# Patient Record
Sex: FEMALE | Race: WHITE | NOT HISPANIC OR LATINO | Employment: UNEMPLOYED | URBAN - METROPOLITAN AREA
[De-identification: names, ages, dates, MRNs, and addresses within clinical notes are randomized per-mention and may not be internally consistent; named-entity substitution may affect disease eponyms.]

---

## 2020-01-30 ENCOUNTER — TELEPHONE (OUTPATIENT)
Dept: HEMATOLOGY/ONCOLOGY | Facility: CLINIC | Age: 35
End: 2020-01-30

## 2020-02-06 ENCOUNTER — TELEPHONE (OUTPATIENT)
Dept: HEMATOLOGY/ONCOLOGY | Facility: CLINIC | Age: 35
End: 2020-02-06

## 2020-02-06 NOTE — TELEPHONE ENCOUNTER
----- Message from Arthur Truong sent at 2/6/2020  3:44 PM CST -----  Contact: pt   Type:  Patient Returning Call    Who Called: pt   Who Left Message for Patient:nurs   Does the patient know what this is regarding?:to schedule an appt   Would the patient rather a call back or a response via [facility]? Phone   Best Call Back Number: [phone]  Additional Information: #

## 2020-07-08 ENCOUNTER — TELEPHONE (OUTPATIENT)
Dept: HEMATOLOGY/ONCOLOGY | Facility: CLINIC | Age: 35
End: 2020-07-08

## 2020-07-08 NOTE — TELEPHONE ENCOUNTER
----- Message from Lali Griffiths NP sent at 7/8/2020  8:26 AM CDT -----  Regarding: genetic testing  Argelia- please contact pt and ask her to check with - her insurance to see if they will pay for genetic testing.     Thanks

## 2020-07-08 NOTE — PROGRESS NOTES
Patient ID: Gina Garcia is a 34 y.o. female.    Chief Complaint: Genetic Evaluation (family history of breast cancer)      HPI: Patient presents for consideration of genetic testing. Dr. Lukas Wright referred pt.    Risk factors identified:     Menarche at 15 y/o  G 2 P 2  First pregnancy at 25 y/o  LMP:6/29/2020  Estrogen: fertility meds for 1 month  Radiation to the neck or chest wall-none  Prior breast biopsies or atypical hyperplasia- none    ETOH: rare     FH: mother breast cancer at 44 y/o. Maternal cousins X 2 breast cancer- ? Age. Paternal grandmother breast cancer in her 60's    Body mass index is 29.23 kg/m².    Review of Systems   Constitutional: Negative.    HENT: Negative.    Eyes: Negative.    Respiratory: Negative.    Cardiovascular: Negative.    Gastrointestinal: Negative.         No reflux   Endocrine: Negative.    Genitourinary: Negative.    Musculoskeletal: Negative.    Skin: Negative.    Allergic/Immunologic: Negative.    Neurological: Negative.    Hematological: Negative.  Negative for adenopathy.   Psychiatric/Behavioral: Negative.      Breast: Pt denies any breast pain, nipple discharge, or palpable mass. No prior trauma or bruising. No breast surgeries or abnormalities.    Current Outpatient Medications   Medication Sig Dispense Refill    SUMAtriptan-naproxen (TREXIMET)  mg Tab TAKE 1 TABLET BY MOUTH EVERY 2 HOURS AS NEEDED MIGRAINE *max 2/day* **MAY CAUSE DROWSINESS**      buPROPion (WELLBUTRIN XL) 150 MG TB24 tablet Take 150 mg by mouth every morning.  2    clonazePAM (KLONOPIN) 0.5 MG tablet   2    fluticasone (FLONASE) 50 mcg/actuation nasal spray 2 sprays by Each Nare route once daily. 1 Bottle 0    promethazine-dextromethorphan (PROMETHAZINE-DM) 6.25-15 mg/5 mL Syrp Take 5 mLs by mouth nightly as needed (Cough). 120 mL 0    TREXIMET  mg Tab   11     No current facility-administered medications for this visit.        Review of patient's allergies indicates:  No  Known Allergies    Past Medical History:   Diagnosis Date    Anxiety     Migraines        Past Surgical History:   Procedure Laterality Date    ACHILLES TENDON SURGERY      EXTERNAL EAR SURGERY      cosmetic       Family History   Problem Relation Age of Onset    Breast cancer Paternal Grandmother     Breast cancer Mother         45    Colon cancer Neg Hx     Ovarian cancer Neg Hx     Thrombophilia Neg Hx        Social History     Socioeconomic History    Marital status:      Spouse name: Not on file    Number of children: Not on file    Years of education: Not on file    Highest education level: Not on file   Occupational History    Not on file   Social Needs    Financial resource strain: Not on file    Food insecurity     Worry: Not on file     Inability: Not on file    Transportation needs     Medical: Not on file     Non-medical: Not on file   Tobacco Use    Smoking status: Former Smoker   Substance and Sexual Activity    Alcohol use: No     Alcohol/week: 0.0 standard drinks    Drug use: No    Sexual activity: Yes     Birth control/protection: None   Lifestyle    Physical activity     Days per week: Not on file     Minutes per session: Not on file    Stress: Not on file   Relationships    Social connections     Talks on phone: Not on file     Gets together: Not on file     Attends Zoroastrianism service: Not on file     Active member of club or organization: Not on file     Attends meetings of clubs or organizations: Not on file     Relationship status: Not on file   Other Topics Concern    Not on file   Social History Narrative    Not on file       There were no vitals filed for this visit.    Physical Exam  Constitutional:       Appearance: She is well-developed.   HENT:      Head: Normocephalic and atraumatic.      Right Ear: External ear normal.      Left Ear: External ear normal.      Mouth/Throat:      Pharynx: No oropharyngeal exudate.   Eyes:      General: No scleral icterus.         Right eye: No discharge.         Left eye: No discharge.      Conjunctiva/sclera: Conjunctivae normal.      Pupils: Pupils are equal, round, and reactive to light.   Neck:      Musculoskeletal: Normal range of motion and neck supple.      Thyroid: No thyromegaly.   Cardiovascular:      Rate and Rhythm: Normal rate and regular rhythm.      Heart sounds: Normal heart sounds.   Pulmonary:      Effort: Pulmonary effort is normal.      Breath sounds: Normal breath sounds.   Chest:      Breasts:         Right: No inverted nipple, mass, nipple discharge, skin change or tenderness.         Left: No inverted nipple, mass, nipple discharge, skin change or tenderness.   Abdominal:      General: Bowel sounds are normal.      Palpations: Abdomen is soft.   Musculoskeletal: Normal range of motion.      Right shoulder: She exhibits no crepitus and normal strength.   Lymphadenopathy:      Head:      Right side of head: No submental, submandibular, tonsillar, preauricular, posterior auricular or occipital adenopathy.      Left side of head: No submental, submandibular, tonsillar, preauricular, posterior auricular or occipital adenopathy.      Cervical: No cervical adenopathy.      Right cervical: No superficial or posterior cervical adenopathy.     Left cervical: No superficial or posterior cervical adenopathy.      Upper Body:      Right upper body: No supraclavicular adenopathy.      Left upper body: No supraclavicular adenopathy.   Skin:     General: Skin is warm and dry.      Coloration: Skin is not pale.      Findings: No erythema or rash.   Neurological:      Mental Status: She is alert and oriented to person, place, and time.      Deep Tendon Reflexes: Reflexes are normal and symmetric.   Psychiatric:         Behavior: Behavior normal.         Thought Content: Thought content normal.         Judgment: Judgment normal.       [facility] mammogram 4 years ago    Assessment & Plan:  Family history of breast  cancer    Counseling and coordination of care    Counseling on health promotion and disease prevention    Health education/counseling      1. Negative clinical findings on exam.  2. Negative imaging- 4 years ago per pt report  3. Recommend pt start screening mammos at age 40 unless otherwise indicated by an abnormal exam or change in family history or noted mutation on genetic testing. We will plan to see her on a yearly basis for examination and update of family history and risk factors  4. Discussed modifiable risk factors such as diet and exercise. Reviewed diet and counseled pt regarding eating more fruits and vegetables throughout the day.  5. Diet/Weight goals- 10# wt loss  6. Exercise:has stationary bike at home- not using  7. Encouraged 30 minutes most days of the week. Explained benefits of losing a few pounds to decrease estrogen exposure from fat cells.  8. Discussed risks vs benefits of genetic testing.  Indications for testing: due to her family history of breast cancer. Explained process of drawing blood- filing with insurance- if denied pt will be notified and given the option of paying out of pocket.   9. Explained 3 possible answers to genetic testing.   - Negative - if testing is negative would proceed with MRI and Mammogram screenings alternating every 6 months with clinical exams.  - Positive - If positive for a mutation- depending on the gene identified - would likely be recommended to see surgeon and plastic surgeon to discuss risk reducing prophylactic lynnette mastectomies with reconstruction with or without TAHBSO depending on test results.  Indication for testing of other relatives explained.  - Variant of undetermined significance - usually does not change follow-up recommendations or screenings unless reclassified in the future as a definitive mutation.   Consent for testing signed after risks vs benefits explained. Will call pt with results.   10. Discussed use of tamoxifen for the reduction  of breast cancer risk- Information regarding risk reducing medications given to pt verbally and in writing. Pt declines at this time due to the potential for hot flashes and blood clots.   11. BSE technique was demonstrated and explained. Related what to look and feel for on exam monthly. Pt verbalized understanding and return demonstrated proper technique and knowledge of what to look for on self-exam. Pt instructed to call if notes any changes. Contact information given.   One hour- 60 minutes was spent with this patient and 75% was spent identifying risk factors, reviewing records and educating pt regarding risk reduction strategies and planning future screenings.            Information about strategies to decrease breast cancer risk factors from Up to Date given to the pt.

## 2020-07-15 ENCOUNTER — OFFICE VISIT (OUTPATIENT)
Dept: HEMATOLOGY/ONCOLOGY | Facility: CLINIC | Age: 35
End: 2020-07-15
Payer: OTHER GOVERNMENT

## 2020-07-15 ENCOUNTER — TELEPHONE (OUTPATIENT)
Dept: HEMATOLOGY/ONCOLOGY | Facility: CLINIC | Age: 35
End: 2020-07-15

## 2020-07-15 VITALS — WEIGHT: 149.69 LBS | BODY MASS INDEX: 29.23 KG/M2

## 2020-07-15 DIAGNOSIS — Z71.89 COUNSELING ON HEALTH PROMOTION AND DISEASE PREVENTION: ICD-10-CM

## 2020-07-15 DIAGNOSIS — Z71.89 COUNSELING AND COORDINATION OF CARE: ICD-10-CM

## 2020-07-15 DIAGNOSIS — Z71.9 HEALTH EDUCATION/COUNSELING: ICD-10-CM

## 2020-07-15 DIAGNOSIS — Z80.3 FAMILY HISTORY OF BREAST CANCER: Primary | ICD-10-CM

## 2020-07-15 PROCEDURE — 99999 PR PBB SHADOW E&M-EST. PATIENT-LVL II: CPT | Mod: PBBFAC,,, | Performed by: NURSE PRACTITIONER

## 2020-07-15 PROCEDURE — 99999 PR PBB SHADOW E&M-EST. PATIENT-LVL II: ICD-10-PCS | Mod: PBBFAC,,, | Performed by: NURSE PRACTITIONER

## 2020-07-15 PROCEDURE — 99205 PR OFFICE/OUTPT VISIT, NEW, LEVL V, 60-74 MIN: ICD-10-PCS | Mod: S$PBB,,, | Performed by: NURSE PRACTITIONER

## 2020-07-15 PROCEDURE — 99212 OFFICE O/P EST SF 10 MIN: CPT | Mod: PBBFAC | Performed by: NURSE PRACTITIONER

## 2020-07-15 PROCEDURE — 99205 OFFICE O/P NEW HI 60 MIN: CPT | Mod: S$PBB,,, | Performed by: NURSE PRACTITIONER

## 2020-07-15 RX ORDER — SUMATRIPTAN AND NAPROXEN SODIUM 85; 500 MG/1; MG/1
TABLET, FILM COATED ORAL
COMMUNITY
Start: 2019-10-18 | End: 2022-03-03

## 2020-07-23 ENCOUNTER — TELEPHONE (OUTPATIENT)
Dept: HEMATOLOGY/ONCOLOGY | Facility: CLINIC | Age: 35
End: 2020-07-23

## 2020-08-10 ENCOUNTER — DOCUMENTATION ONLY (OUTPATIENT)
Dept: SURGERY | Facility: CLINIC | Age: 35
End: 2020-08-10

## 2020-08-10 DIAGNOSIS — Z15.89 BIALLELIC MUTATION OF CHEK2 GENE: Primary | ICD-10-CM

## 2020-08-10 DIAGNOSIS — Z15.09 BIALLELIC MUTATION OF CHEK2 GENE: Primary | ICD-10-CM

## 2020-08-10 DIAGNOSIS — Z15.01 BIALLELIC MUTATION OF CHEK2 GENE: Primary | ICD-10-CM

## 2020-08-10 NOTE — PROGRESS NOTES
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 Clinical Significance: High Cancer Risk This mutation is associated with increased cancer risk and should be regarded as clinically significant.     ADDITIONAL FINDINGS: VARIANT(S) OF UNCERTAIN SIGNIFICANCE (VUS) IDENTIFIED GENE INTERPRETATION VARIANT(S) OF UNCERTAIN SIGNIFICANCE UNCERTAIN CLINICAL SIGNIFICANCE There are currently insufficient data to determine if these variants cause increased cancer risk.     Variant noted in PMS2 c.1004A>G (p.Hja017Vyl) (aka N335S (1004A>G))  Details About Non-Clinically Significant Variants: All individuals     CHEK2-associated Cancer Risk:  This patient has been found to have a mutation in the CHEK2 gene. Women with CHEK2 mutations have a risk for breast cancer that is significantly increased over the 12.5% lifetime risk for women in the general population of the [place]. Men with CHEK2 mutations also have an increased risk for breast cancer.  Estimates of cancer risk for men and women with CHEK2 mutations vary widely and are strongly influenced by family history. In cases where there is no family history of one of these cancers, the risk for a patient with a CHEK2 mutation may be lower than in cases where that cancer has been diagnosed in one or more close relatives. Therefore, the family history of a patient should be considered when deciding on the most appropriate strategies to manage cancer risk, with more aggressive strategies targeted to patients with  significant family histories of related cancers.  Individuals with CHEK2 mutations may have an elevated risk for colorectal cancer, and the National Comprehensive Cancer Network (NCCN) has provided screening recommendations to address this possible risk.  Although many different CHEK2 mutations have been identified, estimated cancer risks for CHEK2 are currently based largely on studies of a single mutation (c.1100del) that is common in patients of  ancestry. These estimates may be modified as we learn more about other CHEK2 mutations in patients of varied ancestries.  Some studies have described a possible increased risk for a wide range of cancers in patients with CHEK2 mutations, including prostate, gastric, thyroid, hematological malignancies, testicular germ cell tumors, and other malignancies. However, these studies are not conclusive and there are currently no medical management guidelines to address these possible risks.  Although there are increased risks for cancer in men and women with mutations in CHEK2, there are interventions that may reduce these risks. Guidelines from the National Comprehensive Cancer Network (NCCN) that may apply are listed below. Since information about the cancer risks associated with CHEK2 mutations is relatively new, and there is still some uncertainty about the best ways to reduce these risks, it may be appropriate to interpret these results in consultation with cancer genetics experts in this emerging area of knowledge.       23%-48% risk up to age 80 for breast cancer    Possible increased risk for colon cancer.    Recommend to pt:    1. bilateral screening mammogram now  2. Appt with oncologist to discuss recommendations for follow-up. Explained to pt risk for breast cancer is not as high as if had BRCA muation. May recommend mammogram alternating with MRi of breast and clinical exams.   3. Recommend pt become established with GI to discuss colon cancer screening  initiation by 39 y/o or sooner if any one in her family is diagnosed below the age of 44 y/o  4. Recommend pt sister be tested for mutation and any other female relatives. Pt states she will discuss with her sister and has given us permission to copy her results and send with her sisters for testing reference purposes.

## 2020-08-10 NOTE — PROGRESS NOTES
NCCN Guidelines:    Annual screening mammogram (consider 3D mammography) and annual MRI with contrast beginning at age 40, or earlier based on family breast cancer history for women with mutations in following genes:  CLINTON  CHEK2  NBN  Breast Cancer Risk Management    Discussing the option of risk-reducing mastectomy based of family history of breast cancer for women with mutations in:  CLINTON  CHEK2  NBN  NF1  PALB2     Will also schedule pt appt with general surgery to discuss risk reducing mastectomies.

## 2020-08-13 ENCOUNTER — HOSPITAL ENCOUNTER (OUTPATIENT)
Dept: RADIOLOGY | Facility: HOSPITAL | Age: 35
Discharge: HOME | End: 2020-08-13
Attending: NURSE PRACTITIONER
Payer: OTHER GOVERNMENT

## 2020-08-13 ENCOUNTER — OFFICE VISIT (OUTPATIENT)
Dept: HEMATOLOGY/ONCOLOGY | Facility: CLINIC | Age: 35
End: 2020-08-13
Payer: OTHER GOVERNMENT

## 2020-08-13 VITALS
SYSTOLIC BLOOD PRESSURE: 131 MMHG | OXYGEN SATURATION: 99 % | HEIGHT: 57 IN | HEART RATE: 78 BPM | RESPIRATION RATE: 18 BRPM | TEMPERATURE: 99 F | BODY MASS INDEX: 33.54 KG/M2 | WEIGHT: 155.44 LBS | DIASTOLIC BLOOD PRESSURE: 78 MMHG

## 2020-08-13 VITALS — HEIGHT: 60 IN | WEIGHT: 149.69 LBS | BODY MASS INDEX: 29.39 KG/M2

## 2020-08-13 DIAGNOSIS — Z15.01 BIALLELIC MUTATION OF CHEK2 GENE WITHOUT DIAGNOSED MALIGNANCY: ICD-10-CM

## 2020-08-13 DIAGNOSIS — Z15.01 BIALLELIC MUTATION OF CHEK2 GENE: ICD-10-CM

## 2020-08-13 DIAGNOSIS — Z15.89 BIALLELIC MUTATION OF CHEK2 GENE WITHOUT DIAGNOSED MALIGNANCY: ICD-10-CM

## 2020-08-13 DIAGNOSIS — Z15.09 CHEK2-RELATED BREAST CANCER: ICD-10-CM

## 2020-08-13 DIAGNOSIS — C50.919 CHEK2-RELATED BREAST CANCER: ICD-10-CM

## 2020-08-13 DIAGNOSIS — Z15.09 BIALLELIC MUTATION OF CHEK2 GENE WITHOUT DIAGNOSED MALIGNANCY: ICD-10-CM

## 2020-08-13 DIAGNOSIS — Z15.89 CHEK2-RELATED BREAST CANCER: ICD-10-CM

## 2020-08-13 DIAGNOSIS — Z15.89 BIALLELIC MUTATION OF CHEK2 GENE: ICD-10-CM

## 2020-08-13 DIAGNOSIS — Z80.51 FAMILY HISTORY OF CANCER OF THE KIDNEY: Primary | ICD-10-CM

## 2020-08-13 DIAGNOSIS — Z15.02 CHEK2-RELATED BREAST CANCER: ICD-10-CM

## 2020-08-13 DIAGNOSIS — Z15.09 BIALLELIC MUTATION OF CHEK2 GENE: ICD-10-CM

## 2020-08-13 PROCEDURE — 99215 OFFICE O/P EST HI 40 MIN: CPT | Mod: S$PBB,,, | Performed by: INTERNAL MEDICINE

## 2020-08-13 PROCEDURE — 77067 MAMMO DIGITAL SCREENING BILAT WITH TOMOSYNTHESIS_CAD: ICD-10-PCS | Mod: 26,,, | Performed by: RADIOLOGY

## 2020-08-13 PROCEDURE — 77063 BREAST TOMOSYNTHESIS BI: CPT | Mod: 26,,, | Performed by: RADIOLOGY

## 2020-08-13 PROCEDURE — 99215 PR OFFICE/OUTPT VISIT, EST, LEVL V, 40-54 MIN: ICD-10-PCS | Mod: S$PBB,,, | Performed by: INTERNAL MEDICINE

## 2020-08-13 PROCEDURE — 99999 PR PBB SHADOW E&M-EST. PATIENT-LVL IV: CPT | Mod: PBBFAC,,, | Performed by: INTERNAL MEDICINE

## 2020-08-13 PROCEDURE — 77063 MAMMO DIGITAL SCREENING BILAT WITH TOMOSYNTHESIS_CAD: ICD-10-PCS | Mod: 26,,, | Performed by: RADIOLOGY

## 2020-08-13 PROCEDURE — 99999 PR PBB SHADOW E&M-EST. PATIENT-LVL IV: ICD-10-PCS | Mod: PBBFAC,,, | Performed by: INTERNAL MEDICINE

## 2020-08-13 PROCEDURE — 77067 SCR MAMMO BI INCL CAD: CPT | Mod: TC

## 2020-08-13 PROCEDURE — 99214 OFFICE O/P EST MOD 30 MIN: CPT | Mod: PBBFAC | Performed by: INTERNAL MEDICINE

## 2020-08-13 PROCEDURE — 77067 SCR MAMMO BI INCL CAD: CPT | Mod: 26,,, | Performed by: RADIOLOGY

## 2020-08-13 NOTE — PROGRESS NOTES
Subjective:       Patient ID: Gina Garcia is a 34 y.o. female.    Chief Complaint: Results    HPI 34-year-old female mother  of breast carcinoma age 45 patient underwent high risk genetic screening found to have CHEK2 positive mutation    Past Medical History:   Diagnosis Date    Anxiety     CHEK2-related breast cancer 2020    Migraines      Family History   Problem Relation Age of Onset    Breast cancer Paternal Grandmother     Cancer Paternal Grandmother         prostate    Breast cancer Mother 45        estrogen positive    Breast cancer Other     Breast cancer Other     Breast cancer Other     Breast cancer Other     Lung cancer Maternal Grandmother     Colon cancer Neg Hx         colon cancer    Ovarian cancer Neg Hx     Thrombophilia Neg Hx      Social History     Socioeconomic History    Marital status:      Spouse name: Not on file    Number of children: Not on file    Years of education: Not on file    Highest education level: Not on file   Occupational History    Not on file   Social Needs    Financial resource strain: Not on file    Food insecurity     Worry: Not on file     Inability: Not on file    Transportation needs     Medical: Not on file     Non-medical: Not on file   Tobacco Use    Smoking status: Former Smoker   Substance and Sexual Activity    Alcohol use: No     Alcohol/week: 0.0 standard drinks    Drug use: No    Sexual activity: Yes     Birth control/protection: None   Lifestyle    Physical activity     Days per week: Not on file     Minutes per session: Not on file    Stress: Not on file   Relationships    Social connections     Talks on phone: Not on file     Gets together: Not on file     Attends Druze service: Not on file     Active member of club or organization: Not on file     Attends meetings of clubs or organizations: Not on file     Relationship status: Not on file   Other Topics Concern    Not on file   Social History  Narrative    Not on file     Past Surgical History:   Procedure Laterality Date    ACHILLES TENDON SURGERY      EXTERNAL EAR SURGERY      cosmetic       Labs:  Lab Results   Component Value Date    WBC 11.05 (H) 11/30/2009    HGB 12.2 11/30/2009    HCT 36.1 (L) 11/30/2009    MCV 95.0 11/30/2009     11/30/2009     BMP  Lab Results   Component Value Date     08/26/2008    K 3.7 08/26/2008     08/26/2008    CO2 21 (L) 08/26/2008    BUN 8 08/26/2008    CREATININE 0.6 08/26/2008    CALCIUM 10.0 08/26/2008     Lab Results   Component Value Date    ALT 29 08/26/2008    AST 27 08/26/2008    ALKPHOS 64 08/26/2008    BILITOT 0.4 08/26/2008       No results found for: IRON, TIBC, FERRITIN, SATURATEDIRO  No results found for: [id number]  No results found for: FOLATE  Lab Results   Component Value Date    TSH 1.8 07/02/2008         Review of Systems   Constitutional: Negative for activity change, appetite change, chills, diaphoresis, fatigue, fever and unexpected weight change.   HENT: Negative for congestion, dental problem, drooling, ear discharge, ear pain, facial swelling, hearing loss, mouth sores, nosebleeds, postnasal drip, rhinorrhea, sinus pressure, sneezing, sore throat, tinnitus, trouble swallowing and voice change.    Eyes: Negative for photophobia, pain, discharge, redness, itching and visual disturbance.   Respiratory: Negative for cough, choking, chest tightness, shortness of breath, wheezing and stridor.    Cardiovascular: Negative for chest pain, palpitations and leg swelling.   Gastrointestinal: Negative for abdominal distention, abdominal pain, anal bleeding, blood in stool, constipation, diarrhea, nausea, rectal pain and vomiting.   Endocrine: Negative for cold intolerance, heat intolerance, polydipsia, polyphagia and polyuria.   Genitourinary: Negative for decreased urine volume, difficulty urinating, dyspareunia, dysuria, enuresis, flank pain, frequency, genital sores, hematuria,  menstrual problem, pelvic pain, urgency, vaginal bleeding, vaginal discharge and vaginal pain.   Musculoskeletal: Negative for arthralgias, back pain, gait problem, joint swelling, myalgias, neck pain and neck stiffness.   Skin: Negative for color change, pallor and rash.   Allergic/Immunologic: Negative for environmental allergies, food allergies and immunocompromised state.   Neurological: Negative for dizziness, tremors, seizures, syncope, facial asymmetry, speech difficulty, weakness, light-headedness, numbness and headaches.   Hematological: Negative for adenopathy. Does not bruise/bleed easily.   Psychiatric/Behavioral: Positive for dysphoric mood. Negative for agitation, behavioral problems, confusion, decreased concentration, hallucinations, self-injury, sleep disturbance and suicidal ideas. The patient is nervous/anxious. The patient is not hyperactive.        Objective:      Physical Exam  Vitals signs reviewed.   Constitutional:       General: She is not in acute distress.     Appearance: She is well-developed. She is not diaphoretic.   HENT:      Head: Normocephalic and atraumatic.      Right Ear: External ear normal.      Left Ear: External ear normal.      Nose: Nose normal.      Right Sinus: No maxillary sinus tenderness or frontal sinus tenderness.      Left Sinus: No maxillary sinus tenderness or frontal sinus tenderness.      Mouth/Throat:      Pharynx: No oropharyngeal exudate.   Eyes:      General: Lids are normal. No scleral icterus.        Right eye: No discharge.         Left eye: No discharge.      Conjunctiva/sclera: Conjunctivae normal.      Right eye: Right conjunctiva is not injected. No hemorrhage.     Left eye: Left conjunctiva is not injected. No hemorrhage.     Pupils: Pupils are equal, round, and reactive to light.   Neck:      Musculoskeletal: Normal range of motion and neck supple.      Thyroid: No thyromegaly.      Vascular: No JVD.      Trachea: No tracheal deviation.    Cardiovascular:      Rate and Rhythm: Normal rate.   Pulmonary:      Effort: Pulmonary effort is normal. No respiratory distress.      Breath sounds: No stridor.   Chest:      Chest wall: No tenderness.   Abdominal:      General: Bowel sounds are normal. There is no distension.      Palpations: Abdomen is soft. There is no hepatomegaly, splenomegaly or mass.      Tenderness: There is no abdominal tenderness. There is no rebound.   Musculoskeletal: Normal range of motion.         General: No tenderness.   Lymphadenopathy:      Cervical: No cervical adenopathy.      Upper Body:      Right upper body: No supraclavicular adenopathy.      Left upper body: No supraclavicular adenopathy.   Skin:     General: Skin is dry.      Findings: No erythema or rash.   Neurological:      Mental Status: She is alert and oriented to person, place, and time.      Cranial Nerves: No cranial nerve deficit.      Coordination: Coordination normal.   Psychiatric:         Behavior: Behavior normal.         Thought Content: Thought content normal.         Judgment: Judgment normal.             Assessment:      1. Family history of cancer of the kidney    2. CHEK2-related breast cancer    3. 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  Patient concerned over germ line mutation.  Is scheduled to see general surgery for possible prophylactic mastectomy.  In addition will have her seen by Medical Genetics further discussion of options.  She has had a concern over kidney cancer I have ordered ultrasound of her kidney.  I am not aware of this being associated with checked to but to relieve her anxiety of reasonable course of action.  At this time follow-up through surgery with plastic surgery consultation as well as Medical Genetics answered questions of family and copies of reports given to her 40 min face-to-face time coordination care greater 50% time face-to-face with  patient        Jak Queen Jr, MD FACP

## 2020-08-20 ENCOUNTER — OFFICE VISIT (OUTPATIENT)
Dept: SURGERY | Facility: CLINIC | Age: 35
End: 2020-08-20
Payer: OTHER GOVERNMENT

## 2020-08-20 VITALS — HEART RATE: 58 BPM | TEMPERATURE: 98 F | SYSTOLIC BLOOD PRESSURE: 125 MMHG | DIASTOLIC BLOOD PRESSURE: 81 MMHG

## 2020-08-20 DIAGNOSIS — Z15.09 BIALLELIC MUTATION OF CHEK2 GENE WITHOUT DIAGNOSED MALIGNANCY: Primary | ICD-10-CM

## 2020-08-20 DIAGNOSIS — Z15.89 BIALLELIC MUTATION OF CHEK2 GENE WITHOUT DIAGNOSED MALIGNANCY: Primary | ICD-10-CM

## 2020-08-20 DIAGNOSIS — Z15.01 BIALLELIC MUTATION OF CHEK2 GENE WITHOUT DIAGNOSED MALIGNANCY: Primary | ICD-10-CM

## 2020-08-20 PROCEDURE — 99999 PR PBB SHADOW E&M-EST. PATIENT-LVL III: ICD-10-PCS | Mod: PBBFAC,,, | Performed by: SURGERY

## 2020-08-20 PROCEDURE — 99204 PR OFFICE/OUTPT VISIT, NEW, LEVL IV, 45-59 MIN: ICD-10-PCS | Mod: S$PBB,,, | Performed by: SURGERY

## 2020-08-20 PROCEDURE — 99213 OFFICE O/P EST LOW 20 MIN: CPT | Mod: PBBFAC | Performed by: SURGERY

## 2020-08-20 PROCEDURE — 99999 PR PBB SHADOW E&M-EST. PATIENT-LVL III: CPT | Mod: PBBFAC,,, | Performed by: SURGERY

## 2020-08-20 PROCEDURE — 99204 OFFICE O/P NEW MOD 45 MIN: CPT | Mod: S$PBB,,, | Performed by: SURGERY

## 2020-08-20 NOTE — PROGRESS NOTES
[facility]  Breast Surgery  Department of General Surgery      Date of Visit:  2020    Referring [place]  [id number] [facility] [place] [address]    PCP:  Lukas Martinez MD    HIGH RISK    Gina Garcia is a 34 y.o. premenopausal female referred for evaluation of increased risk of breast cancer based on family history and + CHECK2 mutation.  Here today to discussed options of high risk screening and risk reduction.    Other breast cancer risk factors include family hx on mother's side, family hx on father's side, mom with breast CA, family hx of colon CA and family hx of Prostate CA.     Age of menarche was 15.    Age of menopause was NA.    Last menstrual period was 2020.    Patient denies hormonal therapy. IUD for 8 years - paraguard.     Patient is .    Age of first live birth was 23.    Patient did breast feed.       Family History is significant for the following:  Mother breast cancer at age 45, living, ER+  Paternal grandmother with breast cancer  4 maternal great aunts with breast cancer  Maternal and paternal grandfather with prostate cancer  Great uncle with kidney cancer  Also history of colon cancer, unknown family members    Social History:   Smoking:  never  Drinking:  Socially  Caffeine: daily     The following portions of the patient's history were reviewed and updated as appropriate: allergies, current medications, past family history, past medical history, past social history, past surgical history and problem list.    Review of Systems  Pertinent items are noted in HPI.      Objective:   /81   Pulse (!) 58   Temp 98 °F (36.7 °C) (Oral)     General: NAD  Chest: nonlabored breathing, no obvious deformity, no axillary LAD, no palpable masses, macromastia with ptosis   Heart: regular rate  Abdomen: soft, nondistended  Extremities: no edema noted      Imaging  Mammograms:  BIRADS 1 mammogram 2020       Assessment:     This is  a 34 y.o. female with an increased risk of breast cancer based on CHECK 2 mutation and family history.        Plan:   We discussed that she is at elevated risk for breast cancer based on family history and CHECK2 mutation.     We did discuss advanced screening with clinical breast exam every 6 months and MRI annually versus bilateral prophylactic mastectomy. There is lack of evidence supporting this as routinely recommended treatment; however, based on her significant family history, it is reasonable. The patient is requesting BPM with reconstruction for risk reduction.     We also discussed risk reduction options. Discussed that we recommend a healthy lifestyle.      Nutrition we recommend fresh fruits and vegetables and lean meats and avoidance of processed foods.    Exercise I recommend at least 30 minutes of cardiovascular exercise 4-5 times per week, even walking would have benefit.  Discussed that exercise can lower the relative risk of breast cancer by about 18-20%.  Also discussed that obesity is linked to higher risk of breast cancer, therefore exercise in important.    Discussed alcohol use and some studies suggest that increase alcohol intake may increase breast cancer risk.  Therefore, I do recommend limited alcohol intake.    Also discussed risk factors that are not modifiable, such as age at menarche, age at menopause, age at first pregnancy, and family history.     Will refer to plastic surgery and plan surgery accordingly.     Berkley Duckworth

## 2020-08-21 ENCOUNTER — TELEPHONE (OUTPATIENT)
Dept: RADIOLOGY | Facility: HOSPITAL | Age: 35
End: 2020-08-21

## 2020-08-28 ENCOUNTER — TELEPHONE (OUTPATIENT)
Dept: RADIOLOGY | Facility: HOSPITAL | Age: 35
End: 2020-08-28

## 2020-10-09 ENCOUNTER — TELEPHONE (OUTPATIENT)
Dept: GENETICS | Facility: CLINIC | Age: 35
End: 2020-10-09

## 2020-10-23 ENCOUNTER — TELEPHONE (OUTPATIENT)
Dept: RADIOLOGY | Facility: HOSPITAL | Age: 35
End: 2020-10-23

## 2020-10-26 ENCOUNTER — HOSPITAL ENCOUNTER (OUTPATIENT)
Dept: RADIOLOGY | Facility: HOSPITAL | Age: 35
Discharge: HOME | End: 2020-10-26
Attending: INTERNAL MEDICINE
Payer: OTHER GOVERNMENT

## 2020-10-26 DIAGNOSIS — Z80.51 FAMILY HISTORY OF CANCER OF THE KIDNEY: ICD-10-CM

## 2020-10-26 PROCEDURE — 76770 US RETROPERITONEAL COMPLETE: ICD-10-PCS | Mod: 26,,, | Performed by: RADIOLOGY

## 2020-10-26 PROCEDURE — 76770 US EXAM ABDO BACK WALL COMP: CPT | Mod: TC

## 2020-10-26 PROCEDURE — 76770 US EXAM ABDO BACK WALL COMP: CPT | Mod: 26,,, | Performed by: RADIOLOGY

## 2020-10-30 ENCOUNTER — TELEPHONE (OUTPATIENT)
Dept: SURGERY | Facility: CLINIC | Age: 35
End: 2020-10-30

## 2020-10-30 NOTE — TELEPHONE ENCOUNTER
"Spoke with Suleiman Garcia/Zulema in regards to upcoming sx is not approve at this time due to referral(Dr Almaraz was  not rec'd. Informed spouse contacted Dr Lukas Martinez-PCP office, staff member was not available at the time per Alix and a message was left with Alix/Central Scheduling a referral(Dr Almaraz) is needed "STAT" prior to upcoming sx on 11/16/2020 [facility]. Contact information was provided. Alix verbalized an understanding  "

## 2020-11-02 ENCOUNTER — TELEPHONE (OUTPATIENT)
Dept: SURGERY | Facility: CLINIC | Age: 35
End: 2020-11-02

## 2020-11-02 ENCOUNTER — PATIENT MESSAGE (OUTPATIENT)
Dept: SURGERY | Facility: CLINIC | Age: 35
End: 2020-11-02

## 2020-11-02 NOTE — TELEPHONE ENCOUNTER
"Spoke to pt and pt's  - Advised that I will send a message through her portal stating Dr Wright office needs to state in his referral for Plastic Surgery " Dr Almaraz is Dr Duckworth's preferred Plastic Surgeon" for Shannan Barr to give a Prior Auth  - Both pt and pt's  verbalized understanding     ----- Message from Melody Ingram sent at 11/2/2020  9:05 AM CST -----  Contact: Pt / Suleiman Bearden would like a call back at  [phone] in regards to referral information.  He would like a time frame for surgery.    Thanks    Melody Ingram      "

## 2021-03-13 ENCOUNTER — IMMUNIZATION (OUTPATIENT)
Dept: PHARMACY | Facility: CLINIC | Age: 36
End: 2021-03-13
Payer: OTHER GOVERNMENT

## 2021-03-13 DIAGNOSIS — Z23 NEED FOR VACCINATION: Primary | ICD-10-CM

## 2021-04-10 ENCOUNTER — IMMUNIZATION (OUTPATIENT)
Dept: PHARMACY | Facility: CLINIC | Age: 36
End: 2021-04-10
Payer: OTHER GOVERNMENT

## 2021-04-10 DIAGNOSIS — Z23 NEED FOR VACCINATION: Primary | ICD-10-CM

## 2021-08-25 ENCOUNTER — OFFICE VISIT (OUTPATIENT)
Dept: GASTROENTEROLOGY | Facility: CLINIC | Age: 36
End: 2021-08-25
Payer: OTHER GOVERNMENT

## 2021-08-25 DIAGNOSIS — R14.0 BLOATING: ICD-10-CM

## 2021-08-25 DIAGNOSIS — Z15.01 BIALLELIC MUTATION OF CHEK2 GENE: ICD-10-CM

## 2021-08-25 DIAGNOSIS — K59.00 CONSTIPATION, UNSPECIFIED CONSTIPATION TYPE: Primary | ICD-10-CM

## 2021-08-25 DIAGNOSIS — Z15.09 BIALLELIC MUTATION OF CHEK2 GENE: ICD-10-CM

## 2021-08-25 DIAGNOSIS — E66.9 OBESITY, UNSPECIFIED CLASSIFICATION, UNSPECIFIED OBESITY TYPE, UNSPECIFIED WHETHER SERIOUS COMORBIDITY PRESENT: ICD-10-CM

## 2021-08-25 DIAGNOSIS — Z15.89 BIALLELIC MUTATION OF CHEK2 GENE: ICD-10-CM

## 2021-08-25 PROCEDURE — 99204 OFFICE O/P NEW MOD 45 MIN: CPT | Mod: 95,,, | Performed by: INTERNAL MEDICINE

## 2021-08-25 PROCEDURE — 99204 PR OFFICE/OUTPT VISIT, NEW, LEVL IV, 45-59 MIN: ICD-10-PCS | Mod: 95,,, | Performed by: INTERNAL MEDICINE

## 2022-01-13 ENCOUNTER — PATIENT MESSAGE (OUTPATIENT)
Dept: ENDOSCOPY | Facility: HOSPITAL | Age: 37
End: 2022-01-13
Payer: OTHER GOVERNMENT

## 2022-01-18 ENCOUNTER — PATIENT MESSAGE (OUTPATIENT)
Dept: HEMATOLOGY/ONCOLOGY | Facility: CLINIC | Age: 37
End: 2022-01-18
Payer: OTHER GOVERNMENT

## 2022-01-19 NOTE — PROGRESS NOTES
Patient ID: Gina Garcia is a 36 y.o. female.    Chief Complaint: CHEK2 mutation    HPI: Patient presents for f/u regarding CHEK 2 genetic mutation. Dr. Lukas Wright referred pt.    Pt had genetic testing 7/15/2020 that revealed a CHEK2 mutation    Pt notified family members regarding need for testing.     Pt has intentionally lost wt over the past 2 years.    NCCN Guidelines:     Annual screening mammogram (consider 3D mammography) and annual MRI with contrast beginning at age 40, or earlier based on family breast cancer history for women with mutations in following genes:  CLINTON  CHEK2  NBN  Breast Cancer Risk Management     Discussing the option of risk-reducing mastectomy based of family history of breast cancer for women with mutations in:  CLINTON  CHEK2  NBN  NF1  PALB2     Pt had mammogram 8/13/2020- wnl risk score 23.79%- pt has not had imaging since    Pt met with oncology and general and plastic surgery to discuss chemoprevention and prophylactic mastectomies with reconstruction- pt has not proceeded with surgery due to insurance issues    Plans to check back in with plastics to see if can get surgery approved this year    Risk factors identified:     Menarche at 13 y/o  G 2 P 2  First pregnancy at 23 y/o  LMP: 1/17/2022  Estrogen: fertility meds for 1 month  Radiation to the neck or chest wall-none  Prior breast biopsies or atypical hyperplasia- none    ETOH: rare     FH: mother breast cancer at 44 y/o. Maternal cousins X 2 breast cancer- ? Age. Paternal grandmother breast cancer in her 60's, father- prostate cancer- 68 y/o    Body mass index is 25.6 kg/m².    Review of Systems   Constitutional: Negative.    HENT: Negative.    Eyes: Negative.    Respiratory: Negative.    Cardiovascular: Negative.    Gastrointestinal: Negative.         No reflux   Endocrine: Negative.    Genitourinary: Negative.    Musculoskeletal: Negative.    Skin: Negative.    Allergic/Immunologic: Negative.    Neurological:  Negative.    Hematological: Negative.  Negative for adenopathy.   Psychiatric/Behavioral: Negative.      Breast: Pt denies any breast pain, nipple discharge, or palpable mass. No prior trauma or bruising. No breast surgeries or abnormalities.    Current Outpatient Medications   Medication Sig Dispense Refill    buPROPion (WELLBUTRIN XL) 150 MG TB24 tablet Take 150 mg by mouth every morning.  2    clonazePAM (KLONOPIN) 0.5 MG tablet   2    fluticasone (FLONASE) 50 mcg/actuation nasal spray 2 sprays by Each Nare route once daily. 1 Bottle 0    promethazine-dextromethorphan (PROMETHAZINE-DM) 6.25-15 mg/5 mL Syrp Take 5 mLs by mouth nightly as needed (Cough). 120 mL 0    SUMAtriptan-naproxen (TREXIMET)  mg Tab TAKE 1 TABLET BY MOUTH EVERY 2 HOURS AS NEEDED MIGRAINE *max 2/day* **MAY CAUSE DROWSINESS**      TREXIMET  mg Tab   11     No current facility-administered medications for this visit.       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    Migraines        Past Surgical History:   Procedure Laterality Date    ACHILLES TENDON SURGERY      EXTERNAL EAR SURGERY      cosmetic       Family History   Problem Relation Age of Onset    Breast cancer Paternal Grandmother     Cancer Paternal Grandmother         prostate    Breast cancer Mother 45        estrogen positive    Breast cancer Other     Breast cancer Other     Breast cancer Other     Breast cancer Other     Lung cancer Maternal Grandmother     Colon cancer Neg Hx         colon cancer    Ovarian cancer Neg Hx     Thrombophilia Neg Hx        Social History     Socioeconomic History    Marital status:    Tobacco Use    Smoking status: Former Smoker   Substance and Sexual Activity    Alcohol use: No     Alcohol/week: 0.0 standard drinks    Drug use: No    Sexual activity: Yes     Birth control/protection: None        Vitals:    01/27/22 0820   BP: 125/80   Pulse: 71   Temp: 97.3 °F (36.3 °C)       Physical Exam  Constitutional:       Appearance: She is well-developed.   HENT:      Head: Normocephalic and atraumatic.      Right Ear: External ear normal.      Left Ear: External ear normal.      Mouth/Throat:      Pharynx: No oropharyngeal exudate.   Eyes:      General: No scleral icterus.        Right eye: No discharge.         Left eye: No discharge.      Conjunctiva/sclera: Conjunctivae normal.      Pupils: Pupils are equal, round, and reactive to light.   Neck:      Thyroid: No thyromegaly.   Cardiovascular:      Rate and Rhythm: Normal rate and regular rhythm.      Heart sounds: Normal heart sounds.   Pulmonary:      Effort: Pulmonary effort is normal.      Breath sounds: Normal breath sounds.   Chest:   Breasts:      Right: No inverted nipple, mass, nipple discharge, skin change, tenderness or supraclavicular adenopathy.      Left: No inverted nipple, mass, nipple discharge, skin change, tenderness or supraclavicular adenopathy.       Abdominal:      General: Bowel sounds are normal.      Palpations: Abdomen is soft.   Musculoskeletal:         General: Normal range of motion.      Right shoulder: No crepitus. Normal strength.      Cervical back: Normal range of motion and neck supple.   Lymphadenopathy:      Head:      Right side of head: No submental, submandibular, tonsillar, preauricular, posterior auricular or occipital adenopathy.      Left side of head: No submental, submandibular, tonsillar, preauricular, posterior auricular or occipital adenopathy.      Cervical: No cervical adenopathy.      Right cervical: No superficial or posterior cervical adenopathy.     Left cervical: No superficial or posterior cervical adenopathy.      Upper Body:      Right upper body: No supraclavicular adenopathy.      Left upper body: No supraclavicular adenopathy.   Skin:     General: Skin is warm and dry.      Coloration: Skin is  not pale.      Findings: No erythema or rash.   Neurological:      Mental Status: She is alert and oriented to person, place, and time.      Deep Tendon Reflexes: Reflexes are normal and symmetric.   Psychiatric:         Behavior: Behavior normal.         Thought Content: Thought content normal.         Judgment: Judgment normal.       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 Negative clinical findings on exam.  2. Negative imaging- 8/13/2020- due for mammo now. Would recommend mammos alternating with MRI of the breast due to CHEK2 mutation- MRI would be due July 2022 with exam if pt has not had mastectomies and reconstruction- referral to gen surg placed to est care again  3. Discussed modifiable risk factors such as diet and exercise. Reviewed diet and counseled pt regarding eating more fruits and vegetables throughout the day.  4. Diet/Weight goals- maintain wt loss  5. Exercise:has stationary bike at home- not using  6. Encouraged 30 minutes most days of the week. Explained benefits of losing a few pounds to decrease estrogen exposure from fat cells.  7. Discussed use of tamoxifen for the reduction of breast cancer risk- Information regarding risk reducing medications given to pt verbally and in writing. Pt declines at this time due to the potential for hot flashes and blood clots.   8. BSE technique was demonstrated and explained. Related what to look and feel for on exam monthly. Pt verbalized understanding and return demonstrated proper technique and knowledge of what to look for on self-exam. Pt instructed to call if notes any changes. Contact information given.   30 minutes was spent with this patient and 75% was spent identifying risk factors, reviewing records and educating pt regarding risk reduction strategies and planning future  screenings.

## 2022-01-27 ENCOUNTER — OFFICE VISIT (OUTPATIENT)
Dept: HEMATOLOGY/ONCOLOGY | Facility: CLINIC | Age: 37
End: 2022-01-27
Payer: OTHER GOVERNMENT

## 2022-01-27 VITALS
TEMPERATURE: 97 F | DIASTOLIC BLOOD PRESSURE: 80 MMHG | BODY MASS INDEX: 25.55 KG/M2 | WEIGHT: 126.75 LBS | HEIGHT: 59 IN | HEART RATE: 71 BPM | OXYGEN SATURATION: 99 % | SYSTOLIC BLOOD PRESSURE: 125 MMHG

## 2022-01-27 DIAGNOSIS — Z15.01 BIALLELIC MUTATION OF CHEK2 GENE WITHOUT DIAGNOSED MALIGNANCY: Primary | ICD-10-CM

## 2022-01-27 DIAGNOSIS — Z15.09 BIALLELIC MUTATION OF CHEK2 GENE WITHOUT DIAGNOSED MALIGNANCY: Primary | ICD-10-CM

## 2022-01-27 DIAGNOSIS — Z15.89 BIALLELIC MUTATION OF CHEK2 GENE WITHOUT DIAGNOSED MALIGNANCY: Primary | ICD-10-CM

## 2022-01-27 PROCEDURE — 99214 OFFICE O/P EST MOD 30 MIN: CPT | Mod: S$PBB,,, | Performed by: NURSE PRACTITIONER

## 2022-01-27 PROCEDURE — 99214 OFFICE O/P EST MOD 30 MIN: CPT | Mod: PBBFAC | Performed by: NURSE PRACTITIONER

## 2022-01-27 PROCEDURE — 99214 PR OFFICE/OUTPT VISIT, EST, LEVL IV, 30-39 MIN: ICD-10-PCS | Mod: S$PBB,,, | Performed by: NURSE PRACTITIONER

## 2022-01-27 PROCEDURE — 99999 PR PBB SHADOW E&M-EST. PATIENT-LVL IV: ICD-10-PCS | Mod: PBBFAC,,, | Performed by: NURSE PRACTITIONER

## 2022-01-27 PROCEDURE — 99999 PR PBB SHADOW E&M-EST. PATIENT-LVL IV: CPT | Mod: PBBFAC,,, | Performed by: NURSE PRACTITIONER

## 2022-01-28 ENCOUNTER — PATIENT MESSAGE (OUTPATIENT)
Dept: ENDOSCOPY | Facility: HOSPITAL | Age: 37
End: 2022-01-28
Payer: OTHER GOVERNMENT

## 2022-01-28 RX ORDER — SOD SULF/POT CHLORIDE/MAG SULF 1.479 G
12 TABLET ORAL DAILY
Qty: 24 TABLET | Refills: 0 | Status: ON HOLD | OUTPATIENT
Start: 2022-01-28 | End: 2022-03-28 | Stop reason: HOSPADM

## 2022-02-21 ENCOUNTER — HOSPITAL ENCOUNTER (OUTPATIENT)
Dept: RADIOLOGY | Facility: HOSPITAL | Age: 37
Discharge: HOME | End: 2022-02-21
Attending: NURSE PRACTITIONER
Payer: OTHER GOVERNMENT

## 2022-02-21 VITALS — WEIGHT: 126 LBS | BODY MASS INDEX: 25.4 KG/M2 | HEIGHT: 59 IN

## 2022-02-21 DIAGNOSIS — Z15.09 BIALLELIC MUTATION OF CHEK2 GENE WITHOUT DIAGNOSED MALIGNANCY: ICD-10-CM

## 2022-02-21 DIAGNOSIS — Z15.01 BIALLELIC MUTATION OF CHEK2 GENE WITHOUT DIAGNOSED MALIGNANCY: ICD-10-CM

## 2022-02-21 DIAGNOSIS — Z15.89 BIALLELIC MUTATION OF CHEK2 GENE WITHOUT DIAGNOSED MALIGNANCY: ICD-10-CM

## 2022-02-21 PROCEDURE — 77063 BREAST TOMOSYNTHESIS BI: CPT | Mod: 26,,, | Performed by: RADIOLOGY

## 2022-02-21 PROCEDURE — 77063 MAMMO DIGITAL SCREENING BILAT WITH TOMO: ICD-10-PCS | Mod: 26,,, | Performed by: RADIOLOGY

## 2022-02-21 PROCEDURE — 77067 MAMMO DIGITAL SCREENING BILAT WITH TOMO: ICD-10-PCS | Mod: 26,,, | Performed by: RADIOLOGY

## 2022-02-21 PROCEDURE — 77067 SCR MAMMO BI INCL CAD: CPT | Mod: TC

## 2022-02-21 PROCEDURE — 77067 SCR MAMMO BI INCL CAD: CPT | Mod: 26,,, | Performed by: RADIOLOGY

## 2022-02-22 ENCOUNTER — HOSPITAL ENCOUNTER (OUTPATIENT)
Dept: RADIOLOGY | Facility: HOSPITAL | Age: 37
Discharge: HOME | End: 2022-02-22
Attending: NURSE PRACTITIONER
Payer: OTHER GOVERNMENT

## 2022-02-22 ENCOUNTER — PATIENT MESSAGE (OUTPATIENT)
Dept: HEMATOLOGY/ONCOLOGY | Facility: CLINIC | Age: 37
End: 2022-02-22
Payer: OTHER GOVERNMENT

## 2022-02-22 DIAGNOSIS — R92.8 ABNORMAL MAMMOGRAM: ICD-10-CM

## 2022-02-22 DIAGNOSIS — R92.8 ABNORMAL MAMMOGRAM: Primary | ICD-10-CM

## 2022-02-22 DIAGNOSIS — R92.1 CALCIFICATION OF RIGHT BREAST ON MAMMOGRAPHY: Primary | ICD-10-CM

## 2022-02-22 PROCEDURE — 77062 MAMMO DIGITAL DIAGNOSTIC BILAT WITH TOMO: ICD-10-PCS | Mod: 26,,, | Performed by: RADIOLOGY

## 2022-02-22 PROCEDURE — 77062 BREAST TOMOSYNTHESIS BI: CPT | Mod: 26,,, | Performed by: RADIOLOGY

## 2022-02-22 PROCEDURE — 76642 ULTRASOUND BREAST LIMITED: CPT | Mod: TC,50

## 2022-02-22 PROCEDURE — 77066 DX MAMMO INCL CAD BI: CPT | Mod: 26,,, | Performed by: RADIOLOGY

## 2022-02-22 PROCEDURE — 77066 MAMMO DIGITAL DIAGNOSTIC BILAT WITH TOMO: ICD-10-PCS | Mod: 26,,, | Performed by: RADIOLOGY

## 2022-02-22 PROCEDURE — 76642 ULTRASOUND BREAST LIMITED: CPT | Mod: 26,50,, | Performed by: RADIOLOGY

## 2022-02-22 PROCEDURE — 76642 US BREAST BILATERAL LIMITED: ICD-10-PCS | Mod: 26,50,, | Performed by: RADIOLOGY

## 2022-02-22 PROCEDURE — 77066 DX MAMMO INCL CAD BI: CPT | Mod: TC

## 2022-02-23 ENCOUNTER — OFFICE VISIT (OUTPATIENT)
Dept: SURGERY | Facility: CLINIC | Age: 37
End: 2022-02-23
Payer: OTHER GOVERNMENT

## 2022-02-23 ENCOUNTER — PATIENT MESSAGE (OUTPATIENT)
Dept: HEMATOLOGY/ONCOLOGY | Facility: CLINIC | Age: 37
End: 2022-02-23
Payer: OTHER GOVERNMENT

## 2022-02-23 VITALS
SYSTOLIC BLOOD PRESSURE: 128 MMHG | DIASTOLIC BLOOD PRESSURE: 66 MMHG | WEIGHT: 121.94 LBS | BODY MASS INDEX: 24.62 KG/M2 | HEART RATE: 54 BPM

## 2022-02-23 DIAGNOSIS — Z15.01 BIALLELIC MUTATION OF CHEK2 GENE WITHOUT DIAGNOSED MALIGNANCY: Primary | ICD-10-CM

## 2022-02-23 DIAGNOSIS — Z15.09 BIALLELIC MUTATION OF CHEK2 GENE WITHOUT DIAGNOSED MALIGNANCY: Primary | ICD-10-CM

## 2022-02-23 DIAGNOSIS — Z15.89 BIALLELIC MUTATION OF CHEK2 GENE WITHOUT DIAGNOSED MALIGNANCY: Primary | ICD-10-CM

## 2022-02-23 DIAGNOSIS — Z80.51 FAMILY HISTORY OF CANCER OF THE KIDNEY: ICD-10-CM

## 2022-02-23 PROCEDURE — 99999 PR PBB SHADOW E&M-EST. PATIENT-LVL III: ICD-10-PCS | Mod: PBBFAC,,, | Performed by: SURGERY

## 2022-02-23 PROCEDURE — 99214 PR OFFICE/OUTPT VISIT, EST, LEVL IV, 30-39 MIN: ICD-10-PCS | Mod: S$PBB,,, | Performed by: SURGERY

## 2022-02-23 PROCEDURE — 99213 OFFICE O/P EST LOW 20 MIN: CPT | Mod: PBBFAC,PO | Performed by: SURGERY

## 2022-02-23 PROCEDURE — 99999 PR PBB SHADOW E&M-EST. PATIENT-LVL III: CPT | Mod: PBBFAC,,, | Performed by: SURGERY

## 2022-02-23 PROCEDURE — 99214 OFFICE O/P EST MOD 30 MIN: CPT | Mod: S$PBB,,, | Performed by: SURGERY

## 2022-03-04 ENCOUNTER — HOSPITAL ENCOUNTER (OUTPATIENT)
Dept: RADIOLOGY | Facility: HOSPITAL | Age: 37
Discharge: HOME | End: 2022-03-04
Attending: NURSE PRACTITIONER
Payer: OTHER GOVERNMENT

## 2022-03-04 DIAGNOSIS — R92.1 CALCIFICATION OF RIGHT BREAST ON MAMMOGRAPHY: ICD-10-CM

## 2022-03-04 DIAGNOSIS — R92.8 ABNORMAL MAMMOGRAM: ICD-10-CM

## 2022-03-04 PROCEDURE — 88305 TISSUE EXAM BY PATHOLOGIST: CPT | Performed by: PATHOLOGY

## 2022-03-04 PROCEDURE — 88305 TISSUE EXAM BY PATHOLOGIST: ICD-10-PCS | Mod: 26,,, | Performed by: PATHOLOGY

## 2022-03-04 PROCEDURE — 88305 TISSUE EXAM BY PATHOLOGIST: CPT | Mod: 26,,, | Performed by: PATHOLOGY

## 2022-03-04 PROCEDURE — 19081 BX BREAST 1ST LESION STRTCTC: CPT | Mod: RT

## 2022-03-04 PROCEDURE — 88360 TUMOR IMMUNOHISTOCHEM/MANUAL: CPT | Mod: 26,,, | Performed by: PATHOLOGY

## 2022-03-04 PROCEDURE — 88360 PR  TUMOR IMMUNOHISTOCHEM/MANUAL: ICD-10-PCS | Mod: 26,,, | Performed by: PATHOLOGY

## 2022-03-04 PROCEDURE — 19081 MAMMO BREAST STEREOTACTIC BREAST BIOPSY RIGHT: ICD-10-PCS | Mod: RT,,, | Performed by: RADIOLOGY

## 2022-03-04 PROCEDURE — 19081 BX BREAST 1ST LESION STRTCTC: CPT | Mod: RT,,, | Performed by: RADIOLOGY

## 2022-03-04 PROCEDURE — 88360 TUMOR IMMUNOHISTOCHEM/MANUAL: CPT | Performed by: PATHOLOGY

## 2022-03-04 NOTE — PROGRESS NOTES
[facility]  Breast Surgery  Department of General Surgery      Date of Visit:  2022    Referring [place]  [address] [facility]  [place] [address]    PCP:  Lukas Martinez MD    HIGH RISK    Gina Garcia is a 36 y.o. premenopausal female presents for abnormal mammogram of the right breast and high risk breast cancer check 2 mutation.  She recently underwent mammogram showing calcifications of the right breast and is currently awaiting stereotactic core biopsy.  She had previously undergone evaluation for her check 2 mutation with discussions about prophylactic bilateral mastectomy and reconstruction with Dr. Duckworth and Dr. Almaraz.  It was recommended that she undergo weight loss prior to surgery which she has done in the interim approximately 40-50 lbs.      Per Dr. Duckworth:  referred for evaluation of increased risk of breast cancer based on family history and + CHECK2 mutation.  Here today to discussed options of high risk screening and risk reduction.    Other breast cancer risk factors include family hx on mother's side, family hx on father's side, mom with breast CA, family hx of colon CA and family hx of Prostate CA.     Age of menarche was 15.    Age of menopause was NA.    Last menstrual period was 2020.    Patient denies hormonal therapy. IUD for 8 years - paraguard.     Patient is .    Age of first live birth was 23.    Patient did breast feed.       Family History is significant for the following:  Mother breast cancer at age 45, living, ER+  Paternal grandmother with breast cancer  4 maternal great aunts with breast cancer  Maternal and paternal grandfather with prostate cancer  Great uncle with kidney cancer  Also history of colon cancer, unknown family members    Social History:   Smoking:  never  Drinking:  Socially  Caffeine: daily   Past Medical History:   Diagnosis Date    Anxiety     CHEK2-related breast cancer 2020     Migraines      Past Surgical History:   Procedure Laterality Date    ACHILLES TENDON SURGERY      EXTERNAL EAR SURGERY      cosmetic     Current Outpatient Medications on File Prior to Visit   Medication Sig Dispense Refill    sod sulf-pot chloride-mag sulf (SUTAB) 1.479-0.188- 0.225 gram tablet Take 12 tablets by mouth once daily. Take according to package instructions with indicated amount of water. 24 tablet 0    [DISCONTINUED] buPROPion (WELLBUTRIN XL) 150 MG TB24 tablet Take 150 mg by mouth every morning.  2    [DISCONTINUED] clonazePAM (KLONOPIN) 0.5 MG tablet   2    [DISCONTINUED] fluticasone (FLONASE) 50 mcg/actuation nasal spray 2 sprays by Each Nare route once daily. 1 Bottle 0    [DISCONTINUED] promethazine-dextromethorphan (PROMETHAZINE-DM) 6.25-15 mg/5 mL Syrp Take 5 mLs by mouth nightly as needed (Cough). 120 mL 0    [DISCONTINUED] SUMAtriptan-naproxen (TREXIMET)  mg Tab TAKE 1 TABLET BY MOUTH EVERY 2 HOURS AS NEEDED MIGRAINE *max 2/day* **MAY CAUSE DROWSINESS**      [DISCONTINUED] TREXIMET  mg Tab   11     No current facility-administered medications on file prior to visit.         Review of patient's allergies indicates:  No Known Allergies      Review of Systems  Review of Systems   Constitutional: Negative for chills and fever.   HENT: Negative for congestion.    Eyes: Negative for blurred vision.   Respiratory: Negative for shortness of breath.    Cardiovascular: Negative for chest pain.   Gastrointestinal: Negative for abdominal pain, constipation, diarrhea, nausea and vomiting.   Genitourinary: Negative for dysuria.   Musculoskeletal: Negative for back pain.   Skin: Negative for itching and rash.   Neurological: Negative for dizziness.   Endo/Heme/Allergies: Does not bruise/bleed easily.   Psychiatric/Behavioral: The patient is not nervous/anxious.           Objective:   /66   Pulse (!) 54   Wt 55.3 kg (121 lb 14.6 oz)   LMP 02/20/2022   BMI 24.62 kg/m²      Physical Exam  Vitals reviewed.   Constitutional:       Appearance: Normal appearance. She is not ill-appearing.   HENT:      Head: Normocephalic and atraumatic.   Eyes:      Extraocular Movements: Extraocular movements intact.   Cardiovascular:      Rate and Rhythm: Normal rate and regular rhythm.      Pulses: Normal pulses.      Heart sounds: Normal heart sounds.   Pulmonary:      Effort: Pulmonary effort is normal.      Breath sounds: Normal breath sounds.   Chest:   Breasts:      Right: No swelling, bleeding, inverted nipple, mass, nipple discharge, skin change or tenderness.      Left: No swelling, bleeding, inverted nipple, mass, nipple discharge, skin change or tenderness.       Abdominal:      General: There is no distension.      Palpations: Abdomen is soft.   Musculoskeletal:         General: Normal range of motion.      Cervical back: Normal range of motion.   Skin:     General: Skin is warm and dry.   Neurological:      General: No focal deficit present.      Mental Status: She is alert and oriented to person, place, and time.   Psychiatric:         Mood and Affect: Mood normal.           Imaging  Result:   Mammo Digital Diagnostic Bilat with Tony  US Breast Bilateral Limited     History:  Patient is 36 y.o. and is seen for diagnostic imaging.     Films Compared:  Prior images (if available) were compared.     Findings:  This procedure was performed using tomosynthesis. Computer-aided detection was utilized in the interpretation of this examination.  The breasts have scattered areas of fibroglandular density.      Right  Mammo Digital Diagnostic Bilat with Tony  There are no corresponding areas of architectural distortion seen on this modality. Ultrasound of the lower-outer right breast demonstrates no concerning abnormality.         There are fine linear or fine-linear branching calcifications in a linear distribution seen in the retroareolar region of the right breast.      Left   Small mass within  the outer left breast is less conspicuous with somewhat lucent appearance. Ultrasound was performed and demonstrates a minimally complicated 9 mm cyst at the 03:00 o'clock location, 8 cm from the nipple.         Impression:  Right  Calcifications: Right breast calcifications at the retroareolar position. Assessment: 4 - Suspicious finding. Stereotactic Biopsy is recommended.      Left  There is no mammographic or sonographic evidence of malignancy in the left breast.     BI-RADS Category:   Overall: 4 - Suspicious     Recommendation:  Stereotactic biopsy recommended of the right breast calcifications.   Six month follow-up diagnostic left mammogram and ultrasound can be performed for the minimally complicated cyst.           Assessment:     This is a 36 y.o. female with abnormal mammogram right breast and an increased risk of breast cancer based on CHECK 2 mutation and family history.        Plan:     Imaging reviewed with patient.  Awaiting core biopsy right breast.  Will discuss with patient results once available.   Will need at a minimum 6 month follow-up left breast interval imaging    We discussed that she is at elevated risk for breast cancer based on family history and CHECK2 mutation.   Currently she is undergoing advanced screening with clinical breast exam every 6 months and MRI annually     There is lack of evidence supporting this as routinely recommended treatment; however, based on her significant family history, it is reasonable. The patient is requesting BPM with reconstruction for risk reduction.   She is doing well with risk reduction lifestyle of weight loss and healthy eating.  Discussed alcohol use and some studies suggest that increase alcohol intake may increase breast cancer risk.  Therefore, I do recommend limited alcohol intake.  Also discussed risk factors that are not modifiable, such as age at menarche, age at menopause, age at first pregnancy, and family history.     Referral to   Freel/plastic surgery to continue previous discussions for potential bilateral prophylactic mastectomy      Micheal Bartholomew

## 2022-03-04 NOTE — NURSING
Pressure held on right breast biopsy site for 10 mins, hemostasis was achieved, steri strips were applied, and wound was covered with 4x4 guaze and a tegaderm.  Dressing clean, dry and intact with no drainage noted.  Discharge instructions given verbally and in writing, patient voiced understandings.  Patient discharged and accompanied by family member.

## 2022-03-08 ENCOUNTER — TELEPHONE (OUTPATIENT)
Dept: HEMATOLOGY/ONCOLOGY | Facility: CLINIC | Age: 37
End: 2022-03-08
Payer: OTHER GOVERNMENT

## 2022-03-08 DIAGNOSIS — Z15.89 CHEK2-RELATED BREAST CANCER: ICD-10-CM

## 2022-03-08 DIAGNOSIS — D05.11 BREAST NEOPLASM, TIS (DCIS), RIGHT: Primary | ICD-10-CM

## 2022-03-08 DIAGNOSIS — Z15.02 CHEK2-RELATED BREAST CANCER: ICD-10-CM

## 2022-03-08 DIAGNOSIS — C50.919 CHEK2-RELATED BREAST CANCER: ICD-10-CM

## 2022-03-08 DIAGNOSIS — Z15.09 CHEK2-RELATED BREAST CANCER: ICD-10-CM

## 2022-03-08 DIAGNOSIS — D05.10 DUCTAL CARCINOMA IN SITU (DCIS) OF BREAST, UNSPECIFIED LATERALITY: Primary | ICD-10-CM

## 2022-03-08 LAB
FINAL PATHOLOGIC DIAGNOSIS: NORMAL
GROSS: NORMAL
Lab: NORMAL
SUPPLEMENTAL DIAGNOSIS: NORMAL

## 2022-03-08 NOTE — TELEPHONE ENCOUNTER
Received communication from Lali Griffiths NP to have pt set up for breast cancer multi D appts.    I have spoken to the pt over the phone this am and together we set her up for dr. Queen 3/16/22 at the [facility] center , then she will proceed to the [place] location for appt with Dr Bartholomew ( surgeon of her request) for 2Pm same day. Pt is good with going to two locations same day  She will then see dR witt 3/21/22 at the cancer center. Travel directions reviewed and pt stated understanding.  Pt also asked if we could enter another referral to plastic surgery as Dr. Almaraz does not accept her insurance at all. She was supposed to have bilateral mastectomies with reconstruction until abnormal mammo resulted.   Message sent to dr Bartholomew to enter another referral to plastic surgery per pt request so that this part could be working meanwhile.   Multi D approach to her care reviewed with pt and she stated understanding.   Zoom call set for 3/14/22 to review case per Lali, and all pertinent parties invited.   Pt knows to call with any further needs meanwhile.

## 2022-03-10 ENCOUNTER — PATIENT MESSAGE (OUTPATIENT)
Dept: HEMATOLOGY/ONCOLOGY | Facility: CLINIC | Age: 37
End: 2022-03-10
Payer: OTHER GOVERNMENT

## 2022-03-14 ENCOUNTER — TELEPHONE (OUTPATIENT)
Dept: HEMATOLOGY/ONCOLOGY | Facility: CLINIC | Age: 37
End: 2022-03-14
Payer: OTHER GOVERNMENT

## 2022-03-14 ENCOUNTER — TUMOR BOARD CONFERENCE (OUTPATIENT)
Dept: HEMATOLOGY/ONCOLOGY | Facility: CLINIC | Age: 37
End: 2022-03-14
Payer: OTHER GOVERNMENT

## 2022-03-14 NOTE — TELEPHONE ENCOUNTER
See breast mult d note  Spoke with pt and cancelled radiation consult as it is felt this appt is no longer needed by conference recommendations. Pt in agreement with plan

## 2022-03-14 NOTE — PROGRESS NOTES
Interdisciplinary Breast Cancer Conference    Gina Garcia    Female    Date Presented to Tumor Board: (P) 03/14/22    Presenting Hospital / Clinic: (P) [facility]              Presenter: (P) Lali Griffiths NP    Reason for Consultation: (P) Initial Presentation    Specialties Present: (P) Medical Oncology;Hematology;Radiation Oncology;Surgical Oncology;Navigation;Genetics    Patient Status: (P) a new patient    Treatment to Date: (P) None    Clinical Trial Eligibility: (P) Not discussed    ER: (P) Positive    IL: (P) Positive         Cancer Staging  No matching staging information was found for the patient.    Recommended Plan: (P) Surgery    Bilateral Mastectomies with Reconstruction with SLN biopsy on right side/ cancel radiation consult until later date if needed       Presentation at Cancer Conference: (P) Prospective

## 2022-03-16 ENCOUNTER — OFFICE VISIT (OUTPATIENT)
Dept: SURGERY | Facility: CLINIC | Age: 37
End: 2022-03-16
Payer: OTHER GOVERNMENT

## 2022-03-16 ENCOUNTER — OFFICE VISIT (OUTPATIENT)
Dept: HEMATOLOGY/ONCOLOGY | Facility: CLINIC | Age: 37
End: 2022-03-16
Payer: OTHER GOVERNMENT

## 2022-03-16 ENCOUNTER — LAB VISIT (OUTPATIENT)
Dept: LAB | Facility: HOSPITAL | Age: 37
End: 2022-03-16
Attending: INTERNAL MEDICINE
Payer: OTHER GOVERNMENT

## 2022-03-16 VITALS
HEIGHT: 59 IN | OXYGEN SATURATION: 100 % | SYSTOLIC BLOOD PRESSURE: 128 MMHG | BODY MASS INDEX: 23.74 KG/M2 | DIASTOLIC BLOOD PRESSURE: 87 MMHG | HEART RATE: 78 BPM | TEMPERATURE: 98 F | WEIGHT: 117.75 LBS

## 2022-03-16 VITALS
DIASTOLIC BLOOD PRESSURE: 87 MMHG | SYSTOLIC BLOOD PRESSURE: 128 MMHG | HEART RATE: 78 BPM | BODY MASS INDEX: 23.78 KG/M2 | WEIGHT: 117.75 LBS | TEMPERATURE: 98 F

## 2022-03-16 DIAGNOSIS — Z15.09 CHEK2-RELATED BREAST CANCER: ICD-10-CM

## 2022-03-16 DIAGNOSIS — Z15.02 CHEK2-RELATED BREAST CANCER: ICD-10-CM

## 2022-03-16 DIAGNOSIS — Z15.09 BIALLELIC MUTATION OF CHEK2 GENE WITHOUT DIAGNOSED MALIGNANCY: Primary | ICD-10-CM

## 2022-03-16 DIAGNOSIS — Z15.89 CHEK2-RELATED BREAST CANCER: ICD-10-CM

## 2022-03-16 DIAGNOSIS — C50.919 CHEK2-RELATED BREAST CANCER: ICD-10-CM

## 2022-03-16 DIAGNOSIS — D05.11 DUCTAL CARCINOMA IN SITU (DCIS) OF RIGHT BREAST: ICD-10-CM

## 2022-03-16 DIAGNOSIS — E66.9 OBESITY, UNSPECIFIED CLASSIFICATION, UNSPECIFIED OBESITY TYPE, UNSPECIFIED WHETHER SERIOUS COMORBIDITY PRESENT: ICD-10-CM

## 2022-03-16 DIAGNOSIS — Z15.89 BIALLELIC MUTATION OF CHEK2 GENE WITHOUT DIAGNOSED MALIGNANCY: Primary | ICD-10-CM

## 2022-03-16 DIAGNOSIS — D05.11 BREAST NEOPLASM, TIS (DCIS), RIGHT: ICD-10-CM

## 2022-03-16 DIAGNOSIS — Z15.01 BIALLELIC MUTATION OF CHEK2 GENE WITHOUT DIAGNOSED MALIGNANCY: Primary | ICD-10-CM

## 2022-03-16 DIAGNOSIS — K59.00 CONSTIPATION, UNSPECIFIED CONSTIPATION TYPE: ICD-10-CM

## 2022-03-16 LAB
ALBUMIN SERPL BCP-MCNC: 4.2 G/DL (ref 3.5–5.2)
ALP SERPL-CCNC: 65 U/L (ref 55–135)
ALT SERPL W/O P-5'-P-CCNC: 16 U/L (ref 10–44)
ANION GAP SERPL CALC-SCNC: 11 MMOL/L (ref 8–16)
AST SERPL-CCNC: 17 U/L (ref 10–40)
BASOPHILS # BLD AUTO: 0.07 K/UL (ref 0–0.2)
BASOPHILS NFR BLD: 0.9 % (ref 0–1.9)
BILIRUB SERPL-MCNC: 0.4 MG/DL (ref 0.1–1)
BUN SERPL-MCNC: 7 MG/DL (ref 6–20)
CALCIUM SERPL-MCNC: 9.9 MG/DL (ref 8.7–10.5)
CHLORIDE SERPL-SCNC: 106 MMOL/L (ref 95–110)
CHOLEST SERPL-MCNC: 281 MG/DL (ref 120–199)
CHOLEST/HDLC SERPL: 5.3 {RATIO} (ref 2–5)
CO2 SERPL-SCNC: 23 MMOL/L (ref 23–29)
CREAT SERPL-MCNC: 0.7 MG/DL (ref 0.5–1.4)
DIFFERENTIAL METHOD: ABNORMAL
EOSINOPHIL # BLD AUTO: 0 K/UL (ref 0–0.5)
EOSINOPHIL NFR BLD: 0.4 % (ref 0–8)
ERYTHROCYTE [DISTWIDTH] IN BLOOD BY AUTOMATED COUNT: 11.2 % (ref 11.5–14.5)
EST. GFR  (AFRICAN AMERICAN): >60 ML/MIN/1.73 M^2
EST. GFR  (NON AFRICAN AMERICAN): >60 ML/MIN/1.73 M^2
GLUCOSE SERPL-MCNC: 82 MG/DL (ref 70–110)
HCT VFR BLD AUTO: 43.1 % (ref 37–48.5)
HDLC SERPL-MCNC: 53 MG/DL (ref 40–75)
HDLC SERPL: 18.9 % (ref 20–50)
HGB BLD-MCNC: 14 G/DL (ref 12–16)
IMM GRANULOCYTES # BLD AUTO: 0.01 K/UL (ref 0–0.04)
IMM GRANULOCYTES NFR BLD AUTO: 0.1 % (ref 0–0.5)
LDLC SERPL CALC-MCNC: 208.8 MG/DL (ref 63–159)
LYMPHOCYTES # BLD AUTO: 2.5 K/UL (ref 1–4.8)
LYMPHOCYTES NFR BLD: 31.3 % (ref 18–48)
MCH RBC QN AUTO: 32.5 PG (ref 27–31)
MCHC RBC AUTO-ENTMCNC: 32.5 G/DL (ref 32–36)
MCV RBC AUTO: 100 FL (ref 82–98)
MONOCYTES # BLD AUTO: 0.6 K/UL (ref 0.3–1)
MONOCYTES NFR BLD: 6.9 % (ref 4–15)
NEUTROPHILS # BLD AUTO: 4.9 K/UL (ref 1.8–7.7)
NEUTROPHILS NFR BLD: 60.4 % (ref 38–73)
NONHDLC SERPL-MCNC: 228 MG/DL
NRBC BLD-RTO: 0 /100 WBC
PLATELET # BLD AUTO: 367 K/UL (ref 150–450)
PMV BLD AUTO: 11.2 FL (ref 9.2–12.9)
POTASSIUM SERPL-SCNC: 5 MMOL/L (ref 3.5–5.1)
PROT SERPL-MCNC: 7.3 G/DL (ref 6–8.4)
RBC # BLD AUTO: 4.31 M/UL (ref 4–5.4)
SODIUM SERPL-SCNC: 140 MMOL/L (ref 136–145)
TRIGL SERPL-MCNC: 96 MG/DL (ref 30–150)
TSH SERPL DL<=0.005 MIU/L-ACNC: 2.04 UIU/ML (ref 0.4–4)
WBC # BLD AUTO: 8.02 K/UL (ref 3.9–12.7)

## 2022-03-16 PROCEDURE — 99999 PR PBB SHADOW E&M-EST. PATIENT-LVL IV: CPT | Mod: PBBFAC,,, | Performed by: INTERNAL MEDICINE

## 2022-03-16 PROCEDURE — 36415 COLL VENOUS BLD VENIPUNCTURE: CPT | Performed by: INTERNAL MEDICINE

## 2022-03-16 PROCEDURE — 99999 PR PBB SHADOW E&M-EST. PATIENT-LVL III: ICD-10-PCS | Mod: PBBFAC,,, | Performed by: SURGERY

## 2022-03-16 PROCEDURE — 99214 OFFICE O/P EST MOD 30 MIN: CPT | Mod: PBBFAC | Performed by: INTERNAL MEDICINE

## 2022-03-16 PROCEDURE — 80061 LIPID PANEL: CPT | Performed by: INTERNAL MEDICINE

## 2022-03-16 PROCEDURE — 99213 OFFICE O/P EST LOW 20 MIN: CPT | Mod: PBBFAC,27 | Performed by: SURGERY

## 2022-03-16 PROCEDURE — 99999 PR PBB SHADOW E&M-EST. PATIENT-LVL IV: ICD-10-PCS | Mod: PBBFAC,,, | Performed by: INTERNAL MEDICINE

## 2022-03-16 PROCEDURE — 99215 OFFICE O/P EST HI 40 MIN: CPT | Mod: S$PBB,,, | Performed by: INTERNAL MEDICINE

## 2022-03-16 PROCEDURE — 85025 COMPLETE CBC W/AUTO DIFF WBC: CPT | Performed by: INTERNAL MEDICINE

## 2022-03-16 PROCEDURE — 84443 ASSAY THYROID STIM HORMONE: CPT | Performed by: INTERNAL MEDICINE

## 2022-03-16 PROCEDURE — 80053 COMPREHEN METABOLIC PANEL: CPT | Performed by: INTERNAL MEDICINE

## 2022-03-16 PROCEDURE — 99215 PR OFFICE/OUTPT VISIT, EST, LEVL V, 40-54 MIN: ICD-10-PCS | Mod: S$PBB,,, | Performed by: INTERNAL MEDICINE

## 2022-03-16 PROCEDURE — 99999 PR PBB SHADOW E&M-EST. PATIENT-LVL III: CPT | Mod: PBBFAC,,, | Performed by: SURGERY

## 2022-03-16 PROCEDURE — 99215 PR OFFICE/OUTPT VISIT, EST, LEVL V, 40-54 MIN: ICD-10-PCS | Mod: S$PBB,,, | Performed by: SURGERY

## 2022-03-16 PROCEDURE — 99215 OFFICE O/P EST HI 40 MIN: CPT | Mod: S$PBB,,, | Performed by: SURGERY

## 2022-03-16 NOTE — PROGRESS NOTES
Subjective:       Patient ID: Gina Garcia is a 36 y.o. female.    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  Intra ductal breast carcinoma.  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    Migraines      Family History   Problem Relation Age of Onset    Breast cancer Paternal Grandmother     Cancer Paternal Grandmother         prostate    Breast cancer Mother 45        estrogen positive    Breast cancer Other     Breast cancer Other     Breast cancer Other     Breast cancer Other     Lung cancer Maternal Grandmother     Colon cancer Neg Hx         colon cancer    Ovarian cancer Neg Hx     Thrombophilia Neg Hx      Social History     Socioeconomic History    Marital status:    Tobacco Use    Smoking status: Never Smoker    Smokeless tobacco: Never Used   Substance and Sexual Activity    Alcohol use: No     Alcohol/week: 0.0 standard drinks    Drug use: No    Sexual activity: Yes     Birth control/protection: None     Past Surgical History:   Procedure Laterality Date    ACHILLES TENDON SURGERY      EXTERNAL EAR SURGERY      cosmetic       Labs:  Lab Results   Component Value Date    WBC 11.05 (H) 11/30/2009    HGB 12.2 11/30/2009    HCT 36.1 (L) 11/30/2009    MCV 95.0 11/30/2009     11/30/2009     BMP  Lab Results   Component Value Date     08/26/2008    K 3.7 08/26/2008     08/26/2008    CO2 21 (L) 08/26/2008    BUN 8 08/26/2008    CREATININE 0.6 08/26/2008    CALCIUM 10.0 08/26/2008     Lab Results   Component Value Date    ALT 29 08/26/2008    AST 27 08/26/2008    ALKPHOS 64 08/26/2008    BILITOT 0.4 08/26/2008       No results found for: IRON, TIBC, FERRITIN, SATURATEDIRO  No results found for:  [id number]  No results found for: FOLATE  Lab Results   Component Value Date    TSH 1.8 07/02/2008         Review of Systems   Constitutional: Negative for activity change, appetite change, chills, diaphoresis, fatigue, fever and unexpected weight change.   HENT: Negative for congestion, dental problem, drooling, ear discharge, ear pain, facial swelling, hearing loss, mouth sores, nosebleeds, postnasal drip, rhinorrhea, sinus pressure, sneezing, sore throat, tinnitus, trouble swallowing and voice change.    Eyes: Negative for photophobia, pain, discharge, redness, itching and visual disturbance.   Respiratory: Negative for cough, choking, chest tightness, shortness of breath, wheezing and stridor.    Cardiovascular: Negative for chest pain, palpitations and leg swelling.   Gastrointestinal: Negative for abdominal distention, abdominal pain, anal bleeding, blood in stool, constipation, diarrhea, nausea, rectal pain and vomiting.   Endocrine: Negative for cold intolerance, heat intolerance, polydipsia, polyphagia and polyuria.   Genitourinary: Negative for decreased urine volume, difficulty urinating, dyspareunia, dysuria, enuresis, flank pain, frequency, genital sores, hematuria, menstrual problem, pelvic pain, urgency, vaginal bleeding, vaginal discharge and vaginal pain.   Musculoskeletal: Negative for arthralgias, back pain, gait problem, joint swelling, myalgias, neck pain and neck stiffness.   Skin: Negative for color change, pallor and rash.   Allergic/Immunologic: Negative for environmental allergies, food allergies and immunocompromised state.   Neurological: Negative for dizziness, tremors, seizures, syncope, facial asymmetry, speech difficulty, weakness, light-headedness, numbness and headaches.   Hematological: Negative for adenopathy. Does not bruise/bleed easily.   Psychiatric/Behavioral: Positive for dysphoric mood. Negative for agitation, behavioral problems, confusion, decreased concentration,  hallucinations, self-injury, sleep disturbance and suicidal ideas. The patient is nervous/anxious. The patient is not hyperactive.        Objective:      Physical Exam  Vitals reviewed.   Constitutional:       General: She is not in acute distress.     Appearance: She is well-developed. She is not diaphoretic.   HENT:      Head: Normocephalic and atraumatic.      Right Ear: External ear normal.      Left Ear: External ear normal.      Nose: Nose normal.      Right Sinus: No maxillary sinus tenderness or frontal sinus tenderness.      Left Sinus: No maxillary sinus tenderness or frontal sinus tenderness.      Mouth/Throat:      Pharynx: No oropharyngeal exudate.   Eyes:      General: Lids are normal. No scleral icterus.        Right eye: No discharge.         Left eye: No discharge.      Conjunctiva/sclera: Conjunctivae normal.      Right eye: Right conjunctiva is not injected. No hemorrhage.     Left eye: Left conjunctiva is not injected. No hemorrhage.     Pupils: Pupils are equal, round, and reactive to light.   Neck:      Thyroid: No thyromegaly.      Vascular: No JVD.      Trachea: No tracheal deviation.   Cardiovascular:      Rate and Rhythm: Normal rate.   Pulmonary:      Effort: Pulmonary effort is normal. No respiratory distress.      Breath sounds: No stridor.   Chest:      Chest wall: No tenderness.   Breasts:      Right: No supraclavicular adenopathy.      Left: No supraclavicular adenopathy.       Abdominal:      General: Bowel sounds are normal. There is no distension.      Palpations: Abdomen is soft. There is no hepatomegaly, splenomegaly or mass.      Tenderness: There is no abdominal tenderness. There is no rebound.   Musculoskeletal:         General: No tenderness. Normal range of motion.      Cervical back: Normal range of motion and neck supple.   Lymphadenopathy:      Cervical: No cervical adenopathy.      Upper Body:      Right upper body: No supraclavicular adenopathy.      Left upper body: No  supraclavicular adenopathy.   Skin:     General: Skin is dry.      Findings: No erythema or rash.   Neurological:      Mental Status: She is alert and oriented to person, place, and time.      Cranial Nerves: No cranial nerve deficit.      Coordination: Coordination normal.   Psychiatric:         Mood and Affect: Mood is anxious and depressed.         Behavior: Behavior normal.         Thought Content: Thought content normal.         Judgment: Judgment normal.             Assessment:      1. Breast neoplasm, Tis (DCIS), right    2. CHEK2-related breast cancer           Plan:     Intraductal breast carcinoma CHEK2 mutation patient presented at multidisciplinary tumor conference overwhelming consensus is that patient proceed with bilateral mastectomy and reconstruction.  I have discussed with her various types of mastectomy article from up-to-date followed to her for review in discussed with her possible follow-up scenario recommend Medical Genetics consultation to discuss testing of children who are currently age 12. And also other related malignancies which I do not believe or associated with CHEK2 mutation only breast.  Discussed above with her will defer to Medical Genetics for other recommendations.  Follow-up afterwards for review total time 40 minutes          Jak Queen Jr, MD FACP

## 2022-03-17 ENCOUNTER — TELEPHONE (OUTPATIENT)
Dept: GASTROENTEROLOGY | Facility: CLINIC | Age: 37
End: 2022-03-17
Payer: OTHER GOVERNMENT

## 2022-03-17 ENCOUNTER — PATIENT MESSAGE (OUTPATIENT)
Dept: GASTROENTEROLOGY | Facility: CLINIC | Age: 37
End: 2022-03-17
Payer: OTHER GOVERNMENT

## 2022-03-17 DIAGNOSIS — R10.9 ABDOMINAL PAIN, UNSPECIFIED ABDOMINAL LOCATION: Primary | ICD-10-CM

## 2022-03-17 NOTE — TELEPHONE ENCOUNTER
----- Message from Rosa Elena Carrero sent at 3/17/2022  7:47 AM CDT -----  Regarding: reorder stool study  H. Pylori stool study was linked to lab appt yesterday, patient did not have the specimen with them at that time and wanted to return another time with specimen. Please reorder lab for a future encounter and home collection.

## 2022-03-22 ENCOUNTER — PATIENT MESSAGE (OUTPATIENT)
Dept: SURGERY | Facility: CLINIC | Age: 37
End: 2022-03-22
Payer: OTHER GOVERNMENT

## 2022-03-28 ENCOUNTER — ANESTHESIA (OUTPATIENT)
Dept: ENDOSCOPY | Facility: HOSPITAL | Age: 37
End: 2022-03-28
Payer: OTHER GOVERNMENT

## 2022-03-28 ENCOUNTER — HOSPITAL ENCOUNTER (OUTPATIENT)
Facility: HOSPITAL | Age: 37
Discharge: HOME | End: 2022-03-28
Attending: INTERNAL MEDICINE | Admitting: INTERNAL MEDICINE
Payer: OTHER GOVERNMENT

## 2022-03-28 ENCOUNTER — ANESTHESIA EVENT (OUTPATIENT)
Dept: ENDOSCOPY | Facility: HOSPITAL | Age: 37
End: 2022-03-28
Payer: OTHER GOVERNMENT

## 2022-03-28 LAB
B-HCG UR QL: NEGATIVE
CTP QC/QA: YES

## 2022-03-28 PROCEDURE — 45380 PR COLONOSCOPY,BIOPSY: ICD-10-PCS | Mod: ,,, | Performed by: INTERNAL MEDICINE

## 2022-03-28 PROCEDURE — 25000003 PHARM REV CODE 250: Performed by: NURSE ANESTHETIST, CERTIFIED REGISTERED

## 2022-03-28 PROCEDURE — 00811 ANES LWR INTST NDSC NOS: CPT | Performed by: INTERNAL MEDICINE

## 2022-03-28 PROCEDURE — 88305 TISSUE EXAM BY PATHOLOGIST: CPT | Mod: 26,,, | Performed by: PATHOLOGY

## 2022-03-28 PROCEDURE — 37000008 HC ANESTHESIA 1ST 15 MINUTES: Performed by: INTERNAL MEDICINE

## 2022-03-28 PROCEDURE — 45380 COLONOSCOPY AND BIOPSY: CPT | Mod: ,,, | Performed by: INTERNAL MEDICINE

## 2022-03-28 PROCEDURE — 81025 URINE PREGNANCY TEST: CPT | Performed by: INTERNAL MEDICINE

## 2022-03-28 PROCEDURE — 63600175 PHARM REV CODE 636 W HCPCS: Performed by: NURSE ANESTHETIST, CERTIFIED REGISTERED

## 2022-03-28 PROCEDURE — 88305 TISSUE EXAM BY PATHOLOGIST: ICD-10-PCS | Mod: 26,,, | Performed by: PATHOLOGY

## 2022-03-28 PROCEDURE — 88305 TISSUE EXAM BY PATHOLOGIST: CPT | Performed by: PATHOLOGY

## 2022-03-28 PROCEDURE — 37000009 HC ANESTHESIA EA ADD 15 MINS: Performed by: INTERNAL MEDICINE

## 2022-03-28 PROCEDURE — 27201012 HC FORCEPS, HOT/COLD, DISP: Performed by: INTERNAL MEDICINE

## 2022-03-28 PROCEDURE — 45380 COLONOSCOPY AND BIOPSY: CPT | Performed by: INTERNAL MEDICINE

## 2022-03-28 RX ORDER — LIDOCAINE HYDROCHLORIDE 10 MG/ML
INJECTION, SOLUTION EPIDURAL; INFILTRATION; INTRACAUDAL; PERINEURAL
Status: DISCONTINUED | OUTPATIENT
Start: 2022-03-28 | End: 2022-03-28

## 2022-03-28 RX ORDER — SODIUM CHLORIDE, SODIUM LACTATE, POTASSIUM CHLORIDE, CALCIUM CHLORIDE 600; 310; 30; 20 MG/100ML; MG/100ML; MG/100ML; MG/100ML
INJECTION, SOLUTION INTRAVENOUS CONTINUOUS
Status: DISCONTINUED | OUTPATIENT
Start: 2022-03-28 | End: 2022-03-28 | Stop reason: HOSPADM

## 2022-03-28 RX ORDER — PROPOFOL 10 MG/ML
VIAL (ML) INTRAVENOUS
Status: DISCONTINUED | OUTPATIENT
Start: 2022-03-28 | End: 2022-03-28

## 2022-03-28 RX ORDER — ADHESIVE BANDAGE
30 BANDAGE TOPICAL DAILY PRN
COMMUNITY
End: 2022-06-29

## 2022-03-28 RX ORDER — SODIUM CHLORIDE, SODIUM LACTATE, POTASSIUM CHLORIDE, CALCIUM CHLORIDE 600; 310; 30; 20 MG/100ML; MG/100ML; MG/100ML; MG/100ML
INJECTION, SOLUTION INTRAVENOUS CONTINUOUS PRN
Status: DISCONTINUED | OUTPATIENT
Start: 2022-03-28 | End: 2022-03-28

## 2022-03-28 RX ADMIN — PROPOFOL 50 MG: 10 INJECTION, EMULSION INTRAVENOUS at 10:03

## 2022-03-28 RX ADMIN — PROPOFOL 80 MG: 10 INJECTION, EMULSION INTRAVENOUS at 10:03

## 2022-03-28 RX ADMIN — LIDOCAINE HYDROCHLORIDE 50 MG: 10 INJECTION, SOLUTION EPIDURAL; INFILTRATION; INTRACAUDAL; PERINEURAL at 10:03

## 2022-03-28 RX ADMIN — SODIUM CHLORIDE, SODIUM LACTATE, POTASSIUM CHLORIDE, AND CALCIUM CHLORIDE: .6; .31; .03; .02 INJECTION, SOLUTION INTRAVENOUS at 10:03

## 2022-03-28 RX ADMIN — PROPOFOL 50 MG: 10 INJECTION, EMULSION INTRAVENOUS at 11:03

## 2022-03-28 RX ADMIN — PROPOFOL 30 MG: 10 INJECTION, EMULSION INTRAVENOUS at 11:03

## 2022-03-28 NOTE — H&P
PRE PROCEDURE H&P    Patient Name: Gina Garcia  MRN: [id number]  : 1985  Date of Procedure:  3/28/2022  Referring Physician: Winnie Polanco MD  Primary Physician: Kenneth Barraza MD  Procedure Physician: Winnie Polanco MD       Planned Procedure: Colonoscopy  Diagnosis: constipation  Chief Complaint: Same as above    HPI: Patient is an 36 y.o. female is here for the above.       Past Medical History:   Past Medical History:   Diagnosis Date    Anxiety     CHEK2-related breast cancer 2020    Migraines         Past Surgical History:  Past Surgical History:   Procedure Laterality Date    ACHILLES TENDON SURGERY      EXTERNAL EAR SURGERY      cosmetic        Home Medications:  Prior to Admission medications    Medication Sig Start Date End Date Taking? Authorizing Provider   magnesium hydroxide 400 mg/5 ml (MILK OF MAGNESIA) 400 mg/5 mL Susp Take 30 mLs by mouth daily as needed.   Yes Historical Provider   sod sulf-pot chloride-mag sulf (SUTAB) 1.479-0.188- 0.225 gram tablet Take 12 tablets by mouth once daily. Take according to package instructions with indicated amount of water. 22   Dhruv Barr PA-C        Allergies:  Review of patient's allergies indicates:  No Known Allergies     Social History:   Social History     Socioeconomic History    Marital status:    Tobacco Use    Smoking status: Never Smoker    Smokeless tobacco: Never Used   Substance and Sexual Activity    Alcohol use: No     Alcohol/week: 0.0 standard drinks    Drug use: No    Sexual activity: Yes     Birth control/protection: None       Family History:  Family History   Problem Relation Age of Onset    Breast cancer Paternal Grandmother     Cancer Paternal Grandmother         prostate    Breast cancer Mother 45        estrogen positive    Breast cancer Other     Breast cancer Other     Breast cancer Other     Breast cancer Other     Lung cancer Maternal Grandmother     Colon cancer Neg Hx   "       colon cancer    Ovarian cancer Neg Hx     Thrombophilia Neg Hx        ROS: No acute cardiac events, no acute respiratory complaints.     Physical Exam (all patients):    /81 (BP Location: Left arm, Patient Position: Sitting)   Pulse 65   Temp 98.4 °F (36.9 °C)   Resp 19   Ht 4' 11" (1.499 m)   Wt 53.5 kg (118 lb)   LMP 03/21/2022   SpO2 100%   Breastfeeding No   BMI 23.83 kg/m²   Lungs: Clear to auscultation bilaterally, respirations unlabored  Heart: Regular rate and rhythm, S1 and S2 normal, no obvious murmurs  Abdomen:         Soft, non-tender, bowel sounds normal, no masses, no organomegaly    Lab Results   Component Value Date    WBC 8.02 03/16/2022     (H) 03/16/2022    RDW 11.2 (L) 03/16/2022     03/16/2022    GLU 82 03/16/2022    BUN 7 03/16/2022     03/16/2022    K 5.0 03/16/2022     03/16/2022        SEDATION PLAN: per anesthesia      History reviewed, vital signs satisfactory, cardiopulmonary status satisfactory, sedation options, risks and plans have been discussed with the patient  All their questions were answered and the patient agrees to the sedation procedures as planned and the patient is deemed an appropriate candidate for the sedation as planned.    Procedure explained to patient, informed consent obtained and placed in chart.    Winnie Polanco  3/28/2022  10:49 AM   "

## 2022-03-28 NOTE — PROVATION PATIENT INSTRUCTIONS
Discharge Summary/Instructions after an Endoscopic Procedure  Patient Name: Gina Garcia  Patient MRN: [id number]  Patient YOB: 1985 Monday, March 28, 2022 Winnie Polanco MD  Dear patient,  As a result of recent federal legislation (The Federal Cures Act), you may   receive lab or pathology results from your procedure in your [facility]   account before your physician is able to contact you. Your physician or   their representative will relay the results to you with their   recommendations at their soonest availability.  Thank you,  RESTRICTIONS:  During your procedure today, you received medications for sedation.  These   medications may affect your judgment, balance and coordination.  Therefore,   for 24 hours, you have the following restrictions:   - DO NOT drive a car, operate machinery, make legal/financial decisions,   sign important papers or drink alcohol.    ACTIVITY:  Today: no heavy lifting, straining or running due to procedural   sedation/anesthesia.  The following day: return to full activity including work.  DIET:  Eat and drink normally unless instructed otherwise.     TREATMENT FOR COMMON SIDE EFFECTS:  - Mild abdominal pain, nausea, belching, bloating or excessive gas:  rest,   eat lightly and use a heating pad.  - Sore Throat: treat with throat lozenges and/or gargle with warm salt   water.  - Because air was used during the procedure, expelling large amounts of air   from your rectum or belching is normal.  - If a bowel prep was taken, you may not have a bowel movement for 1-3 days.    This is normal.  SYMPTOMS TO WATCH FOR AND REPORT TO YOUR PHYSICIAN:  1. Abdominal pain or bloating, other than gas cramps.  2. Chest pain.  3. Back pain.  4. Signs of infection such as: chills or fever occurring within 24 hours   after the procedure.  5. Rectal bleeding, which would show as bright red, maroon, or black stools.   (A tablespoon of blood from the rectum is not serious, especially if    hemorrhoids are present.)  6. Vomiting.  7. Weakness or dizziness.  GO DIRECTLY TO THE NEAREST EMERGENCY ROOM IF YOU HAVE ANY OF THE FOLLOWING:      Difficulty breathing              Chills and/or fever over 101 F   Persistent vomiting and/or vomiting blood   Severe abdominal pain   Severe chest pain   Black, tarry stools   Bleeding- more than one tablespoon   Any other symptom or condition that you feel may need urgent attention  Your doctor recommends these additional instructions:  If any biopsies were taken, your doctors clinic will contact you in 1 to 2   weeks with any results.  - Patient has a contact number available for emergencies.  The signs and   symptoms of potential delayed complications were discussed with the   patient.  Return to normal activities tomorrow.  Written discharge   instructions were provided to the patient.   - Discharge patient to home (via wheelchair).   - Resume previous diet today.   - Continue present medications.   - Await pathology results.   - Repeat colonoscopy in 5 years for surveillance (pt due for screening at   age 40 for genetic mutation).  For questions, problems or results please call your physician Winnie Polanco MD at Work:  [phone]  If you have any questions about the above instructions, call the GI   department at [phone] or call the endoscopy unit at [phone]   from 7am until 3 pm.  [facility] [place], EMERGENCY ROOM PHONE NUMBER:   [phone]  IF A COMPLICATION OR EMERGENCY SITUATION ARISES AND YOU ARE UNABLE TO REACH   YOUR PHYSICIAN - GO DIRECTLY TO THE EMERGENCY ROOM.  I have read or have had read to me these discharge instructions for my   procedure and have received a written copy.  I understand these   instructions and will follow-up with my physician if I have any questions.     __________________________________       _____________________________________  Nurse Signature                                           Patient/Designated   Responsible Party Signature  MD Winnie Lechuga MD  3/28/2022 11:14:23 AM  This report has been verified and signed electronically.  Dear patient,  As a result of recent federal legislation (The Federal Cures Act), you may   receive lab or pathology results from your procedure in your [facility]   account before your physician is able to contact you. Your physician or   their representative will relay the results to you with their   recommendations at their soonest availability.  Thank you,  PROVATION

## 2022-03-28 NOTE — TRANSFER OF CARE
"Anesthesia Transfer of Care Note    Patient: Gina Garcia    Procedure(s) Performed: Procedure(s) (LRB):  COLONOSCOPY (N/A)    Patient location: GI    Anesthesia Type: MAC    Transport from OR: Transported from OR on room air with adequate spontaneous ventilation    Post pain: adequate analgesia    Post assessment: no apparent anesthetic complications and tolerated procedure well    Post vital signs: stable    Level of consciousness: awake    Nausea/Vomiting: no nausea/vomiting    Complications: none    Transfer of care protocol was followed      Last vitals:   Visit Vitals  /81 (BP Location: Left arm, Patient Position: Sitting)   Pulse 65   Temp 36.9 °C (98.4 °F)   Resp 19   Ht 4' 11" (1.499 m)   Wt 53.5 kg (118 lb)   LMP 03/21/2022   SpO2 100%   Breastfeeding No   BMI 23.83 kg/m²     "

## 2022-03-28 NOTE — ANESTHESIA PREPROCEDURE EVALUATION
03/28/2022  Gina Garcia is a 36 y.o., female.      Pre-op Assessment    I have reviewed the Patient Summary Reports.     I have reviewed the Nursing Notes. I have reviewed the NPO Status.   I have reviewed the Medications.     Review of Systems  Anesthesia Hx:  No problems with previous Anesthesia  Denies Family Hx of Anesthesia complications.   Denies Personal Hx of Anesthesia complications.   Social:  Non-Smoker    Hematology/Oncology:  Hematology Normal   Oncology Normal     EENT/Dental:EENT/Dental Normal   Cardiovascular:   Exercise tolerance: good ECG has been reviewed.    Pulmonary:  Pulmonary Normal    Renal/:  Renal/ Normal     Hepatic/GI:  Hepatic/GI Normal    Musculoskeletal:  Musculoskeletal Normal    Neurological:   Headaches    Endocrine:  Endocrine Normal    Dermatological:  Skin Normal    Psych:   anxiety          Physical Exam  General: Well nourished, Cooperative, Alert and Oriented    Airway:  Mallampati: II   Mouth Opening: Normal  TM Distance: Normal  Tongue: Normal  Neck ROM: Normal ROM    Dental:  Intact  Pt denies loose or removable dentition  Heart:  Rate: Normal  Rhythm: Regular Rhythm        Anesthesia Plan  Type of Anesthesia, risks & benefits discussed:    Anesthesia Type: MAC  Intra-op Monitoring Plan: Standard ASA Monitors  Post Op Pain Control Plan: multimodal analgesia  Induction:  IV  Informed Consent: Informed consent signed with the Patient and all parties understand the risks and agree with anesthesia plan.  All questions answered.   ASA Score: 2  Day of Surgery Review of History & Physical: H&P Update referred to the surgeon/provider.    Ready For Surgery From Anesthesia Perspective.     .

## 2022-03-28 NOTE — ANESTHESIA POSTPROCEDURE EVALUATION
Anesthesia Post Evaluation    Patient: Gina Garcia    Procedure(s) Performed: Procedure(s) (LRB):  COLONOSCOPY (N/A)    Final Anesthesia Type: MAC      Patient location during evaluation: GI PACU  Patient participation: Yes- Able to Participate  Level of consciousness: awake and alert  Post-procedure vital signs: reviewed and stable  Pain management: adequate  Airway patency: patent    PONV status at discharge: No PONV  Anesthetic complications: no      Cardiovascular status: blood pressure returned to baseline and hemodynamically stable  Respiratory status: unassisted, spontaneous ventilation and room air  Hydration status: euvolemic  Follow-up not needed.          Vitals Value Taken Time   BP 93/50 03/28/22 1118   Temp 36.4 °C (97.6 °F) 03/28/22 1118   Pulse 86 03/28/22 1118   Resp 16 03/28/22 1118   SpO2 99 % 03/28/22 1118         No case tracking events are documented in the log.      Pain/Jimy Score: No data recorded

## 2022-03-29 VITALS
SYSTOLIC BLOOD PRESSURE: 112 MMHG | BODY MASS INDEX: 23.79 KG/M2 | HEART RATE: 76 BPM | HEIGHT: 59 IN | TEMPERATURE: 98 F | OXYGEN SATURATION: 99 % | RESPIRATION RATE: 16 BRPM | DIASTOLIC BLOOD PRESSURE: 70 MMHG | WEIGHT: 118 LBS

## 2022-03-30 NOTE — PROGRESS NOTES
[facility]  Breast Surgery  Department of General Surgery      Date of Visit:  2022    Referring provider:  [place]  [id number] [facility]  [place] [address]    PCP:  Kenneth Barraza MD    HIGH RISK    Gina Garcia is a 36 y.o. premenopausal female presents to discuss right breast biopsy. Her biopsy showed right breast DCIS ER+/NE+.    presents for abnormal mammogram of the right breast and high risk breast cancer check 2 mutation.  She recently underwent mammogram showing calcifications of the right breast and is currently awaiting stereotactic core biopsy.  She had previously undergone evaluation for her check 2 mutation with discussions about prophylactic bilateral mastectomy and reconstruction with Dr. Duckworth and Dr. Almaraz.  It was recommended that she undergo weight loss prior to surgery which she has done in the interim approximately 40-50 lbs.      Per Dr. Duckworth:  referred for evaluation of increased risk of breast cancer based on family history and + CHECK2 mutation.  Here today to discussed options of high risk screening and risk reduction.    Other breast cancer risk factors include family hx on mother's side, family hx on father's side, mom with breast CA, family hx of colon CA and family hx of Prostate CA.     Age of menarche was 15.    Age of menopause was NA.    Last menstrual period was 2020.    Patient denies hormonal therapy. IUD for 8 years - paraguard.     Patient is .    Age of first live birth was 23.    Patient did breast feed.       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       No current facility-administered medications on file prior to visit.         Review of patient's allergies indicates:  No Known Allergies      Review of Systems  Review of Systems   Constitutional: Negative for chills and fever.   HENT: Negative for congestion.    Eyes: Negative for blurred vision.   Respiratory: Negative for shortness of breath.    Cardiovascular: Negative for chest pain.   Gastrointestinal: Negative for abdominal pain, constipation, diarrhea, nausea and vomiting.   Genitourinary: Negative for dysuria.   Musculoskeletal: Negative for back pain.   Skin: Negative for itching and rash.   Neurological: Negative for dizziness.   Endo/Heme/Allergies: Does not bruise/bleed easily.   Psychiatric/Behavioral: The patient is not nervous/anxious.           Objective:   /87   Pulse 78   Temp 97.8 °F (36.6 °C) (Temporal)   Wt 53.4 kg (117 lb 11.6 oz)   LMP 02/20/2022   BMI 23.78 kg/m²     Physical Exam  Vitals reviewed.   Constitutional:       Appearance: Normal appearance. She is not ill-appearing.   HENT:      Head: Normocephalic and atraumatic.   Eyes:      Extraocular Movements: Extraocular movements intact.   Cardiovascular:      Rate and Rhythm: Normal rate and regular rhythm.      Pulses: Normal pulses.      Heart sounds: Normal heart sounds.   Pulmonary:      Effort: Pulmonary effort is normal.      Breath sounds: Normal breath  sounds.   Chest:   Breasts:      Right: No swelling, bleeding, inverted nipple, mass, nipple discharge, skin change or tenderness.      Left: No swelling, bleeding, inverted nipple, mass, nipple discharge, skin change or tenderness.       Abdominal:      General: There is no distension.      Palpations: Abdomen is soft.   Musculoskeletal:         General: Normal range of motion.      Cervical back: Normal range of motion.   Skin:     General: Skin is warm and dry.   Neurological:      General: No focal deficit present.      Mental Status: She is alert and oriented to person, place, and time.   Psychiatric:         Mood and Affect: Mood normal.           Imaging  Result:   Mammo Digital Diagnostic Bilat with Tony  US Breast Bilateral Limited     History:  Patient is 36 y.o. and is seen for diagnostic imaging.     Films Compared:  Prior images (if available) were compared.     Findings:  This procedure was performed using tomosynthesis. Computer-aided detection was utilized in the interpretation of this examination.  The breasts have scattered areas of fibroglandular density.      Right  Mammo Digital Diagnostic Bilat with Tony  There are no corresponding areas of architectural distortion seen on this modality. Ultrasound of the lower-outer right breast demonstrates no concerning abnormality.         There are fine linear or fine-linear branching calcifications in a linear distribution seen in the retroareolar region of the right breast.      Left   Small mass within the outer left breast is less conspicuous with somewhat lucent appearance. Ultrasound was performed and demonstrates a minimally complicated 9 mm cyst at the 03:00 o'clock location, 8 cm from the nipple.         Impression:  Right  Calcifications: Right breast calcifications at the retroareolar position. Assessment: 4 - Suspicious finding. Stereotactic Biopsy is recommended.      Left  There is no mammographic or sonographic evidence of malignancy in the  left breast.     BI-RADS Category:   Overall: 4 - Suspicious     Recommendation:  Stereotactic biopsy recommended of the right breast calcifications.   Six month follow-up diagnostic left mammogram and ultrasound can be performed for the minimally complicated cyst.      Final Pathologic Diagnosis RIGHT BREAST, CALCIFICATIONS, BIOPSY:   Ductal carcinoma in situ (DCIS), high nuclear grade, cribriform, solid and   micropapillary types.   Microcalcifications are identified in association with DCIS.   No invasive carcinoma identified.   Comment:  The largest single linear focus of DCIS present measures 4 mm.   Biomarkers are ordered and will be issued in a supplemental report.  Dr. Chen Mcdonald also reviewed this case and agrees with the diagnosis.  VC      Comment: Interp By KRAIG Willson M.D., Signed on 03/08/2022 at 17:29   Supplemental Diagnosis BREAST BIOMARKER RESULTS   Estrogen receptor (ER):  Positive (100%, strong)   Progesterone receptor (VT):  Positive (5%, strong)   All immunostain controls react appropriately.              Assessment:     This is a 36 y.o. female with abnormal mammogram right breast DCIS and an increased risk of breast cancer based on CHECK 2 mutation and family history.        Plan:   Pathology reviewed and discussed  Bilateral mastectomy with  reconstruction with right axillary SLNbx possible ALND; discussed alternative surgical options including lumpectomy + XRT; potential need for further treatment including hormone therapy  Preop: CBC, CMP, EKG, CXR  Risks and benefits discussed with patient including but not limited to: pain, bleeding, infection, injury to underlying nerves or vessels, lymphedema,parathesias, need for further surgery  Oncology follow up after surgery  Referral plastic surgery Dr. Carpio schedule once eval complete  45 minutes of total time spent on the encounter, which includes face to face time and non-face to face time preparing to see the patient (eg, review  of tests), Obtaining and/or reviewing separately obtained history, Documenting clinical information in the electronic or other health record, Independently interpreting results (not separately reported) and communicating results to the patient/family/caregiver, or Care coordination (not separately reported).       Micheal Bartholomew

## 2022-03-31 ENCOUNTER — PATIENT MESSAGE (OUTPATIENT)
Dept: SURGERY | Facility: CLINIC | Age: 37
End: 2022-03-31
Payer: OTHER GOVERNMENT

## 2022-04-04 ENCOUNTER — TELEPHONE (OUTPATIENT)
Dept: HEMATOLOGY/ONCOLOGY | Facility: CLINIC | Age: 37
End: 2022-04-04
Payer: OTHER GOVERNMENT

## 2022-04-04 NOTE — TELEPHONE ENCOUNTER
Left  Left  to schedule genetics appt. Provided call back number.to schedule genetics appt. Provided call back number.

## 2022-04-05 LAB
FINAL PATHOLOGIC DIAGNOSIS: NORMAL
GROSS: NORMAL
Lab: NORMAL

## 2022-04-11 DIAGNOSIS — Z15.09 BIALLELIC MUTATION OF CHEK2 GENE: ICD-10-CM

## 2022-04-11 DIAGNOSIS — Z15.89 BIALLELIC MUTATION OF CHEK2 GENE: ICD-10-CM

## 2022-04-11 DIAGNOSIS — D05.11 DUCTAL CARCINOMA IN SITU (DCIS) OF RIGHT BREAST: Primary | ICD-10-CM

## 2022-04-11 DIAGNOSIS — D05.11 BREAST NEOPLASM, TIS (DCIS), RIGHT: ICD-10-CM

## 2022-04-11 DIAGNOSIS — Z15.01 BIALLELIC MUTATION OF CHEK2 GENE: ICD-10-CM

## 2022-04-11 RX ORDER — SODIUM CHLORIDE 9 MG/ML
INJECTION, SOLUTION INTRAVENOUS CONTINUOUS
Status: CANCELLED | OUTPATIENT
Start: 2022-04-11

## 2022-04-11 RX ORDER — LIDOCAINE HYDROCHLORIDE 10 MG/ML
1 INJECTION, SOLUTION EPIDURAL; INFILTRATION; INTRACAUDAL; PERINEURAL ONCE
Status: CANCELLED | OUTPATIENT
Start: 2022-04-11 | End: 2022-04-11

## 2022-04-18 ENCOUNTER — TELEPHONE (OUTPATIENT)
Dept: INTERNAL MEDICINE | Facility: CLINIC | Age: 37
End: 2022-04-18
Payer: OTHER GOVERNMENT

## 2022-04-18 ENCOUNTER — PATIENT MESSAGE (OUTPATIENT)
Dept: INTERNAL MEDICINE | Facility: CLINIC | Age: 37
End: 2022-04-18
Payer: OTHER GOVERNMENT

## 2022-04-18 NOTE — TELEPHONE ENCOUNTER
Spoke with dr cornell and she say she need a referral to do her Bilateral mastectomy reconstruction  surgery. She has breast cancer and has to have a procedure done. I do see a  Referral in here from a different doctor but they need one from you since your her pcp and not dr cavazos

## 2022-04-18 NOTE — TELEPHONE ENCOUNTER
----- Message from Arthur Truong sent at 4/18/2022 11:09 AM CDT -----  Contact: pt  Pt is calling rg having double masectomy on Monday of next week and is needing to have a referral placed for the reconstruction part of the surgery and sent to [facility] and also do Dr Romero's fax [phone] phone # [phone]// pt can be reached asap rg this matter at [phone]//thanks/noah     Needs to have it done asap today for the surgery on Monday

## 2022-04-18 NOTE — TELEPHONE ENCOUNTER
Called dr. steinberg's office to let them know that dr. Barraza has never seen this pt before and that pt will need to be seen in order for her to do the referral.

## 2022-04-18 NOTE — TELEPHONE ENCOUNTER
pls schedule pt for Wed AM//go ahead and double book my 8AM slot for her to establish care and so I can place a referral.

## 2022-04-18 NOTE — TELEPHONE ENCOUNTER
I have never seen this patient.  In order to place a referral, she will need a visit with me.  Not sure how I can be listed as PCP as I have never seen her.

## 2022-04-18 NOTE — TELEPHONE ENCOUNTER
Spoke with pt and got her booked but she is asking can the referral still be put in so  can accept it and she still see you on Wednesday. I did explain to her that you are out of the office on today and tomorrow and that she needs to be seen because she hasn't seen you before. Pt is still begging for referral

## 2022-04-18 NOTE — TELEPHONE ENCOUNTER
----- Message from Ye Wilson sent at 4/18/2022 11:09 AM CDT -----  Contact: Dr. cornell office  Calling to get a referral for the PT. Schedule for surgery next week and insurance is requiring a referral. Call back at [phone] or [phone]. fax at [phone].

## 2022-04-20 ENCOUNTER — OFFICE VISIT (OUTPATIENT)
Dept: INTERNAL MEDICINE | Facility: CLINIC | Age: 37
End: 2022-04-20
Payer: OTHER GOVERNMENT

## 2022-04-20 ENCOUNTER — DOCUMENTATION ONLY (OUTPATIENT)
Dept: PREADMISSION TESTING | Facility: HOSPITAL | Age: 37
End: 2022-04-20
Payer: OTHER GOVERNMENT

## 2022-04-20 VITALS
HEART RATE: 65 BPM | WEIGHT: 113.75 LBS | BODY MASS INDEX: 22.98 KG/M2 | TEMPERATURE: 98 F | DIASTOLIC BLOOD PRESSURE: 60 MMHG | SYSTOLIC BLOOD PRESSURE: 110 MMHG | OXYGEN SATURATION: 99 %

## 2022-04-20 DIAGNOSIS — E78.49 FAMILIAL HYPERLIPIDEMIA: ICD-10-CM

## 2022-04-20 DIAGNOSIS — Z15.01 BIALLELIC MUTATION OF CHEK2 GENE WITHOUT DIAGNOSED MALIGNANCY: Primary | ICD-10-CM

## 2022-04-20 DIAGNOSIS — Z15.09 BIALLELIC MUTATION OF CHEK2 GENE WITHOUT DIAGNOSED MALIGNANCY: Primary | ICD-10-CM

## 2022-04-20 DIAGNOSIS — Z15.89 BIALLELIC MUTATION OF CHEK2 GENE WITHOUT DIAGNOSED MALIGNANCY: Primary | ICD-10-CM

## 2022-04-20 DIAGNOSIS — D05.11 NEOPLASM OF RIGHT BREAST, PRIMARY TUMOR STAGING CATEGORY TIS: DUCTAL CARCINOMA IN SITU (DCIS): ICD-10-CM

## 2022-04-20 PROCEDURE — 99999 PR PBB SHADOW E&M-EST. PATIENT-LVL IV: CPT | Mod: PBBFAC,,, | Performed by: FAMILY MEDICINE

## 2022-04-20 PROCEDURE — 99204 PR OFFICE/OUTPT VISIT, NEW, LEVL IV, 45-59 MIN: ICD-10-PCS | Mod: S$PBB,,, | Performed by: FAMILY MEDICINE

## 2022-04-20 PROCEDURE — 99214 OFFICE O/P EST MOD 30 MIN: CPT | Mod: PBBFAC,PO | Performed by: FAMILY MEDICINE

## 2022-04-20 PROCEDURE — 99204 OFFICE O/P NEW MOD 45 MIN: CPT | Mod: S$PBB,,, | Performed by: FAMILY MEDICINE

## 2022-04-20 PROCEDURE — 99999 PR PBB SHADOW E&M-EST. PATIENT-LVL IV: ICD-10-PCS | Mod: PBBFAC,,, | Performed by: FAMILY MEDICINE

## 2022-04-20 RX ORDER — ASCORBIC ACID 250 MG
250 TABLET ORAL DAILY
COMMUNITY

## 2022-04-20 RX ORDER — ROSUVASTATIN CALCIUM 20 MG/1
20 TABLET, COATED ORAL DAILY
Qty: 90 TABLET | Refills: 3 | Status: SHIPPED | OUTPATIENT
Start: 2022-04-20 | End: 2023-08-08

## 2022-04-20 NOTE — PROGRESS NOTES
Pre op instructions reviewed with patient per phone.    Spoke about pre op process and surgery instructions, verbalized understanding.    To confirm, Your surgeon has instructed you:  Surgery is scheduled on April 25, 2022 at [facility].      Please report to [facility], 1st floor.    The address is [address] [facility].  [place]  [id number]       The Pre Admissions will call you the day prior to surgery with your arrival time.        INSTRUCTIONS IMPORTANT!!!  Do Not Eat, Drink, or Smoke after 12 midnight! NO WATER after midnight! OK to brush teeth, no gum, candy or mints!        *Take only these medicines with a small swallow of water-morning of surgery.    NONE        ____  Do Not wear makeup, mascara nail polish or artificial nails  ____  NO powder, lotions, deodorants or creams to surgical area.  ____  Please remove all jewelry, including piercings and leave at home.  SURGERY WILL BE CANCELLED IF PIERCINGS ARE PRESENT!!!  ____  Dentures, Hearing Aids and Contact Lens will need to be removed prior to the start of surgery.  ____  Please bring photo ID and insurance information to hospital (Leave Valuables at Home)  ____  If going home the same day, arrange for a ride home. You will not be able to            drive if Anesthesia was used.    ____  Wear clean, loose fitting clothing. Allow for dressings, bandages.  ____  Stop Aspirin, Ibuprofen, Motrin and Aleve at least 5-7 days before surgery, unless otherwise instructed by your doctor, or the nurse. You MAY use Tylenol/acetaminophen until day of               surgery.  ____  If you take diabetic medication, do NOT take morning of surgery unless instructed by            Doctor. Metformin to be stopped 24 hrs prior to surgery time.   ____ Stop taking any Fish Oil supplements or Vitamins at least 5 days prior to surgery, unless instructed otherwise by your Doctor.         Bathing Instructions: The night before surgery and the  morning prior to coming to the hospital:              -Do not shave your face.  -Do not shave pubic hair 7 days prior to surgery (gyn pt's).  -Do not shave legs/underarms 3 days prior to surgery.              -Shower & Rinse your body as usual with anti-bacterial Soap (Dial, Lever 2000, or Hibiclens)              -Do not use hibiclens on your head, face, or genitals.              -Do not wash with anti-bacterial soap after you use the hibiclens.              -Rinse your body thoroughly.       Pediatric patients do not need to use anti-bacterial soap or Hibiclens.            [facility] Visitor/Ride Policy:  Only 1 adult allowed (over the age of 18) to accompany you into Pre-op/Recovery Surgery Dept and must stay through the entire length of admission.    Must have a ride home from a responsible adult that you know and trust.   Pediatric Patients are allowed 2 adult visitors.    Medical Transport, Uber or Lyft can only be used if patient has a responsible adult to accompany them during ride home.     Post-Op Instructions: You will receive surgery post-op instructions by your Discharge Nurse prior to going home.     Surgical Site Infection:     Prevention of surgical site infections:                 -Keep incisions clean and dry.              -Do not soak/submerge incisions in water until completely healed.              -Do not apply lotions, powders, creams, or deodorants to site.              -Always make sure hands are cleaned with antibacterial soap/ alcohol-based   prior to touching the surgical site.       Signs and symptoms:              -Redness and pain around the area where you had surgery              -Drainage of cloudy fluid from your surgical wound              -Fever over 100.4 or chills  >>>Call Surgeon office/on-call Surgeon if you experience any of these signs & symptoms post-surgery.        *Please Call [facility] Pre-[facility] Department with surgery instruction questions at [phone].      *Insurance Questions, please call [phone].    Pre admit office to call afternoon prior to surgery with final arrival time.

## 2022-04-20 NOTE — PROGRESS NOTES
Subjective:      Patient ID: Gina Garcia is a 36 y.o. female.    Chief Complaint: establish care    HPI 36 y.o.   female patient with a PMHx of anxiety, migraines, and CHEK2-related breast cancer presents to clinic to establish care. This is the patient's first encounter with Dr. PRATT.    Today she reports that she is doing well. She states that she is scheduled for mastectomy  With reconstruction 4/25/22.  Seeing Dr. Bartholomew and Dr. Romero will be doing surgery.  She is in need of referral from PCP.  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  Her Maternal side with Breast CA as well as Paternal side.  Also, has familial hyperlipidemia.      Patient denies chest pain, SOB and bowel changes.         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    Migraines      Family History   Problem Relation Age of Onset    Breast cancer Mother 45        estrogen positive    Prostate cancer Father     Lung cancer Maternal Grandmother     Cancer Paternal Grandmother         prostate    Breast cancer Paternal Grandmother     Cancer Paternal Grandfather     Breast cancer Other     Breast cancer Other     Breast cancer Other     Breast cancer Other     Colon cancer Neg Hx         colon cancer    Ovarian cancer Neg Hx     Thrombophilia Neg Hx      Past Surgical History:   Procedure Laterality Date    ACHILLES TENDON SURGERY      COLONOSCOPY N/A 3/28/2022    Procedure: COLONOSCOPY;  Surgeon: Winnie Polanco MD;  Location: [facility];  Service: Endoscopy;  Laterality: N/A;    EXTERNAL EAR SURGERY      cosmetic     Social History     Tobacco Use    Smoking status: Never Smoker    Smokeless tobacco: Never Used   Substance Use Topics    Alcohol use: No     Alcohol/week: 0.0 standard drinks    Drug use: Never       /60   Pulse 65   Temp 97.7 °F (36.5 °C)   Wt 51.6 kg (113 lb 12.1 oz)   LMP 03/21/2022   SpO2 99%   BMI  22.98 kg/m²     Review of Systems   Constitutional: Negative for activity change, appetite change, chills, diaphoresis, fatigue, fever and unexpected weight change.   HENT: Negative for hearing loss, rhinorrhea, tinnitus and trouble swallowing.    Eyes: Negative for discharge and visual disturbance.   Respiratory: Negative for cough, chest tightness, shortness of breath and wheezing.    Cardiovascular: Negative for chest pain, palpitations and leg swelling.   Gastrointestinal: Negative for abdominal distention, abdominal pain, blood in stool, constipation, diarrhea and vomiting.   Endocrine: Negative for polydipsia and polyuria.   Genitourinary: Negative for difficulty urinating, dysuria, frequency, hematuria, menstrual problem and urgency.   Musculoskeletal: Negative for arthralgias, back pain, joint swelling and neck pain.   Skin: Negative for color change and rash.   Neurological: Negative for weakness and headaches.   Hematological: Negative for adenopathy.   Psychiatric/Behavioral: Negative for confusion, decreased concentration and dysphoric mood.       Objective:     Physical Exam  Vitals and nursing note reviewed.   Constitutional:       General: She is not in acute distress.  HENT:      Right Ear: External ear normal.      Left Ear: External ear normal.      Nose: Nose normal.   Eyes:      Conjunctiva/sclera: Conjunctivae normal.      Pupils: Pupils are equal, round, and reactive to light.   Neck:      Vascular: No carotid bruit.   Cardiovascular:      Rate and Rhythm: Normal rate and regular rhythm.      Heart sounds: Normal heart sounds.   Pulmonary:      Effort: Pulmonary effort is normal. No respiratory distress.      Breath sounds: Normal breath sounds. No wheezing or rales.   Abdominal:      General: Bowel sounds are normal. There is no distension.      Palpations: Abdomen is soft.      Tenderness: There is no abdominal tenderness. There is no guarding.   Musculoskeletal:      Cervical back: Normal  range of motion and neck supple.      Right lower leg: No edema.      Left lower leg: No edema.   Skin:     General: Skin is warm and dry.      Findings: No rash.   Neurological:      Mental Status: She is alert and oriented to person, place, and time.   Psychiatric:         Behavior: Behavior normal.         Thought Content: Thought content normal.         Judgment: Judgment normal.         Lab Results   Component Value Date    WBC 8.02 03/16/2022    HGB 14.0 03/16/2022    HCT 43.1 03/16/2022     03/16/2022    CHOL 281 (H) 03/16/2022    TRIG 96 03/16/2022    HDL 53 03/16/2022    ALT 16 03/16/2022    AST 17 03/16/2022     03/16/2022    K 5.0 03/16/2022     03/16/2022    CREATININE 0.7 03/16/2022    BUN 7 03/16/2022    CO2 23 03/16/2022    TSH 2.041 03/16/2022    GLUF 89 07/02/2008       Assessment:     1. 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 Neoplasm of right breast, primary tumor staging category Tis: ductal carcinoma in situ (DCIS)    3. 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  Dispense: 90 tablet; Refill: 3        Vitals reviewed and stable. BP within normal range at 110/60    Referral placed for pt.  Patient is scheduled for mastectomy on 4/25/2022.     Start crestor 20mg daily//recheck labs in 4 mos  Update Pap//pt plans to look at getting total hysterectomy as  well    Questions and concerns addressed.          Documentation entered by Nuha Malone, acting as scribe for Dr. Kenneth Dewitt. 04/20/2022 8:01 AM.

## 2022-04-20 NOTE — H&P (VIEW-ONLY)
Subjective:      Patient ID: Gina Garcia is a 36 y.o. female.    Chief Complaint: establish care    HPI 36 y.o.   female patient with a PMHx of anxiety, migraines, and CHEK2-related breast cancer presents to clinic to establish care. This is the patient's first encounter with Dr. PRATT.    Today she reports that she is doing well. She states that she is scheduled for mastectomy  With reconstruction 4/25/22.  Seeing Dr. Bartholomew and Dr. Romero will be doing surgery.  She is in need of referral from PCP.  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  Her Maternal side with Breast CA as well as Paternal side.  Also, has familial hyperlipidemia.      Patient denies chest pain, SOB and bowel changes.         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    Migraines      Family History   Problem Relation Age of Onset    Breast cancer Mother 45        estrogen positive    Prostate cancer Father     Lung cancer Maternal Grandmother     Cancer Paternal Grandmother         prostate    Breast cancer Paternal Grandmother     Cancer Paternal Grandfather     Breast cancer Other     Breast cancer Other     Breast cancer Other     Breast cancer Other     Colon cancer Neg Hx         colon cancer    Ovarian cancer Neg Hx     Thrombophilia Neg Hx      Past Surgical History:   Procedure Laterality Date    ACHILLES TENDON SURGERY      COLONOSCOPY N/A 3/28/2022    Procedure: COLONOSCOPY;  Surgeon: Winnie Polanco MD;  Location: [facility];  Service: Endoscopy;  Laterality: N/A;    EXTERNAL EAR SURGERY      cosmetic     Social History     Tobacco Use    Smoking status: Never Smoker    Smokeless tobacco: Never Used   Substance Use Topics    Alcohol use: No     Alcohol/week: 0.0 standard drinks    Drug use: Never       /60   Pulse 65   Temp 97.7 °F (36.5 °C)   Wt 51.6 kg (113 lb 12.1 oz)   LMP 03/21/2022   SpO2 99%   BMI  22.98 kg/m²     Review of Systems   Constitutional: Negative for activity change, appetite change, chills, diaphoresis, fatigue, fever and unexpected weight change.   HENT: Negative for hearing loss, rhinorrhea, tinnitus and trouble swallowing.    Eyes: Negative for discharge and visual disturbance.   Respiratory: Negative for cough, chest tightness, shortness of breath and wheezing.    Cardiovascular: Negative for chest pain, palpitations and leg swelling.   Gastrointestinal: Negative for abdominal distention, abdominal pain, blood in stool, constipation, diarrhea and vomiting.   Endocrine: Negative for polydipsia and polyuria.   Genitourinary: Negative for difficulty urinating, dysuria, frequency, hematuria, menstrual problem and urgency.   Musculoskeletal: Negative for arthralgias, back pain, joint swelling and neck pain.   Skin: Negative for color change and rash.   Neurological: Negative for weakness and headaches.   Hematological: Negative for adenopathy.   Psychiatric/Behavioral: Negative for confusion, decreased concentration and dysphoric mood.       Objective:     Physical Exam  Vitals and nursing note reviewed.   Constitutional:       General: She is not in acute distress.  HENT:      Right Ear: External ear normal.      Left Ear: External ear normal.      Nose: Nose normal.   Eyes:      Conjunctiva/sclera: Conjunctivae normal.      Pupils: Pupils are equal, round, and reactive to light.   Neck:      Vascular: No carotid bruit.   Cardiovascular:      Rate and Rhythm: Normal rate and regular rhythm.      Heart sounds: Normal heart sounds.   Pulmonary:      Effort: Pulmonary effort is normal. No respiratory distress.      Breath sounds: Normal breath sounds. No wheezing or rales.   Abdominal:      General: Bowel sounds are normal. There is no distension.      Palpations: Abdomen is soft.      Tenderness: There is no abdominal tenderness. There is no guarding.   Musculoskeletal:      Cervical back: Normal  range of motion and neck supple.      Right lower leg: No edema.      Left lower leg: No edema.   Skin:     General: Skin is warm and dry.      Findings: No rash.   Neurological:      Mental Status: She is alert and oriented to person, place, and time.   Psychiatric:         Behavior: Behavior normal.         Thought Content: Thought content normal.         Judgment: Judgment normal.         Lab Results   Component Value Date    WBC 8.02 03/16/2022    HGB 14.0 03/16/2022    HCT 43.1 03/16/2022     03/16/2022    CHOL 281 (H) 03/16/2022    TRIG 96 03/16/2022    HDL 53 03/16/2022    ALT 16 03/16/2022    AST 17 03/16/2022     03/16/2022    K 5.0 03/16/2022     03/16/2022    CREATININE 0.7 03/16/2022    BUN 7 03/16/2022    CO2 23 03/16/2022    TSH 2.041 03/16/2022    GLUF 89 07/02/2008       Assessment:     1. 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 Neoplasm of right breast, primary tumor staging category Tis: ductal carcinoma in situ (DCIS)    3. 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  Dispense: 90 tablet; Refill: 3        Vitals reviewed and stable. BP within normal range at 110/60    Referral placed for pt.  Patient is scheduled for mastectomy on 4/25/2022.     Start crestor 20mg daily//recheck labs in 4 mos  Update Pap//pt plans to look at getting total hysterectomy as  well    Questions and concerns addressed.          Documentation entered by Nuha Malone, acting as scribe for Dr. Kenneth Dewitt. 04/20/2022 8:01 AM.

## 2022-04-21 ENCOUNTER — TELEPHONE (OUTPATIENT)
Dept: INTERNAL MEDICINE | Facility: CLINIC | Age: 37
End: 2022-04-21
Payer: OTHER GOVERNMENT

## 2022-04-21 ENCOUNTER — TELEPHONE (OUTPATIENT)
Dept: PRIMARY CARE CLINIC | Facility: CLINIC | Age: 37
End: 2022-04-21
Payer: OTHER GOVERNMENT

## 2022-04-21 ENCOUNTER — TELEPHONE (OUTPATIENT)
Dept: GENETICS | Facility: CLINIC | Age: 37
End: 2022-04-21
Payer: OTHER GOVERNMENT

## 2022-04-21 ENCOUNTER — PATIENT MESSAGE (OUTPATIENT)
Dept: INTERNAL MEDICINE | Facility: CLINIC | Age: 37
End: 2022-04-21
Payer: OTHER GOVERNMENT

## 2022-04-21 NOTE — TELEPHONE ENCOUNTER
Nurse called pt to set up visit with genetic from a referral, no answer voice message left to call office for Jay Novak, Genetic

## 2022-04-21 NOTE — TELEPHONE ENCOUNTER
Pt needs help getting approved to have her breast surgery on Monday. Shannan is saying the doctor, dr. pascal is out of their network but pt has been trying to get this surgery. A year ago pt was trying and they denied her and she ended up getting breast cancer. Now that she is trying to get it removed they are still denying it. Pt has had pre-op visit and everything she needs to have surgery.

## 2022-04-21 NOTE — TELEPHONE ENCOUNTER
Pt states she needs a copy of Letter of Medical Necessity. It needs to state that no in network provider are offiliated with [facility], and that the out of network provider is needed. This form needs to be sent to [facility] [place]. This needs to be sent ASAP.           Do they have a form or you will be able to type it up

## 2022-04-21 NOTE — TELEPHONE ENCOUNTER
----- Message from Arpita Joe sent at 4/21/2022  3:28 PM CDT -----  Deann from [facility] would like a call back in regards to the  plastic surgery referral. She stated the doctor is not in network and would like to know if there is a different doctor they can approve it for or a reason for the non net work doctor. Please call her at [phone], she stated it is okay to leave a voicemail with the information.

## 2022-04-21 NOTE — TELEPHONE ENCOUNTER
Spoke with ms montoya about pts referral and she says as long as the referral is in the portal they can still go forward with surgery. Will call  to make sure they have it

## 2022-04-21 NOTE — TELEPHONE ENCOUNTER
----- Message from Ericka Novak sent at 4/21/2022  4:32 PM CDT -----  Pt is needing to speak with the nurse about her upcoming appt. Pt states she needs a copy of Letter of Medical Necessity. It needs to state that no in network provider are offiliated with [facility], and that the out of network provider is needed. This form needs to be sent to [facility]. This needs to be sent ASAP. Please call back at [phone]

## 2022-04-21 NOTE — TELEPHONE ENCOUNTER
----- Message from Bing Mendoza sent at 4/21/2022  9:19 AM CDT -----  Michelle, with Dr. Romero, would like return call; states pt is needing to have urgent referral put in  system for upcoming surgery.  Please call back at [phone].   Priya Christie

## 2022-04-21 NOTE — TELEPHONE ENCOUNTER
----- Message from Aida Jackson sent at 4/21/2022  4:10 PM CDT -----  Contact: Michelle, with Dr. Romero  Type: Patient Call Back         Who called: Michelle, with Dr. Romero         What is the request in detail: returning a missed call regarding patient's referral auth; please advise          Best call back number: [phone] - Michelle          Additional Information: sched for surgery on 4/25           Thank You

## 2022-04-22 ENCOUNTER — TELEPHONE (OUTPATIENT)
Dept: INTERNAL MEDICINE | Facility: CLINIC | Age: 37
End: 2022-04-22
Payer: OTHER GOVERNMENT

## 2022-04-22 ENCOUNTER — TELEPHONE (OUTPATIENT)
Dept: PREADMISSION TESTING | Facility: HOSPITAL | Age: 37
End: 2022-04-22
Payer: OTHER GOVERNMENT

## 2022-04-22 ENCOUNTER — ANESTHESIA EVENT (OUTPATIENT)
Dept: SURGERY | Facility: HOSPITAL | Age: 37
End: 2022-04-22
Payer: OTHER GOVERNMENT

## 2022-04-22 NOTE — TELEPHONE ENCOUNTER
I have spoken to Jai at [facility] and provided the information that she states they need to get Dr. Carpio approved.  jai reports she will contact us back if anything else is needed.  Please let pt know this.

## 2022-04-22 NOTE — TELEPHONE ENCOUNTER
Called and spoke with the patient about the following:    Your Surgery arrival time is at 8:30 AM on 4/22/2022 at [facility] location.    The address is [address] [place]  [address].     Only one adult (over 18) is to accompany you to surgery, unless it is a Pediatric patient, then 2 adults are encouraged to accompany them to the surgery center.    Your ride MUST STAY the entire time until you are discharged.      Please come in the main lobby (located on the 1st floor).     Be prepared to show your photo ID and insurance card.     Masks should be worn by ALL persons entering the building.     Nothing to eat or drink after after midnight, unless you were instructed to take specific medications discussed with the Pre-admit Nurse.     Please call [phone] with any questions or concerns.     Thanks.

## 2022-04-22 NOTE — TELEPHONE ENCOUNTER
----- Message from Walt Fritz sent at 4/21/2022  5:05 PM CDT -----  'Pt needs help getting approved to have her breast surgery on Monday. Shannan is saying the doctor, dr. pascal is out of their network but pt has been trying to get this surgery. A year ago pt was trying and they denied her and she ended up getting breast cancer. Now that she is trying to get it removed they are still denying it. Pt has had pre-op visit and everything she needs to have surgery.'        The team has already approved the referral input by the provider.    Thanks!

## 2022-04-22 NOTE — TELEPHONE ENCOUNTER
----- Message from Carmen Harrison sent at 4/22/2022  6:30 AM CDT -----  Regarding: return call  Contact: Deann eric/ Shannan/ Yessenia eric/ Shannan/ Yessenia returning your call    Please call    Phone [phone]

## 2022-04-22 NOTE — ANESTHESIA PREPROCEDURE EVALUATION
04/22/2022   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    Migraines        Gina Garcia is a 36 y.o., female.  Past Surgical History:   Procedure Laterality Date    ACHILLES TENDON SURGERY      COLONOSCOPY N/A 3/28/2022    Procedure: COLONOSCOPY;  Surgeon: Winnie Polanco MD;  Location: [facility];  Service: Endoscopy;  Laterality: N/A;    EXTERNAL EAR SURGERY      cosmetic           Pre-op Assessment    I have reviewed the Patient Summary Reports.     I have reviewed the Nursing Notes. I have reviewed the NPO Status.   I have reviewed the Medications.     Review of Systems  Anesthesia Hx:  No problems with previous Anesthesia  Denies Family Hx of Anesthesia complications.   Denies Personal Hx of Anesthesia complications.   Social:  Non-Smoker, No Alcohol Use    Hematology/Oncology:  Hematology Normal      Current/Recent Cancer. Breast   Cardiovascular:  Cardiovascular Normal  hyperlipidemia    Pulmonary:  Pulmonary Normal    Renal/:  Renal/ Normal     Hepatic/GI:  Hepatic/GI Normal    Neurological:   Headaches    Endocrine:  Endocrine Normal    Psych:  Psychiatric Normal           Physical Exam  General: Well nourished, Cooperative, Alert and Oriented    Airway:  Mallampati: III   Mouth Opening: Small, but > 3cm  TM Distance: Normal  Tongue: Normal  Neck ROM: Normal ROM    Dental:  Intact    Chest/Lungs:  Clear to auscultation, Normal Respiratory Rate    Heart:  Rate: Normal  Rhythm: Regular Rhythm      Wt Readings from Last 3 Encounters:   04/20/22 51.6 kg (113 lb 12.1 oz)   03/28/22 53.5 kg (118 lb)   03/16/22 53.4 kg (117 lb 11.6 oz)     Temp Readings from Last 3 Encounters:   04/20/22 36.5 °C (97.7 °F)   03/28/22 36.4 °C (97.6 °F) (Temporal)   03/16/22 36.6 °C (97.8 °F) (Temporal)     BP Readings from Last 3 Encounters:   04/20/22 110/60    03/28/22 112/70   03/16/22 128/87     Pulse Readings from Last 3 Encounters:   04/20/22 65   03/28/22 76   03/16/22 78     Lab Results   Component Value Date    WBC 8.02 03/16/2022    HGB 14.0 03/16/2022    HCT 43.1 03/16/2022     (H) 03/16/2022     03/16/2022       CMP  Sodium   Date Value Ref Range Status   03/16/2022 140 136 - 145 mmol/L Final     Potassium   Date Value Ref Range Status   03/16/2022 5.0 3.5 - 5.1 mmol/L Final     Chloride   Date Value Ref Range Status   03/16/2022 106 95 - 110 mmol/L Final     CO2   Date Value Ref Range Status   03/16/2022 23 23 - 29 mmol/L Final     Glucose   Date Value Ref Range Status   03/16/2022 82 70 - 110 mg/dL Final     BUN   Date Value Ref Range Status   03/16/2022 7 6 - 20 mg/dL Final     Creatinine   Date Value Ref Range Status   03/16/2022 0.7 0.5 - 1.4 mg/dL Final     Calcium   Date Value Ref Range Status   03/16/2022 9.9 8.7 - 10.5 mg/dL Final     Total Protein   Date Value Ref Range Status   03/16/2022 7.3 6.0 - 8.4 g/dL Final     Albumin   Date Value Ref Range Status   03/16/2022 4.2 3.5 - 5.2 g/dL Final     Total Bilirubin   Date Value Ref Range Status   03/16/2022 0.4 0.1 - 1.0 mg/dL Final     Comment:     For infants and newborns, interpretation of results should be based  on gestational age, weight and in agreement with clinical  observations.    Premature Infant recommended reference ranges:  Up to 24 hours.............<8.0 mg/dL  Up to 48 hours............<12.0 mg/dL  3-5 days..................<15.0 mg/dL  6-29 days.................<15.0 mg/dL       Alkaline Phosphatase   Date Value Ref Range Status   03/16/2022 65 55 - 135 U/L Final     AST   Date Value Ref Range Status   03/16/2022 17 10 - 40 U/L Final     ALT   Date Value Ref Range Status   03/16/2022 16 10 - 44 U/L Final     Anion Gap   Date Value Ref Range Status   03/16/2022 11 8 - 16 mmol/L Final     eGFR if    Date Value Ref Range Status   03/16/2022 >60.0 >60  mL/min/1.73 m^2 Final     eGFR if non    Date Value Ref Range Status   03/16/2022 >60.0 >60 mL/min/1.73 m^2 Final     Comment:     Calculation used to obtain the estimated glomerular filtration  rate (eGFR) is the CKD-EPI equation.            Anesthesia Plan  Type of Anesthesia, risks & benefits discussed:    Anesthesia Type: Gen ETT  Intra-op Monitoring Plan: Standard ASA Monitors  Post Op Pain Control Plan: multimodal analgesia and IV/PO Opioids PRN  Induction:  IV  ASA Score: 2  Day of Surgery Review of History & Physical: H&P Update referred to the surgeon/provider.    Ready For Surgery From Anesthesia Perspective.     .

## 2022-04-25 ENCOUNTER — HOSPITAL ENCOUNTER (OUTPATIENT)
Facility: HOSPITAL | Age: 37
Discharge: HOME | End: 2022-04-26
Attending: SURGERY | Admitting: STUDENT IN AN ORGANIZED HEALTH CARE EDUCATION/TRAINING PROGRAM
Payer: OTHER GOVERNMENT

## 2022-04-25 ENCOUNTER — HOSPITAL ENCOUNTER (OUTPATIENT)
Dept: RADIOLOGY | Facility: HOSPITAL | Age: 37
Discharge: HOME | End: 2022-04-25
Attending: SURGERY
Payer: OTHER GOVERNMENT

## 2022-04-25 ENCOUNTER — ANESTHESIA (OUTPATIENT)
Dept: SURGERY | Facility: HOSPITAL | Age: 37
End: 2022-04-25
Payer: OTHER GOVERNMENT

## 2022-04-25 DIAGNOSIS — Z15.89 BIALLELIC MUTATION OF CHEK2 GENE: ICD-10-CM

## 2022-04-25 DIAGNOSIS — D05.11 BREAST NEOPLASM, TIS (DCIS), RIGHT: ICD-10-CM

## 2022-04-25 DIAGNOSIS — D05.11 DUCTAL CARCINOMA IN SITU (DCIS) OF RIGHT BREAST: ICD-10-CM

## 2022-04-25 DIAGNOSIS — Z15.09 BIALLELIC MUTATION OF CHEK2 GENE: ICD-10-CM

## 2022-04-25 DIAGNOSIS — Z15.89 BIALLELIC MUTATION OF CHEK2 GENE WITHOUT DIAGNOSED MALIGNANCY: Primary | ICD-10-CM

## 2022-04-25 DIAGNOSIS — Z15.01 BIALLELIC MUTATION OF CHEK2 GENE WITHOUT DIAGNOSED MALIGNANCY: Primary | ICD-10-CM

## 2022-04-25 DIAGNOSIS — Z15.01 BIALLELIC MUTATION OF CHEK2 GENE: ICD-10-CM

## 2022-04-25 DIAGNOSIS — Z15.09 BIALLELIC MUTATION OF CHEK2 GENE WITHOUT DIAGNOSED MALIGNANCY: Primary | ICD-10-CM

## 2022-04-25 PROCEDURE — 63600175 PHARM REV CODE 636 W HCPCS: Performed by: NURSE ANESTHETIST, CERTIFIED REGISTERED

## 2022-04-25 PROCEDURE — 88331 PATH CONSLTJ SURG 1 BLK 1SPC: CPT | Mod: 26,,, | Performed by: PATHOLOGY

## 2022-04-25 PROCEDURE — C1729 CATH, DRAINAGE: HCPCS | Performed by: SURGERY

## 2022-04-25 PROCEDURE — 38525 PR BIOPSY/REM LYMPH NODES, AXILLARY: ICD-10-PCS | Mod: 51,RT,, | Performed by: SURGERY

## 2022-04-25 PROCEDURE — 88309 TISSUE EXAM BY PATHOLOGIST: CPT | Mod: 26,,, | Performed by: PATHOLOGY

## 2022-04-25 PROCEDURE — A9520 TC99 TILMANOCEPT DIAG 0.5MCI: HCPCS

## 2022-04-25 PROCEDURE — 88342 IMHCHEM/IMCYTCHM 1ST ANTB: CPT | Mod: 26,,, | Performed by: PATHOLOGY

## 2022-04-25 PROCEDURE — 25000003 PHARM REV CODE 250: Performed by: STUDENT IN AN ORGANIZED HEALTH CARE EDUCATION/TRAINING PROGRAM

## 2022-04-25 PROCEDURE — 88331 PR  PATH CONSULT IN SURG,W FRZ SEC: ICD-10-PCS | Mod: 26,,, | Performed by: PATHOLOGY

## 2022-04-25 PROCEDURE — D9220A PRA ANESTHESIA: Mod: CRNA,,, | Performed by: NURSE ANESTHETIST, CERTIFIED REGISTERED

## 2022-04-25 PROCEDURE — 88307 TISSUE EXAM BY PATHOLOGIST: CPT | Mod: 26,,, | Performed by: PATHOLOGY

## 2022-04-25 PROCEDURE — 37000008 HC ANESTHESIA 1ST 15 MINUTES: Performed by: SURGERY

## 2022-04-25 PROCEDURE — 19303 PR MASTECTOMY, SIMPLE, COMPLETE: ICD-10-PCS | Mod: LT,,, | Performed by: SURGERY

## 2022-04-25 PROCEDURE — 38900 IO MAP OF SENT LYMPH NODE: CPT | Mod: RT,,, | Performed by: SURGERY

## 2022-04-25 PROCEDURE — 63600175 PHARM REV CODE 636 W HCPCS: Performed by: STUDENT IN AN ORGANIZED HEALTH CARE EDUCATION/TRAINING PROGRAM

## 2022-04-25 PROCEDURE — 88307 PR  SURG PATH,LEVEL V: ICD-10-PCS | Mod: 26,,, | Performed by: PATHOLOGY

## 2022-04-25 PROCEDURE — D9220A PRA ANESTHESIA: ICD-10-PCS | Mod: ANES,,, | Performed by: ANESTHESIOLOGY

## 2022-04-25 PROCEDURE — 36000707: Performed by: SURGERY

## 2022-04-25 PROCEDURE — 78195 NM LYMPHATICS AND LYMPH NODE IMAGING: ICD-10-PCS | Mod: 26,,, | Performed by: RADIOLOGY

## 2022-04-25 PROCEDURE — 63600175 PHARM REV CODE 636 W HCPCS: Performed by: SURGERY

## 2022-04-25 PROCEDURE — 27201423 OPTIME MED/SURG SUP & DEVICES STERILE SUPPLY: Performed by: SURGERY

## 2022-04-25 PROCEDURE — 38900 PR INTRAOPERATIVE SENTINEL LYMPH NODE ID W DYE INJECTION: ICD-10-PCS | Mod: RT,,, | Performed by: SURGERY

## 2022-04-25 PROCEDURE — 25000003 PHARM REV CODE 250: Performed by: NURSE ANESTHETIST, CERTIFIED REGISTERED

## 2022-04-25 PROCEDURE — 88331 PATH CONSLTJ SURG 1 BLK 1SPC: CPT | Performed by: PATHOLOGY

## 2022-04-25 PROCEDURE — 36000706: Performed by: SURGERY

## 2022-04-25 PROCEDURE — C1789 PROSTHESIS, BREAST, IMP: HCPCS | Performed by: SURGERY

## 2022-04-25 PROCEDURE — 00404 ANES INTEG SYS RAD/MODF BRST: CPT | Performed by: SURGERY

## 2022-04-25 PROCEDURE — 88341 IMHCHEM/IMCYTCHM EA ADD ANTB: CPT | Mod: 26,,, | Performed by: PATHOLOGY

## 2022-04-25 PROCEDURE — 71000033 HC RECOVERY, INTIAL HOUR: Performed by: SURGERY

## 2022-04-25 PROCEDURE — 38525 BIOPSY/REMOVAL LYMPH NODES: CPT | Mod: 51,RT,, | Performed by: SURGERY

## 2022-04-25 PROCEDURE — 88307 TISSUE EXAM BY PATHOLOGIST: CPT | Performed by: PATHOLOGY

## 2022-04-25 PROCEDURE — 88341 IMHCHEM/IMCYTCHM EA ADD ANTB: CPT | Mod: 59 | Performed by: PATHOLOGY

## 2022-04-25 PROCEDURE — 88342 CHG IMMUNOCYTOCHEMISTRY: ICD-10-PCS | Mod: 26,,, | Performed by: PATHOLOGY

## 2022-04-25 PROCEDURE — 63600175 PHARM REV CODE 636 W HCPCS: Performed by: ANESTHESIOLOGY

## 2022-04-25 PROCEDURE — D9220A PRA ANESTHESIA: Mod: ANES,,, | Performed by: ANESTHESIOLOGY

## 2022-04-25 PROCEDURE — 88309 PR  SURG PATH,LEVEL VI: ICD-10-PCS | Mod: 26,,, | Performed by: PATHOLOGY

## 2022-04-25 PROCEDURE — 19303 MAST SIMPLE COMPLETE: CPT | Mod: LT,,, | Performed by: SURGERY

## 2022-04-25 PROCEDURE — 78195 LYMPH SYSTEM IMAGING: CPT | Mod: 26,,, | Performed by: RADIOLOGY

## 2022-04-25 PROCEDURE — D9220A PRA ANESTHESIA: ICD-10-PCS | Mod: CRNA,,, | Performed by: NURSE ANESTHETIST, CERTIFIED REGISTERED

## 2022-04-25 PROCEDURE — 94799 UNLISTED PULMONARY SVC/PX: CPT

## 2022-04-25 PROCEDURE — 37000009 HC ANESTHESIA EA ADD 15 MINS: Performed by: SURGERY

## 2022-04-25 PROCEDURE — 88341 PR IHC OR ICC EACH ADD'L SINGLE ANTIBODY  STAINPR: ICD-10-PCS | Mod: 26,,, | Performed by: PATHOLOGY

## 2022-04-25 PROCEDURE — 88342 IMHCHEM/IMCYTCHM 1ST ANTB: CPT | Mod: 59 | Performed by: PATHOLOGY

## 2022-04-25 PROCEDURE — 25000003 PHARM REV CODE 250: Performed by: ANESTHESIOLOGY

## 2022-04-25 PROCEDURE — 25000003 PHARM REV CODE 250: Performed by: SURGERY

## 2022-04-25 DEVICE — IMPLANTABLE DEVICE: Type: IMPLANTABLE DEVICE | Site: BREAST | Status: FUNCTIONAL

## 2022-04-25 RX ORDER — ROCURONIUM BROMIDE 10 MG/ML
INJECTION, SOLUTION INTRAVENOUS
Status: DISCONTINUED | OUTPATIENT
Start: 2022-04-25 | End: 2022-04-25

## 2022-04-25 RX ORDER — MIDAZOLAM HYDROCHLORIDE 1 MG/ML
INJECTION INTRAMUSCULAR; INTRAVENOUS
Status: DISCONTINUED | OUTPATIENT
Start: 2022-04-25 | End: 2022-04-25

## 2022-04-25 RX ORDER — ALBUTEROL SULFATE 0.83 MG/ML
2.5 SOLUTION RESPIRATORY (INHALATION) EVERY 4 HOURS PRN
Status: DISCONTINUED | OUTPATIENT
Start: 2022-04-25 | End: 2022-04-26 | Stop reason: HOSPADM

## 2022-04-25 RX ORDER — CEFAZOLIN SODIUM 1 G/3ML
INJECTION, POWDER, FOR SOLUTION INTRAMUSCULAR; INTRAVENOUS
Status: DISCONTINUED | OUTPATIENT
Start: 2022-04-25 | End: 2022-04-25 | Stop reason: HOSPADM

## 2022-04-25 RX ORDER — ISOSULFAN BLUE 50 MG/5ML
INJECTION, SOLUTION SUBCUTANEOUS
Status: DISPENSED
Start: 2022-04-25 | End: 2022-04-26

## 2022-04-25 RX ORDER — ACETAMINOPHEN 10 MG/ML
1000 INJECTION, SOLUTION INTRAVENOUS ONCE
Status: COMPLETED | OUTPATIENT
Start: 2022-04-25 | End: 2022-04-25

## 2022-04-25 RX ORDER — NEOSTIGMINE METHYLSULFATE 1 MG/ML
INJECTION, SOLUTION INTRAVENOUS
Status: DISCONTINUED | OUTPATIENT
Start: 2022-04-25 | End: 2022-04-25

## 2022-04-25 RX ORDER — DOCUSATE SODIUM 100 MG/1
100 CAPSULE, LIQUID FILLED ORAL 2 TIMES DAILY
Status: DISCONTINUED | OUTPATIENT
Start: 2022-04-25 | End: 2022-04-26 | Stop reason: HOSPADM

## 2022-04-25 RX ORDER — SUCCINYLCHOLINE CHLORIDE 20 MG/ML
INJECTION INTRAMUSCULAR; INTRAVENOUS
Status: DISCONTINUED | OUTPATIENT
Start: 2022-04-25 | End: 2022-04-25

## 2022-04-25 RX ORDER — HYDROCODONE BITARTRATE AND ACETAMINOPHEN 5; 325 MG/1; MG/1
1 TABLET ORAL
Status: DISCONTINUED | OUTPATIENT
Start: 2022-04-25 | End: 2022-04-26

## 2022-04-25 RX ORDER — HYDROMORPHONE HYDROCHLORIDE 2 MG/ML
INJECTION, SOLUTION INTRAMUSCULAR; INTRAVENOUS; SUBCUTANEOUS
Status: DISCONTINUED | OUTPATIENT
Start: 2022-04-25 | End: 2022-04-25

## 2022-04-25 RX ORDER — ACETAMINOPHEN 325 MG/1
650 TABLET ORAL EVERY 6 HOURS PRN
Status: DISCONTINUED | OUTPATIENT
Start: 2022-04-25 | End: 2022-04-26 | Stop reason: HOSPADM

## 2022-04-25 RX ORDER — LIDOCAINE HYDROCHLORIDE 10 MG/ML
INJECTION, SOLUTION EPIDURAL; INFILTRATION; INTRACAUDAL; PERINEURAL
Status: DISCONTINUED
Start: 2022-04-25 | End: 2022-04-25 | Stop reason: WASHOUT

## 2022-04-25 RX ORDER — HYDROMORPHONE HYDROCHLORIDE 2 MG/ML
0.5 INJECTION, SOLUTION INTRAMUSCULAR; INTRAVENOUS; SUBCUTANEOUS EVERY 6 HOURS PRN
Status: DISCONTINUED | OUTPATIENT
Start: 2022-04-25 | End: 2022-04-26 | Stop reason: HOSPADM

## 2022-04-25 RX ORDER — KETAMINE HYDROCHLORIDE 50 MG/ML
INJECTION, SOLUTION INTRAMUSCULAR; INTRAVENOUS
Status: DISCONTINUED | OUTPATIENT
Start: 2022-04-25 | End: 2022-04-25

## 2022-04-25 RX ORDER — HYDROCODONE BITARTRATE AND ACETAMINOPHEN 5; 325 MG/1; MG/1
1 TABLET ORAL EVERY 4 HOURS PRN
Status: DISCONTINUED | OUTPATIENT
Start: 2022-04-25 | End: 2022-04-26 | Stop reason: HOSPADM

## 2022-04-25 RX ORDER — DEXAMETHASONE SODIUM PHOSPHATE 4 MG/ML
INJECTION, SOLUTION INTRA-ARTICULAR; INTRALESIONAL; INTRAMUSCULAR; INTRAVENOUS; SOFT TISSUE
Status: DISCONTINUED | OUTPATIENT
Start: 2022-04-25 | End: 2022-04-25

## 2022-04-25 RX ORDER — FENTANYL CITRATE 50 UG/ML
25 INJECTION, SOLUTION INTRAMUSCULAR; INTRAVENOUS EVERY 5 MIN PRN
Status: DISCONTINUED | OUTPATIENT
Start: 2022-04-25 | End: 2022-04-26

## 2022-04-25 RX ORDER — ONDANSETRON 2 MG/ML
4 INJECTION INTRAMUSCULAR; INTRAVENOUS EVERY 12 HOURS PRN
Status: DISCONTINUED | OUTPATIENT
Start: 2022-04-25 | End: 2022-04-26 | Stop reason: HOSPADM

## 2022-04-25 RX ORDER — BUPIVACAINE HYDROCHLORIDE 2.5 MG/ML
INJECTION, SOLUTION EPIDURAL; INFILTRATION; INTRACAUDAL
Status: DISCONTINUED
Start: 2022-04-25 | End: 2022-04-25 | Stop reason: WASHOUT

## 2022-04-25 RX ORDER — CEFAZOLIN SODIUM 2 G/50ML
2 SOLUTION INTRAVENOUS
Status: COMPLETED | OUTPATIENT
Start: 2022-04-25 | End: 2022-04-25

## 2022-04-25 RX ORDER — CYCLOBENZAPRINE HCL 5 MG
10 TABLET ORAL 3 TIMES DAILY
Status: DISCONTINUED | OUTPATIENT
Start: 2022-04-25 | End: 2022-04-26 | Stop reason: HOSPADM

## 2022-04-25 RX ORDER — HYDROCODONE BITARTRATE AND ACETAMINOPHEN 10; 325 MG/1; MG/1
1 TABLET ORAL EVERY 4 HOURS PRN
Status: DISCONTINUED | OUTPATIENT
Start: 2022-04-25 | End: 2022-04-26 | Stop reason: HOSPADM

## 2022-04-25 RX ORDER — METHYLENE BLUE 5 MG/ML
INJECTION INTRAVENOUS
Status: DISCONTINUED
Start: 2022-04-25 | End: 2022-04-25 | Stop reason: WASHOUT

## 2022-04-25 RX ORDER — LIDOCAINE HYDROCHLORIDE 10 MG/ML
1 INJECTION, SOLUTION EPIDURAL; INFILTRATION; INTRACAUDAL; PERINEURAL ONCE
Status: DISCONTINUED | OUTPATIENT
Start: 2022-04-25 | End: 2022-04-26

## 2022-04-25 RX ORDER — MEPERIDINE HYDROCHLORIDE 25 MG/ML
12.5 INJECTION INTRAMUSCULAR; INTRAVENOUS; SUBCUTANEOUS ONCE
Status: DISCONTINUED | OUTPATIENT
Start: 2022-04-25 | End: 2022-04-26

## 2022-04-25 RX ORDER — LIDOCAINE HYDROCHLORIDE 20 MG/ML
INJECTION, SOLUTION EPIDURAL; INFILTRATION; INTRACAUDAL; PERINEURAL
Status: DISCONTINUED | OUTPATIENT
Start: 2022-04-25 | End: 2022-04-25

## 2022-04-25 RX ORDER — PROPOFOL 10 MG/ML
VIAL (ML) INTRAVENOUS
Status: DISCONTINUED | OUTPATIENT
Start: 2022-04-25 | End: 2022-04-25

## 2022-04-25 RX ORDER — SODIUM CHLORIDE 9 MG/ML
INJECTION, SOLUTION INTRAVENOUS CONTINUOUS
Status: DISCONTINUED | OUTPATIENT
Start: 2022-04-25 | End: 2022-04-26

## 2022-04-25 RX ORDER — FENTANYL CITRATE 50 UG/ML
INJECTION, SOLUTION INTRAMUSCULAR; INTRAVENOUS
Status: DISCONTINUED | OUTPATIENT
Start: 2022-04-25 | End: 2022-04-25

## 2022-04-25 RX ORDER — NEOMYCIN AND POLYMYXIN B SULFATES 40; 200000 MG/ML; [USP'U]/ML
SOLUTION IRRIGATION
Status: DISCONTINUED | OUTPATIENT
Start: 2022-04-25 | End: 2022-04-25 | Stop reason: HOSPADM

## 2022-04-25 RX ORDER — PROMETHAZINE HYDROCHLORIDE 25 MG/1
25 TABLET ORAL EVERY 6 HOURS PRN
Status: DISCONTINUED | OUTPATIENT
Start: 2022-04-25 | End: 2022-04-26 | Stop reason: HOSPADM

## 2022-04-25 RX ORDER — ISOSULFAN BLUE 50 MG/5ML
INJECTION, SOLUTION SUBCUTANEOUS
Status: DISPENSED
Start: 2022-04-25 | End: 2022-04-25

## 2022-04-25 RX ORDER — EPHEDRINE SULFATE 50 MG/ML
INJECTION, SOLUTION INTRAVENOUS
Status: DISCONTINUED | OUTPATIENT
Start: 2022-04-25 | End: 2022-04-25

## 2022-04-25 RX ORDER — PHENYLEPHRINE HYDROCHLORIDE 10 MG/ML
INJECTION INTRAVENOUS
Status: DISCONTINUED | OUTPATIENT
Start: 2022-04-25 | End: 2022-04-25

## 2022-04-25 RX ORDER — ONDANSETRON HYDROCHLORIDE 2 MG/ML
INJECTION, SOLUTION INTRAMUSCULAR; INTRAVENOUS
Status: DISCONTINUED | OUTPATIENT
Start: 2022-04-25 | End: 2022-04-25

## 2022-04-25 RX ORDER — CEFAZOLIN SODIUM 1 G/3ML
INJECTION, POWDER, FOR SOLUTION INTRAMUSCULAR; INTRAVENOUS
Status: DISPENSED
Start: 2022-04-25 | End: 2022-04-25

## 2022-04-25 RX ORDER — CEFAZOLIN SODIUM 1 G/50ML
1 SOLUTION INTRAVENOUS
Status: DISCONTINUED | OUTPATIENT
Start: 2022-04-26 | End: 2022-04-26 | Stop reason: HOSPADM

## 2022-04-25 RX ORDER — NEOMYCIN AND POLYMYXIN B SULFATES 40; 200000 MG/ML; [USP'U]/ML
SOLUTION IRRIGATION
Status: DISPENSED
Start: 2022-04-25 | End: 2022-04-26

## 2022-04-25 RX ORDER — ACETAMINOPHEN 10 MG/ML
INJECTION, SOLUTION INTRAVENOUS
Status: DISCONTINUED | OUTPATIENT
Start: 2022-04-25 | End: 2022-04-25

## 2022-04-25 RX ORDER — NEOMYCIN AND POLYMYXIN B SULFATES 40; 200000 MG/ML; [USP'U]/ML
SOLUTION IRRIGATION
Status: DISPENSED
Start: 2022-04-25 | End: 2022-04-25

## 2022-04-25 RX ORDER — IBUPROFEN 200 MG
600 TABLET ORAL EVERY 6 HOURS PRN
Status: DISCONTINUED | OUTPATIENT
Start: 2022-04-25 | End: 2022-04-26 | Stop reason: HOSPADM

## 2022-04-25 RX ORDER — DIPHENHYDRAMINE HYDROCHLORIDE 50 MG/ML
25 INJECTION INTRAMUSCULAR; INTRAVENOUS EVERY 6 HOURS PRN
Status: DISCONTINUED | OUTPATIENT
Start: 2022-04-25 | End: 2022-04-26

## 2022-04-25 RX ORDER — ISOSULFAN BLUE 50 MG/5ML
INJECTION, SOLUTION SUBCUTANEOUS
Status: DISCONTINUED | OUTPATIENT
Start: 2022-04-25 | End: 2022-04-25 | Stop reason: HOSPADM

## 2022-04-25 RX ORDER — ONDANSETRON 2 MG/ML
4 INJECTION INTRAMUSCULAR; INTRAVENOUS ONCE AS NEEDED
Status: COMPLETED | OUTPATIENT
Start: 2022-04-25 | End: 2022-04-25

## 2022-04-25 RX ADMIN — EPHEDRINE SULFATE 20 MG: 50 INJECTION INTRAVENOUS at 06:04

## 2022-04-25 RX ADMIN — LIDOCAINE HYDROCHLORIDE 40 MG: 20 INJECTION, SOLUTION EPIDURAL; INFILTRATION; INTRACAUDAL; PERINEURAL at 01:04

## 2022-04-25 RX ADMIN — ONDANSETRON HYDROCHLORIDE 4 MG: 2 INJECTION, SOLUTION INTRAMUSCULAR; INTRAVENOUS at 06:04

## 2022-04-25 RX ADMIN — PHENYLEPHRINE HYDROCHLORIDE 100 MCG: 10 INJECTION INTRAVENOUS at 03:04

## 2022-04-25 RX ADMIN — ROCURONIUM BROMIDE 10 MG: 10 INJECTION, SOLUTION INTRAVENOUS at 05:04

## 2022-04-25 RX ADMIN — DOCUSATE SODIUM 100 MG: 100 CAPSULE, LIQUID FILLED ORAL at 09:04

## 2022-04-25 RX ADMIN — GLYCOPYRROLATE 0.4 MG: 0.2 INJECTION, SOLUTION INTRAMUSCULAR; INTRAVITREAL at 06:04

## 2022-04-25 RX ADMIN — KETAMINE HYDROCHLORIDE 50 MG: 50 INJECTION, SOLUTION INTRAMUSCULAR; INTRAVENOUS at 01:04

## 2022-04-25 RX ADMIN — DEXAMETHASONE SODIUM PHOSPHATE 8 MG: 4 INJECTION, SOLUTION INTRA-ARTICULAR; INTRALESIONAL; INTRAMUSCULAR; INTRAVENOUS; SOFT TISSUE at 01:04

## 2022-04-25 RX ADMIN — HYDROMORPHONE HYDROCHLORIDE 0.4 MG: 2 INJECTION, SOLUTION INTRAMUSCULAR; INTRAVENOUS; SUBCUTANEOUS at 04:04

## 2022-04-25 RX ADMIN — PHENYLEPHRINE HYDROCHLORIDE 100 MCG: 10 INJECTION INTRAVENOUS at 04:04

## 2022-04-25 RX ADMIN — ONDANSETRON HYDROCHLORIDE 4 MG: 2 SOLUTION INTRAMUSCULAR; INTRAVENOUS at 07:04

## 2022-04-25 RX ADMIN — EPHEDRINE SULFATE 10 MG: 50 INJECTION INTRAVENOUS at 05:04

## 2022-04-25 RX ADMIN — FENTANYL CITRATE 25 MCG: 50 INJECTION INTRAMUSCULAR; INTRAVENOUS at 07:04

## 2022-04-25 RX ADMIN — PROPOFOL 200 MG: 10 INJECTION, EMULSION INTRAVENOUS at 01:04

## 2022-04-25 RX ADMIN — ROCURONIUM BROMIDE 5 MG: 10 INJECTION, SOLUTION INTRAVENOUS at 01:04

## 2022-04-25 RX ADMIN — ACETAMINOPHEN 1000 MG: 10 INJECTION, SOLUTION INTRAVENOUS at 04:04

## 2022-04-25 RX ADMIN — GLYCOPYRROLATE 0.2 MG: 0.2 INJECTION, SOLUTION INTRAMUSCULAR; INTRAVITREAL at 02:04

## 2022-04-25 RX ADMIN — ONDANSETRON HYDROCHLORIDE 4 MG: 2 SOLUTION INTRAMUSCULAR; INTRAVENOUS at 11:04

## 2022-04-25 RX ADMIN — DEXTROSE 2 G: 50 INJECTION, SOLUTION INTRAVENOUS at 01:04

## 2022-04-25 RX ADMIN — SODIUM CHLORIDE: 0.9 INJECTION, SOLUTION INTRAVENOUS at 09:04

## 2022-04-25 RX ADMIN — HYDROCODONE BITARTRATE AND ACETAMINOPHEN 1 TABLET: 5; 325 TABLET ORAL at 09:04

## 2022-04-25 RX ADMIN — CEFAZOLIN SODIUM 2 G: 2 SOLUTION INTRAVENOUS at 07:04

## 2022-04-25 RX ADMIN — HYDROMORPHONE HYDROCHLORIDE 0.5 MG: 2 INJECTION INTRAMUSCULAR; INTRAVENOUS; SUBCUTANEOUS at 09:04

## 2022-04-25 RX ADMIN — FENTANYL CITRATE 100 MCG: 50 INJECTION, SOLUTION INTRAMUSCULAR; INTRAVENOUS at 01:04

## 2022-04-25 RX ADMIN — PROPOFOL 20 MG: 10 INJECTION, EMULSION INTRAVENOUS at 07:04

## 2022-04-25 RX ADMIN — ACETAMINOPHEN 1000 MG: 10 INJECTION, SOLUTION INTRAVENOUS at 09:04

## 2022-04-25 RX ADMIN — SUCCINYLCHOLINE CHLORIDE 140 MG: 20 INJECTION, SOLUTION INTRAMUSCULAR; INTRAVENOUS at 01:04

## 2022-04-25 RX ADMIN — PROPOFOL 20 MG: 10 INJECTION, EMULSION INTRAVENOUS at 06:04

## 2022-04-25 RX ADMIN — ROCURONIUM BROMIDE 10 MG: 10 INJECTION, SOLUTION INTRAVENOUS at 04:04

## 2022-04-25 RX ADMIN — HYDROMORPHONE HYDROCHLORIDE 0.6 MG: 2 INJECTION, SOLUTION INTRAMUSCULAR; INTRAVENOUS; SUBCUTANEOUS at 01:04

## 2022-04-25 RX ADMIN — NEOSTIGMINE METHYLSULFATE 3 MG: 1 INJECTION INTRAVENOUS at 06:04

## 2022-04-25 RX ADMIN — HYDROMORPHONE HYDROCHLORIDE 0.8 MG: 2 INJECTION, SOLUTION INTRAMUSCULAR; INTRAVENOUS; SUBCUTANEOUS at 06:04

## 2022-04-25 RX ADMIN — ROCURONIUM BROMIDE 10 MG: 10 INJECTION, SOLUTION INTRAVENOUS at 02:04

## 2022-04-25 RX ADMIN — EPHEDRINE SULFATE 10 MG: 50 INJECTION INTRAVENOUS at 06:04

## 2022-04-25 RX ADMIN — CYCLOBENZAPRINE HYDROCHLORIDE 10 MG: 5 TABLET, FILM COATED ORAL at 09:04

## 2022-04-25 RX ADMIN — HYDROMORPHONE HYDROCHLORIDE 0.2 MG: 2 INJECTION, SOLUTION INTRAMUSCULAR; INTRAVENOUS; SUBCUTANEOUS at 02:04

## 2022-04-25 RX ADMIN — MIDAZOLAM HYDROCHLORIDE 2 MG: 1 INJECTION INTRAMUSCULAR; INTRAVENOUS at 01:04

## 2022-04-25 NOTE — ANESTHESIA PROCEDURE NOTES
Intubation    Date/Time: 4/25/2022 1:06 PM  Performed by: Phuong Singh CRNA  Authorized by: Jessica Delvalle MD     Intubation:     Induction:  Intravenous    Intubated:  Postinduction    Mask Ventilation:  Easy mask    Attempts:  2    Attempted By:  CRNA    Method of Intubation:  Direct    Blade:  Mehta 2    Laryngeal View Grade: Grade IIb - only the arytenoids and epiglottis seen      Attempted By (2nd Attempt):  CRNA    Method of Intubation (2nd Attempt):  Video laryngoscopy    Blade (2nd Attempt):  Purdy 3    Laryngeal View Grade (2nd Attempt): Grade I - full view of cords      Difficult Airway Encountered?: No      Complications:  None    Airway Device:  Oral endotracheal tube    Airway Device Size:  7.0    Style/Cuff Inflation:  Cuffed    Inflation Amount (mL):  4    Tube secured:  21    Secured at:  The lips    Placement Verified By:  Capnometry and Revisualization with laryngoscopy (Bilateral Breath Sounds)    Complicating Factors:  None    Findings Post-Intubation:  BS equal bilateral and atraumatic/condition of teeth unchanged

## 2022-04-25 NOTE — H&P
[facility]  Breast Surgery  Department of General Surgery        Date of Visit:  2022     Referring provider:  [place]  [id number] [facility]  [place] [address]     PCP:  Kenneth Barraza MD     HIGH RISK     Gina Garcia is a 36 y.o. premenopausal female presents to discuss right breast biopsy. Her biopsy showed right breast DCIS ER+/NV+.     presents for abnormal mammogram of the right breast and high risk breast cancer check 2 mutation.  She recently underwent mammogram showing calcifications of the right breast and is currently awaiting stereotactic core biopsy.  She had previously undergone evaluation for her check 2 mutation with discussions about prophylactic bilateral mastectomy and reconstruction with Dr. Duckworth and Dr. Almaraz.  It was recommended that she undergo weight loss prior to surgery which she has done in the interim approximately 40-50 lbs.        Per Dr. Duckworth:  referred for evaluation of increased risk of breast cancer based on family history and + CHECK2 mutation.  Here today to discussed options of high risk screening and risk reduction.     Other breast cancer risk factors include family hx on mother's side, family hx on father's side, mom with breast CA, family hx of colon CA and family hx of Prostate CA.      Age of menarche was 15.    Age of menopause was NA.    Last menstrual period was 2020.    Patient denies hormonal therapy. IUD for 8 years - paraguard.      Patient is .    Age of first live birth was 23.    Patient did breast feed.         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          No current facility-administered medications on file prior to visit.            Review of patient's allergies indicates:  No Known Allergies        Review of Systems  Review of Systems   Constitutional: Negative for chills and fever.   HENT: Negative for congestion.    Eyes: Negative for blurred vision.   Respiratory: Negative for shortness of breath.    Cardiovascular: Negative for chest pain.   Gastrointestinal: Negative for abdominal pain, constipation, diarrhea, nausea and vomiting.   Genitourinary: Negative for dysuria.   Musculoskeletal: Negative for back pain.   Skin: Negative for itching and rash.   Neurological: Negative for dizziness.   Endo/Heme/Allergies: Does not bruise/bleed easily.   Psychiatric/Behavioral: The patient is not nervous/anxious.             Objective:   /87   Pulse 78   Temp 97.8 °F (36.6 °C) (Temporal)   Wt 53.4 kg (117 lb 11.6 oz)   LMP 02/20/2022   BMI 23.78 kg/m²      Physical Exam  Vitals reviewed.   Constitutional:       Appearance: Normal appearance. She is not ill-appearing.   HENT:      Head: Normocephalic and atraumatic.   Eyes:      Extraocular Movements: Extraocular movements intact.   Cardiovascular:      Rate and Rhythm: Normal rate and regular rhythm.      Pulses: Normal pulses.      Heart sounds: Normal heart sounds.   Pulmonary:      Effort: Pulmonary  effort is normal.      Breath sounds: Normal breath sounds.   Chest:   Breasts:      Right: No swelling, bleeding, inverted nipple, mass, nipple discharge, skin change or tenderness.      Left: No swelling, bleeding, inverted nipple, mass, nipple discharge, skin change or tenderness.         Abdominal:      General: There is no distension.      Palpations: Abdomen is soft.   Musculoskeletal:         General: Normal range of motion.      Cervical back: Normal range of motion.   Skin:     General: Skin is warm and dry.   Neurological:      General: No focal deficit present.      Mental Status: She is alert and oriented to person, place, and time.   Psychiatric:         Mood and Affect: Mood normal.               Imaging  Result:   Mammo Digital Diagnostic Bilat with Tony  US Breast Bilateral Limited     History:  Patient is 36 y.o. and is seen for diagnostic imaging.     Films Compared:  Prior images (if available) were compared.     Findings:  This procedure was performed using tomosynthesis. Computer-aided detection was utilized in the interpretation of this examination.  The breasts have scattered areas of fibroglandular density.      Right  Mammo Digital Diagnostic Bilat with Tony  There are no corresponding areas of architectural distortion seen on this modality. Ultrasound of the lower-outer right breast demonstrates no concerning abnormality.         There are fine linear or fine-linear branching calcifications in a linear distribution seen in the retroareolar region of the right breast.      Left   Small mass within the outer left breast is less conspicuous with somewhat lucent appearance. Ultrasound was performed and demonstrates a minimally complicated 9 mm cyst at the 03:00 o'clock location, 8 cm from the nipple.         Impression:  Right  Calcifications: Right breast calcifications at the retroareolar position. Assessment: 4 - Suspicious finding. Stereotactic Biopsy is recommended.      Left  There is no  mammographic or sonographic evidence of malignancy in the left breast.     BI-RADS Category:   Overall: 4 - Suspicious     Recommendation:  Stereotactic biopsy recommended of the right breast calcifications.   Six month follow-up diagnostic left mammogram and ultrasound can be performed for the minimally complicated cyst.      Final Pathologic Diagnosis RIGHT BREAST, CALCIFICATIONS, BIOPSY:   Ductal carcinoma in situ (DCIS), high nuclear grade, cribriform, solid and   micropapillary types.   Microcalcifications are identified in association with DCIS.   No invasive carcinoma identified.   Comment:  The largest single linear focus of DCIS present measures 4 mm.   Biomarkers are ordered and will be issued in a supplemental report.  Dr. Chen Mcdonald also reviewed this case and agrees with the diagnosis.  VC      Comment: Interp By KRAIG Willson M.D., Signed on 03/08/2022 at 17:29   Supplemental Diagnosis BREAST BIOMARKER RESULTS   Estrogen receptor (ER):  Positive (100%, strong)   Progesterone receptor (FL):  Positive (5%, strong)   All immunostain controls react appropriately.                Assessment:      This is a 36 y.o. female with abnormal mammogram right breast DCIS and an increased risk of breast cancer based on CHECK 2 mutation and family history.          Plan:   Pathology reviewed and discussed  Bilateral mastectomy with  reconstruction with right axillary SLNbx possible ALND; discussed alternative surgical options including lumpectomy + XRT; potential need for further treatment including hormone therapy  Preop: CBC, CMP, EKG, CXR  Risks and benefits discussed with patient including but not limited to: pain, bleeding, infection, injury to underlying nerves or vessels, lymphedema,parathesias, need for further surgery  Oncology follow up after surgery  Referral plastic surgery Dr. Carpio schedule once eval complete  45 minutes of total time spent on the encounter, which includes face to face time and  non-face to face time preparing to see the patient (eg, review of tests), Obtaining and/or reviewing separately obtained history, Documenting clinical information in the electronic or other health record, Independently interpreting results (not separately reported) and communicating results to the patient/family/caregiver, or Care coordination (not separately reported).         Micheal Bartholomew

## 2022-04-25 NOTE — OP NOTE
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 Item Serial No.  Lot No. LRB No. 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  Right Breast [place] Node #1 - FROZEN/PERM2.  Right Breast [place] Node #2 - FROZEN/PERM3.  Right  Breast [place] Node #3 - FROZEN/PERM4.  Right Axillary Content - PERM5.  Right Breast [place] Node #4 - FROZEN/PERM6.  Right Breast Ti Tissue short superior - long lateral - PERM7.  Left Breast Tissue short superior - long lateral - PERM     References:    Click here for ordering Quick Tip   Question Answer Comment   Procedure Type: General Surgery    Specimen Class: Routine/Screening    Release to patient Immediate        04/25/22 7309                        Condition: Good    Disposition: PACU - hemodynamically stable.    Procedure in detail:  Prior to arriving in the operating room, the patient's right breast was injected with technetium to facilitate sentinel lymph node identification. The patient was then brought to the operating room and placed in the supine position with both upper extremities extended.  general anesthesia was administered. Perioperative antibiotics were administered and a time out was performed confirming the patient, site, and procedure. The bilateral chest and axilla was then prepped and draped in the usual sterile fashion.     We then turned our attention to the right breast where an elliptical incision was fashioned to incorporate the nipple areolar complex.  The incision was made with a 10-blade and extended through the subcutaneous tissues with Bovie electrocautery.  Skin flaps were raised to the clavicle superiorly.  We then  turned our attention to the right axilla.  Blue dye was injected prior to the incision in the usual subareolar fashion. The gamma probe was used to identify an area of increased radioactivity within the lower axilla. The clavipectoral sheath was sharply incised to reveal the level I axillary lymph nodes. The probe was used to identify a single node with increased radioactivity.  This node was brought into the operative field and carefully dissected free of the surrounding lymphovascular structures.  The highest ex vivo count of the node was 770.  The node was  then sent to pathology for frozen section evaluation, labeled as sentinel node #1.  A total of 4 axillary sentinel nodes and 0 axillary non-sentinel nodes were identified, excised and submitted to pathology.  Bed counts were obtained to confirm that the 10% rule had not been violated.   The wound was irrigated with normal saline, and all bleeding points were secured with Bovie electrocautery.      We then proceeded to raise the remainder of the flaps to the lateral border of the sternum medially, to the inframammary fold inferiorly, and to the anterior border of the latissimus dorsi muscle laterally. The breast tissue was sharply excised off the chest wall taking care to incorporate the pectoralis fascia while leaving the serratus fascia behind.  The resulting mastectomy specimen was marked using a short stitch superiorly and long stitch laterally.  The breast was sent to pathology for permanent evaluation.       Frozen section rojelio evaluation revealed positive  metastatic disease.  Based on thee results an axillary dissection was then performed with removal of the associated lymph nodes and surrounding adipose tissue. This included levels I and II. This was accomplished by exposing the axillary vein superiorly. Small venous tributaries, lymphatics, and vessels were clipped and ligated or cauterized and divided. The subscapularis muscle was skeletonized. The long thoracic and thoracodorsal neurovascular bundles were identified and preserved. The tissue between the long thoracic and thoracodorsal bundle was removed.  Of note, at the end of the dissection, level 3 nodes were palpated for any abnormality and none were noted.      We then turned our attention to the left breast where an elliptical incision was fashioned to incorporate the nipple areolar complex.  The incision was made with a 10-blade and extended through the subcutaneous tissues with Bovie electrocautery.  Skin flaps were raised to the clavicle  superiorly. We then proceeded to raise the remainder of the flaps to the lateral border of the sternum medially, to the inframammary fold inferiorly, and to the anterior border of the latissimus dorsi muscle laterally. The breast tissue was sharply excised off the chest wall taking care to incorporate the pectoralis fascia while leaving the serratus fascia behind.  The resulting mastectomy specimen was marked using a short stitch superiorly and long stitch laterally.  The breast was sent to pathology for permanent evaluation.       At this time Dr. Carpio proceeded with reconstruction.  Please refer to her note for remaining portions of the surgery.

## 2022-04-26 VITALS
RESPIRATION RATE: 16 BRPM | DIASTOLIC BLOOD PRESSURE: 55 MMHG | WEIGHT: 110.44 LBS | SYSTOLIC BLOOD PRESSURE: 116 MMHG | HEIGHT: 59 IN | TEMPERATURE: 100 F | OXYGEN SATURATION: 97 % | BODY MASS INDEX: 22.26 KG/M2 | HEART RATE: 68 BPM

## 2022-04-26 PROBLEM — D05.10 DCIS (DUCTAL CARCINOMA IN SITU): Status: ACTIVE | Noted: 2022-04-26

## 2022-04-26 LAB
ALBUMIN SERPL BCP-MCNC: 3.5 G/DL (ref 3.5–5.2)
ALP SERPL-CCNC: 66 U/L (ref 55–135)
ALT SERPL W/O P-5'-P-CCNC: 15 U/L (ref 10–44)
ANION GAP SERPL CALC-SCNC: 11 MMOL/L (ref 8–16)
AST SERPL-CCNC: 21 U/L (ref 10–40)
BASOPHILS # BLD AUTO: 0.03 K/UL (ref 0–0.2)
BASOPHILS NFR BLD: 0.2 % (ref 0–1.9)
BILIRUB SERPL-MCNC: 0.8 MG/DL (ref 0.1–1)
BUN SERPL-MCNC: 6 MG/DL (ref 6–20)
CALCIUM SERPL-MCNC: 8.6 MG/DL (ref 8.7–10.5)
CHLORIDE SERPL-SCNC: 102 MMOL/L (ref 95–110)
CO2 SERPL-SCNC: 21 MMOL/L (ref 23–29)
CREAT SERPL-MCNC: 0.7 MG/DL (ref 0.5–1.4)
DIFFERENTIAL METHOD: ABNORMAL
EOSINOPHIL # BLD AUTO: 0 K/UL (ref 0–0.5)
EOSINOPHIL NFR BLD: 0 % (ref 0–8)
ERYTHROCYTE [DISTWIDTH] IN BLOOD BY AUTOMATED COUNT: 11.6 % (ref 11.5–14.5)
EST. GFR  (AFRICAN AMERICAN): >60 ML/MIN/1.73 M^2
EST. GFR  (NON AFRICAN AMERICAN): >60 ML/MIN/1.73 M^2
GLUCOSE SERPL-MCNC: 108 MG/DL (ref 70–110)
HCT VFR BLD AUTO: 35.3 % (ref 37–48.5)
HGB BLD-MCNC: 12 G/DL (ref 12–16)
IMM GRANULOCYTES # BLD AUTO: 0.05 K/UL (ref 0–0.04)
IMM GRANULOCYTES NFR BLD AUTO: 0.3 % (ref 0–0.5)
LYMPHOCYTES # BLD AUTO: 2.7 K/UL (ref 1–4.8)
LYMPHOCYTES NFR BLD: 16.8 % (ref 18–48)
MCH RBC QN AUTO: 33.3 PG (ref 27–31)
MCHC RBC AUTO-ENTMCNC: 34 G/DL (ref 32–36)
MCV RBC AUTO: 98 FL (ref 82–98)
MONOCYTES # BLD AUTO: 1.2 K/UL (ref 0.3–1)
MONOCYTES NFR BLD: 7.3 % (ref 4–15)
NEUTROPHILS # BLD AUTO: 12.1 K/UL (ref 1.8–7.7)
NEUTROPHILS NFR BLD: 75.4 % (ref 38–73)
NRBC BLD-RTO: 0 /100 WBC
PLATELET # BLD AUTO: 381 K/UL (ref 150–450)
PMV BLD AUTO: 10.9 FL (ref 9.2–12.9)
POTASSIUM SERPL-SCNC: 3.9 MMOL/L (ref 3.5–5.1)
PROT SERPL-MCNC: 6.2 G/DL (ref 6–8.4)
RBC # BLD AUTO: 3.6 M/UL (ref 4–5.4)
SODIUM SERPL-SCNC: 134 MMOL/L (ref 136–145)
WBC # BLD AUTO: 16 K/UL (ref 3.9–12.7)

## 2022-04-26 PROCEDURE — 63600175 PHARM REV CODE 636 W HCPCS: Performed by: STUDENT IN AN ORGANIZED HEALTH CARE EDUCATION/TRAINING PROGRAM

## 2022-04-26 PROCEDURE — 85025 COMPLETE CBC W/AUTO DIFF WBC: CPT | Performed by: SURGERY

## 2022-04-26 PROCEDURE — 25000003 PHARM REV CODE 250: Performed by: SURGERY

## 2022-04-26 PROCEDURE — 25000003 PHARM REV CODE 250: Performed by: STUDENT IN AN ORGANIZED HEALTH CARE EDUCATION/TRAINING PROGRAM

## 2022-04-26 PROCEDURE — 80053 COMPREHEN METABOLIC PANEL: CPT | Performed by: SURGERY

## 2022-04-26 RX ORDER — KETOROLAC TROMETHAMINE 30 MG/ML
30 INJECTION, SOLUTION INTRAMUSCULAR; INTRAVENOUS EVERY 8 HOURS
Status: DISCONTINUED | OUTPATIENT
Start: 2022-04-26 | End: 2022-04-26 | Stop reason: HOSPADM

## 2022-04-26 RX ORDER — OXYCODONE HYDROCHLORIDE 5 MG/1
5 TABLET ORAL ONCE
Status: COMPLETED | OUTPATIENT
Start: 2022-04-26 | End: 2022-04-26

## 2022-04-26 RX ORDER — ONDANSETRON 4 MG/1
4 TABLET, FILM COATED ORAL EVERY 8 HOURS PRN
Qty: 10 TABLET | Refills: 1 | Status: SHIPPED | OUTPATIENT
Start: 2022-04-26 | End: 2022-04-30

## 2022-04-26 RX ORDER — CEPHALEXIN 500 MG/1
500 CAPSULE ORAL EVERY 6 HOURS
Qty: 56 CAPSULE | Refills: 0 | Status: SHIPPED | OUTPATIENT
Start: 2022-04-26 | End: 2022-05-10

## 2022-04-26 RX ORDER — CYCLOBENZAPRINE HCL 10 MG
10 TABLET ORAL 3 TIMES DAILY PRN
Qty: 30 TABLET | Refills: 1 | Status: SHIPPED | OUTPATIENT
Start: 2022-04-26 | End: 2022-05-06

## 2022-04-26 RX ORDER — OXYCODONE HYDROCHLORIDE 5 MG/1
5 TABLET ORAL EVERY 4 HOURS PRN
Qty: 40 TABLET | Refills: 0 | Status: SHIPPED | OUTPATIENT
Start: 2022-04-26 | End: 2022-05-03

## 2022-04-26 RX ADMIN — CEFAZOLIN SODIUM 1 G: 1 SOLUTION INTRAVENOUS at 12:04

## 2022-04-26 RX ADMIN — DOCUSATE SODIUM 100 MG: 100 CAPSULE, LIQUID FILLED ORAL at 08:04

## 2022-04-26 RX ADMIN — CEFAZOLIN SODIUM 1 G: 1 SOLUTION INTRAVENOUS at 03:04

## 2022-04-26 RX ADMIN — CYCLOBENZAPRINE HYDROCHLORIDE 10 MG: 5 TABLET, FILM COATED ORAL at 08:04

## 2022-04-26 RX ADMIN — KETOROLAC TROMETHAMINE 30 MG: 30 INJECTION, SOLUTION INTRAMUSCULAR at 08:04

## 2022-04-26 RX ADMIN — HYDROCODONE BITARTRATE AND ACETAMINOPHEN 1 TABLET: 10; 325 TABLET ORAL at 09:04

## 2022-04-26 RX ADMIN — IBUPROFEN 600 MG: 200 TABLET, FILM COATED ORAL at 12:04

## 2022-04-26 RX ADMIN — CYCLOBENZAPRINE HYDROCHLORIDE 10 MG: 5 TABLET, FILM COATED ORAL at 02:04

## 2022-04-26 RX ADMIN — HYDROMORPHONE HYDROCHLORIDE 0.5 MG: 2 INJECTION INTRAMUSCULAR; INTRAVENOUS; SUBCUTANEOUS at 05:04

## 2022-04-26 RX ADMIN — HYDROCODONE BITARTRATE AND ACETAMINOPHEN 1 TABLET: 10; 325 TABLET ORAL at 02:04

## 2022-04-26 RX ADMIN — OXYCODONE HYDROCHLORIDE 5 MG: 5 TABLET ORAL at 02:04

## 2022-04-26 RX ADMIN — HYDROMORPHONE HYDROCHLORIDE 0.5 MG: 2 INJECTION INTRAMUSCULAR; INTRAVENOUS; SUBCUTANEOUS at 01:04

## 2022-04-26 NOTE — CARE UPDATE
Patient assessed. Sitting and resting with a pain level of 7. Given meds and labs taken. Refused food. Pt taking fluids PO. Family at bedside. HOB elevated and call light within reach.

## 2022-04-26 NOTE — NURSING
Pt ambulated to the bathroom (assist x1).  Collected 275 ml of GREEN urine in hat. Sample retained.

## 2022-04-26 NOTE — BRIEF OP NOTE
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  Right Breast [place] Node #1 - FROZEN/PERM2.  Right Breast [place] Node #2 - FROZEN/PERM3.  Right Breast [place] Node #3 - FROZEN/PERM4.  Right Axillary Content - PERM5.  Right Breast [place] Node #4 - FROZEN/PERM6.  Right Breast Ti Tissue short superior - long lateral - PERM7.  Left Breast Tissue short superior - long lateral - PERM     References:    Click here for ordering Quick Tip   Question Answer  Comment   Procedure Type: General Surgery    Specimen Class: Routine/Screening    Release to patient Immediate        04/25/22 1740                [id number]

## 2022-04-26 NOTE — CARE UPDATE
Pain meds given. Pt requested right arm to be propped on a pillow due to pain. Ice pack removed from left, lateral breast. HOB elevated, alert and oriented. Family member at bedside. Call light within reach.

## 2022-04-26 NOTE — CARE UPDATE
Patient and mom stated they were ready for discharge. Patient still having some discomfort on her left side, MD assessed it when she rounded this morning and told patient it looked good. Explained it could just be from the drains and the tissue expander. Discussed discharge instructions in depth with both the patient and her mom. Stressed the importance of getting OOB and walking around. Went over pain medication schedule including the NSAIDS. Patient and her mom both stated they understood all teaching. Completed teaching on simon drains when emptied earlier. Printed out a log for her to record her drainage from each drain per doctors orders and to bring to first follow up appointment which patient knows the date and time. IV removed. Pharmacy delivered medications to patient. Patient left with all belongings.

## 2022-04-26 NOTE — TRANSFER OF CARE
"Anesthesia Transfer of Care Note    Patient: Gina Garcia    Procedure(s) Performed: Procedure(s) (LRB):  MASTECTOMY, BILATERAL (Bilateral)  BIOPSY, LYMPH NODE, SENTINEL (Right)  INSERTION, TISSUE EXPANDER, BREAST (Bilateral)  LYMPHADENECTOMY, AXILLARY (Right)    Patient location: PACU    Anesthesia Type: general    Transport from OR: Transported from OR on room air with adequate spontaneous ventilation    Post pain: adequate analgesia    Post assessment: no apparent anesthetic complications    Post vital signs: stable    Level of consciousness: sedated    Nausea/Vomiting: no nausea/vomiting    Complications: none    Transfer of care protocol was followed      Last vitals:   Visit Vitals  /74 (BP Location: Right arm, Patient Position: Sitting)   Pulse 64   Temp 36.4 °C (97.6 °F) (Temporal)   Resp 16   Ht 4' 11" (1.499 m)   Wt 50.1 kg (110 lb 7.2 oz)   SpO2 96%   Breastfeeding No   BMI 22.31 kg/m²     "

## 2022-04-26 NOTE — DISCHARGE SUMMARY
[facility] - Med/Surg  Discharge Note  Short Stay    Procedure(s) (LRB):  MASTECTOMY, BILATERAL (Bilateral)  BIOPSY, LYMPH NODE, SENTINEL (Right)  INSERTION, TISSUE EXPANDER, BREAST (Bilateral)  LYMPHADENECTOMY, AXILLARY (Right)    OUTCOME: Patient tolerated treatment/procedure well without complication and is now ready for discharge.    DISPOSITION: Home or Self Care    FINAL DIAGNOSIS:  DCIS (ductal carcinoma in situ)    FOLLOWUP: In clinic    DISCHARGE INSTRUCTIONS:    Discharge Procedure Orders   Diet Adult Regular     Ice to affected area     Lifting restrictions     Notify your health care provider if you experience any of the following:  temperature >100.4     Notify your health care provider if you experience any of the following:  persistent nausea and vomiting or diarrhea     Notify your health care provider if you experience any of the following:  severe uncontrolled pain     Notify your health care provider if you experience any of the following:  redness, tenderness, or signs of infection (pain, swelling, redness, odor or green/yellow discharge around incision site)     Notify your health care provider if you experience any of the following:  difficulty breathing or increased cough     Notify your health care provider if you experience any of the following:  worsening rash     Leave dressing on - Keep it clean, dry, and intact until clinic visit     Shower on day dressing removed (No bath)         Clinical Reference Documents Added to Patient Instructions       Document    BREAST RECONSTRUCTION DISCHARGE INSTRUCTIONS (ENGLISH)    BREAST SURGERY EXERCISES (ENGLISH)    MASTECTOMY DISCHARGE INSTRUCTIONS (ENGLISH)          TIME SPENT ON DISCHARGE: 20 minutes

## 2022-04-26 NOTE — PROGRESS NOTES
Patient seen and examined.  Did ok overnight but hurting this morning     AFVSS, I/O reviewed   Bilat mastectomy flaps are hyperemic, R >> L, but they mike and appear viable.  Incisions C/D/I  No hematoma or seroma.  R axillary swelling/induration more than L but not unexpected amount  Hussain drain x 4 serosang and functioning, normal output     No labs     POD 1 B SSM, R ALND, immediate recon w subpectoral TE and autoderm/skin reduction     -Adding toradol now, give next dose of muscle relaxant now  -IV abx then PO until drains are discontinued   -OOBTC, ambulate today   -Activity reviewed   -OK to DC home later today

## 2022-04-26 NOTE — NURSING
Completed assessment, VS, etc. Pt reports chest pain of ten (10) bilaterally.  Reports that this is the pain she has experienced since awakening in PACU.  Will continue to monitor.

## 2022-04-26 NOTE — NURSING
Pt resting quietly after pain med administration.  Pt did not arrive with a Kohler. Per pt request, removed SCD's.Will continue to monitor.

## 2022-04-26 NOTE — NURSING
Received pt from PACU at 2020. Pt AAO, mother at bedside.  Pain confined to bilateral anterior chest. Last pain medication at 1940.  Will continue to monitor.

## 2022-04-26 NOTE — NURSING
Pt taking adequate fluids PO. Stopped IV fluids per order.Pt pain now 5.  Mother at bedside. Will continue to monitor.

## 2022-04-26 NOTE — PLAN OF CARE
Reviewed the POC, no changes required.  Pt received last pain med at 0525, and is resting quietly. VS WNL with mother at bedside. Will continue to monitor.

## 2022-04-26 NOTE — HOSPITAL COURSE
S/p bilateral mastectomy with right axillary dissection and reconstruction    POD 1 pain moderately controlled, nausea improving, ambulating

## 2022-04-26 NOTE — CARE UPDATE
Patient is alert and oriented. Instructed to cough, deep breathe, and use IS. Sitting and eating lunch, PO fluids. Meds given. Patient is accompanied by family. Call light and personal items within reach.

## 2022-04-26 NOTE — CARE UPDATE
Patient awake and alert. Patient refused food but has a drink bedside. Mother is at bedside. HOB elevated and will continue to monitor.

## 2022-04-26 NOTE — ASSESSMENT & PLAN NOTE
Status post bilateral mastectomy with right axillary dissection (positive node on frozen section)    Pain control  Regular diet  Ambulate  Discharge planning

## 2022-04-26 NOTE — PROGRESS NOTES
[facility] - Med/Surg  General Surgery  Progress Note    Subjective:     History of Present Illness:  No notes on file    Post-Op Info:  Procedure(s) (LRB):  MASTECTOMY, BILATERAL (Bilateral)  BIOPSY, LYMPH NODE, SENTINEL (Right)  INSERTION, TISSUE EXPANDER, BREAST (Bilateral)  LYMPHADENECTOMY, AXILLARY (Right)   1 Day Post-Op     Interval History: pain moderately controlled, nausea improving, ambulating    Medications:  Continuous Infusions:   sodium chloride 0.9% Stopped (04/25/22 2055)    sodium chloride 0.9% Stopped (04/25/22 2314)     Scheduled Meds:   ceFAZolin (ANCEF) IVPB  1 g Intravenous Q8H    cyclobenzaprine  10 mg Oral TID    docusate sodium  100 mg Oral BID    LIDOcaine (PF) 10 mg/ml (1%)  1 mL Intradermal Once    meperidine  12.5 mg Intravenous Once     PRN Meds:acetaminophen, albuterol sulfate, diphenhydrAMINE, fentaNYL, HYDROcodone-acetaminophen, HYDROcodone-acetaminophen, HYDROcodone-acetaminophen, HYDROmorphone, ibuprofen, lorazepam, ondansetron, promethazine     Review of patient's allergies indicates:  No Known Allergies  Objective:     Vital Signs (Most Recent):  Temp: 98 °F (36.7 °C) (04/26/22 0349)  Pulse: 79 (04/26/22 0349)  Resp: 16 (04/26/22 0525)  BP: 116/74 (04/26/22 0349)  SpO2: 98 % (04/26/22 0520) Vital Signs (24h Range):  Temp:  [97.6 °F (36.4 °C)-98.3 °F (36.8 °C)] 98 °F (36.7 °C)  Pulse:  [] 79  Resp:  [11-24] 16  SpO2:  [96 %-100 %] 98 %  BP: (116-137)/(56-87) 116/74     Weight: 50.1 kg (110 lb 7.2 oz)  Body mass index is 22.31 kg/m².    Intake/Output - Last 3 Shifts         04/24 0700 04/25 0659 04/25 0700 04/26 0659    P.O.  300    I.V. (mL/kg)  280 (5.6)    Other  100    IV Piggyback  2050    Total Intake(mL/kg)  2730 (54.5)    Urine (mL/kg/hr)  475    Drains  195    Blood  150    Total Output  820    Net  +1910                  Physical Exam  Vitals reviewed.   Constitutional:       Appearance: She is well-developed.   HENT:      Head: Normocephalic and  atraumatic.   Cardiovascular:      Rate and Rhythm: Normal rate and regular rhythm.   Pulmonary:      Effort: Pulmonary effort is normal.      Breath sounds: Normal breath sounds.   Chest:      Comments: Bilateral incisions clean dry intact  KARLOS drains serosanguineous  Abdominal:      General: Bowel sounds are normal. There is no distension.      Palpations: Abdomen is soft.      Tenderness: There is no abdominal tenderness.   Musculoskeletal:      Cervical back: Neck supple.   Skin:     General: Skin is warm and dry.   Neurological:      Mental Status: She is alert and oriented to person, place, and time.         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

## 2022-04-26 NOTE — SUBJECTIVE & OBJECTIVE
Interval History: pain moderately controlled, nausea improving, ambulating    Medications:  Continuous Infusions:   sodium chloride 0.9% Stopped (04/25/22 2055)    sodium chloride 0.9% Stopped (04/25/22 2314)     Scheduled Meds:   ceFAZolin (ANCEF) IVPB  1 g Intravenous Q8H    cyclobenzaprine  10 mg Oral TID    docusate sodium  100 mg Oral BID    LIDOcaine (PF) 10 mg/ml (1%)  1 mL Intradermal Once    meperidine  12.5 mg Intravenous Once     PRN Meds:acetaminophen, albuterol sulfate, diphenhydrAMINE, fentaNYL, HYDROcodone-acetaminophen, HYDROcodone-acetaminophen, HYDROcodone-acetaminophen, HYDROmorphone, ibuprofen, lorazepam, ondansetron, promethazine     Review of patient's allergies indicates:  No Known Allergies  Objective:     Vital Signs (Most Recent):  Temp: 98 °F (36.7 °C) (04/26/22 0349)  Pulse: 79 (04/26/22 0349)  Resp: 16 (04/26/22 0525)  BP: 116/74 (04/26/22 0349)  SpO2: 98 % (04/26/22 0520) Vital Signs (24h Range):  Temp:  [97.6 °F (36.4 °C)-98.3 °F (36.8 °C)] 98 °F (36.7 °C)  Pulse:  [] 79  Resp:  [11-24] 16  SpO2:  [96 %-100 %] 98 %  BP: (116-137)/(56-87) 116/74     Weight: 50.1 kg (110 lb 7.2 oz)  Body mass index is 22.31 kg/m².    Intake/Output - Last 3 Shifts         04/24 0700 04/25 0659 04/25 0700 04/26 0659    P.O.  300    I.V. (mL/kg)  280 (5.6)    Other  100    IV Piggyback  2050    Total Intake(mL/kg)  2730 (54.5)    Urine (mL/kg/hr)  475    Drains  195    Blood  150    Total Output  820    Net  +1910                  Physical Exam  Vitals reviewed.   Constitutional:       Appearance: She is well-developed.   HENT:      Head: Normocephalic and atraumatic.   Cardiovascular:      Rate and Rhythm: Normal rate and regular rhythm.   Pulmonary:      Effort: Pulmonary effort is normal.      Breath sounds: Normal breath sounds.   Chest:      Comments: Bilateral incisions clean dry intact  KARLOS drains serosanguineous  Abdominal:      General: Bowel sounds are normal. There is no distension.       Palpations: Abdomen is soft.      Tenderness: There is no abdominal tenderness.   Musculoskeletal:      Cervical back: Neck supple.   Skin:     General: Skin is warm and dry.   Neurological:      Mental Status: She is alert and oriented to person, place, and time.

## 2022-04-26 NOTE — PATIENT INSTRUCTIONS
NO LIFTING > 5 POUNDS UNTIL CLEARED BY DR NAPIER   START SHOWERS POD 1.  LET SOAPY WATER RINSE OVER PLASTIC DRESSING. PAT DRY AFTERWARDS  SECURE DRAINS AROUND NECK WHEN IN SHOWER   LEAVE PLASTIC DRESSING ON UNTIL YOUR FOLLOW UP WITH DR NAPIER.  IF YOU EXPERIENCE ANY BLISTERING OR REACTION TO IT, PLEASE NOTIFY DR NAPIER     TAKE YOUR ANTIBIOTICS UNTIL THE DRAINS ARE OUT   RECORD DRAIN OUTPUT 1-2 TIMES PER DAY  BRING DRAIN RECORD WITH YOU TO CLINIC   FOR PAIN, I RECOMMEND THE FOLLOWING:    Advil/Alleve/Motrin/Ibuprofen - you may take 800mg three times per day for 5 days,.  Then decrease to regular strength.  Tylenol/Acetaminophen - you may take 900mg three times per day for 3 days, then decrease to regular strength  Muscle relaxant - you may take them 3 times per day   Narcotic pain med - take when pain is not controlled with the above medications         DATE    DRAIN      1  2   AM     PM       AM     PM       AM     PM       AM     PM       AM     PM       AM     PM       AM     PM       AM     PM       AM     PM       AM     PM       AM     PM       AM     PM       AM     PM       AM     PM

## 2022-04-27 ENCOUNTER — TELEPHONE (OUTPATIENT)
Dept: GENETICS | Facility: CLINIC | Age: 37
End: 2022-04-27
Payer: OTHER GOVERNMENT

## 2022-04-27 NOTE — TELEPHONE ENCOUNTER
"Nurse reached out to pt regarding her genetic referral for counseling pt denied to schedule apt and  stated, " I don't need that appointment any more" nurse expressed understanding, pt's referral was canceled .   "

## 2022-04-27 NOTE — ANESTHESIA POSTPROCEDURE EVALUATION
Anesthesia Post Evaluation    Patient: Gina Garcia    Procedure(s) Performed: Procedure(s) (LRB):  MASTECTOMY, BILATERAL (Bilateral)  BIOPSY, LYMPH NODE, SENTINEL (Right)  INSERTION, TISSUE EXPANDER, BREAST (Bilateral)  LYMPHADENECTOMY, AXILLARY (Right)    Final Anesthesia Type: general      Patient location during evaluation: PACU  Patient participation: Yes- Able to Participate  Level of consciousness: awake and alert and oriented  Post-procedure vital signs: reviewed and stable  Pain management: adequate  Airway patency: patent    PONV status at discharge: No PONV  Anesthetic complications: no      Cardiovascular status: blood pressure returned to baseline, stable and hemodynamically stable  Respiratory status: unassisted  Hydration status: euvolemic  Follow-up not needed.          Vitals Value Taken Time   /55 04/26/22 1530   Temp 37.5 °C (99.5 °F) 04/26/22 1530   Pulse 68 04/26/22 1530   Resp 16 04/26/22 1530   SpO2 97 % 04/26/22 1530         Event Time   Out of Recovery 20:15:00         Pain/Jimy Score: Pain Rating Prior to Med Admin: 9 (4/26/2022  2:59 PM)  Pain Rating Post Med Admin: 5 (4/26/2022  3:50 PM)

## 2022-05-06 LAB
FINAL PATHOLOGIC DIAGNOSIS: NORMAL
FROZEN SECTION DIAGNOSIS: NORMAL
FROZEN SECTION FOOTNOTE: NORMAL
GROSS: NORMAL
Lab: NORMAL

## 2022-05-09 ENCOUNTER — TELEPHONE (OUTPATIENT)
Dept: HEMATOLOGY/ONCOLOGY | Facility: CLINIC | Age: 37
End: 2022-05-09
Payer: OTHER GOVERNMENT

## 2022-05-09 NOTE — TELEPHONE ENCOUNTER
Spoke with pt over the phone regarding the need for oncology follow up . Pt agreed to appt 5/12/22 at 1020 at the [facility] with Dr Queen . She knows to call me back with any further concerns meanwhile.

## 2022-05-10 DIAGNOSIS — D05.11 DUCTAL CARCINOMA IN SITU (DCIS) OF RIGHT BREAST: ICD-10-CM

## 2022-05-10 DIAGNOSIS — Z90.13 ACQUIRED ABSENCE OF BREAST AND NIPPLE, BILATERAL: Primary | ICD-10-CM

## 2022-05-11 ENCOUNTER — TELEPHONE (OUTPATIENT)
Dept: SURGERY | Facility: CLINIC | Age: 37
End: 2022-05-11
Payer: OTHER GOVERNMENT

## 2022-05-12 ENCOUNTER — OFFICE VISIT (OUTPATIENT)
Dept: HEMATOLOGY/ONCOLOGY | Facility: CLINIC | Age: 37
End: 2022-05-12
Payer: OTHER GOVERNMENT

## 2022-05-12 VITALS
OXYGEN SATURATION: 100 % | RESPIRATION RATE: 20 BRPM | BODY MASS INDEX: 22.53 KG/M2 | WEIGHT: 111.75 LBS | SYSTOLIC BLOOD PRESSURE: 118 MMHG | HEART RATE: 84 BPM | HEIGHT: 59 IN | TEMPERATURE: 98 F | DIASTOLIC BLOOD PRESSURE: 74 MMHG

## 2022-05-12 DIAGNOSIS — Z15.89 CHEK2-RELATED BREAST CANCER: ICD-10-CM

## 2022-05-12 DIAGNOSIS — Z15.09 BIALLELIC MUTATION OF CHEK2 GENE WITHOUT DIAGNOSED MALIGNANCY: ICD-10-CM

## 2022-05-12 DIAGNOSIS — D05.11 BREAST NEOPLASM, TIS (DCIS), RIGHT: Primary | ICD-10-CM

## 2022-05-12 DIAGNOSIS — D05.10 DUCTAL CARCINOMA IN SITU (DCIS) OF BREAST, UNSPECIFIED LATERALITY: ICD-10-CM

## 2022-05-12 DIAGNOSIS — Z15.01 BIALLELIC MUTATION OF CHEK2 GENE WITHOUT DIAGNOSED MALIGNANCY: ICD-10-CM

## 2022-05-12 DIAGNOSIS — Z15.89 BIALLELIC MUTATION OF CHEK2 GENE WITHOUT DIAGNOSED MALIGNANCY: ICD-10-CM

## 2022-05-12 DIAGNOSIS — Z15.09 CHEK2-RELATED BREAST CANCER: ICD-10-CM

## 2022-05-12 DIAGNOSIS — Z80.51 FAMILY HISTORY OF CANCER OF THE KIDNEY: ICD-10-CM

## 2022-05-12 DIAGNOSIS — Z15.02 CHEK2-RELATED BREAST CANCER: ICD-10-CM

## 2022-05-12 DIAGNOSIS — C50.919 CHEK2-RELATED BREAST CANCER: ICD-10-CM

## 2022-05-12 PROCEDURE — 99999 PR PBB SHADOW E&M-EST. PATIENT-LVL IV: CPT | Mod: PBBFAC,,, | Performed by: INTERNAL MEDICINE

## 2022-05-12 PROCEDURE — 99214 OFFICE O/P EST MOD 30 MIN: CPT | Mod: PBBFAC | Performed by: INTERNAL MEDICINE

## 2022-05-12 PROCEDURE — 99999 PR PBB SHADOW E&M-EST. PATIENT-LVL IV: ICD-10-PCS | Mod: PBBFAC,,, | Performed by: INTERNAL MEDICINE

## 2022-05-12 PROCEDURE — 99214 OFFICE O/P EST MOD 30 MIN: CPT | Mod: S$PBB,,, | Performed by: INTERNAL MEDICINE

## 2022-05-12 PROCEDURE — 99214 PR OFFICE/OUTPT VISIT, EST, LEVL IV, 30-39 MIN: ICD-10-PCS | Mod: S$PBB,,, | Performed by: INTERNAL MEDICINE

## 2022-05-12 RX ORDER — GABAPENTIN 100 MG/1
100-200 CAPSULE ORAL DAILY
COMMUNITY
Start: 2022-05-10 | End: 2022-08-19

## 2022-05-12 NOTE — PROGRESS NOTES
Subjective:       Patient ID: Gina Garcia is a 36 y.o. female.    Chief Complaint: Results (S moved to) and Breast Cancer    HPI 36-year-old female status post bilateral mastectomy patient has right-sided ductal carcinoma in Situ ERPR positive.  Left-sided mastectomy no evidence of malignancy CHEK2 positive.  Mother recently diagnosed with CHEK2 positive breast cancer.  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    Migraines      Family History   Problem Relation Age of Onset    Breast cancer Mother 45        estrogen positive    Prostate cancer Father     Lung cancer Maternal Grandmother     Cancer Paternal Grandmother         prostate    Breast cancer Paternal Grandmother     Cancer Paternal Grandfather     Breast cancer Other     Breast cancer Other     Breast cancer Other     Breast cancer Other     Colon cancer Neg Hx         colon cancer    Ovarian cancer Neg Hx     Thrombophilia Neg Hx      Social History     Socioeconomic History    Marital status:     Number of children: 2   Occupational History    Occupation: Stay at home mom   Tobacco Use    Smoking status: Never Smoker    Smokeless tobacco: Never Used   Substance and Sexual Activity    Alcohol use: No     Alcohol/week: 0.0 standard drinks    Drug use: Never    Sexual activity: Yes     Birth control/protection: None     Past Surgical History:   Procedure Laterality Date    ACHILLES TENDON SURGERY      AXILLARY NODE DISSECTION Right 4/25/2022    Procedure: LYMPHADENECTOMY, AXILLARY;  Surgeon: Micheal Bartholomew MD;  Location: [facility] OR;  Service: General;  Laterality: Right;  Right axillary node dissection    BILATERAL MASTECTOMY Bilateral 4/25/2022    Procedure: MASTECTOMY, BILATERAL;  Surgeon: Micheal Bartholomew MD;  Location: [facility] OR;  Service: General;  Laterality: Bilateral;    COLONOSCOPY N/A  3/28/2022    Procedure: COLONOSCOPY;  Surgeon: Winnie Polanco MD;  Location[facility];  Service: Endoscopy;  Laterality: N/A;    EXTERNAL EAR SURGERY      cosmetic    INSERTION OF BREAST TISSUE EXPANDER Bilateral 4/25/2022    Procedure: INSERTION, TISSUE EXPANDER, BREAST;  Surgeon: Micheal Bartholomew MD;  Location: [facility];  Service: General;  Laterality: Bilateral;  Procedure performed by KRAIG Carpio    SENTINEL LYMPH NODE BIOPSY Right 4/25/2022    Procedure: BIOPSY, LYMPH NODE, SENTINEL;  Surgeon: Micheal Bartholomew MD;  Location: [facility];  Service: General;  Laterality: Right;       Labs:  Lab Results   Component Value Date    WBC 16.00 (H) 04/26/2022    HGB 12.0 04/26/2022    HCT 35.3 (L) 04/26/2022    MCV 98 04/26/2022     04/26/2022     BMP  Lab Results   Component Value Date     (L) 04/26/2022    K 3.9 04/26/2022     04/26/2022    CO2 21 (L) 04/26/2022    BUN 6 04/26/2022    CREATININE 0.7 04/26/2022    CALCIUM 8.6 (L) 04/26/2022    ANIONGAP 11 04/26/2022    ESTGFRAFRICA >60 04/26/2022    EGFRNONAA >60 04/26/2022     Lab Results   Component Value Date    ALT 15 04/26/2022    AST 21 04/26/2022    ALKPHOS 66 04/26/2022    BILITOT 0.8 04/26/2022       No results found for: IRON, TIBC, FERRITIN, SATURATEDIRO  No results found for: LDVAHVTE18  No results found for: FOLATE  Lab Results   Component Value Date    TSH 2.041 03/16/2022         Review of Systems   Constitutional: Negative for activity change, appetite change, chills, diaphoresis, fatigue, fever and unexpected weight change.   HENT: Negative for congestion, dental problem, drooling, ear discharge, ear pain, facial swelling, hearing loss, mouth sores, nosebleeds, postnasal drip, rhinorrhea, sinus pressure, sneezing, sore throat, tinnitus, trouble swallowing and voice change.    Eyes: Negative for photophobia, pain, discharge, redness, itching and visual disturbance.   Respiratory: Negative for cough, choking, chest tightness, shortness of  breath, wheezing and stridor.    Cardiovascular: Negative for chest pain, palpitations and leg swelling.   Gastrointestinal: Negative for abdominal distention, abdominal pain, anal bleeding, blood in stool, constipation, diarrhea, nausea, rectal pain and vomiting.   Endocrine: Negative for cold intolerance, heat intolerance, polydipsia, polyphagia and polyuria.   Genitourinary: Negative for decreased urine volume, difficulty urinating, dyspareunia, dysuria, enuresis, flank pain, frequency, genital sores, hematuria, menstrual problem, pelvic pain, urgency, vaginal bleeding, vaginal discharge and vaginal pain.   Musculoskeletal: Negative for arthralgias, back pain, gait problem, joint swelling, myalgias, neck pain and neck stiffness.   Skin: Negative for color change, pallor and rash.   Allergic/Immunologic: Negative for environmental allergies, food allergies and immunocompromised state.   Neurological: Negative for dizziness, tremors, seizures, syncope, facial asymmetry, speech difficulty, weakness, light-headedness, numbness and headaches.   Hematological: Negative for adenopathy. Does not bruise/bleed easily.   Psychiatric/Behavioral: Positive for dysphoric mood. Negative for agitation, behavioral problems, confusion, decreased concentration, hallucinations, self-injury, sleep disturbance and suicidal ideas. The patient is nervous/anxious. The patient is not hyperactive.        Objective:      Physical Exam  Vitals reviewed.   Constitutional:       General: She is not in acute distress.     Appearance: She is well-developed. She is not diaphoretic.   HENT:      Head: Normocephalic and atraumatic.      Right Ear: External ear normal.      Left Ear: External ear normal.      Nose: Nose normal.      Right Sinus: No maxillary sinus tenderness or frontal sinus tenderness.      Left Sinus: No maxillary sinus tenderness or frontal sinus tenderness.      Mouth/Throat:      Pharynx: No oropharyngeal exudate.   Eyes:       General: Lids are normal. No scleral icterus.        Right eye: No discharge.         Left eye: No discharge.      Conjunctiva/sclera: Conjunctivae normal.      Right eye: Right conjunctiva is not injected. No hemorrhage.     Left eye: Left conjunctiva is not injected. No hemorrhage.     Pupils: Pupils are equal, round, and reactive to light.   Neck:      Thyroid: No thyromegaly.      Vascular: No JVD.      Trachea: No tracheal deviation.   Cardiovascular:      Rate and Rhythm: Normal rate.   Pulmonary:      Effort: Pulmonary effort is normal. No respiratory distress.      Breath sounds: No stridor.   Chest:      Chest wall: No tenderness.   Breasts:      Right: No supraclavicular adenopathy.      Left: No supraclavicular adenopathy.       Abdominal:      General: Bowel sounds are normal. There is no distension.      Palpations: Abdomen is soft. There is no hepatomegaly, splenomegaly or mass.      Tenderness: There is no abdominal tenderness. There is no rebound.   Musculoskeletal:         General: No tenderness. Normal range of motion.      Cervical back: Normal range of motion and neck supple.   Lymphadenopathy:      Cervical: No cervical adenopathy.      Upper Body:      Right upper body: No supraclavicular adenopathy.      Left upper body: No supraclavicular adenopathy.   Skin:     General: Skin is dry.      Findings: No erythema or rash.   Neurological:      Mental Status: She is alert and oriented to person, place, and time.      Cranial Nerves: No cranial nerve deficit.      Coordination: Coordination normal.   Psychiatric:         Behavior: Behavior normal.         Thought Content: Thought content normal.         Judgment: Judgment normal.             Assessment:      1. Breast neoplasm, Tis (DCIS), right    2. CHEK2-related breast cancer    3. Ductal carcinoma in situ (DCIS) of breast, unspecified laterality    4. 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 Family history of cancer  of the kidney           Plan:     Extensive conversation with patient she states that there was some confusion at the time of her initial biopsy where she was felt to have metastatic disease in a lymph node but subsequent sentinel node biopsy is subpleural node resulted axillary sampling did not reveal any evidence of malignancy.  I would like case presented at multidisciplinary tumor conference.  For review.  In terms of secondary prophylaxis she has had bilateral mastectomies the efficacy of either tamoxifen or Arimidex in this setting believe is somewhat questionable with potential toxicity profile that the patient is adamant that she would like to consider this.  I have also strongly recommend that she see Medical Genetics for evaluation of this malignancy and subsequent malignancies in family in discussion with him.        Jak Queen Jr, MD FACP

## 2022-05-12 NOTE — OP NOTE
[facility]/Surg  Surgery Department  Operative Note    SUMMARY     Date of Procedure: 4/25/2022     Procedure: Procedure(s) (LRB):  MASTECTOMY, BILATERAL (Bilateral)  BIOPSY, LYMPH NODE, SENTINEL (Right)  INSERTION, TISSUE EXPANDER, BREAST (Bilateral)  LYMPHADENECTOMY, AXILLARY (Right)     Surgeon(s) and Role:     * Micheal Bartholomew MD - Primary     * Esther Carpio MD - Co-Surgeon    Assisting Surgeon: None    Pre-Operative Diagnosis: Ductal carcinoma in situ (DCIS) of right breast [D05.11]  Biallelic mutation of CHEK2 gene [Z15.09, Z15.01]    Post-Operative Diagnosis: Post-Op Diagnosis Codes:     * Ductal carcinoma in situ (DCIS) of right breast [D05.11]     * Biallelic mutation of CHEK2 gene [Z15.09, Z15.01]     * 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  Surgical markings were applied as usual including the standard breast borders.  A skin reduction pattern was planned.  The patient was taken to the operating room and placed on the table supine with arms abducted approximately 90° and secured on arm boards and all appropriate pressure points were padded.  General anesthesia was induced without event.  A gary was not placed and preoperative antibiotics were given.  A time out was called prior to beginning the procedure.       Procedure was begun by Dr. Bartholomew who performed a bilateral skin sparing mastectomy through an circumareolar incision  that I had marked out in preop and a right sentinel lymph node biopsy.  unfortunately the frozen section of the lymph node was positive so he then performed a full axillary node dissection.  I entered the operating room and examined the mastectomy flaps.  They appeared fairly uniform in thickness but did appear to be adequate thickness for reconstruction immediately.  There was skin edge bleeding in the dermis, and I did trim the dermis to confirm this. Her skin flaps were thin but the subdermal plexus was visible and mostly intact.        While Dr. Bartholomew was performing the oncologic portion of the procedure I had prepared the expanders under usual sterile fashion on the back table.  The expander size had been selected in clinic based on the patient's breast base width.  First all air was evacuated from the expanders and then the drainage port was flushed with 50 cc of saline to ensure its function.  I then filled each expander with 100cc of saline mixed with 1 cc of Lymphazurin blue.  The expanders were then left to soak in triple antibiotic solution which was saline mixed with Ancef, gentamicin, and diluted Betadine.       I began on the right mastectomy pocket and examined the flaps for hemostasis.  Hemostasis was obtained carefully with the Bovie on 30 not to burn the mastectomy flap.  I did have to place lateral chest wall quilting sutures to recreate the lateral breast boundaries.  I then identified the lateral border of the pectoralis and began dissecting in the loose areolar plane between the pectoralis major and minor muscle.  The muscle was elevated medially to the level of its sternal insertion.  Intercostal perforators into the muscle were identified and carefully coagulated with the Bovie as needed.  The muscle was raised superiorly to the level of the superior breast border which I had marked out preoperatively.  It was then dissected laterally towards the lateral chest wall only enough to accommodate  the selected expander and not removing all of its lateral attachments.  The breast skin within the lower central breast and within the Wise pattern was then de-epithelialized for use in place of Alloderm or ADM and to serve as the inferior support for the expander.    I then irrigated into the mastectomy pocket and the subpectoral pocket with a L of triple antibiotic solution, I wiped the skin with a lap soaked in triple antibiotic solution and changed gloves in preparation for handling the expander.  I was the only person to touch the expander and with the skin carefully retracted it was placed in the subpectoral position using basically a no-touch technique.  The autoderm was then carefully trimmed and secured to the inferior border of the pectoralis with PDS sutures. Two fifteen Divehi drains were then tunneled through the skin of the lower lateral chest and placed in the mastectomy pocket, 1 medially and 1 laterally towards the axilla.  These were then secured with 2 nylon sutures.      I then performed a mirror image procedure on the contralateral breast, again changing gloves and performing the same procedure for expander handling.  I closed the mastectomy incisions with multiple 3-0 Monocryls interrupted in the deep dermal plane and a running 4-0 Monocryl in the subcuticular plane.  Dressings were Dermabond glue along the mastectomy incisions and bio patches and Tegaderm around the drain sites.  Large sheets of Tegaderm were also applied to the breast skin to secure it in place like a splint over the expander and breast reconstruction.       The patient tolerated the procedure well without complication and was awoken from anesthesia without issue.  All instrument, sharps and sponge counts were correct at completion of case.        Significant Surgical Tasks Conducted by the Assistant(s), if Applicable: na    Estimated Blood Loss (EBL): 150 mL           Implants:   Implant Name Type Inv. Item Serial No.   Lot No. LRB No. Used Action   AlloX2 Tissue Expander Smooth Surface Integrated Port    [employer]TRA [id number] Right 1 Implanted   AlloX2 Tissue Expander Smooth Surface Integrated Port    [employer] [id number] Left 1 Implanted       Specimens:   Specimen (24h ago, onward)            None                  Condition: Good    Disposition: PACU - hemodynamically stable.    Attestation: I was present and scrubbed for the entire procedure.

## 2022-05-16 ENCOUNTER — OFFICE VISIT (OUTPATIENT)
Dept: SURGERY | Facility: CLINIC | Age: 37
End: 2022-05-16
Payer: OTHER GOVERNMENT

## 2022-05-16 VITALS
HEART RATE: 81 BPM | DIASTOLIC BLOOD PRESSURE: 75 MMHG | WEIGHT: 111.13 LBS | TEMPERATURE: 98 F | BODY MASS INDEX: 22.44 KG/M2 | SYSTOLIC BLOOD PRESSURE: 113 MMHG

## 2022-05-16 DIAGNOSIS — D05.11 DUCTAL CARCINOMA IN SITU (DCIS) OF RIGHT BREAST: Primary | ICD-10-CM

## 2022-05-16 PROCEDURE — 99024 PR POST-OP FOLLOW-UP VISIT: ICD-10-PCS | Mod: ,,, | Performed by: SURGERY

## 2022-05-16 PROCEDURE — 99999 PR PBB SHADOW E&M-EST. PATIENT-LVL III: ICD-10-PCS | Mod: PBBFAC,,, | Performed by: SURGERY

## 2022-05-16 PROCEDURE — 99999 PR PBB SHADOW E&M-EST. PATIENT-LVL III: CPT | Mod: PBBFAC,,, | Performed by: SURGERY

## 2022-05-16 PROCEDURE — 99213 OFFICE O/P EST LOW 20 MIN: CPT | Mod: PBBFAC | Performed by: SURGERY

## 2022-05-16 PROCEDURE — 99024 POSTOP FOLLOW-UP VISIT: CPT | Mod: ,,, | Performed by: SURGERY

## 2022-05-16 RX ORDER — CYCLOBENZAPRINE HCL 5 MG
5 TABLET ORAL 3 TIMES DAILY PRN
COMMUNITY
End: 2022-06-17

## 2022-05-17 ENCOUNTER — TELEPHONE (OUTPATIENT)
Dept: GENETICS | Facility: CLINIC | Age: 37
End: 2022-05-17
Payer: OTHER GOVERNMENT

## 2022-05-17 NOTE — TELEPHONE ENCOUNTER
Genetic Referral    Nurse reached out to pt regarding her medical oncologist Dr Queen placed a referral for genetic counseling,  pt was aware of the apt, she agreed to set up for next week virtual

## 2022-05-20 ENCOUNTER — OFFICE VISIT (OUTPATIENT)
Dept: HEMATOLOGY/ONCOLOGY | Facility: CLINIC | Age: 37
End: 2022-05-20
Payer: OTHER GOVERNMENT

## 2022-05-20 ENCOUNTER — CLINICAL SUPPORT (OUTPATIENT)
Dept: REHABILITATION | Facility: HOSPITAL | Age: 37
End: 2022-05-20
Attending: STUDENT IN AN ORGANIZED HEALTH CARE EDUCATION/TRAINING PROGRAM
Payer: OTHER GOVERNMENT

## 2022-05-20 ENCOUNTER — TUMOR BOARD CONFERENCE (OUTPATIENT)
Dept: HEMATOLOGY/ONCOLOGY | Facility: CLINIC | Age: 37
End: 2022-05-20
Payer: OTHER GOVERNMENT

## 2022-05-20 VITALS
HEIGHT: 59 IN | RESPIRATION RATE: 20 BRPM | WEIGHT: 111.75 LBS | SYSTOLIC BLOOD PRESSURE: 118 MMHG | OXYGEN SATURATION: 100 % | DIASTOLIC BLOOD PRESSURE: 78 MMHG | TEMPERATURE: 99 F | HEART RATE: 80 BPM | BODY MASS INDEX: 22.53 KG/M2

## 2022-05-20 DIAGNOSIS — Z15.09 CHEK2-RELATED BREAST CANCER: Primary | ICD-10-CM

## 2022-05-20 DIAGNOSIS — Z15.02 CHEK2-RELATED BREAST CANCER: Primary | ICD-10-CM

## 2022-05-20 DIAGNOSIS — M25.612 DECREASED RANGE OF MOTION OF SHOULDER, LEFT: ICD-10-CM

## 2022-05-20 DIAGNOSIS — Z74.09 DECREASED FUNCTIONAL MOBILITY AND ENDURANCE: ICD-10-CM

## 2022-05-20 DIAGNOSIS — Z15.09 BIALLELIC MUTATION OF CHEK2 GENE WITHOUT DIAGNOSED MALIGNANCY: ICD-10-CM

## 2022-05-20 DIAGNOSIS — C50.919 CHEK2-RELATED BREAST CANCER: Primary | ICD-10-CM

## 2022-05-20 DIAGNOSIS — M25.611 DECREASED RANGE OF MOTION OF SHOULDER, RIGHT: ICD-10-CM

## 2022-05-20 DIAGNOSIS — Z90.13 ACQUIRED ABSENCE OF BREAST AND NIPPLE, BILATERAL: ICD-10-CM

## 2022-05-20 DIAGNOSIS — Z15.89 CHEK2-RELATED BREAST CANCER: Primary | ICD-10-CM

## 2022-05-20 DIAGNOSIS — Z15.01 BIALLELIC MUTATION OF CHEK2 GENE WITHOUT DIAGNOSED MALIGNANCY: ICD-10-CM

## 2022-05-20 DIAGNOSIS — M62.81 PROXIMAL MUSCLE WEAKNESS: ICD-10-CM

## 2022-05-20 DIAGNOSIS — F41.9 ANXIETY: ICD-10-CM

## 2022-05-20 DIAGNOSIS — Z15.89 BIALLELIC MUTATION OF CHEK2 GENE WITHOUT DIAGNOSED MALIGNANCY: ICD-10-CM

## 2022-05-20 DIAGNOSIS — D05.11 DUCTAL CARCINOMA IN SITU (DCIS) OF RIGHT BREAST: ICD-10-CM

## 2022-05-20 PROCEDURE — 99999 PR PBB SHADOW E&M-EST. PATIENT-LVL IV: CPT | Mod: PBBFAC,,, | Performed by: INTERNAL MEDICINE

## 2022-05-20 PROCEDURE — 97162 PT EVAL MOD COMPLEX 30 MIN: CPT

## 2022-05-20 PROCEDURE — 99214 PR OFFICE/OUTPT VISIT, EST, LEVL IV, 30-39 MIN: ICD-10-PCS | Mod: S$PBB,,, | Performed by: INTERNAL MEDICINE

## 2022-05-20 PROCEDURE — 99214 OFFICE O/P EST MOD 30 MIN: CPT | Mod: S$PBB,,, | Performed by: INTERNAL MEDICINE

## 2022-05-20 PROCEDURE — 97140 MANUAL THERAPY 1/> REGIONS: CPT

## 2022-05-20 PROCEDURE — 99214 OFFICE O/P EST MOD 30 MIN: CPT | Mod: PBBFAC | Performed by: INTERNAL MEDICINE

## 2022-05-20 PROCEDURE — 99999 PR PBB SHADOW E&M-EST. PATIENT-LVL IV: ICD-10-PCS | Mod: PBBFAC,,, | Performed by: INTERNAL MEDICINE

## 2022-05-20 PROCEDURE — 97110 THERAPEUTIC EXERCISES: CPT

## 2022-05-20 NOTE — PROGRESS NOTES
Subjective:       Patient ID: Gina Garcia is a 36 y.o. female.    Chief Complaint: Results and Breast Cancer    HPI 36-year-old female history right-sided intraductal breast carcinoma.  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    Migraines      Family History   Problem Relation Age of Onset    Breast cancer Mother 45        estrogen positive    Prostate cancer Father     Lung cancer Maternal Grandmother     Cancer Paternal Grandmother         prostate    Breast cancer Paternal Grandmother     Cancer Paternal Grandfather     Breast cancer Other     Breast cancer Other     Breast cancer Other     Breast cancer Other     Colon cancer Neg Hx         colon cancer    Ovarian cancer Neg Hx     Thrombophilia Neg Hx      Social History     Socioeconomic History    Marital status:     Number of children: 2   Occupational History    Occupation: Stay at home mom   Tobacco Use    Smoking status: Never Smoker    Smokeless tobacco: Never Used   Substance and Sexual Activity    Alcohol use: No     Alcohol/week: 0.0 standard drinks    Drug use: Never    Sexual activity: Yes     Birth control/protection: None     Past Surgical History:   Procedure Laterality Date    ACHILLES TENDON SURGERY      AXILLARY NODE DISSECTION Right 4/25/2022    Procedure: LYMPHADENECTOMY, AXILLARY;  Surgeon: Micheal Bartholomew MD;  Location: [facility] OR;  Service: General;  Laterality: Right;  Right axillary node dissection    BILATERAL MASTECTOMY Bilateral 4/25/2022    Procedure: MASTECTOMY, BILATERAL;  Surgeon: Micheal Bartholomew MD;  Location: [facility] OR;   Service: General;  Laterality: Bilateral;    COLONOSCOPY N/A 3/28/2022    Procedure: COLONOSCOPY;  Surgeon: Winnie Polanco MD;  Location: [facility];  Service: Endoscopy;  Laterality: N/A;    EXTERNAL EAR SURGERY      cosmetic    INSERTION OF BREAST TISSUE EXPANDER Bilateral 4/25/2022    Procedure: INSERTION, TISSUE EXPANDER, BREAST;  Surgeon: Micheal Bartholomew MD;  Location: [facility];  Service: General;  Laterality: Bilateral;  Procedure performed by KRAIG Carpio    SENTINEL LYMPH NODE BIOPSY Right 4/25/2022    Procedure: BIOPSY, LYMPH NODE, SENTINEL;  Surgeon: Micheal Bartholomew MD;  Location: [facility];  Service: General;  Laterality: Right;       Labs:  Lab Results   Component Value Date    WBC 16.00 (H) 04/26/2022    HGB 12.0 04/26/2022    HCT 35.3 (L) 04/26/2022    MCV 98 04/26/2022     04/26/2022     BMP  Lab Results   Component Value Date     (L) 04/26/2022    K 3.9 04/26/2022     04/26/2022    CO2 21 (L) 04/26/2022    BUN 6 04/26/2022    CREATININE 0.7 04/26/2022    CALCIUM 8.6 (L) 04/26/2022    ANIONGAP 11 04/26/2022    ESTGFRAFRICA >60 04/26/2022    EGFRNONAA >60 04/26/2022     Lab Results   Component Value Date    ALT 15 04/26/2022    AST 21 04/26/2022    ALKPHOS 66 04/26/2022    BILITOT 0.8 04/26/2022       No results found for: IRON, TIBC, FERRITIN, SATURATEDIRO  No results found for: [id number]  No results found for: FOLATE  Lab Results   Component Value Date    TSH 2.041 03/16/2022         Review of Systems   Constitutional: Negative for activity change, appetite change, chills, diaphoresis, fatigue, fever and unexpected weight change.   HENT: Negative for congestion, dental problem, drooling, ear discharge, ear pain, facial swelling, hearing loss, mouth sores, nosebleeds, postnasal drip, rhinorrhea, sinus pressure, sneezing, sore throat, tinnitus, trouble swallowing and voice change.    Eyes: Negative for photophobia, pain, discharge, redness, itching and visual disturbance.    Respiratory: Negative for cough, choking, chest tightness, shortness of breath, wheezing and stridor.    Cardiovascular: Negative for chest pain, palpitations and leg swelling.   Gastrointestinal: Negative for abdominal distention, abdominal pain, anal bleeding, blood in stool, constipation, diarrhea, nausea, rectal pain and vomiting.   Endocrine: Negative for cold intolerance, heat intolerance, polydipsia, polyphagia and polyuria.   Genitourinary: Negative for decreased urine volume, difficulty urinating, dyspareunia, dysuria, enuresis, flank pain, frequency, genital sores, hematuria, menstrual problem, pelvic pain, urgency, vaginal bleeding, vaginal discharge and vaginal pain.   Musculoskeletal: Negative for arthralgias, back pain, gait problem, joint swelling, myalgias, neck pain and neck stiffness.   Skin: Negative for color change, pallor and rash.   Allergic/Immunologic: Negative for environmental allergies, food allergies and immunocompromised state.   Neurological: Negative for dizziness, tremors, seizures, syncope, facial asymmetry, speech difficulty, weakness, light-headedness, numbness and headaches.   Hematological: Negative for adenopathy. Does not bruise/bleed easily.   Psychiatric/Behavioral: Positive for dysphoric mood. Negative for agitation, behavioral problems, confusion, decreased concentration, hallucinations, self-injury, sleep disturbance and suicidal ideas. The patient is nervous/anxious. The patient is not hyperactive.        Objective:      Physical Exam  Vitals reviewed.   Constitutional:       General: She is not in acute distress.     Appearance: She is well-developed. She is not diaphoretic.   HENT:      Head: Normocephalic and atraumatic.      Right Ear: External ear normal.      Left Ear: External ear normal.      Nose: Nose normal.      Right Sinus: No maxillary sinus tenderness or frontal sinus tenderness.      Left Sinus: No maxillary sinus tenderness or frontal sinus tenderness.       Mouth/Throat:      Pharynx: No oropharyngeal exudate.   Eyes:      General: Lids are normal. No scleral icterus.        Right eye: No discharge.         Left eye: No discharge.      Conjunctiva/sclera: Conjunctivae normal.      Right eye: Right conjunctiva is not injected. No hemorrhage.     Left eye: Left conjunctiva is not injected. No hemorrhage.     Pupils: Pupils are equal, round, and reactive to light.   Neck:      Thyroid: No thyromegaly.      Vascular: No JVD.      Trachea: No tracheal deviation.   Cardiovascular:      Rate and Rhythm: Normal rate.   Pulmonary:      Effort: Pulmonary effort is normal. No respiratory distress.      Breath sounds: No stridor.   Chest:      Chest wall: No tenderness.   Breasts:      Right: No supraclavicular adenopathy.      Left: No supraclavicular adenopathy.       Abdominal:      General: Bowel sounds are normal. There is no distension.      Palpations: Abdomen is soft. There is no hepatomegaly, splenomegaly or mass.      Tenderness: There is no abdominal tenderness. There is no rebound.   Musculoskeletal:         General: No tenderness. Normal range of motion.      Cervical back: Normal range of motion and neck supple.   Lymphadenopathy:      Cervical: No cervical adenopathy.      Upper Body:      Right upper body: No supraclavicular adenopathy.      Left upper body: No supraclavicular adenopathy.   Skin:     General: Skin is dry.      Findings: No erythema or rash.   Neurological:      Mental Status: She is alert and oriented to person, place, and time.      Cranial Nerves: No cranial nerve deficit.      Coordination: Coordination normal.   Psychiatric:         Behavior: Behavior normal.         Thought Content: Thought content normal.         Judgment: Judgment normal.             Assessment:      1. CHEK2-related breast cancer    2. 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 Anxiety    4. Ductal carcinoma in situ (DCIS) of right breast            Plan:     CHEK2 related breast carcinoma.  I have talked to her after presentation in given her documentation from multidisciplinary tumor conference that no would recommended prophylactic use either tamoxifen or aromatase inhibitor to prevent secondary prevention of breast cancer or chest wall status post bilateral mastectomy.  It was felt that there is no data to support +potential toxicities of treatment.  Her mother is undergoing breast cancer treatment today it is being treated but she has invasive breast cancer of try to explained to the patient that her mother is being treated for systemic disease not necessarily local recurrence.  Would recommend follow-up in 1 year she is to see [facility]etics next week and recommended that she consider total abdominal hysterectomy bilateral salpingo oophorectomy after discussion with Medical Genetics and is referred to OBGYN for consideration.  At that time she can discuss whether not a combined modality treatment with THS BSO with breast implants would be an appropriate course of action        Jak Queen Jr, MD FACP

## 2022-05-20 NOTE — PROGRESS NOTES
Tumor Board Documentation      Gina Garcia was presented by Jak Queen MD at our Tumor Board on 5/20/2022, which included representatives from (P) Medical Oncology, Hematology, Radiation Oncology, Surgical Oncology, Pathology, Navigation, Research, Genetics, Plastic Surgery, Radiology, Gastrointestinal, Pulmonology, Urology.    Gina currently presents as (P) a current patient with (P) Breast cancer, with history of the following treatments: (P) Surgical Intervention(s).    Additionally, we reviewed previous medical and familial history, history of present illness, and recent lab results along with all available histopathologic and imaging studies. The tumor board considered available treatment options and made the following recommendations:     (P) Additional Observation Genetic counseling    Recommendations from conference indicate there is no data to support benefit for Adjuvant Hormonal therapy.  Refer to genetics for counseling.    The following procedures/referrals were also placed: No orders of the defined types were placed in this encounter.      Clinical Trial Status: (P) Not discussed     National site-specific guidelines were discussed with respect to the case.    Tumor board is a meeting of clinicians from various specialty areas who evaluate and discuss patients for whom a multidisciplinary approach is being considered. Final determinations in the plan of care are those of the provider(s). The responsibility for follow up of recommendations given during tumor board is that of the provider.     Jak Queen MD

## 2022-05-20 NOTE — PLAN OF CARE
[facility]  Physical Therapy Initial Evaluation    Date: 5/20/2022   Name: Gina Garcia  Clinic Number: [id number]    Therapy Diagnosis:    Encounter Diagnoses   Name Primary?    Ductal carcinoma in situ (DCIS) of right breast     Acquired absence of breast and nipple, bilateral     Decreased functional mobility and endurance     Proximal muscle weakness     Decreased range of motion of shoulder, left     Decreased range of motion of shoulder, right       Physician: Esther Carpio, *     Physician Orders: PT Eval and Treat  Medical Diagnosis from Referral: Ductal carcinoma in situ (DCIS) of right breast, Acquired absence of breast and nipple,  Evaluation Date: 5/20/2022  Authorization Period Expiration: 7/1/2022  Plan of Care Expiration: 7/1/2022  Visit # / Visits authorized: 1/1  FOTO: 0/3 (perform next session)     Progress Note Due on 6/20/2021    Precautions: Standard and cancer    Time In: 1050  Time Out: 1135  Total Billable Time (timed & untimed codes): 43 minutes    SUBJECTIVE   History of Present Illness: Gina is a 36 y.o. female that presents to [facility] s/p  B mastectomy with expander placement. Have not started expanding process due to necrotic breast tissue following surgery; scheduled for debridement in a couple of weeks. 17 nodes removed on the R but states sentinel node was a false positive. Taking gabapentine and cyclobenzaprine for muscle spasms on the R side. States that both sides of her chest are tight and feel like rocks. Axillary cord present on the R which is causing pain down the arm and limiting motion. Currently taking tamoxifen but states they plan to eventally do a complete hysterectomy and change medications.     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   Current Level of Function: unable to perform overhead or reaching tasks due to poor mobility and pain, R>L. Unable to currently lift more than 5 pounds and does not participate in house work. She is a stay at home mom but tries to limit her activity. Difficulty using steering wheel, putting on a bra or cleaning little box due to pain.      Dominant Extremity: right    Pts goals: Pt reported goals are to decrease overall pain, improve mobility, decrease risk for lymphedema and to return to prior functional level.     Surgical History:  Gina Garcia  has a past surgical history that includes Achilles tendon surgery; External ear surgery; Colonoscopy (N/A, 3/28/2022); Bilateral mastectomy (Bilateral, 4/25/2022); [place] lymph node biopsy (Right, 4/25/2022); Insertion of breast tissue expander (Bilateral, 4/25/2022); and Axillary node dissection (Right, 4/25/2022).    Medications:  Gina has a current medication list which includes the following prescription(s): ascorbic acid (vitamin c), cyclobenzaprine, gabapentin, magnesium hydroxide 400 mg/5 ml, and rosuvastatin.    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    Migraines        OBJECTIVE   Mental status: Alert, oriented, attentive     Postural examination/scapula alignment: Rounded shoulder, Head forward and Affected scapula abducted    Skin Integrity:   Scar Location: bilateral breast and R axilla  Appearance: necrotic  Signs of  "infection: No  Drainage:brown  Color:black      Axillary Web Syndrome/Cording:   Location: R axilla  Degree of Cording: moderate  Number of cords present: one    Sensation: numbness noted around scars and necrotic tissue        Palpation: Increased tone and tenderness noted with palpation of bilateral periscapular musculature, pectorals, infraspinatus  and subscapularis . I    RANGE OF MOTION:    Shoulder AROM/PROM left  5/20/2022 Right   5/20/2022 Pain/Dysfunction with Movement Goal   Flexion  155/165 90/128  165   Abduction  152/105 71/68  165   Extension 50 35     IR at 45 full full     ER at 45  Full  90     Functional IR T8 T12     Functional ER T6 Occiput          STRENGTH:    U/E MMT Right Left Pain/Dysfunction with Movement Goal   Shoulder Flexion 2+/5 2+/5  4+/5 B   Shoulder Extension (seated) 3+/5 3+/5  4+/5 B   Shoulder Abduction 2+/5 2+/5  4+/5 B   Shoulder IR (seated) 2+/5 2+/5  4+/5 B   Shoulder ER (seated)   2+/5 2+/5  4+/5 B   Elbow Flexion (seated) 3/5 3/5  5/5 B   Elbow Extension (seated) 3/5 3/5  5/5 B   Wrist Flexion  4/5 4/5  5/5 B   Wrist Extension 4/5 4/5  5/5 B    (3 Trials)  NT NT              Baseline Measurements of BL UE's for early detection of Lymphedema:     LANDMARK RIGHT UE  5/20/2022 LEFT UE  5/20/2022 DIFFERENCE  5/20/2022   Hand 17.6 cm 16.8 cm .8 cm   Wrist 14.3 cm 14.3 cm 0 cm   Wrist + 8" 18 cm 17.1 cm .9 cm   Wrist + 16" 22.2 cm 21.6 cm .6 cm   Elbow 22.4 cm 22.5 cm .1 cm   Elbow + 8" 24.2 cm 23.5 cm .7 cm   Elbow + 16" 27.0 cm 26.7 cm .3 cm       Gait Assessment:    - AD used: none  - Assistance: independent  - Distance: community distances       FUNCTION:     CMS Impairment/Limitation/Restriction for FOTO NT Survey    Therapist reviewed FOTO scores for Gina on 5/20/2022.   FOTO documents entered into [employer] - see Media section.    Limitation Score: NT%         TREATMENT   Total Treatment time separate from Evaluation: (16) minutes        Gina received the following manual " therapy techniques applied for (8) minutes, including:    Manual Intervention Performed Today    Soft Tissue Mobilization x right pectorals, subscapularis  and axillary cord   Joint Mobilizations     Mobilization with movement          Functional Dry Needling        Plan for Next Visit: PRN          Gina received therapeutic exercises to develop strength, endurance, ROM, flexibility, posture and core stabilization for (8) minutes including:    Intervention Performed Today    Shoulder, elbow and wrist PROM  x 8 minutes to stretch axillary cord                                         Plan for Next Visit: table slides or ball roll outs         Home Exercises and Patient Education Provided    Education/Self-Care provided: (time included with therex)    Pt educated on role of therapy in multi - disciplinary team, goals for therapy   Pt was educated in lymphedema etiology and management plans.     Pt was provided with written risk reductions and precautions for managing lymphedema.    Patient educated on the impairments noted above and the effects of physical therapy intervention to improve overall condition and QOL.       Written Home Exercises Provided: yes.  Exercises were reviewed and Gina was able to demonstrate them prior to the end of the session.  Gina demonstrated good  understanding of the education provided.     See EMR under Patient Instructions for exercises provided 5/20/2022.    ASSESSMENT   This is a 36 y.o. female referred to outpatient physical therapy and presents with a medical diagnosis of R breast cancer and was seen today post-operatively to establish PT plan of care for impairments following surgery including: decreased ROM, decreased strength, decreased muscle length, postural abnormalities, decreased overall function and scar tissue and axillary cord    Pt instructed in HEP this session and was able to perform all exercises given independently. Pt instructed to follow up with therapist if any  "concerns arise with program established. Pt will continue to benefit from skilled physical therapy to address the impairments stated in chart below, provide patient/family education and to maximize pt's level of independence in home and community environment     Plan of care discussed with patient: Yes  Pt's spiritual, cultural and educational needs considered and patient is agreeable to the plan of care and goals as stated below:     Anticipated Barriers for therapy: adjuvant cancer treatment schedules and side effects, co-morbidities and necrotic tissue, tamoxifen use    Medical Necessity is demonstrated by the following  History  Co-morbidities and personal factors that may impact the plan of care Co-morbidities:   anxiety    Personal Factors:   coping style     moderate   Examination  Body Structures and Functions, activity limitations and participation restrictions that may impact the plan of care Body Regions:   upper extremities  trunk    Body Systems:    ROM  strength  motor control  skin integrity  skin color    Participation Restrictions:   See above in "Current Level of Function"     Activity limitations:   Learning and applying knowledge  no deficits    General Tasks and Commands  no deficits    Communication  no deficits    Mobility  lifting and carrying objects  reaching    Self care  washing oneself (bathing, drying, washing hands)  caring for body parts (brushing teeth, shaving, grooming)  dressing  looking after one's health    Domestic Life  shopping  cooking  doing house work (cleaning house, washing dishes, laundry)  assisting others  driving    Interactions/Relationships  no deficits    Life Areas  no deficits    Community and Social Life  community life  recreation and leisure         high   Clinical Presentation evolving clinical presentation with changing clinical characteristics moderate   Decision Making/ Complexity Score: moderate       GOALS:    Short Term Goals:  6 weeks Progress  "   1. Pain: Pt will demonstrate improved pain by reports of less than or equal to 4/10 worst pain on the verbal rating scale in order to progress toward maximal functional ability and improve QOL.    2. Function: Patient will demonstrate improved function as indicated by a functional limitation score of less than or equal to NT out of 100 on FOTO.    3. Mobility: Pt will increase AROM/PROM in shoulder flexion to 130 degrees on right to improve functional reach, carry, push, pull pain free    4. Strength: Patient will improve strength to 50% of stated goals, listed in objective measures above, in order to progress towards independence with functional activities.     5. HEP: Patient will demonstrate independence with HEP in order to progress toward functional independence.    6. Patient will demonstrate 100% understanding of lymphedema risk reduction practices to include self monitoring for lymphedema.      Long Term Goals:  12 weeks Progress   1. Pain: Pt will demonstrate improved pain by reports of less than or equal to 2/10 worst pain on the verbal rating scale in order to progress toward maximal functional ability and improve QOL.      2. Function: Patient will demonstrate improved function as indicated by a functional limitation score of less than or equal to NT  out of 100 on FOTO.    3. Mobility: Patient will improve AROM to stated goals, listed in objective measures above, in order to return to maximal functional potential and improve quality of life.    4. Strength: Pt will increase strength to 4+/5 in gross UE musculature to improve tolerance to all functional activities pain free    5. Pt will demonstrate full/maximized tissue mobility to increase ROM and promote healthy tissue to be pain free at discharge    6. Patient will report compliance with walking program 5x week for 10 min each day to improve overall cardiovascular function and decrease cancer related fatigue at discharge.      Goals Key:  PC=  progressing/continue; PM= partially met;        DC= discontinue       PLAN   Plan of care Certification: 5/20/2022 to 8/20/2022.    Outpatient Physical Therapy 2 times weekly for 12 weeks to include any combination of the following interventions: virtual visits, dry needling, modalities, electrical stimulation (IFC, Pre-Mod, Attended with Functional Dry Needling), Cervical/Lumbar Traction, Manual Therapy, Neuromuscular Re-ed, Patient Education, Self Care, Therapeutic Exercise and Therapeutic Activites     Thank you for this referral.    These services are reasonable and necessary for the conditions set forth above while under my care.    Marlee Willoughby, PT, DPT

## 2022-05-21 NOTE — PROGRESS NOTES
[facility]  Breast Surgery  Department of General Surgery      Date of Visit:  2022    Referring provider:  No referring provider defined for this encounter.    PCP:  Kenneth Barraza MD    HIGH RISK    Gina Garcia is a 36 y.o. premenopausal female s/p bilateral mastectomy right axillary dissection with reconstruction by Dr. Carpio 22 presents for post op. She reports some decreased rom and paresthesia on right. Slight skin sloughing undergoing treatment with Dr. Carpio. Starting physical therapy soon.     presents to discuss right breast biopsy. Her biopsy showed right breast DCIS ER+/[place]+.    presents for abnormal mammogram of the right breast and high risk breast cancer check 2 mutation.  She recently underwent mammogram showing calcifications of the right breast and is currently awaiting stereotactic core biopsy.  She had previously undergone evaluation for her check 2 mutation with discussions about prophylactic bilateral mastectomy and reconstruction with Dr. Duckworth and Dr. Almaraz.  It was recommended that she undergo weight loss prior to surgery which she has done in the interim approximately 40-50 lbs.      Per Dr. Duckworth:  referred for evaluation of increased risk of breast cancer based on family history and + CHECK2 mutation.  Here today to discussed options of high risk screening and risk reduction.    Other breast cancer risk factors include family hx on mother's side, family hx on father's side, mom with breast CA, family hx of colon CA and family hx of Prostate CA.     Age of menarche was 15.    Age of menopause was NA.    Last menstrual period was 2020.    Patient denies hormonal therapy. IUD for 8 years - paraguard.     Patient is .    Age of first live birth was 23.    Patient did breast feed.       Family History is significant for the following:  Mother breast cancer at age 45, living, ER+  Paternal grandmother with breast cancer  4 maternal great  aunts with breast cancer  Maternal and paternal grandfather with prostate cancer  Great uncle with kidney cancer  Also history of colon cancer, unknown family members    Social History:   Smoking:  never  Drinking:  Socially  Caffeine: daily   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    Migraines      Past Surgical History:   Procedure Laterality Date    ACHILLES TENDON SURGERY      AXILLARY NODE DISSECTION Right 4/25/2022    Procedure: LYMPHADENECTOMY, AXILLARY;  Surgeon: Micheal Bartholomew MD;  Location: [facility] OR;  Service: General;  Laterality: Right;  Right axillary node dissection    BILATERAL MASTECTOMY Bilateral 4/25/2022    Procedure: MASTECTOMY, BILATERAL;  Surgeon: Micheal Bartholomew MD;  Location: [facility] OR;  Service: General;  Laterality: Bilateral;    COLONOSCOPY N/A 3/28/2022    Procedure: COLONOSCOPY;  Surgeon: Winnie Polanco MD;  [facility] ENDO;  Service: Endoscopy;  Laterality: N/A;    EXTERNAL EAR SURGERY      cosmetic    INSERTION OF BREAST TISSUE EXPANDER Bilateral 4/25/2022    Procedure: INSERTION, TISSUE EXPANDER, BREAST;  Surgeon: Micheal Bartholomew MD;  Location: [facility] OR;  Service: General;  Laterality: Bilateral;  Procedure performed by KRAIG Carpio    SENTINEL LYMPH NODE BIOPSY Right 4/25/2022    Procedure: BIOPSY, LYMPH NODE, SENTINEL;  Surgeon: Micheal Bartholomew MD;  Location: [facility] OR;  Service: General;  Laterality: Right;     Current Outpatient Medications on File Prior to Visit   Medication Sig Dispense Refill    ascorbic acid, vitamin C, (VITAMIN C) 250 MG tablet Take 250 mg by mouth once daily.      cyclobenzaprine (FLEXERIL) 5 MG tablet Take 5 mg by mouth 3 (three) times daily as needed for Muscle spasms.      gabapentin (NEURONTIN) 100 MG capsule Take 100-200 mg by mouth 3 (three) times daily.      magnesium hydroxide 400 mg/5 ml (MILK OF MAGNESIA) 400 mg/5 mL Susp Take 30 mLs by mouth daily as needed.      rosuvastatin (CRESTOR) 20  MG tablet Take 1 tablet (20 mg total) by mouth once daily. 90 tablet 3     No current facility-administered medications on file prior to visit.         Review of patient's allergies indicates:  No Known Allergies      Review of Systems  Review of Systems   Constitutional: Negative for chills and fever.   HENT: Negative for congestion.    Eyes: Negative for blurred vision.   Respiratory: Negative for shortness of breath.    Cardiovascular: Negative for chest pain.   Gastrointestinal: Negative for abdominal pain, constipation, diarrhea, nausea and vomiting.   Genitourinary: Negative for dysuria.   Musculoskeletal: Negative for back pain.   Skin: Negative for itching and rash.   Neurological: Negative for dizziness.   Endo/Heme/Allergies: Does not bruise/bleed easily.   Psychiatric/Behavioral: The patient is not nervous/anxious.           Objective:   /75   Pulse 81   Temp 97.7 °F (36.5 °C) (Tympanic)   Wt 50.4 kg (111 lb 1.8 oz)   BMI 22.44 kg/m²     Physical Exam  Vitals reviewed.   Constitutional:       Appearance: Normal appearance. She is not ill-appearing.   HENT:      Head: Normocephalic and atraumatic.   Eyes:      Extraocular Movements: Extraocular movements intact.   Cardiovascular:      Rate and Rhythm: Normal rate and regular rhythm.      Pulses: Normal pulses.      Heart sounds: Normal heart sounds.   Pulmonary:      Effort: Pulmonary effort is normal.      Breath sounds: Normal breath sounds.   Chest:   Breasts:      Right: No swelling, bleeding, inverted nipple, mass, nipple discharge, skin change or tenderness.      Left: No swelling, bleeding, inverted nipple, mass, nipple discharge, skin change or tenderness.         Abdominal:      General: There is no distension.      Palpations: Abdomen is soft.   Musculoskeletal:         General: Normal range of motion.      Cervical back: Normal range of motion.   Skin:     General: Skin is warm and dry.   Neurological:      General: No focal deficit  present.      Mental Status: She is alert and oriented to person, place, and time.   Psychiatric:         Mood and Affect: Mood normal.           Imaging  Result:   Mammo Digital Diagnostic Bilat with Tony  US Breast Bilateral Limited     History:  Patient is 36 y.o. and is seen for diagnostic imaging.     Films Compared:  Prior images (if available) were compared.     Findings:  This procedure was performed using tomosynthesis. Computer-aided detection was utilized in the interpretation of this examination.  The breasts have scattered areas of fibroglandular density.      Right  Mammo Digital Diagnostic Bilat with Tony  There are no corresponding areas of architectural distortion seen on this modality. Ultrasound of the lower-outer right breast demonstrates no concerning abnormality.         There are fine linear or fine-linear branching calcifications in a linear distribution seen in the retroareolar region of the right breast.      Left   Small mass within the outer left breast is less conspicuous with somewhat lucent appearance. Ultrasound was performed and demonstrates a minimally complicated 9 mm cyst at the 03:00 o'clock location, 8 cm from the nipple.         Impression:  Right  Calcifications: Right breast calcifications at the retroareolar position. Assessment: 4 - Suspicious finding. Stereotactic Biopsy is recommended.      Left  There is no mammographic or sonographic evidence of malignancy in the left breast.     BI-RADS Category:   Overall: 4 - Suspicious     Recommendation:  Stereotactic biopsy recommended of the right breast calcifications.   Six month follow-up diagnostic left mammogram and ultrasound can be performed for the minimally complicated cyst.      Final Pathologic Diagnosis 1. Right breast sentinel node #1, excision:   No metastatic carcinoma identified in 1 lymph node (0/1), supported by   immunohistochemical stains for AE1/AE3, cam 5.2, and WSK with adequate   controls   2. Right breast  sentinel node #2, excision:   No metastatic carcinoma identified in 1 lymph node (0/1), supported by   immunohistochemical stains for AE1/AE3, cam 5.2, and WSK with adequate   controls   3. Right breast sentinel node #3, excision:   No metastatic carcinoma identified in 1 lymph node (0/1), supported by   immunohistochemical stains for AE1/AE3, cam 5.2, and WSK with adequate   controls   4. Right axillary content, excision:   No metastatic carcinoma identified in 13 lymph nodes (0/13), supported by   AE1/AE3 immunostains with adequate controls   5. Right breast sentinel node #4, excision:   No metastatic carcinoma identified in 1 lymph node (0/1), supported by   immunohistochemical stains for AE1/AE3, cam 5.2, and WSK with adequate   controls   6. Right breast, mastectomy:   High-grade ductal carcinoma in Situ (DCIS), cribriform, solid, micropapillary   patterns, approximately 12 mm in size, present in 3/31 blocks   DCIS measures 8 mm to the posterior surgical margin, 8 mm to the inferior   surgical margin, and at least 10 mm to the remainder of the surgical margins   Biopsy marker and biopsy site change identified   Nipple and skin uninvolved   No invasive carcinoma identified.   Please see complete Surgical pathology cancer case summary below   PATHOLOGIC TNM STAGING: pTis(sn)N0   7. Left breast, mastectomy:   Benign breast tissue with fibrocystic changes   Unremarkable nipple and skin   No evidence of in-situ or invasive carcinoma   Surgical pathology cancer case summary   Procedure-total mastectomy   Specimen laterality-right   Histologic type-ductal carcinoma in-situ   Size (extent) of DCIS-approximately 12 mm, present in 3/31 blocks   Architectural patterns-cribriform, solid, micropapillary   Nuclear grade-grade 3 (high)   Necrosis-present, focal   Microcalcifications-present in DCIS   Margins   Margin status   DCIS measures 8 mm to the posterior surgical margin, 8 mm to the inferior   surgical margin, and at  least 10 mm to the remainder of the surgical margins   Regional lymph nodes   Regional lymph node status-all regional lymph nodes negative for tumor   Total number of lymph nodes examined (sentinel and nonsentinel) -17   Number of sentinel nodes examined-4   Pathologic stage classification   pT category- pTis(DCIS):  Ductal carcinoma in Situ   Regional lymph nodes modifier-(sn):  [place] nodes examined   pN category-pN0:  No regional lymph node metastasis identified   Additional findings-fibrocystic changes   Breast biomarker testing performed on previous biopsy [id number] as follows:   BREAST BIOMARKER RESULTS   Estrogen receptor (ER): Positive (100%, strong)   Progesterone receptor (OR): Positive (5%, strong)   All immunostain controls react appropriately.   Diagnosed by RICHY VILLASENOR M.D.   For future possible ancillary studies, DCIS is present in block 6D.    Comment: Interp By Nkechi Chan D.O., Signed on 05/06/2022 at 14:08   Frozen Section Diagnosis 1.Postive   2.Negative   3.Negative   4.Negative             Assessment:     This is a 36 y.o. female  With CHECK 2 mutation/DCIS s/p bilateral mastectomy with right axillary dissection and reconstruction        Plan:     Pathology reviewed and discussed findings on intraop frozen and final pathology  Healing well  Physical therapy for ROM/strengthening  Keep plastic surgery follow up  Oncology follow up appt upcoming  Tumor board for pathology review and   F/u in 1 month

## 2022-05-21 NOTE — H&P (VIEW-ONLY)
[facility]  Breast Surgery  Department of General Surgery      Date of Visit:  2022    Referring provider:  No referring provider defined for this encounter.    PCP:  Kenneth Barraza MD    HIGH RISK    Gina Garcia is a 36 y.o. premenopausal female s/p bilateral mastectomy right axillary dissection with reconstruction by Dr. Carpio 22 presents for post op. She reports some decreased rom and paresthesia on right. Slight skin sloughing undergoing treatment with Dr. Carpio. Starting physical therapy soon.     presents to discuss right breast biopsy. Her biopsy showed right breast DCIS ER+/[place]+.    presents for abnormal mammogram of the right breast and high risk breast cancer check 2 mutation.  She recently underwent mammogram showing calcifications of the right breast and is currently awaiting stereotactic core biopsy.  She had previously undergone evaluation for her check 2 mutation with discussions about prophylactic bilateral mastectomy and reconstruction with Dr. Duckworth and Dr. Almaraz.  It was recommended that she undergo weight loss prior to surgery which she has done in the interim approximately 40-50 lbs.      Per Dr. Duckworth:  referred for evaluation of increased risk of breast cancer based on family history and + CHECK2 mutation.  Here today to discussed options of high risk screening and risk reduction.    Other breast cancer risk factors include family hx on mother's side, family hx on father's side, mom with breast CA, family hx of colon CA and family hx of Prostate CA.     Age of menarche was 15.    Age of menopause was NA.    Last menstrual period was 2020.    Patient denies hormonal therapy. IUD for 8 years - paraguard.     Patient is .    Age of first live birth was 23.    Patient did breast feed.       Family History is significant for the following:  Mother breast cancer at age 45, living, ER+  Paternal grandmother with breast cancer  4 maternal great  aunts with breast cancer  Maternal and paternal grandfather with prostate cancer  Great uncle with kidney cancer  Also history of colon cancer, unknown family members    Social History:   Smoking:  never  Drinking:  Socially  Caffeine: daily   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    Migraines      Past Surgical History:   Procedure Laterality Date    ACHILLES TENDON SURGERY      AXILLARY NODE DISSECTION Right 4/25/2022    Procedure: LYMPHADENECTOMY, AXILLARY;  Surgeon: Micheal Bartholomew MD;  Location: [facility] OR;  Service: General;  Laterality: Right;  Right axillary node dissection    BILATERAL MASTECTOMY Bilateral 4/25/2022    Procedure: MASTECTOMY, BILATERAL;  Surgeon: Micheal Bartholomew MD;  Location: [facility] OR;  Service: General;  Laterality: Bilateral;    COLONOSCOPY N/A 3/28/2022    Procedure: COLONOSCOPY;  Surgeon: Winnie Polanco MD;  Location: [facility] ENDO;  Service: Endoscopy;  Laterality: N/A;    EXTERNAL EAR SURGERY      cosmetic    INSERTION OF BREAST TISSUE EXPANDER Bilateral 4/25/2022    Procedure: INSERTION, TISSUE EXPANDER, BREAST;  Surgeon: Micheal Bartholomew MD;  Location: [facility] OR;  Service: General;  Laterality: Bilateral;  Procedure performed by KRAIG Carpio    SENTINEL LYMPH NODE BIOPSY Right 4/25/2022    Procedure: BIOPSY, LYMPH NODE, SENTINEL;  Surgeon: Micheal Bartholomew MD;  Location: [facility] OR;  Service: General;  Laterality: Right;     Current Outpatient Medications on File Prior to Visit   Medication Sig Dispense Refill    ascorbic acid, vitamin C, (VITAMIN C) 250 MG tablet Take 250 mg by mouth once daily.      cyclobenzaprine (FLEXERIL) 5 MG tablet Take 5 mg by mouth 3 (three) times daily as needed for Muscle spasms.      gabapentin (NEURONTIN) 100 MG capsule Take 100-200 mg by mouth 3 (three) times daily.      magnesium hydroxide 400 mg/5 ml (MILK OF MAGNESIA) 400 mg/5 mL Susp Take 30 mLs by mouth daily as needed.      rosuvastatin (CRESTOR) 20  MG tablet Take 1 tablet (20 mg total) by mouth once daily. 90 tablet 3     No current facility-administered medications on file prior to visit.         Review of patient's allergies indicates:  No Known Allergies      Review of Systems  Review of Systems   Constitutional: Negative for chills and fever.   HENT: Negative for congestion.    Eyes: Negative for blurred vision.   Respiratory: Negative for shortness of breath.    Cardiovascular: Negative for chest pain.   Gastrointestinal: Negative for abdominal pain, constipation, diarrhea, nausea and vomiting.   Genitourinary: Negative for dysuria.   Musculoskeletal: Negative for back pain.   Skin: Negative for itching and rash.   Neurological: Negative for dizziness.   Endo/Heme/Allergies: Does not bruise/bleed easily.   Psychiatric/Behavioral: The patient is not nervous/anxious.           Objective:   /75   Pulse 81   Temp 97.7 °F (36.5 °C) (Tympanic)   Wt 50.4 kg (111 lb 1.8 oz)   BMI 22.44 kg/m²     Physical Exam  Vitals reviewed.   Constitutional:       Appearance: Normal appearance. She is not ill-appearing.   HENT:      Head: Normocephalic and atraumatic.   Eyes:      Extraocular Movements: Extraocular movements intact.   Cardiovascular:      Rate and Rhythm: Normal rate and regular rhythm.      Pulses: Normal pulses.      Heart sounds: Normal heart sounds.   Pulmonary:      Effort: Pulmonary effort is normal.      Breath sounds: Normal breath sounds.   Chest:   Breasts:      Right: No swelling, bleeding, inverted nipple, mass, nipple discharge, skin change or tenderness.      Left: No swelling, bleeding, inverted nipple, mass, nipple discharge, skin change or tenderness.         Abdominal:      General: There is no distension.      Palpations: Abdomen is soft.   Musculoskeletal:         General: Normal range of motion.      Cervical back: Normal range of motion.   Skin:     General: Skin is warm and dry.   Neurological:      General: No focal deficit  present.      Mental Status: She is alert and oriented to person, place, and time.   Psychiatric:         Mood and Affect: Mood normal.           Imaging  Result:   Mammo Digital Diagnostic Bilat with Tony  US Breast Bilateral Limited     History:  Patient is 36 y.o. and is seen for diagnostic imaging.     Films Compared:  Prior images (if available) were compared.     Findings:  This procedure was performed using tomosynthesis. Computer-aided detection was utilized in the interpretation of this examination.  The breasts have scattered areas of fibroglandular density.      Right  Mammo Digital Diagnostic Bilat with Tony  There are no corresponding areas of architectural distortion seen on this modality. Ultrasound of the lower-outer right breast demonstrates no concerning abnormality.         There are fine linear or fine-linear branching calcifications in a linear distribution seen in the retroareolar region of the right breast.      Left   Small mass within the outer left breast is less conspicuous with somewhat lucent appearance. Ultrasound was performed and demonstrates a minimally complicated 9 mm cyst at the 03:00 o'clock location, 8 cm from the nipple.         Impression:  Right  Calcifications: Right breast calcifications at the retroareolar position. Assessment: 4 - Suspicious finding. Stereotactic Biopsy is recommended.      Left  There is no mammographic or sonographic evidence of malignancy in the left breast.     BI-RADS Category:   Overall: 4 - Suspicious     Recommendation:  Stereotactic biopsy recommended of the right breast calcifications.   Six month follow-up diagnostic left mammogram and ultrasound can be performed for the minimally complicated cyst.      Final Pathologic Diagnosis 1. Right breast sentinel node #1, excision:   No metastatic carcinoma identified in 1 lymph node (0/1), supported by   immunohistochemical stains for AE1/AE3, cam 5.2, and WSK with adequate   controls   2. Right breast  sentinel node #2, excision:   No metastatic carcinoma identified in 1 lymph node (0/1), supported by   immunohistochemical stains for AE1/AE3, cam 5.2, and WSK with adequate   controls   3. Right breast sentinel node #3, excision:   No metastatic carcinoma identified in 1 lymph node (0/1), supported by   immunohistochemical stains for AE1/AE3, cam 5.2, and WSK with adequate   controls   4. Right axillary content, excision:   No metastatic carcinoma identified in 13 lymph nodes (0/13), supported by   AE1/AE3 immunostains with adequate controls   5. Right breast sentinel node #4, excision:   No metastatic carcinoma identified in 1 lymph node (0/1), supported by   immunohistochemical stains for AE1/AE3, cam 5.2, and WSK with adequate   controls   6. Right breast, mastectomy:   High-grade ductal carcinoma in Situ (DCIS), cribriform, solid, micropapillary   patterns, approximately 12 mm in size, present in 3/31 blocks   DCIS measures 8 mm to the posterior surgical margin, 8 mm to the inferior   surgical margin, and at least 10 mm to the remainder of the surgical margins   Biopsy marker and biopsy site change identified   Nipple and skin uninvolved   No invasive carcinoma identified.   Please see complete Surgical pathology cancer case summary below   PATHOLOGIC TNM STAGING: pTis(sn)N0   7. Left breast, mastectomy:   Benign breast tissue with fibrocystic changes   Unremarkable nipple and skin   No evidence of in-situ or invasive carcinoma   Surgical pathology cancer case summary   Procedure-total mastectomy   Specimen laterality-right   Histologic type-ductal carcinoma in-situ   Size (extent) of DCIS-approximately 12 mm, present in 3/31 blocks   Architectural patterns-cribriform, solid, micropapillary   Nuclear grade-grade 3 (high)   Necrosis-present, focal   Microcalcifications-present in DCIS   Margins   Margin status   DCIS measures 8 mm to the posterior surgical margin, 8 mm to the inferior   surgical margin, and at  least 10 mm to the remainder of the surgical margins   Regional lymph nodes   Regional lymph node status-all regional lymph nodes negative for tumor   Total number of lymph nodes examined (sentinel and nonsentinel) -17   Number of sentinel nodes examined-4   Pathologic stage classification   pT category- pTis(DCIS):  Ductal carcinoma in Situ   Regional lymph nodes modifier-(sn):  [place] nodes examined   pN category-pN0:  No regional lymph node metastasis identified   Additional findings-fibrocystic changes   Breast biomarker testing performed on previous biopsy [id number] as follows:   BREAST BIOMARKER RESULTS   Estrogen receptor (ER): Positive (100%, strong)   Progesterone receptor (CA): Positive (5%, strong)   All immunostain controls react appropriately.   Diagnosed by RICHY VILLASENOR M.D.   For future possible ancillary studies, DCIS is present in block 6D.    Comment: Interp By Nkechi Chan D.O., Signed on 05/06/2022 at 14:08   Frozen Section Diagnosis 1.Postive   2.Negative   3.Negative   4.Negative             Assessment:     This is a 36 y.o. female  With CHECK 2 mutation/DCIS s/p bilateral mastectomy with right axillary dissection and reconstruction        Plan:     Pathology reviewed and discussed findings on intraop frozen and final pathology  Healing well  Physical therapy for ROM/strengthening  Keep plastic surgery follow up  Oncology follow up appt upcoming  Tumor board for pathology review and   F/u in 1 month

## 2022-05-23 ENCOUNTER — TELEPHONE (OUTPATIENT)
Dept: OBSTETRICS AND GYNECOLOGY | Facility: CLINIC | Age: 37
End: 2022-05-23
Payer: OTHER GOVERNMENT

## 2022-05-23 NOTE — TELEPHONE ENCOUNTER
Left message for patient to return call to [phone].    Patient needs appointment from referral with MD for hyst.

## 2022-05-23 NOTE — TELEPHONE ENCOUNTER
----- Message from Yara Patrick MA sent at 5/20/2022  5:00 PM CDT -----  Good afternoon,    Please contact patient with an appt per Dr. Queen referral is in Epic.    Thanks

## 2022-05-25 ENCOUNTER — TELEPHONE (OUTPATIENT)
Dept: PREADMISSION TESTING | Facility: HOSPITAL | Age: 37
End: 2022-05-25
Payer: OTHER GOVERNMENT

## 2022-05-25 ENCOUNTER — OFFICE VISIT (OUTPATIENT)
Dept: GENETICS | Facility: CLINIC | Age: 37
End: 2022-05-25
Payer: OTHER GOVERNMENT

## 2022-05-25 ENCOUNTER — DOCUMENTATION ONLY (OUTPATIENT)
Dept: PREADMISSION TESTING | Facility: HOSPITAL | Age: 37
End: 2022-05-25
Payer: OTHER GOVERNMENT

## 2022-05-25 DIAGNOSIS — C50.919 CHEK2-RELATED BREAST CANCER: ICD-10-CM

## 2022-05-25 DIAGNOSIS — Z15.02 MONOALLELIC MUTATION OF CHEK2 GENE IN FEMALE PATIENT: ICD-10-CM

## 2022-05-25 DIAGNOSIS — Z80.42 FAMILY HISTORY OF PROSTATE CANCER: ICD-10-CM

## 2022-05-25 DIAGNOSIS — Z15.09 CHEK2-RELATED BREAST CANCER: ICD-10-CM

## 2022-05-25 DIAGNOSIS — Z15.02 CHEK2-RELATED BREAST CANCER: ICD-10-CM

## 2022-05-25 DIAGNOSIS — Z15.89 CHEK2-RELATED BREAST CANCER: ICD-10-CM

## 2022-05-25 DIAGNOSIS — Z15.01 MONOALLELIC MUTATION OF CHEK2 GENE IN FEMALE PATIENT: ICD-10-CM

## 2022-05-25 DIAGNOSIS — Z80.3 FAMILY HISTORY OF BREAST CANCER: ICD-10-CM

## 2022-05-25 DIAGNOSIS — Z80.51 FAMILY HISTORY OF CANCER OF THE KIDNEY: ICD-10-CM

## 2022-05-25 DIAGNOSIS — Z15.09 MONOALLELIC MUTATION OF CHEK2 GENE IN FEMALE PATIENT: ICD-10-CM

## 2022-05-25 DIAGNOSIS — Z15.89 MONOALLELIC MUTATION OF CHEK2 GENE IN FEMALE PATIENT: ICD-10-CM

## 2022-05-25 DIAGNOSIS — D05.11 BREAST NEOPLASM, TIS (DCIS), RIGHT: ICD-10-CM

## 2022-05-25 PROBLEM — M62.81 PROXIMAL MUSCLE WEAKNESS: Status: ACTIVE | Noted: 2022-05-25

## 2022-05-25 PROBLEM — Z74.09 DECREASED FUNCTIONAL MOBILITY AND ENDURANCE: Status: ACTIVE | Noted: 2022-05-25

## 2022-05-25 PROBLEM — M25.612 DECREASED RANGE OF MOTION OF SHOULDER, LEFT: Status: ACTIVE | Noted: 2022-05-25

## 2022-05-25 PROBLEM — M25.611 DECREASED RANGE OF MOTION OF SHOULDER, RIGHT: Status: ACTIVE | Noted: 2022-05-25

## 2022-05-25 PROCEDURE — 96040 PR GENETIC COUNSELING, EACH 30 MIN: CPT | Mod: 95,,,

## 2022-05-25 PROCEDURE — 99499 UNLISTED E&M SERVICE: CPT | Mod: 95,,, | Performed by: INTERNAL MEDICINE

## 2022-05-25 PROCEDURE — 96040 PR GENETIC COUNSELING, EACH 30 MIN: ICD-10-PCS | Mod: 95,,,

## 2022-05-25 PROCEDURE — 99499 NO LOS: ICD-10-PCS | Mod: 95,,, | Performed by: INTERNAL MEDICINE

## 2022-05-25 NOTE — PROGRESS NOTES
"  Cancer Genetics  Hereditary/High Risk Clinic  Department of Hematology and Oncology  [facility]         Date of Service:  2022   Provider:  Jay Novak MS,     Patient Information  Name:  Gina Garcia  :  1985  MRN:  [id number]        Referring Provider: aJk Queen MD      Televisit Information  The patient location is: [place].  The chief complaint leading to consultation is: as below.  Visit type: audiovisual.  Face-to-face time with patient: approximately 40 minutes.  This time does not include the non-face-to-face time preparing to see the patient (e.g., review of tests), obtaining and/or reviewing separately obtained history, documenting clinical information in the electronic or other health record, independently interpreting results (not separately reported) and communicating results to the patient/family/caregiver, or care coordination (not separately reported).  Each patient to whom he or she provides medical services by telemedicine is:  (1) informed of the relationship between the physician and patient and the respective role of any other health care provider with respect to management of the patient; and (2) notified that he or she may decline to receive medical services by telemedicine and may withdraw from such care at any time.      SUBJECTIVE:      Chief Complaint: Post-Test Genetic Counseling - discussion of NCCN guidelines for CHEK2 mutations    History of Present Illness (HPI):  Gina Garcia ("Gina"), 36 y.o., assigned female sex at birth, is established to the [facility] and to me. She was referred by Hematology and Oncology to discuss the NCCN guidelines for CHEK2 mutations. At age 24, she underwent genetic testing due to the family history of breast cancer. 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 (p.[id number]*15) - PATHOGENIC  - VUS: PMS2 c.1004A>G (p.[id number])   Cancer: breast cancer (09/2020)   Colon polyp:    o Colonoscopy (03/2022) - 1 polyps (benign)  - Repeat every 4 years   Other benign tumor:   o Benign breast cyst (2022)   Pancreatitis:  No   Pancreatic cyst: No   Thyroid levels - normal (testing every 3 mon.)   Has not seen a dermatologist yet    Surgical History:  · Bilateral mastectomy (04/2022)  · Reproductive organs intact - plans to have a BRENDEN/BSO    Ancestry:   Ashkenazi Spiritism ancestry:  No   Paternal: Iranian   Maternal: , Azeri, English    Focused Family History:   Consanguinity in ancestors: No   Germline cancer-genetic testing in blood relatives: Yes  o Mother: positive, shares same mutation  o Sister: negative    Cancer in family:  Yes; there are no known blood relatives affected with cancer other than those mentioned in the pedigree below  o Maternal 2nd cousins (mother's paternal 1st cousins) - breast cancer x3    Family Cancer Pedigree:          SUBJECTIVE:     Records Reviewed  · Medical History  · Problem List  · Any pertinent, available Procedures and Pathology reports in both [facility] and [facility]    COUNSELING   Gina was seen for post-test genetic counseling on 05/25/2022. Gina was recently diagnosed with breast cancer. Prior to her diagnosis, she underwent genetic testing, which revealed that she carries a pathogenic CHEK2 gene mutation. We met today to discuss these test results. Gina is highly knowlegdeable about this mutation, the associated risks, and current NCCN management recommendations for screening and prevention. Briefly reviewed the results and NCCN recommendations, answered questions and provided clarifications for information retrieved during her research.    Genetic Testing  Due to her family history of cancer, she underwent  genetic testing in July 2020. The genetic testing (ordered by Lali Griffiths) was ordered through the [facility] Cancer Panel, which investigated the following 35 genes: APC, CLINTON, AXIN2, BAP1, BARD1, BMPR1A, BRCA1, BRCA2, BRIP1, CDH1, CDK4, CDKN2A, CHEK2, EPCAM, GREM1, HOXB13, MLH1, MSH2, MSH3, MSH6, MUTYH, NBN, NTHL1, PALB2, PMS2, POLD1, POLE, PTEN, RAD51C, RAD51D, RNP43, RPS20, SMAD4, STK11, TP53.    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 This result explains her family history of breast cancer and her recent diagnosis of breast cancer. No pathogenic mutations were identified in any of the other 34 genes investigated.     VUS  Of note, a genetic change called a variant of uncertain significance (VUS) was identified in the following gene:       PMS2 c.1004A>G (p.Bso150Idk)    Genetic changes, or variants, are termed VUS when there is insufficient information to know whether or not the change increases the risk of cancer. While pathogenic mutations in this gene may be associated with an increased risk of cancer, a VUS is not currently known to increase the risk of cancer. We do not recommend changing medical management or cancer screening because of a VUS. The goal of genetic testing labs is to reclassify all VUS results as either a benign normal variant or a pathogenic mutation. Should the classification of this variant change, an updated report would be issued from the genetic testing lab ([facility] in [place]). Over time, the majority of VUS results are downgraded to benign, normal genetic variants.     CHEK2 Risks & Management  We discussed that pathogenic mutations in the CHEK2 gene increase the risk for certain types of cancer. We reviewed the specific cancer risks and the National Comprehensive Cancer Network (NCCN) guidelines for individuals with a pathogenic CHEK2 mutation, as outlined below.     Breast cancer risk: Women with  a CHEK2 mutation have an increased risk for breast cancer, which warrants additional breast screening. The lifetime risk for female breast cancer is estimated between 23-48% (which is 2-4x greater than the general population risk). There is also an increased risk for developing a second breast cancer (new primary); this lifetime risk for a second breast cancer is up to 29%. Per NCCN guidelines, women with a CHEK2 mutation should, starting at age 40, have an annual mammogram with tomosynthesis and consider breast MRI with contrast. Typically these exams are spaced apart by 6 months. If there is a family history of breast cancer before age 40, then breast screening should begin 5-10 years earlier than the youngest diagnosis in the family (but not later than age 40). Regarding risk-reducing mastectomy, evidence is insufficient for NCCN guidelines to recommend it, but women may consider it based on their family history and personal decision.      Prostate cancer risk: Men with a CHEK2 mutation have an increased risk for prostate cancer. The lifetime risk for prostate cancer is estimated to be 24-44% (which is 2-3x greater than the general population risk). Men may consider annual prostate cancer screening beginning at age 40. Prostate cancer screening involves both a digital rectal exam and PSA blood testing.     Colorectal cancer risk: Both men and women with a CHEK2 mutation have an elevated lifetime risk for colorectal cancer. Lifetime risks range from 8-10% (which is about 2x greater than the general population risk). Due to this increased risk, NCCN guidelines recommend that patients have a colonoscopy every 5 years beginning at age 40 (or 10 years prior to the first colorectal cancer diagnosis in the family, but not later than age 40).      Thyroid cancer risk: Both men and women with a CHEK2 mutation have an elevated risk for thyroid cancer. A baseline thyroid ultrasound is recommended with follow-up as  needed.     Other cancer risk: There may be an increased risk for other cancer types associated with this CHEK2 mutation, but evidence is limited. Currently, there are no specific screening guidelines for other cancer types associated with this CHEK2 mutation.    Implications for Relatives  We discussed that other relatives may also have this CHEK2 gene mutation. She likely inherited this CHEK2 mutation from one of her parents (as opposed to being de cesario). We discussed that there is a 50% chance for each of her children to have inherited this same mutation. Her children are relatively young. Typically, it is not recommended for children to have genetic testing for this cancer gene mutation. Genetic testing of relatives is strongly recommended to clarify who else in the family (e.g. parents, children, siblings, aunts, uncles, cousins, etc.) has these cancer risks that warrant increased cancer screening. The ultimate goal of genetic testing of family members is prevention of future cancers or at least earlier detection with better prognosis. Gina reported that her parents shared the genetic testing results with their relatives and explained how to pursue genetic testing for the familial mutation.    Preimplantation genetic testing (PGT) with IVF is available for individuals of reproductive age who carry the CHEK2 mutation and want to prevent passing on the mutation to future children.    Genetic discrimination  The Genetic Information Nondiscrimination Act (CHELLE) is U.S. federal legislation that provides some protections against use of an individual's genetic information by their health insurer and by their employer.  Title I of CHELLE prohibits most health insurers from utilizing an individual's genetic information to make decisions regarding insurance eligibility or premium charges.  Title II of CHELLE prohibits covered entities, including employers, from requesting the genetic information of employees and applicants.   CHELLE does not protect individuals from genetic discrimination toward health insurance obtained through a job with the  or through the Federal Employees Health Benefits Plan; from genetic discrimination by employers with fewer than 15 employees or if employed by the ; or from genetic discrimination by any other type of policies/entities, including but not limited to life insurance, disability insurance, long-term care insurance,  benefits, and  Health Services benefits.    Plan   Gina should continue to follow with her oncology team as recommended and follow regular cancer screenings as recommended by his physicians.   Gina should continue having a colonoscopy at least every 5 years or more frequently if recommended by her GI physician (current recommendation is 4 years, per Gina).   Gina should consider a baseline thyroid ultrasound.    We remain available to Gina and her family if there are any questions or if there are any changes in the family cancer history. Gina can stay in contact with our genetics department every 1-2 years for updates regarding cancer risks and management recommendations associated with CHEK2 mutations.     Questions were encouraged and answered to the patient's satisfaction, and she verbalized understanding of information and agreement with the plan.         Signed,    Jya Novak MS, GC  Genetic Counselor, Hereditary and High-Risk Clinic  Hematology/Oncology, [facility]    05/25/2022

## 2022-05-25 NOTE — TELEPHONE ENCOUNTER
Pre op instructions reviewed with Ms. Fuentes  per phone.    Spoke about pre op process and surgery instructions, verbalized understanding.    To confirm, Your surgeon has instructed you:  Surgery is scheduled on May 30.      Please report to [facility], 1st floor.    The address is [address].  [place]  [id number]       The Pre Admissions will call you the day prior to surgery with your arrival time.        INSTRUCTIONS IMPORTANT!!!  Do Not Eat, Drink, or Smoke after 12 midnight! NO WATER after midnight! OK to brush teeth, no gum, candy or mints!        *Take only these medicines with a small swallow of water-morning of surgery.    none        ____  Do Not wear makeup, mascara nail polish or artificial nails  ____  NO powder, lotions, deodorants or creams to surgical area.  ____  Please remove all jewelry, including piercings and leave at home.  SURGERY WILL BE CANCELLED IF PIERCINGS ARE PRESENT!!!  ____  Dentures, Hearing Aids and Contact Lens will need to be removed prior to the start of surgery.  ____  Please bring photo ID and insurance information to hospital (Leave Valuables at Home)  ____  If going home the same day, arrange for a ride home. You will not be able to            drive if Anesthesia was used.    ____  Wear clean, loose fitting clothing. Allow for dressings, bandages.  ____  Stop Aspirin, Ibuprofen, Motrin and Aleve at least 5-7 days before surgery, unless otherwise instructed by your doctor, or the nurse. You MAY use Tylenol/acetaminophen until day of               surgery.  ____  If you take diabetic medication, do NOT take morning of surgery unless instructed by            Doctor. Metformin to be stopped 24 hrs prior to surgery time.   ____ Stop taking any Fish Oil supplements or Vitamins at least 5 days prior to surgery, unless instructed otherwise by your Doctor.         Bathing Instructions: The night before surgery and the morning prior to  coming to the hospital:              -Do not shave your face.  -Do not shave pubic hair 7 days prior to surgery (gyn pt's).  -Do not shave legs/underarms 3 days prior to surgery.              -Shower & Rinse your body as usual with anti-bacterial Soap (Dial, Lever 2000, or Hibiclens)              -Do not use hibiclens on your head, face, or genitals.              -Do not wash with anti-bacterial soap after you use the hibiclens.              -Rinse your body thoroughly.       Pediatric patients do not need to use anti-bacterial soap or Hibiclens.            [facility] Visitor/Ride Policy:  Only 1 adult allowed (over the age of 18) to accompany you into Pre-op/Recovery Surgery Dept and must stay through the entire length of admission.    Must have a ride home from a responsible adult that you know and trust.   Pediatric Patients are allowed 2 adult visitors.    Medical Transport, Uber or Lyft can only be used if patient has a responsible adult to accompany them during ride home.     Post-Op Instructions: You will receive surgery post-op instructions by your Discharge Nurse prior to going home.     Surgical Site Infection:     Prevention of surgical site infections:                 -Keep incisions clean and dry.              -Do not soak/submerge incisions in water until completely healed.              -Do not apply lotions, powders, creams, or deodorants to site.              -Always make sure hands are cleaned with antibacterial soap/ alcohol-based   prior to touching the surgical site.       Signs and symptoms:              -Redness and pain around the area where you had surgery              -Drainage of cloudy fluid from your surgical wound              -Fever over 100.4 or chills  >>>Call Surgeon office/on-call Surgeon if you experience any of these signs & symptoms post-surgery.        *Please Call [facility] with surgery instruction questions at [phone].     *Insurance  Questions, please call [phone].    Pre admit office to call afternoon prior to surgery with final arrival time.

## 2022-05-25 NOTE — PROGRESS NOTES
Pre op instructions reviewed with Ms. Fuentes  per phone.    Spoke about pre op process and surgery instructions, verbalized understanding.    To confirm, Your surgeon has instructed you:  Surgery is scheduled on May 30.      Please report to [facility], 1st floor.    The address is [address].  [place]  [id number]       The Pre Admissions will call you the day prior to surgery with your arrival time.        INSTRUCTIONS IMPORTANT!!!  Do Not Eat, Drink, or Smoke after 12 midnight! NO WATER after midnight! OK to brush teeth, no gum, candy or mints!        *Take only these medicines with a small swallow of water-morning of surgery.    none        ____  Do Not wear makeup, mascara nail polish or artificial nails  ____  NO powder, lotions, deodorants or creams to surgical area.  ____  Please remove all jewelry, including piercings and leave at home.  SURGERY WILL BE CANCELLED IF PIERCINGS ARE PRESENT!!!  ____  Dentures, Hearing Aids and Contact Lens will need to be removed prior to the start of surgery.  ____  Please bring photo ID and insurance information to hospital (Leave Valuables at Home)  ____  If going home the same day, arrange for a ride home. You will not be able to            drive if Anesthesia was used.    ____  Wear clean, loose fitting clothing. Allow for dressings, bandages.  ____  Stop Aspirin, Ibuprofen, Motrin and Aleve at least 5-7 days before surgery, unless otherwise instructed by your doctor, or the nurse. You MAY use Tylenol/acetaminophen until day of               surgery.  ____  If you take diabetic medication, do NOT take morning of surgery unless instructed by            Doctor. Metformin to be stopped 24 hrs prior to surgery time.   ____ Stop taking any Fish Oil supplements or Vitamins at least 5 days prior to surgery, unless instructed otherwise by your Doctor.         Bathing Instructions: The night before surgery and the morning prior to  coming to the hospital:              -Do not shave your face.  -Do not shave pubic hair 7 days prior to surgery (gyn pt's).  -Do not shave legs/underarms 3 days prior to surgery.              -Shower & Rinse your body as usual with anti-bacterial Soap (Dial, Lever 2000, or Hibiclens)              -Do not use hibiclens on your head, face, or genitals.              -Do not wash with anti-bacterial soap after you use the hibiclens.              -Rinse your body thoroughly.       Pediatric patients do not need to use anti-bacterial soap or Hibiclens.            [facility] Visitor/Ride Policy:  Only 1 adult allowed (over the age of 18) to accompany you into Pre-op/Recovery Surgery Dept and must stay through the entire length of admission.    Must have a ride home from a responsible adult that you know and trust.   Pediatric Patients are allowed 2 adult visitors.    Medical Transport, Uber or Lyft can only be used if patient has a responsible adult to accompany them during ride home.     Post-Op Instructions: You will receive surgery post-op instructions by your Discharge Nurse prior to going home.     Surgical Site Infection:     Prevention of surgical site infections:                 -Keep incisions clean and dry.              -Do not soak/submerge incisions in water until completely healed.              -Do not apply lotions, powders, creams, or deodorants to site.              -Always make sure hands are cleaned with antibacterial soap/ alcohol-based   prior to touching the surgical site.       Signs and symptoms:              -Redness and pain around the area where you had surgery              -Drainage of cloudy fluid from your surgical wound              -Fever over 100.4 or chills  >>>Call Surgeon office/on-call Surgeon if you experience any of these signs & symptoms post-surgery.        *Please Call [facility] with surgery instruction questions at [phone].     *Insurance  Questions, please call [phone].    Pre admit office to call afternoon prior to surgery with final arrival time.

## 2022-05-26 ENCOUNTER — TELEPHONE (OUTPATIENT)
Dept: OBSTETRICS AND GYNECOLOGY | Facility: CLINIC | Age: 37
End: 2022-05-26
Payer: OTHER GOVERNMENT

## 2022-05-26 NOTE — TELEPHONE ENCOUNTER
Called Dr. Fraser and she works with Dr. Carpio and was trying to get an idea of cost for the patient to have tummy tuck at time of hysterectomy (which is not scheduled as of yet).  Advised procedure codes and exact surgery would be needed for pre-service to given pricing information, etc. And when patient is ready for surgery, Dr. Bowen and Dr. Carpio would have to supply information.  Dr. Fraser verbalized understanding.

## 2022-05-26 NOTE — TELEPHONE ENCOUNTER
----- Message from Patito Montano sent at 5/26/2022 11:27 AM CDT -----  Contact: Dr Fraser phone [phone] or  [phone]  Calling to speak with the doctor or the nurse. Please call her. Thanks.

## 2022-05-27 ENCOUNTER — TELEPHONE (OUTPATIENT)
Dept: PREADMISSION TESTING | Facility: HOSPITAL | Age: 37
End: 2022-05-27
Payer: OTHER GOVERNMENT

## 2022-05-27 ENCOUNTER — ANESTHESIA EVENT (OUTPATIENT)
Dept: SURGERY | Facility: HOSPITAL | Age: 37
End: 2022-05-27
Payer: OTHER GOVERNMENT

## 2022-05-27 NOTE — TELEPHONE ENCOUNTER
Lvm & sent the following msg:    Your Surgery arrival time is at 6:30 AM on 5/30/2022 at [facility] location.    The [place] is [address] [place]  [address].     Only one adult (over 18) is to accompany you to surgery, unless it is a Pediatric patient, then 2 adults are encouraged to accompany them to the surgery center.    Your ride MUST STAY the entire time until you are discharged.      Please come in the main lobby (located on the 1st floor).     Be prepared to show your photo ID and insurance card.     Masks should be worn by ALL persons entering the building.     Nothing to eat or drink after after midnight, unless you were instructed to take specific medications discussed with the Pre-admit Nurse.     Please call [phone] with any questions or concerns.     Thanks.

## 2022-05-27 NOTE — ANESTHESIA PREPROCEDURE EVALUATION
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  Past Surgical History:   Procedure Laterality Date    ACHILLES TENDON SURGERY      AXILLARY NODE DISSECTION Right 4/25/2022    Procedure: LYMPHADENECTOMY, AXILLARY;  Surgeon: Micheal Bartholomew MD;  Location: [facility] OR;  Service: General;  Laterality: Right;  Right axillary node dissection    BILATERAL MASTECTOMY Bilateral 4/25/2022    Procedure: MASTECTOMY, BILATERAL;  Surgeon: Micheal Bartholomew MD;  Location: [facility] OR;  Service: General;  Laterality: Bilateral;    COLONOSCOPY N/A 3/28/2022    Procedure: COLONOSCOPY;  Surgeon: Winnie Polanco MD;  Location: [facility] ENDO;  Service: Endoscopy;  Laterality: N/A;    EXTERNAL EAR SURGERY      cosmetic    INSERTION OF BREAST TISSUE EXPANDER Bilateral 4/25/2022    Procedure: INSERTION, TISSUE EXPANDER, BREAST;  Surgeon: Micheal Bartholomew MD;  Location: [facility] OR;  Service: General;  Laterality: Bilateral;  Procedure performed by KRAIG Carpio    sciatic nerve exploration      SENTINEL LYMPH NODE BIOPSY Right 4/25/2022    Procedure: BIOPSY, LYMPH NODE, SENTINEL;  Surgeon: Micheal Bartholomew MD;  Location: [facility] OR;  Service: General;  Laterality: Right;           Pre-op Assessment    I have reviewed the Patient Summary Reports.     I have reviewed the Nursing Notes. I have reviewed the NPO Status.   I have reviewed the Medications.     Review of Systems  Anesthesia Hx:  No problems with previous Anesthesia  Denies Family Hx of Anesthesia complications.   Denies Personal Hx of Anesthesia complications.   Social:  Non-Smoker, No Alcohol Use    Hematology/Oncology:  Hematology Normal      Current/Recent Cancer. Breast   Cardiovascular:   hyperlipidemia    Pulmonary:  Pulmonary Normal    Renal/:  Renal/ Normal      Hepatic/GI:  Hepatic/GI Normal    Neurological:   Headaches    Endocrine:  Endocrine Normal    Psych:  Psychiatric Normal           Physical Exam  General: Well nourished, Cooperative, Alert and Oriented    Airway:  Mallampati: III   Mouth Opening: Small, but > 3cm  TM Distance: Normal  Tongue: Normal  Neck ROM: Normal ROM    Dental:  Intact    Chest/Lungs:  Clear to auscultation, Normal Respiratory Rate    Heart:  Rate: Normal  Rhythm: Regular Rhythm      Wt Readings from Last 3 Encounters:   05/20/22 50.7 kg (111 lb 12.4 oz)   05/16/22 50.4 kg (111 lb 1.8 oz)   05/12/22 50.7 kg (111 lb 12.4 oz)     Temp Readings from Last 3 Encounters:   05/20/22 37.2 °C (98.9 °F) (Temporal)   05/16/22 36.5 °C (97.7 °F) (Tympanic)   05/12/22 36.7 °C (98 °F) (Temporal)     BP Readings from Last 3 Encounters:   05/20/22 118/78   05/16/22 113/75   05/12/22 118/74     Pulse Readings from Last 3 Encounters:   05/20/22 80   05/16/22 81   05/12/22 84     Lab Results   Component Value Date    WBC 16.00 (H) 04/26/2022    HGB 12.0 04/26/2022    HCT 35.3 (L) 04/26/2022    MCV 98 04/26/2022     04/26/2022       CMP  Sodium   Date Value Ref Range Status   04/26/2022 134 (L) 136 - 145 mmol/L Final     Potassium   Date Value Ref Range Status   04/26/2022 3.9 3.5 - 5.1 mmol/L Final     Chloride   Date Value Ref Range Status   04/26/2022 102 95 - 110 mmol/L Final     CO2   Date Value Ref Range Status   04/26/2022 21 (L) 23 - 29 mmol/L Final     Glucose   Date Value Ref Range Status   04/26/2022 108 70 - 110 mg/dL Final     BUN   Date Value Ref Range Status   04/26/2022 6 6 - 20 mg/dL Final     Creatinine   Date Value Ref Range Status   04/26/2022 0.7 0.5 - 1.4 mg/dL Final     Calcium   Date Value Ref Range Status   04/26/2022 8.6 (L) 8.7 - 10.5 mg/dL Final     Total Protein   Date Value Ref Range Status   04/26/2022 6.2 6.0 - 8.4 g/dL Final     Albumin   Date Value Ref Range Status   04/26/2022 3.5 3.5 - 5.2 g/dL Final     Total Bilirubin    Date Value Ref Range Status   04/26/2022 0.8 0.1 - 1.0 mg/dL Final     Comment:     For infants and newborns, interpretation of results should be based  on gestational age, weight and in agreement with clinical  observations.    Premature Infant recommended reference ranges:  Up to 24 hours.............<8.0 mg/dL  Up to 48 hours............<12.0 mg/dL  3-5 days..................<15.0 mg/dL  6-29 days.................<15.0 mg/dL       Alkaline Phosphatase   Date Value Ref Range Status   04/26/2022 66 55 - 135 U/L Final     AST   Date Value Ref Range Status   04/26/2022 21 10 - 40 U/L Final     ALT   Date Value Ref Range Status   04/26/2022 15 10 - 44 U/L Final     Anion Gap   Date Value Ref Range Status   04/26/2022 11 8 - 16 mmol/L Final     eGFR if    Date Value Ref Range Status   04/26/2022 >60 >60 mL/min/1.73 m^2 Final     eGFR if non    Date Value Ref Range Status   04/26/2022 >60 >60 mL/min/1.73 m^2 Final     Comment:     Calculation used to obtain the estimated glomerular filtration  rate (eGFR) is the CKD-EPI equation.            Anesthesia Plan  Type of Anesthesia, risks & benefits discussed:    Anesthesia Type: Gen Supraglottic Airway  Intra-op Monitoring Plan: Standard ASA Monitors  Post Op Pain Control Plan: multimodal analgesia and IV/PO Opioids PRN  Induction:  IV  ASA Score: 2  Day of Surgery Review of History & Physical: H&P Update referred to the surgeon/provider.    Ready For Surgery From Anesthesia Perspective.     .

## 2022-05-30 ENCOUNTER — HOSPITAL ENCOUNTER (OUTPATIENT)
Facility: HOSPITAL | Age: 37
Discharge: HOME | End: 2022-05-30
Attending: STUDENT IN AN ORGANIZED HEALTH CARE EDUCATION/TRAINING PROGRAM | Admitting: STUDENT IN AN ORGANIZED HEALTH CARE EDUCATION/TRAINING PROGRAM
Payer: OTHER GOVERNMENT

## 2022-05-30 ENCOUNTER — ANESTHESIA (OUTPATIENT)
Dept: SURGERY | Facility: HOSPITAL | Age: 37
End: 2022-05-30
Payer: OTHER GOVERNMENT

## 2022-05-30 VITALS
OXYGEN SATURATION: 100 % | HEART RATE: 61 BPM | BODY MASS INDEX: 21.88 KG/M2 | HEIGHT: 59 IN | WEIGHT: 108.56 LBS | TEMPERATURE: 98 F | DIASTOLIC BLOOD PRESSURE: 60 MMHG | SYSTOLIC BLOOD PRESSURE: 106 MMHG | RESPIRATION RATE: 20 BRPM

## 2022-05-30 DIAGNOSIS — L76.82: Primary | ICD-10-CM

## 2022-05-30 PROCEDURE — D9220A PRA ANESTHESIA: Mod: CRNA,,, | Performed by: NURSE ANESTHETIST, CERTIFIED REGISTERED

## 2022-05-30 PROCEDURE — 25000003 PHARM REV CODE 250: Performed by: ANESTHESIOLOGY

## 2022-05-30 PROCEDURE — 71000033 HC RECOVERY, INTIAL HOUR: Performed by: STUDENT IN AN ORGANIZED HEALTH CARE EDUCATION/TRAINING PROGRAM

## 2022-05-30 PROCEDURE — 71000015 HC POSTOP RECOV 1ST HR: Performed by: STUDENT IN AN ORGANIZED HEALTH CARE EDUCATION/TRAINING PROGRAM

## 2022-05-30 PROCEDURE — 63600175 PHARM REV CODE 636 W HCPCS: Performed by: NURSE ANESTHETIST, CERTIFIED REGISTERED

## 2022-05-30 PROCEDURE — D9220A PRA ANESTHESIA: ICD-10-PCS | Mod: ANES,,, | Performed by: ANESTHESIOLOGY

## 2022-05-30 PROCEDURE — 27201423 OPTIME MED/SURG SUP & DEVICES STERILE SUPPLY: Performed by: STUDENT IN AN ORGANIZED HEALTH CARE EDUCATION/TRAINING PROGRAM

## 2022-05-30 PROCEDURE — 37000009 HC ANESTHESIA EA ADD 15 MINS: Performed by: STUDENT IN AN ORGANIZED HEALTH CARE EDUCATION/TRAINING PROGRAM

## 2022-05-30 PROCEDURE — 37000008 HC ANESTHESIA 1ST 15 MINUTES: Performed by: STUDENT IN AN ORGANIZED HEALTH CARE EDUCATION/TRAINING PROGRAM

## 2022-05-30 PROCEDURE — 27200651 HC AIRWAY, LMA: Performed by: ANESTHESIOLOGY

## 2022-05-30 PROCEDURE — D9220A PRA ANESTHESIA: Mod: ANES,,, | Performed by: ANESTHESIOLOGY

## 2022-05-30 PROCEDURE — 00300 ANES ALL PX INTEG H/N/PTRUNK: CPT | Performed by: STUDENT IN AN ORGANIZED HEALTH CARE EDUCATION/TRAINING PROGRAM

## 2022-05-30 PROCEDURE — 36000707: Performed by: STUDENT IN AN ORGANIZED HEALTH CARE EDUCATION/TRAINING PROGRAM

## 2022-05-30 PROCEDURE — D9220A PRA ANESTHESIA: ICD-10-PCS | Mod: CRNA,,, | Performed by: NURSE ANESTHETIST, CERTIFIED REGISTERED

## 2022-05-30 PROCEDURE — 25000003 PHARM REV CODE 250: Performed by: NURSE ANESTHETIST, CERTIFIED REGISTERED

## 2022-05-30 PROCEDURE — 63600175 PHARM REV CODE 636 W HCPCS: Performed by: ANESTHESIOLOGY

## 2022-05-30 PROCEDURE — 36000706: Performed by: STUDENT IN AN ORGANIZED HEALTH CARE EDUCATION/TRAINING PROGRAM

## 2022-05-30 PROCEDURE — 63600175 PHARM REV CODE 636 W HCPCS: Performed by: STUDENT IN AN ORGANIZED HEALTH CARE EDUCATION/TRAINING PROGRAM

## 2022-05-30 RX ORDER — MEPERIDINE HYDROCHLORIDE 25 MG/ML
12.5 INJECTION INTRAMUSCULAR; INTRAVENOUS; SUBCUTANEOUS ONCE
Status: DISCONTINUED | OUTPATIENT
Start: 2022-05-30 | End: 2022-05-30 | Stop reason: HOSPADM

## 2022-05-30 RX ORDER — MIDAZOLAM HYDROCHLORIDE 1 MG/ML
INJECTION INTRAMUSCULAR; INTRAVENOUS
Status: DISCONTINUED | OUTPATIENT
Start: 2022-05-30 | End: 2022-05-30

## 2022-05-30 RX ORDER — ALBUTEROL SULFATE 0.83 MG/ML
2.5 SOLUTION RESPIRATORY (INHALATION) EVERY 4 HOURS PRN
Status: DISCONTINUED | OUTPATIENT
Start: 2022-05-30 | End: 2022-05-30 | Stop reason: HOSPADM

## 2022-05-30 RX ORDER — OXYCODONE AND ACETAMINOPHEN 5; 325 MG/1; MG/1
1 TABLET ORAL EVERY 6 HOURS PRN
Qty: 12 TABLET | Refills: 0 | Status: SHIPPED | OUTPATIENT
Start: 2022-05-30 | End: 2022-06-02

## 2022-05-30 RX ORDER — LIDOCAINE HCL/PF 100 MG/5ML
SYRINGE (ML) INTRAVENOUS
Status: DISCONTINUED | OUTPATIENT
Start: 2022-05-30 | End: 2022-05-30

## 2022-05-30 RX ORDER — FENTANYL CITRATE 50 UG/ML
INJECTION, SOLUTION INTRAMUSCULAR; INTRAVENOUS
Status: DISCONTINUED | OUTPATIENT
Start: 2022-05-30 | End: 2022-05-30

## 2022-05-30 RX ORDER — FAMOTIDINE 10 MG/ML
INJECTION INTRAVENOUS
Status: COMPLETED
Start: 2022-05-30 | End: 2022-05-30

## 2022-05-30 RX ORDER — DIPHENHYDRAMINE HYDROCHLORIDE 50 MG/ML
25 INJECTION INTRAMUSCULAR; INTRAVENOUS EVERY 6 HOURS PRN
Status: DISCONTINUED | OUTPATIENT
Start: 2022-05-30 | End: 2022-05-30 | Stop reason: HOSPADM

## 2022-05-30 RX ORDER — SODIUM CHLORIDE, SODIUM LACTATE, POTASSIUM CHLORIDE, CALCIUM CHLORIDE 600; 310; 30; 20 MG/100ML; MG/100ML; MG/100ML; MG/100ML
INJECTION, SOLUTION INTRAVENOUS CONTINUOUS
Status: DISCONTINUED | OUTPATIENT
Start: 2022-05-30 | End: 2022-05-30 | Stop reason: HOSPADM

## 2022-05-30 RX ORDER — ONDANSETRON 2 MG/ML
4 INJECTION INTRAMUSCULAR; INTRAVENOUS ONCE AS NEEDED
Status: DISCONTINUED | OUTPATIENT
Start: 2022-05-30 | End: 2022-05-30 | Stop reason: HOSPADM

## 2022-05-30 RX ORDER — HYDROCODONE BITARTRATE AND ACETAMINOPHEN 5; 325 MG/1; MG/1
1 TABLET ORAL
Status: DISCONTINUED | OUTPATIENT
Start: 2022-05-30 | End: 2022-05-30 | Stop reason: HOSPADM

## 2022-05-30 RX ORDER — CEFAZOLIN SODIUM 2 G/50ML
2 SOLUTION INTRAVENOUS
Status: COMPLETED | OUTPATIENT
Start: 2022-05-30 | End: 2022-05-30

## 2022-05-30 RX ORDER — DEXMEDETOMIDINE HYDROCHLORIDE 100 UG/ML
INJECTION, SOLUTION INTRAVENOUS
Status: DISCONTINUED | OUTPATIENT
Start: 2022-05-30 | End: 2022-05-30

## 2022-05-30 RX ORDER — DEXAMETHASONE SODIUM PHOSPHATE 4 MG/ML
INJECTION, SOLUTION INTRA-ARTICULAR; INTRALESIONAL; INTRAMUSCULAR; INTRAVENOUS; SOFT TISSUE
Status: DISCONTINUED | OUTPATIENT
Start: 2022-05-30 | End: 2022-05-30

## 2022-05-30 RX ORDER — FAMOTIDINE 10 MG/ML
INJECTION INTRAVENOUS
Status: DISCONTINUED | OUTPATIENT
Start: 2022-05-30 | End: 2022-05-30

## 2022-05-30 RX ORDER — ONDANSETRON 2 MG/ML
INJECTION INTRAMUSCULAR; INTRAVENOUS
Status: DISCONTINUED | OUTPATIENT
Start: 2022-05-30 | End: 2022-05-30

## 2022-05-30 RX ORDER — FENTANYL CITRATE 50 UG/ML
25 INJECTION, SOLUTION INTRAMUSCULAR; INTRAVENOUS EVERY 5 MIN PRN
Status: DISCONTINUED | OUTPATIENT
Start: 2022-05-30 | End: 2022-05-30 | Stop reason: HOSPADM

## 2022-05-30 RX ORDER — CEPHALEXIN 500 MG/1
500 CAPSULE ORAL EVERY 6 HOURS
Qty: 28 CAPSULE | Refills: 0 | Status: SHIPPED | OUTPATIENT
Start: 2022-05-30 | End: 2022-06-06

## 2022-05-30 RX ORDER — PROPOFOL 10 MG/ML
INJECTION, EMULSION INTRAVENOUS
Status: DISCONTINUED | OUTPATIENT
Start: 2022-05-30 | End: 2022-05-30

## 2022-05-30 RX ORDER — ACETAMINOPHEN 10 MG/ML
INJECTION, SOLUTION INTRAVENOUS
Status: DISCONTINUED | OUTPATIENT
Start: 2022-05-30 | End: 2022-05-30

## 2022-05-30 RX ORDER — SODIUM CHLORIDE 9 MG/ML
INJECTION, SOLUTION INTRAVENOUS CONTINUOUS
Status: DISCONTINUED | OUTPATIENT
Start: 2022-05-30 | End: 2022-12-27

## 2022-05-30 RX ADMIN — Medication 50 MG: at 08:05

## 2022-05-30 RX ADMIN — CEFAZOLIN SODIUM 2 G: 2 SOLUTION INTRAVENOUS at 08:05

## 2022-05-30 RX ADMIN — DEXMEDETOMIDINE HYDROCHLORIDE 4 MCG: 100 INJECTION, SOLUTION INTRAVENOUS at 09:05

## 2022-05-30 RX ADMIN — FAMOTIDINE 20 MG: 10 INJECTION, SOLUTION INTRAVENOUS at 08:05

## 2022-05-30 RX ADMIN — SODIUM CHLORIDE, SODIUM LACTATE, POTASSIUM CHLORIDE, AND CALCIUM CHLORIDE: 600; 310; 30; 20 INJECTION, SOLUTION INTRAVENOUS at 08:05

## 2022-05-30 RX ADMIN — MIDAZOLAM HYDROCHLORIDE 2 MG: 1 INJECTION INTRAMUSCULAR; INTRAVENOUS at 08:05

## 2022-05-30 RX ADMIN — ONDANSETRON 4 MG: 2 INJECTION, SOLUTION INTRAMUSCULAR; INTRAVENOUS at 08:05

## 2022-05-30 RX ADMIN — HYDROCODONE BITARTRATE AND ACETAMINOPHEN 1 TABLET: 5; 325 TABLET ORAL at 10:05

## 2022-05-30 RX ADMIN — PROPOFOL 130 MG: 10 INJECTION, EMULSION INTRAVENOUS at 08:05

## 2022-05-30 RX ADMIN — DEXAMETHASONE SODIUM PHOSPHATE 4 MG: 4 INJECTION, SOLUTION INTRA-ARTICULAR; INTRALESIONAL; INTRAMUSCULAR; INTRAVENOUS; SOFT TISSUE at 09:05

## 2022-05-30 RX ADMIN — FENTANYL CITRATE 25 MCG: 50 INJECTION, SOLUTION INTRAMUSCULAR; INTRAVENOUS at 08:05

## 2022-05-30 RX ADMIN — ACETAMINOPHEN 500 MG: 10 INJECTION, SOLUTION INTRAVENOUS at 09:05

## 2022-05-30 RX ADMIN — FENTANYL CITRATE 25 MCG: 50 INJECTION, SOLUTION INTRAMUSCULAR; INTRAVENOUS at 09:05

## 2022-05-30 NOTE — OP NOTE
[facility] Peri Services  Surgery Department  Operative Note    SUMMARY     Date of Procedure: 5/30/2022     Procedure: Procedure(s) (LRB):  DEBRIDEMENT, BREAST FLAP AND WASHOUT (Right)     Surgeon(s) and Role:     * Esther Carpio MD - Primary    Assisting Surgeon: None    Pre-Operative Diagnosis: Incisional pain [L76.82]  Gangrene [I96]  Personal history of malignant neoplasm of breast [Z85.3]    Post-Operative Diagnosis: Post-Op Diagnosis Codes:     * Incisional pain [L76.82]     * Gangrene [I96]     * Personal history of malignant neoplasm of breast [Z85.3]    Anesthesia: General    Operative Findings (including complications, if any): area of devitalized skin/full thickness necrosis was about 7 x 5cm; total area undermined to close 12 x 10cm     Description of Technical Procedures:   The patient was seen in the preoperative holding area where the history and physical were reviewed and informed consent was confirmed.  Surgical markings were applied as usual. The patient was taken to the operating room and placed on the table supine and all appropriate pressure points were padded. General anesthesia was induced without event.  A gary was not placed and preoperative antibiotics were given.  A time out was called prior to beginning the procedure.      The eschar was removed and all areas of full thickness necrosis were sharply debrided.  Skin edges were cleaned and debrided sharply.  Some devitalized necrotic fat was removed.  There was no exposure of the expander, but there was an area where the inferiorly based autoderm flap was very thin and translucent.  The expander pocket was not opened.  The skin edges were elevated and carefully undermined, only so much as needed to close without tension.  The area was then copiously irrigated with dilute betadine.  Hemostasis was achieved.  The skin was closed with 3-0 monocryl in the deep dermis and 4-0 nylons on the skin.  A prevena device was applied  externally.  No drain was placed.       The patient tolerated the procedure well without complication and was awoken from anesthesia without issue.  All instrument, sharps and sponge counts were correct at completion of case.        Significant Surgical Tasks Conducted by the Assistant(s), if Applicable: na    Estimated Blood Loss (EBL): minimal            Implants: * No implants in log *    Specimens:   Specimen (24h ago, onward)            None                  Condition: Good    Disposition: PACU - hemodynamically stable.    Attestation: I was present and scrubbed for the entire procedure.

## 2022-05-30 NOTE — DISCHARGE INSTRUCTIONS
Nozin Instructions  Goal: the goal of Nozin is to reduce the risk of post-procedural infections by bacteria in the nasal cavity. Think of it as hand  for your nose.    How to use:    1. Shake Nozin bottle well    2. Take a cotton swab and apply 4 drops to one tip    3. Insert cotton tip into one nostril, being sure not to go deeper into nose than tip of the swab.    4. Swab nostril 6 times counterclockwise and 6 times clockwise. Make sure to swab the inside front pocket of the nostril.    5. Take swab out and apply 2 drops to the same cotton tip. Repeat steps 3 and 4 in the other nostril.        Do steps 1-5 twice a day for 7 days.

## 2022-05-30 NOTE — TRANSFER OF CARE
"Anesthesia Transfer of Care Note    Patient: Gina Garcia    Procedure(s) Performed: Procedure(s) (LRB):  DEBRIDEMENT, BREAST FLAP AND WASHOUT (Right)    Patient location: PACU    Anesthesia Type: general    Transport from OR: Transported from OR on room air with adequate spontaneous ventilation    Post pain: adequate analgesia    Post assessment: no apparent anesthetic complications and tolerated procedure well    Post vital signs: stable    Level of consciousness: awake    Nausea/Vomiting: no nausea/vomiting    Complications: none    Transfer of care protocol was followed      Last vitals:   Visit Vitals  /69 (BP Location: Right arm, Patient Position: Sitting)   Pulse 79   Temp 36.3 °C (97.3 °F) (Temporal)   Resp 16   Ht 4' 11" (1.499 m)   Wt 49.2 kg (108 lb 9.2 oz)   SpO2 100%   Breastfeeding No   BMI 21.93 kg/m²     "

## 2022-05-30 NOTE — BRIEF OP NOTE
[facility] Per[facility] Services  Brief Operative Note    Surgery Date: 5/30/2022     Surgeon(s) and Role:     * Esther Carpio MD - Primary    Assisting Surgeon: None    Pre-op Diagnosis:  Incisional pain [L76.82]  Gangrene [I96]  Personal history of malignant neoplasm of breast [Z85.3]    Post-op Diagnosis:  Post-Op Diagnosis Codes:     * Incisional pain [L76.82]     * Gangrene [I96]     * Personal history of malignant neoplasm of breast [Z85.3]    Procedure(s) (LRB):  DEBRIDEMENT, BREAST FLAP AND WASHOUT (Right)    Anesthesia: General    Operative Findings: area of full thickness necrosis debrided and closed primarily.  No exposure of TE     Estimated Blood Loss: * No values recorded between 5/30/2022  8:38 AM and 5/30/2022  9:15 AM *         Specimens:   Specimen (24h ago, onward)            None            Discharge Note    OUTCOME: Patient tolerated treatment/procedure well without complication and is now ready for discharge.    DISPOSITION: Home or Self Care    FINAL DIAGNOSIS:  <principal problem not specified>    FOLLOWUP: In clinic    DISCHARGE INSTRUCTIONS:    Discharge Procedure Orders   Diet Adult Regular     Sponge bath only until clinic visit     Lifting restrictions     Notify your health care provider if you experience any of the following:  temperature >100.4     Notify your health care provider if you experience any of the following:  persistent nausea and vomiting or diarrhea     Notify your health care provider if you experience any of the following:  severe uncontrolled pain     Notify your health care provider if you experience any of the following:  redness, tenderness, or signs of infection (pain, swelling, redness, odor or green/yellow discharge around incision site)     Leave dressing on - Keep it clean, dry, and intact until clinic visit

## 2022-05-30 NOTE — ANESTHESIA POSTPROCEDURE EVALUATION
Anesthesia Post Evaluation    Patient: Gina Garcia    Procedure(s) Performed: Procedure(s) (LRB):  DEBRIDEMENT, BREAST FLAP AND WASHOUT (Right)    Final Anesthesia Type: general      Patient location during evaluation: PACU  Patient participation: Yes- Able to Participate  Level of consciousness: awake and alert and oriented  Post-procedure vital signs: reviewed and stable  Pain management: adequate  Airway patency: patent    PONV status at discharge: No PONV  Anesthetic complications: no      Cardiovascular status: blood pressure returned to baseline, stable and hemodynamically stable  Respiratory status: unassisted  Hydration status: euvolemic  Follow-up not needed.          Vitals Value Taken Time   /60 05/30/22 1010   Temp 36.5 °C (97.7 °F) 05/30/22 1010   Pulse 61 05/30/22 1010   Resp 20 05/30/22 1010   SpO2 100 % 05/30/22 1010         Event Time   Out of Recovery 10:00:00         Pain/Jimy Score: Pain Rating Prior to Med Admin: 5 (5/30/2022 10:09 AM)  Jimy Score: 10 (5/30/2022 10:10 AM)

## 2022-06-09 ENCOUNTER — CLINICAL SUPPORT (OUTPATIENT)
Dept: REHABILITATION | Facility: HOSPITAL | Age: 37
End: 2022-06-09
Attending: STUDENT IN AN ORGANIZED HEALTH CARE EDUCATION/TRAINING PROGRAM
Payer: OTHER GOVERNMENT

## 2022-06-09 DIAGNOSIS — Z74.09 DECREASED FUNCTIONAL MOBILITY AND ENDURANCE: Primary | ICD-10-CM

## 2022-06-09 DIAGNOSIS — M25.612 DECREASED RANGE OF MOTION OF SHOULDER, LEFT: ICD-10-CM

## 2022-06-09 DIAGNOSIS — M25.611 DECREASED RANGE OF MOTION OF SHOULDER, RIGHT: ICD-10-CM

## 2022-06-09 DIAGNOSIS — M62.81 PROXIMAL MUSCLE WEAKNESS: ICD-10-CM

## 2022-06-09 PROCEDURE — 97140 MANUAL THERAPY 1/> REGIONS: CPT

## 2022-06-09 PROCEDURE — 97110 THERAPEUTIC EXERCISES: CPT

## 2022-06-09 NOTE — PROGRESS NOTES
[facility]   Physical Therapy Treatment Note     Name: Gina Garcia  Clinic Number: [id number]    Therapy Diagnosis:   Encounter Diagnoses   Name Primary?    Decreased functional mobility and endurance Yes    Proximal muscle weakness     Decreased range of motion of shoulder, left     Decreased range of motion of shoulder, right      Physician: Esther Carpio, *    Visit Date: 6/9/2022    Physician Orders: PT Eval and Treat  Medical Diagnosis from Referral: Ductal carcinoma in situ (DCIS) of right breast, Acquired absence of breast and nipple,  Evaluation Date: 5/20/2022  Authorization Period Expiration: 7/1/2022  Plan of Care Expiration: 7/1/2022  Visit # / Visits authorized: 1/15 (+1 from initial evaluation)   FOTO: 0/3 (perform next session)      Progress Note Due on 6/20/2021     Precautions: Standard and cancer    Time In: 1000  Time Out: 1100  Total Billable Time: 58 minutes (Billing reflects 1 on 1 treatment time spent with patient)     SUBJECTIVE     Patient reports: she had her debridement last week and has been feeling okay. Feels that the mobility and pain in her R shoulder may have improved some     He/She N/A compliant with home exercise program.  Response to previous treatment: mild soreness but felt okay  Functional change: improved R shoulder A/PROM    Pain: 3/10     Location: R chest, shoulder and upper extremity     OBJECTIVE     Objective Measures updated at progress report unless specified.       TREATMENT     Gina received the treatments listed below:       MANUAL THERAPY TECHNIQUES were applied for (35) minutes, including:    Manual Intervention Performed Today    Soft Tissue Mobilization x Gross soft tissue mobilization through R breast, lateral chest, axilla, upper arm, forearm and hand along the path of the axillary cords. Soft tissue mobilization R periscapular region, upper trapezius, pectorals, subscapularis and infraspinatus    Joint  Mobilizations     Mobilization with movement          Functional Dry Needling        Plan for Next Visit: PRN         THERAPEUTIC EXERCISES to develop strength, endurance, ROM, flexibility, posture, and core stabilization for (23) minutes including:    Intervention Performed Today    Shoulder PROM  x 10 minutes R shoulder flexion, scaption, abduction and external rotation     Median and radial nerve glides  x 30x each on R    Table slides flexion and scaption  x 20x each on 9   Table external rotation  x 10x then discontinued due to discomfort    Pectoralis stretch  x                               Plan for Next Visit: wall slides          PATIENT EDUCATION AND HOME EXERCISES     Home Exercises Provided and Patient Education Provided     Education provided: (time included with therex)    PURPOSE: Patient educated on the impairments noted above and the effects of physical therapy intervention to improve overall condition and QOL.    EXERCISE: Patient was educated on all the above exercise prior/during/after for proper posture, positioning, and execution for safe performance with home exercise program.     Written Home Exercises Provided: yes.  Exercises were reviewed and Gina was able to demonstrate them prior to the end of the session.  Gina demonstrated good  understanding of the education provided. See EMR under Patient Instructions for exercises provided during therapy sessions.      ASSESSMENT     Pt tolerated session well today. Significant improvements noted in tolerance for soft tissue mobilization, PROM and nerve glides. Incorporated table slides and pectoralis stretch to further improve mobility     Gina is progressing well towards her goals.   Pt prognosis is Excellent.     Pt will continue to benefit from skilled outpatient physical therapy to address the deficits listed in the problem list box on initial evaluation, provide pt/family education and to maximize pt's level of independence in the home and  community environment.     Pt's spiritual, cultural and educational needs considered and pt agreeable to plan of care and goals.     Anticipated Barriers for therapy: adjuvant cancer treatment schedules and side effects, co-morbidities and necrotic tissue, tamoxifen use    GOALS:     Short Term Goals:  6 weeks Progress    1. Pain: Pt will demonstrate improved pain by reports of less than or equal to 4/10 worst pain on the verbal rating scale in order to progress toward maximal functional ability and improve QOL.  PC   2. Function: Patient will demonstrate improved function as indicated by a functional limitation score of less than or equal to NT out of 100 on FOTO.  PC   3. Mobility: Pt will increase AROM/PROM in shoulder flexion to 130 degrees on right to improve functional reach, carry, push, pull pain free  PC   4. Strength: Patient will improve strength to 50% of stated goals, listed in objective measures above, in order to progress towards independence with functional activities.   PC   5. HEP: Patient will demonstrate independence with HEP in order to progress toward functional independence.  PC   6. Patient will demonstrate 100% understanding of lymphedema risk reduction practices to include self monitoring for lymphedema.  PC      Long Term Goals:  12 weeks Progress   1. Pain: Pt will demonstrate improved pain by reports of less than or equal to 2/10 worst pain on the verbal rating scale in order to progress toward maximal functional ability and improve QOL.    PC   2. Function: Patient will demonstrate improved function as indicated by a functional limitation score of less than or equal to NT  out of 100 on FOTO.  PC   3. Mobility: Patient will improve AROM to stated goals, listed in objective measures above, in order to return to maximal functional potential and improve quality of life.  PC   4. Strength: Pt will increase strength to 4+/5 in gross UE musculature to improve tolerance to all functional  activities pain free  PC   5. Pt will demonstrate full/maximized tissue mobility to increase ROM and promote healthy tissue to be pain free at discharge  PC   6. Patient will report compliance with walking program 5x week for 10 min each day to improve overall cardiovascular function and decrease cancer related fatigue at discharge.  PC      Goals Key:  PC= progressing/continue; PM= partially met;        DC= discontinue    PLAN     Continue Plan of Care (POC) and progress per patient tolerance. See treatment section for details on planned progressions next session.      Marlee Willoughby, PT

## 2022-06-15 ENCOUNTER — PATIENT MESSAGE (OUTPATIENT)
Dept: ADMINISTRATIVE | Facility: OTHER | Age: 37
End: 2022-06-15
Payer: OTHER GOVERNMENT

## 2022-06-17 ENCOUNTER — OFFICE VISIT (OUTPATIENT)
Dept: OBSTETRICS AND GYNECOLOGY | Facility: CLINIC | Age: 37
End: 2022-06-17
Payer: OTHER GOVERNMENT

## 2022-06-17 VITALS
BODY MASS INDEX: 22.14 KG/M2 | DIASTOLIC BLOOD PRESSURE: 72 MMHG | HEIGHT: 59 IN | SYSTOLIC BLOOD PRESSURE: 118 MMHG | WEIGHT: 109.81 LBS

## 2022-06-17 DIAGNOSIS — D05.11 DUCTAL CARCINOMA IN SITU (DCIS) OF RIGHT BREAST: ICD-10-CM

## 2022-06-17 DIAGNOSIS — C50.919 CHEK2-RELATED BREAST CANCER: ICD-10-CM

## 2022-06-17 DIAGNOSIS — Z12.4 PAP SMEAR FOR CERVICAL CANCER SCREENING: Primary | ICD-10-CM

## 2022-06-17 DIAGNOSIS — Z15.02 CHEK2-RELATED BREAST CANCER: ICD-10-CM

## 2022-06-17 DIAGNOSIS — Z15.89 CHEK2-RELATED BREAST CANCER: ICD-10-CM

## 2022-06-17 DIAGNOSIS — Z15.09 CHEK2-RELATED BREAST CANCER: ICD-10-CM

## 2022-06-17 PROBLEM — M25.611 DECREASED RANGE OF MOTION OF SHOULDER, RIGHT: Status: RESOLVED | Noted: 2022-05-25 | Resolved: 2022-06-17

## 2022-06-17 PROBLEM — M25.612 DECREASED RANGE OF MOTION OF SHOULDER, LEFT: Status: RESOLVED | Noted: 2022-05-25 | Resolved: 2022-06-17

## 2022-06-17 PROBLEM — M62.81 PROXIMAL MUSCLE WEAKNESS: Status: RESOLVED | Noted: 2022-05-25 | Resolved: 2022-06-17

## 2022-06-17 PROCEDURE — 99999 PR PBB SHADOW E&M-EST. PATIENT-LVL III: ICD-10-PCS | Mod: PBBFAC,,, | Performed by: OBSTETRICS & GYNECOLOGY

## 2022-06-17 PROCEDURE — 99999 PR PBB SHADOW E&M-EST. PATIENT-LVL III: CPT | Mod: PBBFAC,,, | Performed by: OBSTETRICS & GYNECOLOGY

## 2022-06-17 PROCEDURE — 88175 CYTOPATH C/V AUTO FLUID REDO: CPT | Performed by: OBSTETRICS & GYNECOLOGY

## 2022-06-17 PROCEDURE — 99213 OFFICE O/P EST LOW 20 MIN: CPT | Mod: PBBFAC | Performed by: OBSTETRICS & GYNECOLOGY

## 2022-06-17 PROCEDURE — 99204 PR OFFICE/OUTPT VISIT, NEW, LEVL IV, 45-59 MIN: ICD-10-PCS | Mod: S$PBB,,, | Performed by: OBSTETRICS & GYNECOLOGY

## 2022-06-17 PROCEDURE — 87624 HPV HI-RISK TYP POOLED RSLT: CPT | Performed by: OBSTETRICS & GYNECOLOGY

## 2022-06-17 PROCEDURE — 99204 OFFICE O/P NEW MOD 45 MIN: CPT | Mod: S$PBB,,, | Performed by: OBSTETRICS & GYNECOLOGY

## 2022-06-17 RX ORDER — CYCLOBENZAPRINE HCL 10 MG
10 TABLET ORAL EVERY 8 HOURS PRN
COMMUNITY
Start: 2022-05-10 | End: 2022-08-19

## 2022-06-17 NOTE — PROGRESS NOTES
"  Subjective:       Patient ID: Gina Garcia is a 36 y.o. female.    Chief Complaint:  Well Woman      History of Present Illness  HPI  Referral to discuss Bilateral salpingo-oophorectomy for estrogen progesterone receptor positive premenopausal breast cancer   Also with genetic testing positive   Discussed findings and recommendation to create hypoestrogenic state   Discussed with Dr Queen, Oncology     Regular menses, due for pap   Breast cosmetic surgery is planned.  Suggest she proceed with oophorectomy first       Health Maintenance   Topic Date Due    Hepatitis C Screening  Never done    TETANUS VACCINE  Never done    Lipid Panel  Completed     GYN & OB History  Patient's last menstrual period was 2022 (approximate).   Date of Last Pap: No result found    OB History    Para Term  AB Living   2 2 2     2   SAB IAB Ectopic Multiple Live Births           2      # Outcome Date GA Lbr Emery/2nd Weight Sex Delivery Anes PTL Lv   2 Term      Vag-Spont   MAHIN   1 Term      Vag-Spont   MAHIN       Review of Systems  Review of Systems        Objective:   /72   Ht 4' 11" (1.499 m)   Wt 49.8 kg (109 lb 12.6 oz)   LMP 2022 (Approximate)   BMI 22.17 kg/m²    Physical Exam:   Constitutional: She appears well-developed and well-nourished. No distress.      Neck: No JVD present. No thyroid mass and no thyromegaly present.    Cardiovascular: Normal rate and regular rhythm.          Abdominal: Soft. Bowel sounds are normal. No hernia. Hernia confirmed negative in the ventral area, confirmed negative in the right inguinal area and confirmed negative in the left inguinal area.     Genitourinary:    Vagina, uterus and rectum normal.   Labial bartholins normal.There is no rash, tenderness, lesion or injury on the right labia. There is no rash, tenderness, lesion or injury on the left labia. Cervix is normal. Right adnexum displays no mass, no tenderness and no fullness. Left adnexum displays " no mass, no tenderness and no fullness. No erythema,  no vaginal discharge, tenderness or bleeding in the vagina.    No foreign body in the vagina.   Cervix exhibits no motion tenderness, no discharge and no friability.    pap smear completedUterus is not deviated, not enlarged, not fixed and not tender.                      Assessment:        1. Pap smear for cervical cancer screening    2. CHEK2-related breast cancer    3. Ductal carcinoma in situ (DCIS) of right breast                Plan:            Gina was seen today for well woman.    Diagnoses and all orders for this visit:    Pap smear for cervical cancer screening  -     Liquid-Based Pap Smear, Screening  -     HPV High Risk Genotypes, PCR    CHEK2-related breast cancer  -     Ambulatory referral/consult to Obstetrics / Gynecology    Ductal carcinoma in situ (DCIS) of right breast      Robotic Hysterectomy Bilateral salpingo-oophorectomy after normal pap

## 2022-06-20 ENCOUNTER — TELEPHONE (OUTPATIENT)
Dept: OBSTETRICS AND GYNECOLOGY | Facility: CLINIC | Age: 37
End: 2022-06-20
Payer: OTHER GOVERNMENT

## 2022-06-20 ENCOUNTER — CLINICAL SUPPORT (OUTPATIENT)
Dept: REHABILITATION | Facility: HOSPITAL | Age: 37
End: 2022-06-20
Attending: STUDENT IN AN ORGANIZED HEALTH CARE EDUCATION/TRAINING PROGRAM
Payer: OTHER GOVERNMENT

## 2022-06-20 ENCOUNTER — OFFICE VISIT (OUTPATIENT)
Dept: SURGERY | Facility: CLINIC | Age: 37
End: 2022-06-20
Payer: OTHER GOVERNMENT

## 2022-06-20 VITALS
SYSTOLIC BLOOD PRESSURE: 116 MMHG | HEART RATE: 65 BPM | WEIGHT: 109.13 LBS | BODY MASS INDEX: 22.04 KG/M2 | DIASTOLIC BLOOD PRESSURE: 73 MMHG | TEMPERATURE: 98 F

## 2022-06-20 DIAGNOSIS — Z15.09 CHEK2-RELATED BREAST CANCER: Primary | ICD-10-CM

## 2022-06-20 DIAGNOSIS — Z74.09 DECREASED FUNCTIONAL MOBILITY AND ENDURANCE: Primary | ICD-10-CM

## 2022-06-20 DIAGNOSIS — D05.11 DUCTAL CARCINOMA IN SITU (DCIS) OF RIGHT BREAST: ICD-10-CM

## 2022-06-20 DIAGNOSIS — C50.919 CHEK2-RELATED BREAST CANCER: Primary | ICD-10-CM

## 2022-06-20 DIAGNOSIS — D05.11 DUCTAL CARCINOMA IN SITU (DCIS) OF RIGHT BREAST: Primary | ICD-10-CM

## 2022-06-20 DIAGNOSIS — Z15.89 CHEK2-RELATED BREAST CANCER: Primary | ICD-10-CM

## 2022-06-20 DIAGNOSIS — Z15.02 CHEK2-RELATED BREAST CANCER: Primary | ICD-10-CM

## 2022-06-20 PROCEDURE — 99213 OFFICE O/P EST LOW 20 MIN: CPT | Mod: PBBFAC | Performed by: SURGERY

## 2022-06-20 PROCEDURE — 97110 THERAPEUTIC EXERCISES: CPT

## 2022-06-20 PROCEDURE — 97140 MANUAL THERAPY 1/> REGIONS: CPT

## 2022-06-20 PROCEDURE — 99024 PR POST-OP FOLLOW-UP VISIT: ICD-10-PCS | Mod: ,,, | Performed by: SURGERY

## 2022-06-20 PROCEDURE — 99024 POSTOP FOLLOW-UP VISIT: CPT | Mod: ,,, | Performed by: SURGERY

## 2022-06-20 PROCEDURE — 99999 PR PBB SHADOW E&M-EST. PATIENT-LVL III: CPT | Mod: PBBFAC,,, | Performed by: SURGERY

## 2022-06-20 PROCEDURE — 99999 PR PBB SHADOW E&M-EST. PATIENT-LVL III: ICD-10-PCS | Mod: PBBFAC,,, | Performed by: SURGERY

## 2022-06-20 NOTE — PROGRESS NOTES
[facility]  Breast Surgery  Department of General Surgery      Date of Visit:  2022    Referring provider:  No referring provider defined for this encounter.    PCP:  Kenneth Barraza MD    HIGH RISK    Gina Garcia is a 36 y.o. premenopausal female s/p bilateral mastectomy right axillary dissection with reconstruction by Dr. Carpio 22 presents for follow-up.  She is doing well with physical therapy and healing process has been improving.  She has follow-up to start increasing the size of the expanders.  She has also scheduled complete hysterectomy.     presents to discuss right breast biopsy. Her biopsy showed right breast DCIS ER+/ME+.    presents for abnormal mammogram of the right breast and high risk breast cancer check 2 mutation.  She recently underwent mammogram showing calcifications of the right breast and is currently awaiting stereotactic core biopsy.  She had previously undergone evaluation for her check 2 mutation with discussions about prophylactic bilateral mastectomy and reconstruction with Dr. Duckworth and Dr. Almaraz.  It was recommended that she undergo weight loss prior to surgery which she has done in the interim approximately 40-50 lbs.      Per Dr. Duckworth:  referred for evaluation of increased risk of breast cancer based on family history and + CHECK2 mutation.  Here today to discussed options of high risk screening and risk reduction.    Other breast cancer risk factors include family hx on mother's side, family hx on father's side, mom with breast CA, family hx of colon CA and family hx of Prostate CA.     Age of menarche was 15.    Age of menopause was NA.    Last menstrual period was 2020.    Patient denies hormonal therapy. IUD for 8 years - paraguard.     Patient is .    Age of first live birth was 23.    Patient did breast feed.       Family History is significant for the following:  Mother breast cancer at age 45, living, ER+  Paternal  grandmother with breast cancer  4 maternal great aunts with breast cancer  Maternal and paternal grandfather with prostate cancer  Great uncle with kidney cancer  Also history of colon cancer, unknown family members    Social History:   Smoking:  never  Drinking:  Socially  Caffeine: daily   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    Migraines      Past Surgical History:   Procedure Laterality Date    ACHILLES TENDON SURGERY      AXILLARY NODE DISSECTION Right 4/25/2022    Procedure: LYMPHADENECTOMY, AXILLARY;  Surgeon: Micheal Bartholomew MD;  Location: [facility] OR;  Service: General;  Laterality: Right;  Right axillary node dissection    BILATERAL MASTECTOMY Bilateral 4/25/2022    Procedure: MASTECTOMY, BILATERAL;  Surgeon: Micheal Bartholomew MD;  Location: [facility] OR;  Service: General;  Laterality: Bilateral;    COLONOSCOPY N/A 3/28/2022    Procedure: COLONOSCOPY;  Surgeon: Winnie Polanco MD;  Location: [facility] ENDO;  Service: Endoscopy;  Laterality: N/A;    DEBRIDEMENT, BREAST Right 5/30/2022    Procedure: DEBRIDEMENT, BREAST;  Surgeon: Esther Carpio MD;  Location: [facility] OR;  Service: General;  Laterality: Right;  FLAP WASHOUT AND CLOSURE    EXTERNAL EAR SURGERY      cosmetic    INSERTION OF BREAST TISSUE EXPANDER Bilateral 4/25/2022    Procedure: INSERTION, TISSUE EXPANDER, BREAST;  Surgeon: Micheal Bartholomew MD;  Location: [facility] OR;  Service: General;  Laterality: Bilateral;  Procedure performed by KRAIG Carpio    sciatic nerve exploration      SENTINEL LYMPH NODE BIOPSY Right 4/25/2022    Procedure: BIOPSY, LYMPH NODE, SENTINEL;  Surgeon: Micheal Bartholomew MD;  Location: [facility];  Service: General;  Laterality: Right;     Current Outpatient Medications on File Prior to Visit   Medication Sig Dispense Refill    ascorbic acid, vitamin C, (VITAMIN C) 250 MG tablet Take 250 mg by mouth once daily.      cyclobenzaprine (FLEXERIL) 10 MG tablet Take 10 mg by mouth every 8  (eight) hours as needed.      gabapentin (NEURONTIN) 100 MG capsule Take 100-200 mg by mouth 3 (three) times daily.      magnesium hydroxide 400 mg/5 ml (MILK OF MAGNESIA) 400 mg/5 mL Susp Take 30 mLs by mouth daily as needed.      rosuvastatin (CRESTOR) 20 MG tablet Take 1 tablet (20 mg total) by mouth once daily. 90 tablet 3     Current Facility-Administered Medications on File Prior to Visit   Medication Dose Route Frequency Provider Last Rate Last Admin    0.9%  NaCl infusion   Intravenous Continuous Esther Carpio MD             Review of patient's allergies indicates:  No Known Allergies      Review of Systems  Review of Systems   Constitutional: Negative for chills and fever.   HENT: Negative for congestion.    Eyes: Negative for blurred vision.   Respiratory: Negative for shortness of breath.    Cardiovascular: Negative for chest pain.   Gastrointestinal: Negative for abdominal pain, constipation, diarrhea, nausea and vomiting.   Genitourinary: Negative for dysuria.   Musculoskeletal: Negative for back pain.   Skin: Negative for itching and rash.   Neurological: Negative for dizziness.   Endo/Heme/Allergies: Does not bruise/bleed easily.   Psychiatric/Behavioral: The patient is not nervous/anxious.           Objective:   /73   Pulse 65   Temp 97.5 °F (36.4 °C) (Tympanic)   Wt 49.5 kg (109 lb 2 oz)   LMP 06/20/2022   BMI 22.04 kg/m²     Physical Exam  Vitals reviewed.   Constitutional:       Appearance: Normal appearance. She is not ill-appearing.   HENT:      Head: Normocephalic and atraumatic.   Eyes:      Extraocular Movements: Extraocular movements intact.   Cardiovascular:      Rate and Rhythm: Normal rate and regular rhythm.      Pulses: Normal pulses.      Heart sounds: Normal heart sounds.   Pulmonary:      Effort: Pulmonary effort is normal.      Breath sounds: Normal breath sounds.   Chest:   Breasts:      Right: No swelling, bleeding, inverted nipple, mass, nipple discharge,  skin change or tenderness.      Left: No swelling, bleeding, inverted nipple, mass, nipple discharge, skin change or tenderness.         Abdominal:      General: There is no distension.      Palpations: Abdomen is soft.   Musculoskeletal:         General: Normal range of motion.      Cervical back: Normal range of motion.   Skin:     General: Skin is warm and dry.   Neurological:      General: No focal deficit present.      Mental Status: She is alert and oriented to person, place, and time.   Psychiatric:         Mood and Affect: Mood normal.           Imaging  Result:   Mammo Digital Diagnostic Bilat with Tony  US Breast Bilateral Limited     History:  Patient is 36 y.o. and is seen for diagnostic imaging.     Films Compared:  Prior images (if available) were compared.     Findings:  This procedure was performed using tomosynthesis. Computer-aided detection was utilized in the interpretation of this examination.  The breasts have scattered areas of fibroglandular density.      Right  Mammo Digital Diagnostic Bilat with Tony  There are no corresponding areas of architectural distortion seen on this modality. Ultrasound of the lower-outer right breast demonstrates no concerning abnormality.         There are fine linear or fine-linear branching calcifications in a linear distribution seen in the retroareolar region of the right breast.      Left   Small mass within the outer left breast is less conspicuous with somewhat lucent appearance. Ultrasound was performed and demonstrates a minimally complicated 9 mm cyst at the 03:00 o'clock location, 8 cm from the nipple.         Impression:  Right  Calcifications: Right breast calcifications at the retroareolar position. Assessment: 4 - Suspicious finding. Stereotactic Biopsy is recommended.      Left  There is no mammographic or sonographic evidence of malignancy in the left breast.     BI-RADS Category:   Overall: 4 - Suspicious     Recommendation:  Stereotactic biopsy  recommended of the right breast calcifications.   Six month follow-up diagnostic left mammogram and ultrasound can be performed for the minimally complicated cyst.      Final Pathologic Diagnosis 1. Right breast sentinel node #1, excision:   No metastatic carcinoma identified in 1 lymph node (0/1), supported by   immunohistochemical stains for AE1/AE3, cam 5.2, and WSK with adequate   controls   2. Right breast sentinel node #2, excision:   No metastatic carcinoma identified in 1 lymph node (0/1), supported by   immunohistochemical stains for AE1/AE3, cam 5.2, and WSK with adequate   controls   3. Right breast sentinel node #3, excision:   No metastatic carcinoma identified in 1 lymph node (0/1), supported by   immunohistochemical stains for AE1/AE3, cam 5.2, and WSK with adequate   controls   4. Right axillary content, excision:   No metastatic carcinoma identified in 13 lymph nodes (0/13), supported by   AE1/AE3 immunostains with adequate controls   5. Right breast sentinel node #4, excision:   No metastatic carcinoma identified in 1 lymph node (0/1), supported by   immunohistochemical stains for AE1/AE3, cam 5.2, and WSK with adequate   controls   6. Right breast, mastectomy:   High-grade ductal carcinoma in Situ (DCIS), cribriform, solid, micropapillary   patterns, approximately 12 mm in size, present in 3/31 blocks   DCIS measures 8 mm to the posterior surgical margin, 8 mm to the inferior   surgical margin, and at least 10 mm to the remainder of the surgical margins   Biopsy marker and biopsy site change identified   Nipple and skin uninvolved   No invasive carcinoma identified.   Please see complete Surgical pathology cancer case summary below   PATHOLOGIC TNM STAGING: pTis(sn)N0   7. Left breast, mastectomy:   Benign breast tissue with fibrocystic changes   Unremarkable nipple and skin   No evidence of in-situ or invasive carcinoma   Surgical pathology cancer case summary   Procedure-total mastectomy    Specimen laterality-right   Histologic type-ductal carcinoma in-situ   Size (extent) of DCIS-approximately 12 mm, present in 3/31 blocks   Architectural patterns-cribriform, solid, micropapillary   Nuclear grade-grade 3 (high)   Necrosis-present, focal   Microcalcifications-present in DCIS   Margins   Margin status   DCIS measures 8 mm to the posterior surgical margin, 8 mm to the inferior   surgical margin, and at least 10 mm to the remainder of the surgical margins   Regional lymph nodes   Regional lymph node status-all regional lymph nodes negative for tumor   Total number of lymph nodes examined (sentinel and nonsentinel) -17   Number of sentinel nodes examined-4   Pathologic stage classification   pT category- pTis(DCIS):  Ductal carcinoma in Situ   Regional lymph nodes modifier-(sn):  [place] nodes examined   pN category-pN0:  No regional lymph node metastasis identified   Additional findings-fibrocystic changes   Breast biomarker testing performed on previous biopsy [id number] as follows:   BREAST BIOMARKER RESULTS   Estrogen receptor (ER): Positive (100%, strong)   Progesterone receptor (NV): Positive (5%, strong)   All immunostain controls react appropriately.   Diagnosed by RICHY VILLASENOR M.D.   For future possible ancillary studies, DCIS is present in block 6D.    Comment: Interp By Nkechi Chan D.O., Signed on 05/06/2022 at 14:08   Frozen Section Diagnosis 1.Postive   2.Negative   3.Negative   4.Negative             Assessment:     This is a 36 y.o. female  With CHECK 2 mutation/DCIS s/p bilateral mastectomy with right axillary dissection and reconstruction        Plan:     Continues to heal well  Physical therapy for ROM/strengthening  Keep plastic surgery follow up  F/u in 6 months for surveillance

## 2022-06-22 NOTE — PROGRESS NOTES
[facility]   Physical Therapy Treatment Note     Name: Gina Garcia  Clinic Number: [id number]    Therapy Diagnosis:   Encounter Diagnosis   Name Primary?    Decreased functional mobility and endurance Yes     Physician: Esther Carpio, *    Visit Date: 6/20/2022    Physician Orders: PT Eval and Treat  Medical Diagnosis from Referral: Ductal carcinoma in situ (DCIS) of right breast, Acquired absence of breast and nipple,  Evaluation Date: 5/20/2022  Authorization Period Expiration: 7/1/2022  Plan of Care Expiration: 7/1/2022  Visit # / Visits authorized: 2/15 (+1 from initial evaluation)   FOTO: 0/3 (perform next session)      Progress Note Due on 6/20/2021     Precautions: Standard and cancer    Time In: 1000  Time Out: 1100  Total Billable Time: 55 minutes (Billing reflects 1 on 1 treatment time spent with patient)     SUBJECTIVE     Patient reports: feels that mobility in her shoulders continue to improve. Will be starting expanding process for breasts soon.     He/She N/A compliant with home exercise program.  Response to previous treatment: mild soreness but felt okay  Functional change: improved R shoulder A/PROM    Pain: 3/10     Location: R chest, shoulder and upper extremity     OBJECTIVE     Objective Measures updated at progress report unless specified.       TREATMENT     Gina received the treatments listed below:       MANUAL THERAPY TECHNIQUES were applied for (37) minutes, including:    Manual Intervention Performed Today    Soft Tissue Mobilization x Gross soft tissue mobilization through R breast, lateral chest, axilla, upper arm, forearm and hand along the path of the axillary cords. Soft tissue mobilization R periscapular region, upper trapezius, pectorals, subscapularis and infraspinatus    Joint Mobilizations     Mobilization with movement          Functional Dry Needling        Plan for Next Visit: PRN         THERAPEUTIC EXERCISES to develop strength,  endurance, ROM, flexibility, posture, and core stabilization for (23) minutes including:    Intervention Performed Today    Shoulder PROM  x 10 minutes R shoulder flexion, scaption, abduction and external rotation     Median and radial nerve glides  x 30x each on R    Table slides flexion and scaption   20x each on 9   Table external rotation   10x then discontinued due to discomfort    Pectoralis stretch      Rope and pulley  x 3 minutes flexion and abduction    Ball roll outs  x 3 minutes with 3s holds forward, R and L                     Plan for Next Visit: wall slides          PATIENT EDUCATION AND HOME EXERCISES     Home Exercises Provided and Patient Education Provided     Education provided: (time included with therex)    PURPOSE: Patient educated on the impairments noted above and the effects of physical therapy intervention to improve overall condition and QOL.    EXERCISE: Patient was educated on all the above exercise prior/during/after for proper posture, positioning, and execution for safe performance with home exercise program.     Written Home Exercises Provided: yes.  Exercises were reviewed and Gina was able to demonstrate them prior to the end of the session.  Gina demonstrated good  understanding of the education provided. See EMR under Patient Instructions for exercises provided during therapy sessions.      ASSESSMENT     Pt tolerated session well today. Significant improvements noted in shoulder mobility. Incorporated ball roll outs and rope and pulley to reinforce shoulder ROM. Improved tolerance for soft tissue mobilization today     Gina is progressing well towards her goals.   Pt prognosis is Excellent.     Pt will continue to benefit from skilled outpatient physical therapy to address the deficits listed in the problem list box on initial evaluation, provide pt/family education and to maximize pt's level of independence in the home and community environment.     Pt's spiritual, cultural  and educational needs considered and pt agreeable to plan of care and goals.     Anticipated Barriers for therapy: adjuvant cancer treatment schedules and side effects, co-morbidities and necrotic tissue, tamoxifen use    GOALS:     Short Term Goals:  6 weeks Progress    1. Pain: Pt will demonstrate improved pain by reports of less than or equal to 4/10 worst pain on the verbal rating scale in order to progress toward maximal functional ability and improve QOL.  PC   2. Function: Patient will demonstrate improved function as indicated by a functional limitation score of less than or equal to NT out of 100 on FOTO.  PC   3. Mobility: Pt will increase AROM/PROM in shoulder flexion to 130 degrees on right to improve functional reach, carry, push, pull pain free  PC   4. Strength: Patient will improve strength to 50% of stated goals, listed in objective measures above, in order to progress towards independence with functional activities.   PC   5. HEP: Patient will demonstrate independence with HEP in order to progress toward functional independence.  PC   6. Patient will demonstrate 100% understanding of lymphedema risk reduction practices to include self monitoring for lymphedema.  PC      Long Term Goals:  12 weeks Progress   1. Pain: Pt will demonstrate improved pain by reports of less than or equal to 2/10 worst pain on the verbal rating scale in order to progress toward maximal functional ability and improve QOL.    PC   2. Function: Patient will demonstrate improved function as indicated by a functional limitation score of less than or equal to NT  out of 100 on FOTO.  PC   3. Mobility: Patient will improve AROM to stated goals, listed in objective measures above, in order to return to maximal functional potential and improve quality of life.  PC   4. Strength: Pt will increase strength to 4+/5 in gross UE musculature to improve tolerance to all functional activities pain free  PC   5. Pt will demonstrate  full/maximized tissue mobility to increase ROM and promote healthy tissue to be pain free at discharge  PC   6. Patient will report compliance with walking program 5x week for 10 min each day to improve overall cardiovascular function and decrease cancer related fatigue at discharge.  PC      Goals Key:  PC= progressing/continue; PM= partially met;        DC= discontinue    PLAN     Continue Plan of Care (POC) and progress per patient tolerance. See treatment section for details on planned progressions next session.      Marlee Willoughby, PT

## 2022-06-24 RX ORDER — VITAMIN B COMPLEX
1 CAPSULE ORAL DAILY
COMMUNITY

## 2022-06-24 RX ORDER — MAGNESIUM 200 MG
500 TABLET ORAL
COMMUNITY

## 2022-06-24 RX ORDER — FOLIC ACID 0.4 MG
400 TABLET ORAL DAILY
COMMUNITY

## 2022-06-24 NOTE — PRE ADMISSION SCREENING
Pre op instructions reviewed with Pt per phone: Spoke about pre op process and surgery instructions, verbalized understanding.    Surgery is scheduled on 7/01/22. Please arrive at 7:30am. We will call you the afternoon prior to surgery to confirm arrival time, as it is subject to change due to cancellations & emergencies.    Please report to the [facility] (1st Floor) at [facility] located off of [facility] (2nd building on the left, in front of the [facility] pole).  Address: [address] [place] [address] [place] [address]      INSTRUCTIONS IMPORTANT!!!  Do Not Eat, Drink, or Smoke after 12 midnight! NO WATER after midnight! OK to brush teeth, no gum, candy or mints!      *Take only these medicines with a small swallow of water-morning of surgery.  N/a    ____  NO Acrylic/fake nails or nail polish worn day of surgery (specifically hand/arm & foot surgeries).  ____  NO powder, lotions, deodorants, oils or creams on body.  ____  Please Remove All jewelry & piercings prior to surgery.  ____  Please Remove Dentures, Hearing Aids & Contact Lens prior to the start of surgery.  ____  Please bring photo ID and insurance information to hospital (Leave Valuables at Home).  ____  If going home the same day, arrange for a ride home. You will not be able to drive 24 hrs if Anesthesia was used.   ____  Females (ages 11-60) may need to give a urine sample the morning of surgery; please see Pre op Nurse prior to voiding.  ____  Wear clean, loose fitting clothing. Allow for dressings, bandages.  ____  Stop all Aspirin products, Ibuprofen, Advil, Motrin & Aleve at least 5-7 days before surgery, unless otherwise instructed by your doctor, or the nurse.   ____  Blood Thinners are stopped based on your Provider's recommendation; Call Surgeon's Office to inquire when to stop/hold.  ____  Stop taking any Fish Oil supplements or Vitamins at least 5 days prior to surgery, unless instructed otherwise by your Doctor.            Diabetic Patients:  If you take diabetic medication, do NOT take morning of surgery unless instructed by             Doctor. Metformin to be stopped 24 hrs prior to surgery time. DO NOT take long-acting insulin the evening before surgery. Blood sugars will be checked in pre-op morning of.    Bathing Instructions:    -Do not shave your face or body the day before or the day of surgery.  -Do not shave pubic hair 7 days prior to surgery (gyn pt's).   -Shower & Rinse your body as usual with anti-bacterial Soap (Dial, Lever 2000, or Hibiclens)   -Do not use Hibiclens on your head, face, or genitals.   -Do not wash with anti-bacterial soap after you use the Hibiclens.   -Rinse your body thoroughly.      [facility] Visitor/Ride Policy:  Only 2 adults allowed (over the age of 18) to accompany you to the Hospital. You Must have a ride home from a responsible adult that you know and trust. Medical Transport, Uber or Lyft can only be used if patient has a responsible adult to accompany them during ride home.    Post-Op Instructions: You will receive Post-op/Discharge instructions by your Discharge Nurse prior to going home. Please call your Surgeon's office with any post-surgery questions/concerns.    *Call [facility] Department with surgery instruction questions @ [phone] or [phone] (Mon-Fri 7:30a to 3:45p)  *If you are running late or have questions the morning of surgery, please call the Surgery Dept @ [phone]  *Insurance/ Financial Questions, please call [phone].

## 2022-06-27 ENCOUNTER — CLINICAL SUPPORT (OUTPATIENT)
Dept: REHABILITATION | Facility: HOSPITAL | Age: 37
End: 2022-06-27
Attending: STUDENT IN AN ORGANIZED HEALTH CARE EDUCATION/TRAINING PROGRAM
Payer: OTHER GOVERNMENT

## 2022-06-27 DIAGNOSIS — Z74.09 DECREASED FUNCTIONAL MOBILITY AND ENDURANCE: Primary | ICD-10-CM

## 2022-06-27 PROCEDURE — 97110 THERAPEUTIC EXERCISES: CPT

## 2022-06-27 PROCEDURE — 97140 MANUAL THERAPY 1/> REGIONS: CPT

## 2022-06-28 LAB
FINAL PATHOLOGIC DIAGNOSIS: NORMAL
HPV HR 12 DNA SPEC QL NAA+PROBE: NEGATIVE
HPV16 AG SPEC QL: NEGATIVE
HPV18 DNA SPEC QL NAA+PROBE: NEGATIVE
Lab: NORMAL

## 2022-06-28 NOTE — PROGRESS NOTES
[facility]   Physical Therapy Treatment Note     Name: Gina Garcia  Clinic Number: [id number]    Therapy Diagnosis:   Encounter Diagnosis   Name Primary?    Decreased functional mobility and endurance Yes     Physician: Esther Carpio, *    Visit Date: 6/27/2022    Physician Orders: PT Eval and Treat  Medical Diagnosis from Referral: Ductal carcinoma in situ (DCIS) of right breast, Acquired absence of breast and nipple,  Evaluation Date: 5/20/2022  Authorization Period Expiration: 7/1/2022  Plan of Care Expiration: 7/1/2022  Visit # / Visits authorized: 2/15 (+1 from initial evaluation)   FOTO: 0/3 (perform next session)      Progress Note Due on 6/20/2021     Precautions: Standard and cancer    Time In: 1000  Time Out: 1100  Total Billable Time: 53 minutes (Billing reflects 1 on 1 treatment time spent with patient)     SUBJECTIVE     Patient reports: feels that mobility in her R arm continues to improve and notes decreased hand pain. had her first expansion last week which has caused some increased discomfort and tension in the chest as well as numbness and tingling down her arm. States she plans to talk to the doctor about the numbness and tingling when she sees them tomorrow.     He/She was compliant with home exercise program.  Response to previous treatment: mild soreness but felt okay  Functional change: improved R shoulder A/PROM    Pain: 2/10     Location: R chest, shoulder and upper extremity     OBJECTIVE     Objective Measures updated at progress report unless specified.       TREATMENT     Gina received the treatments listed below:       MANUAL THERAPY TECHNIQUES were applied for (23) minutes, including:    Manual Intervention Performed Today    Soft Tissue Mobilization x Gross soft tissue mobilization through R breast, lateral chest, axilla, upper arm, forearm and hand along the path of the axillary cords. Soft tissue mobilization R periscapular region, upper  trapezius, pectorals, subscapularis and infraspinatus    Joint Mobilizations     Mobilization with movement          Functional Dry Needling        Plan for Next Visit: PRN         THERAPEUTIC EXERCISES to develop strength, endurance, ROM, flexibility, posture, and core stabilization for (30) minutes including:    Intervention Performed Today    Shoulder PROM  x 10 minutes R shoulder flexion, scaption, abduction and external rotation     Median and radial nerve glides  x 30x each on R    Table slides flexion and scaption   20x each on 9   Table external rotation   10x then discontinued due to discomfort    Pectoralis stretch      Rope and pulley  x 3 minutes flexion and abduction    Ball roll outs   3 minutes with 3s holds forward, R and L    Supine shoulder flexion stretch  x 10 x 5s holds                Plan for Next Visit: wall slides          PATIENT EDUCATION AND HOME EXERCISES     Home Exercises Provided and Patient Education Provided     Education provided: (time included with therex)    PURPOSE: Patient educated on the impairments noted above and the effects of physical therapy intervention to improve overall condition and QOL.    EXERCISE: Patient was educated on all the above exercise prior/during/after for proper posture, positioning, and execution for safe performance with home exercise program.     Written Home Exercises Provided: yes.  Exercises were reviewed and Gina was able to demonstrate them prior to the end of the session.  Gina demonstrated good  understanding of the education provided. See EMR under Patient Instructions for exercises provided during therapy sessions.      ASSESSMENT     Pt tolerated session well today. Continued improvement noted with PROM and mobility with nerve glides. Incorporated supine shoulder flexion stretch for continued improvement in ROM.     Gina is progressing well towards her goals.   Pt prognosis is Excellent.     Pt will continue to benefit from skilled  outpatient physical therapy to address the deficits listed in the problem list box on initial evaluation, provide pt/family education and to maximize pt's level of independence in the home and community environment.     Pt's spiritual, cultural and educational needs considered and pt agreeable to plan of care and goals.     Anticipated Barriers for therapy: adjuvant cancer treatment schedules and side effects, co-morbidities and necrotic tissue, tamoxifen use    GOALS:     Short Term Goals:  6 weeks Progress    1. Pain: Pt will demonstrate improved pain by reports of less than or equal to 4/10 worst pain on the verbal rating scale in order to progress toward maximal functional ability and improve QOL.  PC   2. Function: Patient will demonstrate improved function as indicated by a functional limitation score of less than or equal to NT out of 100 on FOTO.  PC   3. Mobility: Pt will increase AROM/PROM in shoulder flexion to 130 degrees on right to improve functional reach, carry, push, pull pain free  PC   4. Strength: Patient will improve strength to 50% of stated goals, listed in objective measures above, in order to progress towards independence with functional activities.   PC   5. HEP: Patient will demonstrate independence with HEP in order to progress toward functional independence.  PC   6. Patient will demonstrate 100% understanding of lymphedema risk reduction practices to include self monitoring for lymphedema.  PC      Long Term Goals:  12 weeks Progress   1. Pain: Pt will demonstrate improved pain by reports of less than or equal to 2/10 worst pain on the verbal rating scale in order to progress toward maximal functional ability and improve QOL.    PC   2. Function: Patient will demonstrate improved function as indicated by a functional limitation score of less than or equal to NT  out of 100 on FOTO.  PC   3. Mobility: Patient will improve AROM to stated goals, listed in objective measures above, in order  to return to maximal functional potential and improve quality of life.  PC   4. Strength: Pt will increase strength to 4+/5 in gross UE musculature to improve tolerance to all functional activities pain free  PC   5. Pt will demonstrate full/maximized tissue mobility to increase ROM and promote healthy tissue to be pain free at discharge  PC   6. Patient will report compliance with walking program 5x week for 10 min each day to improve overall cardiovascular function and decrease cancer related fatigue at discharge.  PC      Goals Key:  PC= progressing/continue; PM= partially met;        DC= discontinue    PLAN     Continue Plan of Care (POC) and progress per patient tolerance. See treatment section for details on planned progressions next session.      Marlee Willoughby, PT

## 2022-06-29 ENCOUNTER — LAB VISIT (OUTPATIENT)
Dept: LAB | Facility: HOSPITAL | Age: 37
End: 2022-06-29
Attending: OBSTETRICS & GYNECOLOGY
Payer: OTHER GOVERNMENT

## 2022-06-29 ENCOUNTER — OFFICE VISIT (OUTPATIENT)
Dept: OBSTETRICS AND GYNECOLOGY | Facility: CLINIC | Age: 37
End: 2022-06-29
Payer: OTHER GOVERNMENT

## 2022-06-29 VITALS
BODY MASS INDEX: 21.96 KG/M2 | SYSTOLIC BLOOD PRESSURE: 100 MMHG | WEIGHT: 108.94 LBS | DIASTOLIC BLOOD PRESSURE: 60 MMHG | HEIGHT: 59 IN

## 2022-06-29 DIAGNOSIS — C50.919 CHEK2-RELATED BREAST CANCER: ICD-10-CM

## 2022-06-29 DIAGNOSIS — Z15.02 CHEK2-RELATED BREAST CANCER: Primary | ICD-10-CM

## 2022-06-29 DIAGNOSIS — Z15.89 CHEK2-RELATED BREAST CANCER: ICD-10-CM

## 2022-06-29 DIAGNOSIS — Z15.09 CHEK2-RELATED BREAST CANCER: Primary | ICD-10-CM

## 2022-06-29 DIAGNOSIS — Z15.89 CHEK2-RELATED BREAST CANCER: Primary | ICD-10-CM

## 2022-06-29 DIAGNOSIS — D05.11 DUCTAL CARCINOMA IN SITU (DCIS) OF RIGHT BREAST: ICD-10-CM

## 2022-06-29 DIAGNOSIS — Z15.09 CHEK2-RELATED BREAST CANCER: ICD-10-CM

## 2022-06-29 DIAGNOSIS — C50.919 CHEK2-RELATED BREAST CANCER: Primary | ICD-10-CM

## 2022-06-29 DIAGNOSIS — Z15.02 CHEK2-RELATED BREAST CANCER: ICD-10-CM

## 2022-06-29 LAB
ANION GAP SERPL CALC-SCNC: 11 MMOL/L (ref 8–16)
BASOPHILS # BLD AUTO: 0.08 K/UL (ref 0–0.2)
BASOPHILS NFR BLD: 1 % (ref 0–1.9)
BUN SERPL-MCNC: 8 MG/DL (ref 6–20)
CALCIUM SERPL-MCNC: 9.8 MG/DL (ref 8.7–10.5)
CHLORIDE SERPL-SCNC: 104 MMOL/L (ref 95–110)
CO2 SERPL-SCNC: 23 MMOL/L (ref 23–29)
CREAT SERPL-MCNC: 0.7 MG/DL (ref 0.5–1.4)
DIFFERENTIAL METHOD: ABNORMAL
EOSINOPHIL # BLD AUTO: 0.1 K/UL (ref 0–0.5)
EOSINOPHIL NFR BLD: 1.7 % (ref 0–8)
ERYTHROCYTE [DISTWIDTH] IN BLOOD BY AUTOMATED COUNT: 10.9 % (ref 11.5–14.5)
EST. GFR  (AFRICAN AMERICAN): >60 ML/MIN/1.73 M^2
EST. GFR  (NON AFRICAN AMERICAN): >60 ML/MIN/1.73 M^2
GLUCOSE SERPL-MCNC: 85 MG/DL (ref 70–110)
HCG INTACT+B SERPL-ACNC: <2.4 MIU/ML
HCT VFR BLD AUTO: 40.9 % (ref 37–48.5)
HGB BLD-MCNC: 12.9 G/DL (ref 12–16)
IMM GRANULOCYTES # BLD AUTO: 0.01 K/UL (ref 0–0.04)
IMM GRANULOCYTES NFR BLD AUTO: 0.1 % (ref 0–0.5)
LYMPHOCYTES # BLD AUTO: 2.6 K/UL (ref 1–4.8)
LYMPHOCYTES NFR BLD: 31 % (ref 18–48)
MCH RBC QN AUTO: 31.8 PG (ref 27–31)
MCHC RBC AUTO-ENTMCNC: 31.5 G/DL (ref 32–36)
MCV RBC AUTO: 101 FL (ref 82–98)
MONOCYTES # BLD AUTO: 0.5 K/UL (ref 0.3–1)
MONOCYTES NFR BLD: 6.3 % (ref 4–15)
NEUTROPHILS # BLD AUTO: 4.9 K/UL (ref 1.8–7.7)
NEUTROPHILS NFR BLD: 59.9 % (ref 38–73)
NRBC BLD-RTO: 0 /100 WBC
PLATELET # BLD AUTO: 426 K/UL (ref 150–450)
PMV BLD AUTO: 11.2 FL (ref 9.2–12.9)
POTASSIUM SERPL-SCNC: 5.1 MMOL/L (ref 3.5–5.1)
RBC # BLD AUTO: 4.06 M/UL (ref 4–5.4)
SODIUM SERPL-SCNC: 138 MMOL/L (ref 136–145)
WBC # BLD AUTO: 8.23 K/UL (ref 3.9–12.7)

## 2022-06-29 PROCEDURE — 99499 UNLISTED E&M SERVICE: CPT | Mod: S$PBB,,, | Performed by: OBSTETRICS & GYNECOLOGY

## 2022-06-29 PROCEDURE — 99213 OFFICE O/P EST LOW 20 MIN: CPT | Mod: PBBFAC | Performed by: OBSTETRICS & GYNECOLOGY

## 2022-06-29 PROCEDURE — 85025 COMPLETE CBC W/AUTO DIFF WBC: CPT | Performed by: OBSTETRICS & GYNECOLOGY

## 2022-06-29 PROCEDURE — 36415 COLL VENOUS BLD VENIPUNCTURE: CPT | Performed by: OBSTETRICS & GYNECOLOGY

## 2022-06-29 PROCEDURE — 80048 BASIC METABOLIC PNL TOTAL CA: CPT | Performed by: OBSTETRICS & GYNECOLOGY

## 2022-06-29 PROCEDURE — 99999 PR PBB SHADOW E&M-EST. PATIENT-LVL III: ICD-10-PCS | Mod: PBBFAC,,, | Performed by: OBSTETRICS & GYNECOLOGY

## 2022-06-29 PROCEDURE — 84702 CHORIONIC GONADOTROPIN TEST: CPT | Performed by: OBSTETRICS & GYNECOLOGY

## 2022-06-29 PROCEDURE — 99999 PR PBB SHADOW E&M-EST. PATIENT-LVL III: CPT | Mod: PBBFAC,,, | Performed by: OBSTETRICS & GYNECOLOGY

## 2022-06-29 PROCEDURE — 99499 NO LOS: ICD-10-PCS | Mod: S$PBB,,, | Performed by: OBSTETRICS & GYNECOLOGY

## 2022-06-29 RX ORDER — PROCHLORPERAZINE EDISYLATE 5 MG/ML
5 INJECTION INTRAMUSCULAR; INTRAVENOUS EVERY 10 MIN PRN
Status: CANCELLED | OUTPATIENT
Start: 2022-06-29

## 2022-06-29 RX ORDER — KETOROLAC TROMETHAMINE 30 MG/ML
15 INJECTION, SOLUTION INTRAMUSCULAR; INTRAVENOUS
Status: CANCELLED | OUTPATIENT
Start: 2022-06-29 | End: 2022-06-30

## 2022-06-29 RX ORDER — HYDROCODONE BITARTRATE AND ACETAMINOPHEN 5; 325 MG/1; MG/1
1 TABLET ORAL EVERY 4 HOURS PRN
Status: CANCELLED | OUTPATIENT
Start: 2022-06-29

## 2022-06-29 RX ORDER — POLYETHYLENE GLYCOL 3350 17 G/17G
17 POWDER, FOR SOLUTION ORAL DAILY
Status: CANCELLED | OUTPATIENT
Start: 2022-06-29

## 2022-06-29 RX ORDER — ACETAMINOPHEN 325 MG/1
650 TABLET ORAL EVERY 4 HOURS PRN
Status: CANCELLED | OUTPATIENT
Start: 2022-06-29

## 2022-06-29 RX ORDER — SODIUM CHLORIDE 0.9 % (FLUSH) 0.9 %
3 SYRINGE (ML) INJECTION
Status: CANCELLED | OUTPATIENT
Start: 2022-06-29

## 2022-06-29 RX ORDER — HYDROCODONE BITARTRATE AND ACETAMINOPHEN 10; 325 MG/1; MG/1
1 TABLET ORAL EVERY 4 HOURS PRN
Status: CANCELLED | OUTPATIENT
Start: 2022-06-29

## 2022-06-29 RX ORDER — HYDROMORPHONE HYDROCHLORIDE 2 MG/ML
0.2 INJECTION, SOLUTION INTRAMUSCULAR; INTRAVENOUS; SUBCUTANEOUS EVERY 5 MIN PRN
Status: CANCELLED | OUTPATIENT
Start: 2022-06-29

## 2022-06-29 RX ORDER — ONDANSETRON 4 MG/1
8 TABLET, ORALLY DISINTEGRATING ORAL EVERY 8 HOURS PRN
Status: CANCELLED | OUTPATIENT
Start: 2022-06-29

## 2022-06-29 RX ORDER — FAMOTIDINE 20 MG/1
20 TABLET, FILM COATED ORAL
Status: CANCELLED | OUTPATIENT
Start: 2022-06-29

## 2022-06-29 RX ORDER — IBUPROFEN 200 MG
800 TABLET ORAL
Status: CANCELLED | OUTPATIENT
Start: 2022-06-30

## 2022-06-29 RX ORDER — LIDOCAINE HYDROCHLORIDE 10 MG/ML
0.5 INJECTION, SOLUTION EPIDURAL; INFILTRATION; INTRACAUDAL; PERINEURAL
Status: CANCELLED | OUTPATIENT
Start: 2022-06-29

## 2022-06-29 RX ORDER — ONDANSETRON 2 MG/ML
4 INJECTION INTRAMUSCULAR; INTRAVENOUS DAILY PRN
Status: CANCELLED | OUTPATIENT
Start: 2022-06-29

## 2022-06-29 RX ORDER — SODIUM CHLORIDE, SODIUM LACTATE, POTASSIUM CHLORIDE, CALCIUM CHLORIDE 600; 310; 30; 20 MG/100ML; MG/100ML; MG/100ML; MG/100ML
INJECTION, SOLUTION INTRAVENOUS CONTINUOUS
Status: CANCELLED | OUTPATIENT
Start: 2022-06-29

## 2022-06-29 RX ORDER — AMOXICILLIN 250 MG
1 CAPSULE ORAL 2 TIMES DAILY
Status: CANCELLED | OUTPATIENT
Start: 2022-06-29

## 2022-06-29 RX ORDER — HYDROMORPHONE HYDROCHLORIDE 2 MG/ML
1 INJECTION, SOLUTION INTRAMUSCULAR; INTRAVENOUS; SUBCUTANEOUS EVERY 4 HOURS PRN
Status: CANCELLED | OUTPATIENT
Start: 2022-06-29

## 2022-06-29 RX ORDER — MUPIROCIN 20 MG/G
OINTMENT TOPICAL
Status: CANCELLED | OUTPATIENT
Start: 2022-06-29

## 2022-06-29 RX ORDER — ACETAMINOPHEN 500 MG
1000 TABLET ORAL
Status: CANCELLED | OUTPATIENT
Start: 2022-06-29

## 2022-06-29 RX ORDER — CELECOXIB 100 MG/1
400 CAPSULE ORAL
Status: CANCELLED | OUTPATIENT
Start: 2022-06-29

## 2022-06-29 RX ORDER — PROCHLORPERAZINE EDISYLATE 5 MG/ML
5 INJECTION INTRAMUSCULAR; INTRAVENOUS EVERY 6 HOURS PRN
Status: CANCELLED | OUTPATIENT
Start: 2022-06-29

## 2022-06-29 RX ORDER — MEPERIDINE HYDROCHLORIDE 100 MG/ML
12.5 INJECTION INTRAMUSCULAR; INTRAVENOUS; SUBCUTANEOUS ONCE AS NEEDED
Status: CANCELLED | OUTPATIENT
Start: 2022-06-29 | End: 2022-06-30

## 2022-06-29 RX ORDER — DIPHENHYDRAMINE HCL 25 MG
25 CAPSULE ORAL EVERY 4 HOURS PRN
Status: CANCELLED | OUTPATIENT
Start: 2022-06-29

## 2022-06-29 RX ORDER — DIPHENHYDRAMINE HYDROCHLORIDE 50 MG/ML
25 INJECTION INTRAMUSCULAR; INTRAVENOUS EVERY 4 HOURS PRN
Status: CANCELLED | OUTPATIENT
Start: 2022-06-29

## 2022-06-29 RX ORDER — PREGABALIN 25 MG/1
50 CAPSULE ORAL
Status: CANCELLED | OUTPATIENT
Start: 2022-06-29

## 2022-06-29 NOTE — PROGRESS NOTES
I have explained the risks, benefits , and alternatives of laparoscopic hysterectomy bilateral salpingo-oophorectomy for history of Breast cancer  in detail.  The patient voices understanding and all questions have been answered.  The patient agrees to proceed as planned.  Consent forms for the procedure have been reviewed and signed by the patient.

## 2022-06-29 NOTE — H&P
Subjective:       Patient ID: Gina Garcia is a 36 y.o. female.    Chief Complaint:  Pre-op Exam      History of Present Illness  HPI   Has significant genetic mutation with increase risk of pelvic cancer for hysterectomy bilateral salpingo-oophorectomy     GYN & OB History  Patient's last menstrual period was 2022.   Date of Last Pap: 2022    OB History    Para Term  AB Living   2 2 2     2   SAB IAB Ectopic Multiple Live Births           2      # Outcome Date GA Lbr Emery/2nd Weight Sex Delivery Anes PTL Lv   2 Term      Vag-Spont   MAHIN   1 Term      Vag-Spont   MAHIN     Past Medical History:   Diagnosis Date    Anxiety     CHEK2-related breast cancer 2020    Migraines      Past Surgical History:   Procedure Laterality Date    ACHILLES TENDON SURGERY      AXILLARY NODE DISSECTION Right 2022    Procedure: LYMPHADENECTOMY, AXILLARY;  Surgeon: Micheal Bartholomew MD;  Location: [facility];  Service: General;  Laterality: Right;  Right axillary node dissection    BILATERAL MASTECTOMY Bilateral 2022    Procedure: MASTECTOMY, BILATERAL;  Surgeon: Micheal Bartholomew MD;  Location: [facility] OR;  Service: General;  Laterality: Bilateral;    COLONOSCOPY N/A 3/28/2022    Procedure: COLONOSCOPY;  Surgeon: Winnie Polanco MD;  Location: [facility] ENDO;  Service: Endoscopy;  Laterality: N/A;    DEBRIDEMENT, BREAST Right 2022    Procedure: DEBRIDEMENT, BREAST;  Surgeon: Esther Carpio MD;  Location: [facility] OR;  Service: General;  Laterality: Right;  FLAP WASHOUT AND CLOSURE    EXTERNAL EAR SURGERY      cosmetic    INSERTION OF BREAST TISSUE EXPANDER Bilateral 2022    Procedure: INSERTION, TISSUE EXPANDER, BREAST;  Surgeon: Micheal Bartholomew MD;  Location: [facility] OR;  Service: General;  Laterality: Bilateral;  Procedure performed by KRAIG Carpio    sciatic nerve exploration      SENTINEL LYMPH NODE BIOPSY Right 2022    Procedure: BIOPSY, LYMPH NODE, SENTINEL;  Surgeon: Micheal ERNANDEZ  MD Puma;  Location: [facility];  Service: General;  Laterality: Right;     Family History   Problem Relation Age of Onset    Breast cancer Mother 45        L lumptectomy, chemo; R (dx 69) plans to have BL mastectomy    Skin cancer Mother     Prostate cancer Father 68        radiation complete    Asthma Sister     Lung cancer Maternal Grandmother         non-smoker    Prostate cancer Maternal Grandfather         metastasis to brain    Breast cancer Paternal Grandmother     Prostate cancer Paternal Grandfather     ADD / ADHD Son     Autism Son     Diabetes Paternal Uncle     Colon cancer Neg Hx         colon cancer    Ovarian cancer Neg Hx     Thrombophilia Neg Hx      Social History     Tobacco Use    Smoking status: Never Smoker    Smokeless tobacco: Never Used   Substance Use Topics    Alcohol use: No     Alcohol/week: 0.0 standard drinks    Drug use: Never     (Not in a hospital admission)    Review of patient's allergies indicates:  No Known Allergies  Current Outpatient Medications   Medication Sig    ascorbic acid, vitamin C, (VITAMIN C) 250 MG tablet Take 250 mg by mouth once daily.    b complex vitamins capsule Take 1 capsule by mouth once daily.    cyclobenzaprine (FLEXERIL) 10 MG tablet Take 10 mg by mouth every 8 (eight) hours as needed.    folic acid (FOLVITE) 400 MCG tablet Take 400 mcg by mouth once daily.    gabapentin (NEURONTIN) 100 MG capsule Take 100-200 mg by mouth once daily at 6am.    magnesium 200 mg Tab Take 500 mg by mouth as needed.    rosuvastatin (CRESTOR) 20 MG tablet Take 1 tablet (20 mg total) by mouth once daily.     No current facility-administered medications for this visit.     Facility-Administered Medications Ordered in Other Visits   Medication    0.9%  NaCl infusion       Review of Systems  Review of Systems   Constitutional: Negative for appetite change, chills, fatigue, fever and unexpected weight change.   HENT: Negative.    Eyes: Negative for visual  disturbance.   Respiratory: Negative for shortness of breath and wheezing.    Cardiovascular: Negative for chest pain, palpitations and leg swelling.   Gastrointestinal: Negative for abdominal pain, bloating, blood in stool, constipation, diarrhea, nausea and vomiting.   Endocrine: Negative for hair loss, hot flashes and hypothyroidism.   Genitourinary: Negative for dysmenorrhea, dyspareunia, dysuria, flank pain, frequency, genital sores, hematuria, menorrhagia, menstrual problem, pelvic pain, urgency, vaginal bleeding, vaginal discharge, urinary incontinence and vaginal odor.   Musculoskeletal: Negative for back pain, joint swelling and myalgias.   Integumentary:  Negative for rash, hair changes, breast mass, nipple discharge and breast skin changes.   Neurological: Negative for syncope and headaches.   Hematological: Negative for adenopathy. Does not bruise/bleed easily.   Psychiatric/Behavioral: Negative for depression and sleep disturbance. The patient is not nervous/anxious.    Breast: Negative for mass, mastodynia, nipple discharge and skin changes          Objective:    Physical Exam:   Constitutional: She is oriented to person, place, and time. Vital signs are normal. She appears well-developed and well-nourished. No distress.    HENT:   Head: Normocephalic and atraumatic.   Right Ear: External ear normal.   Left Ear: External ear normal.   Nose: Nose normal.    Eyes: Pupils are equal, round, and reactive to light. Conjunctivae are normal.    Neck: Trachea normal. No JVD present. No thyroid mass and no thyromegaly present.    Cardiovascular: Normal rate and regular rhythm.     Pulmonary/Chest: Effort normal and breath sounds normal. No respiratory distress.        Abdominal: Soft. Bowel sounds are normal. She exhibits no distension and no mass. There is no abdominal tenderness. No hernia. Hernia confirmed negative in the ventral area, confirmed negative in the right inguinal area and confirmed negative in  the left inguinal area.     Genitourinary:    Vagina, uterus and rectum normal.   Rectum:      No external hemorrhoid or abnormal anal tone.   Labial bartholins normal.Labial bartholins abnormal.There is no rash, tenderness or lesion on the right labia. There is no rash, tenderness or lesion on the left labia. Cervix is normal. Right adnexum displays no mass, no tenderness and no fullness. Left adnexum displays no mass, no tenderness and no fullness. No  no vaginal discharge, tenderness, bleeding, rectocele, cystocele or unspecified prolapse of vaginal walls in the vagina.    No foreign body in the vagina.   Cervix exhibits no motion tenderness, no discharge and no friability. Uterus is not deviated, not enlarged and not tender.           Musculoskeletal: Normal range of motion.       Neurological: She is alert and oriented to person, place, and time. She has normal reflexes.    Skin: Skin is warm and dry. She is not diaphoretic.    Psychiatric: She has a normal mood and affect. Her speech is normal and behavior is normal. Thought content normal.          Assessment:        1. CHEK2-related breast cancer                Plan:      Gina was seen today for pre-op exam.    Diagnoses and all orders for this visit:    CHEK2-related breast cancer     robotic hysterectomy bilateral salpingo-oophorectomy

## 2022-06-29 NOTE — H&P (VIEW-ONLY)
Subjective:       Patient ID: Gina Garcia is a 36 y.o. female.    Chief Complaint:  Pre-op Exam      History of Present Illness  HPI   Has significant genetic mutation with increase risk of pelvic cancer for hysterectomy bilateral salpingo-oophorectomy     GYN & OB History  Patient's last menstrual period was 2022.   Date of Last Pap: 2022    OB History    Para Term  AB Living   2 2 2     2   SAB IAB Ectopic Multiple Live Births           2      # Outcome Date GA Lbr Emery/2nd Weight Sex Delivery Anes PTL Lv   2 Term      Vag-Spont   MAHIN   1 Term      Vag-Spont   MAHIN     Past Medical History:   Diagnosis Date    Anxiety     CHEK2-related breast cancer 2020    Migraines      Past Surgical History:   Procedure Laterality Date    ACHILLES TENDON SURGERY      AXILLARY NODE DISSECTION Right 2022    Procedure: LYMPHADENECTOMY, AXILLARY;  Surgeon: Micheal Bartholomew MD;  Location: [facility];  Service: General;  Laterality: Right;  Right axillary node dissection    BILATERAL MASTECTOMY Bilateral 2022    Procedure: MASTECTOMY, BILATERAL;  Surgeon: Micheal Bartholomew MD;  Location: [facility] OR;  Service: General;  Laterality: Bilateral;    COLONOSCOPY N/A 3/28/2022    Procedure: COLONOSCOPY;  Surgeon: Winnie Polanco MD;  Location: [facility] ENDO;  Service: Endoscopy;  Laterality: N/A;    DEBRIDEMENT, BREAST Right 2022    Procedure: DEBRIDEMENT, BREAST;  Surgeon: Esther Carpio MD;  Location: [facility] OR;  Service: General;  Laterality: Right;  FLAP WASHOUT AND CLOSURE    EXTERNAL EAR SURGERY      cosmetic    INSERTION OF BREAST TISSUE EXPANDER Bilateral 2022    Procedure: INSERTION, TISSUE EXPANDER, BREAST;  Surgeon: Micheal Bartholomew MD;  Location: [facility] OR;  Service: General;  Laterality: Bilateral;  Procedure performed by KRAIG Carpio    sciatic nerve exploration      SENTINEL LYMPH NODE BIOPSY Right 2022    Procedure: BIOPSY, LYMPH NODE, SENTINEL;  Surgeon: Micheal ERNANDEZ  MD Puma;  Location: [facility];  Service: General;  Laterality: Right;     Family History   Problem Relation Age of Onset    Breast cancer Mother 45        L lumptectomy, chemo; R (dx 69) plans to have BL mastectomy    Skin cancer Mother     Prostate cancer Father 68        radiation complete    Asthma Sister     Lung cancer Maternal Grandmother         non-smoker    Prostate cancer Maternal Grandfather         metastasis to brain    Breast cancer Paternal Grandmother     Prostate cancer Paternal Grandfather     ADD / ADHD Son     Autism Son     Diabetes Paternal Uncle     Colon cancer Neg Hx         colon cancer    Ovarian cancer Neg Hx     Thrombophilia Neg Hx      Social History     Tobacco Use    Smoking status: Never Smoker    Smokeless tobacco: Never Used   Substance Use Topics    Alcohol use: No     Alcohol/week: 0.0 standard drinks    Drug use: Never     (Not in a hospital admission)    Review of patient's allergies indicates:  No Known Allergies  Current Outpatient Medications   Medication Sig    ascorbic acid, vitamin C, (VITAMIN C) 250 MG tablet Take 250 mg by mouth once daily.    b complex vitamins capsule Take 1 capsule by mouth once daily.    cyclobenzaprine (FLEXERIL) 10 MG tablet Take 10 mg by mouth every 8 (eight) hours as needed.    folic acid (FOLVITE) 400 MCG tablet Take 400 mcg by mouth once daily.    gabapentin (NEURONTIN) 100 MG capsule Take 100-200 mg by mouth once daily at 6am.    magnesium 200 mg Tab Take 500 mg by mouth as needed.    rosuvastatin (CRESTOR) 20 MG tablet Take 1 tablet (20 mg total) by mouth once daily.     No current facility-administered medications for this visit.     Facility-Administered Medications Ordered in Other Visits   Medication    0.9%  NaCl infusion       Review of Systems  Review of Systems   Constitutional: Negative for appetite change, chills, fatigue, fever and unexpected weight change.   HENT: Negative.    Eyes: Negative for visual  disturbance.   Respiratory: Negative for shortness of breath and wheezing.    Cardiovascular: Negative for chest pain, palpitations and leg swelling.   Gastrointestinal: Negative for abdominal pain, bloating, blood in stool, constipation, diarrhea, nausea and vomiting.   Endocrine: Negative for hair loss, hot flashes and hypothyroidism.   Genitourinary: Negative for dysmenorrhea, dyspareunia, dysuria, flank pain, frequency, genital sores, hematuria, menorrhagia, menstrual problem, pelvic pain, urgency, vaginal bleeding, vaginal discharge, urinary incontinence and vaginal odor.   Musculoskeletal: Negative for back pain, joint swelling and myalgias.   Integumentary:  Negative for rash, hair changes, breast mass, nipple discharge and breast skin changes.   Neurological: Negative for syncope and headaches.   Hematological: Negative for adenopathy. Does not bruise/bleed easily.   Psychiatric/Behavioral: Negative for depression and sleep disturbance. The patient is not nervous/anxious.    Breast: Negative for mass, mastodynia, nipple discharge and skin changes          Objective:    Physical Exam:   Constitutional: She is oriented to person, place, and time. Vital signs are normal. She appears well-developed and well-nourished. No distress.    HENT:   Head: Normocephalic and atraumatic.   Right Ear: External ear normal.   Left Ear: External ear normal.   Nose: Nose normal.    Eyes: Pupils are equal, round, and reactive to light. Conjunctivae are normal.    Neck: Trachea normal. No JVD present. No thyroid mass and no thyromegaly present.    Cardiovascular: Normal rate and regular rhythm.     Pulmonary/Chest: Effort normal and breath sounds normal. No respiratory distress.        Abdominal: Soft. Bowel sounds are normal. She exhibits no distension and no mass. There is no abdominal tenderness. No hernia. Hernia confirmed negative in the ventral area, confirmed negative in the right inguinal area and confirmed negative in  the left inguinal area.     Genitourinary:    Vagina, uterus and rectum normal.   Rectum:      No external hemorrhoid or abnormal anal tone.   Labial bartholins normal.Labial bartholins abnormal.There is no rash, tenderness or lesion on the right labia. There is no rash, tenderness or lesion on the left labia. Cervix is normal. Right adnexum displays no mass, no tenderness and no fullness. Left adnexum displays no mass, no tenderness and no fullness. No  no vaginal discharge, tenderness, bleeding, rectocele, cystocele or unspecified prolapse of vaginal walls in the vagina.    No foreign body in the vagina.   Cervix exhibits no motion tenderness, no discharge and no friability. Uterus is not deviated, not enlarged and not tender.           Musculoskeletal: Normal range of motion.       Neurological: She is alert and oriented to person, place, and time. She has normal reflexes.    Skin: Skin is warm and dry. She is not diaphoretic.    Psychiatric: She has a normal mood and affect. Her speech is normal and behavior is normal. Thought content normal.          Assessment:        1. CHEK2-related breast cancer                Plan:      Gina was seen today for pre-op exam.    Diagnoses and all orders for this visit:    CHEK2-related breast cancer     robotic hysterectomy bilateral salpingo-oophorectomy

## 2022-06-30 ENCOUNTER — TELEPHONE (OUTPATIENT)
Dept: PREADMISSION TESTING | Facility: HOSPITAL | Age: 37
End: 2022-06-30
Payer: OTHER GOVERNMENT

## 2022-06-30 ENCOUNTER — ANESTHESIA EVENT (OUTPATIENT)
Dept: SURGERY | Facility: HOSPITAL | Age: 37
End: 2022-06-30
Payer: OTHER GOVERNMENT

## 2022-06-30 NOTE — ANESTHESIA PREPROCEDURE EVALUATION
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  Past Surgical History:   Procedure Laterality Date    ACHILLES TENDON SURGERY      AXILLARY NODE DISSECTION Right 4/25/2022    Procedure: LYMPHADENECTOMY, AXILLARY;  Surgeon: Micheal Bartholomew MD;  Location: [facility] OR;  Service: General;  Laterality: Right;  Right axillary node dissection    BILATERAL MASTECTOMY Bilateral 4/25/2022    Procedure: MASTECTOMY, BILATERAL;  Surgeon: Micheal Bartholomew MD;  Location: [facility] OR;  Service: General;  Laterality: Bilateral;    COLONOSCOPY N/A 3/28/2022    Procedure: COLONOSCOPY;  Surgeon: Winnie Polanco MD;  Location: [facility] ENDO;  Service: Endoscopy;  Laterality: N/A;    DEBRIDEMENT, BREAST Right 5/30/2022    Procedure: DEBRIDEMENT, BREAST;  Surgeon: Esther Carpio MD;  Location: [facility] OR;  Service: General;  Laterality: Right;  FLAP WASHOUT AND CLOSURE    EXTERNAL EAR SURGERY      cosmetic    INSERTION OF BREAST TISSUE EXPANDER Bilateral 4/25/2022    Procedure: INSERTION, TISSUE EXPANDER, BREAST;  Surgeon: Micheal Bartholomew MD;  Location: [facility] OR;  Service: General;  Laterality: Bilateral;  Procedure performed by KRAIG Carpio    sciatic nerve exploration      SENTINEL LYMPH NODE BIOPSY Right 4/25/2022    Procedure: BIOPSY, LYMPH NODE, SENTINEL;  Surgeon: Micheal Bartholomew MD;  Location: [facility];  Service: General;  Laterality: Right;           Pre-op Assessment    I have reviewed the Patient Summary Reports.     I have reviewed the Nursing Notes. I have reviewed the NPO Status.   I have reviewed the Medications.     Review of Systems  Anesthesia Hx:  No problems with previous Anesthesia  Denies Family Hx of Anesthesia complications.   Denies Personal Hx of Anesthesia complications.   Social:  Non-Smoker,  No Alcohol Use    Hematology/Oncology:  Hematology Normal      Current/Recent Cancer. Breast   Cardiovascular:   hyperlipidemia    Pulmonary:  Pulmonary Normal    Renal/:  Renal/ Normal     Hepatic/GI:  Hepatic/GI Normal    Neurological:   Headaches    Endocrine:  Endocrine Normal    Psych:  Psychiatric Normal           Physical Exam  General: Well nourished, Cooperative, Alert and Oriented    Airway:  Mallampati: III   Mouth Opening: Small, but > 3cm  TM Distance: Normal  Tongue: Normal  Neck ROM: Normal ROM    Dental:  Intact    Chest/Lungs:  Clear to auscultation, Normal Respiratory Rate    Heart:  Rate: Normal  Rhythm: Regular Rhythm      Wt Readings from Last 3 Encounters:   06/29/22 49.4 kg (108 lb 14.5 oz)   06/20/22 49.5 kg (109 lb 2 oz)   06/17/22 49.8 kg (109 lb 12.6 oz)     Temp Readings from Last 3 Encounters:   06/20/22 36.4 °C (97.5 °F) (Tympanic)   05/30/22 36.5 °C (97.7 °F) (Temporal)   05/20/22 37.2 °C (98.9 °F) (Temporal)     BP Readings from Last 3 Encounters:   06/29/22 100/60   06/20/22 116/73   06/17/22 118/72     Pulse Readings from Last 3 Encounters:   06/20/22 65   05/30/22 61   05/20/22 80     Lab Results   Component Value Date    WBC 8.23 06/29/2022    HGB 12.9 06/29/2022    HCT 40.9 06/29/2022     (H) 06/29/2022     06/29/2022       CMP  Sodium   Date Value Ref Range Status   06/29/2022 138 136 - 145 mmol/L Final     Potassium   Date Value Ref Range Status   06/29/2022 5.1 3.5 - 5.1 mmol/L Final     Chloride   Date Value Ref Range Status   06/29/2022 104 95 - 110 mmol/L Final     CO2   Date Value Ref Range Status   06/29/2022 23 23 - 29 mmol/L Final     Glucose   Date Value Ref Range Status   06/29/2022 85 70 - 110 mg/dL Final     BUN   Date Value Ref Range Status   06/29/2022 8 6 - 20 mg/dL Final     Creatinine   Date Value Ref Range Status   06/29/2022 0.7 0.5 - 1.4 mg/dL Final     Calcium   Date Value Ref Range Status   06/29/2022 9.8 8.7 - 10.5 mg/dL Final     Total  Protein   Date Value Ref Range Status   04/26/2022 6.2 6.0 - 8.4 g/dL Final     Albumin   Date Value Ref Range Status   04/26/2022 3.5 3.5 - 5.2 g/dL Final     Total Bilirubin   Date Value Ref Range Status   04/26/2022 0.8 0.1 - 1.0 mg/dL Final     Comment:     For infants and newborns, interpretation of results should be based  on gestational age, weight and in agreement with clinical  observations.    Premature Infant recommended reference ranges:  Up to 24 hours.............<8.0 mg/dL  Up to 48 hours............<12.0 mg/dL  3-5 days..................<15.0 mg/dL  6-29 days.................<15.0 mg/dL       Alkaline Phosphatase   Date Value Ref Range Status   04/26/2022 66 55 - 135 U/L Final     AST   Date Value Ref Range Status   04/26/2022 21 10 - 40 U/L Final     ALT   Date Value Ref Range Status   04/26/2022 15 10 - 44 U/L Final     Anion Gap   Date Value Ref Range Status   06/29/2022 11 8 - 16 mmol/L Final     eGFR if    Date Value Ref Range Status   06/29/2022 >60.0 >60 mL/min/1.73 m^2 Final     eGFR if non    Date Value Ref Range Status   06/29/2022 >60.0 >60 mL/min/1.73 m^2 Final     Comment:     Calculation used to obtain the estimated glomerular filtration  rate (eGFR) is the CKD-EPI equation.            Anesthesia Plan  Type of Anesthesia, risks & benefits discussed:    Anesthesia Type: Gen Supraglottic Airway  Intra-op Monitoring Plan: Standard ASA Monitors  Post Op Pain Control Plan: multimodal analgesia and IV/PO Opioids PRN  Induction:  IV  ASA Score: 2  Day of Surgery Review of History & Physical: H&P Update referred to the surgeon/provider.    Ready For Surgery From Anesthesia Perspective.     .

## 2022-06-30 NOTE — TELEPHONE ENCOUNTER
Called and spoke with Pt about the following: Pt verbalized understanding    Please arrive to [facility] main lobby at 7:30am on 7/1/22 for your scheduled procedure. NOTHING to eat or drink after midnight, except medications instructed by the Pre-admit Nurse.     Thank you,  -Pre Admit Testing Dept.  M-F 7:30 am - 4 pm [phone]

## 2022-07-01 ENCOUNTER — HOSPITAL ENCOUNTER (OUTPATIENT)
Facility: HOSPITAL | Age: 37
Discharge: HOME | End: 2022-07-01
Attending: OBSTETRICS & GYNECOLOGY | Admitting: OBSTETRICS & GYNECOLOGY
Payer: OTHER GOVERNMENT

## 2022-07-01 ENCOUNTER — ANESTHESIA (OUTPATIENT)
Dept: SURGERY | Facility: HOSPITAL | Age: 37
End: 2022-07-01
Payer: OTHER GOVERNMENT

## 2022-07-01 DIAGNOSIS — Z15.02 CHEK2-RELATED BREAST CANCER: ICD-10-CM

## 2022-07-01 DIAGNOSIS — Z15.09 CHEK2-RELATED BREAST CANCER: ICD-10-CM

## 2022-07-01 DIAGNOSIS — C50.919 CHEK2-RELATED BREAST CANCER: ICD-10-CM

## 2022-07-01 DIAGNOSIS — Z15.89 CHEK2-RELATED BREAST CANCER: ICD-10-CM

## 2022-07-01 PROBLEM — Z90.710 STATUS POST LAPAROSCOPIC HYSTERECTOMY: Status: ACTIVE | Noted: 2022-07-01

## 2022-07-01 LAB
B-HCG UR QL: NEGATIVE
CTP QC/QA: YES

## 2022-07-01 PROCEDURE — 63600175 PHARM REV CODE 636 W HCPCS: Performed by: OBSTETRICS & GYNECOLOGY

## 2022-07-01 PROCEDURE — 25000003 PHARM REV CODE 250: Performed by: OBSTETRICS & GYNECOLOGY

## 2022-07-01 PROCEDURE — 58571 TLH W/T/O 250 G OR LESS: CPT | Mod: AS,,, | Performed by: PHYSICIAN ASSISTANT

## 2022-07-01 PROCEDURE — 36000713 HC OR TIME LEV V EA ADD 15 MIN: Performed by: OBSTETRICS & GYNECOLOGY

## 2022-07-01 PROCEDURE — 00840 ANES IPER PX LOWER ABD NOS: CPT | Performed by: OBSTETRICS & GYNECOLOGY

## 2022-07-01 PROCEDURE — 25000003 PHARM REV CODE 250: Performed by: ANESTHESIOLOGY

## 2022-07-01 PROCEDURE — 71000033 HC RECOVERY, INTIAL HOUR: Performed by: OBSTETRICS & GYNECOLOGY

## 2022-07-01 PROCEDURE — 58571 TLH W/T/O 250 G OR LESS: CPT | Mod: ,,, | Performed by: OBSTETRICS & GYNECOLOGY

## 2022-07-01 PROCEDURE — 27201423 OPTIME MED/SURG SUP & DEVICES STERILE SUPPLY: Performed by: OBSTETRICS & GYNECOLOGY

## 2022-07-01 PROCEDURE — 81025 URINE PREGNANCY TEST: CPT | Performed by: OBSTETRICS & GYNECOLOGY

## 2022-07-01 PROCEDURE — 63600175 PHARM REV CODE 636 W HCPCS: Performed by: NURSE ANESTHETIST, CERTIFIED REGISTERED

## 2022-07-01 PROCEDURE — 37000009 HC ANESTHESIA EA ADD 15 MINS: Performed by: OBSTETRICS & GYNECOLOGY

## 2022-07-01 PROCEDURE — 71000039 HC RECOVERY, EACH ADD'L HOUR: Performed by: OBSTETRICS & GYNECOLOGY

## 2022-07-01 PROCEDURE — 58571 PR LAPAROSCOPY W TOT HYSTERECTUTERUS <=250 GRAM  W TUBE/OVARY: ICD-10-PCS | Mod: AS,,, | Performed by: PHYSICIAN ASSISTANT

## 2022-07-01 PROCEDURE — C2628 CATHETER, OCCLUSION: HCPCS | Performed by: OBSTETRICS & GYNECOLOGY

## 2022-07-01 PROCEDURE — 88307 PR  SURG PATH,LEVEL V: ICD-10-PCS | Mod: 26,,, | Performed by: PATHOLOGY

## 2022-07-01 PROCEDURE — 88307 TISSUE EXAM BY PATHOLOGIST: CPT | Mod: 26,,, | Performed by: PATHOLOGY

## 2022-07-01 PROCEDURE — 88307 TISSUE EXAM BY PATHOLOGIST: CPT | Performed by: PATHOLOGY

## 2022-07-01 PROCEDURE — 25000003 PHARM REV CODE 250: Performed by: NURSE ANESTHETIST, CERTIFIED REGISTERED

## 2022-07-01 PROCEDURE — 71000015 HC POSTOP RECOV 1ST HR: Performed by: OBSTETRICS & GYNECOLOGY

## 2022-07-01 PROCEDURE — 58571 PR LAPAROSCOPY W TOT HYSTERECTUTERUS <=250 GRAM  W TUBE/OVARY: ICD-10-PCS | Mod: ,,, | Performed by: OBSTETRICS & GYNECOLOGY

## 2022-07-01 PROCEDURE — 36000712 HC OR TIME LEV V 1ST 15 MIN: Performed by: OBSTETRICS & GYNECOLOGY

## 2022-07-01 PROCEDURE — 37000008 HC ANESTHESIA 1ST 15 MINUTES: Performed by: OBSTETRICS & GYNECOLOGY

## 2022-07-01 RX ORDER — MUPIROCIN 20 MG/G
OINTMENT TOPICAL
Status: DISCONTINUED | OUTPATIENT
Start: 2022-07-01 | End: 2022-07-01 | Stop reason: SDUPTHER

## 2022-07-01 RX ORDER — SODIUM CHLORIDE 0.9 % (FLUSH) 0.9 %
10 SYRINGE (ML) INJECTION
Status: DISCONTINUED | OUTPATIENT
Start: 2022-07-01 | End: 2022-07-01 | Stop reason: HOSPADM

## 2022-07-01 RX ORDER — NEOSTIGMINE METHYLSULFATE 1 MG/ML
INJECTION, SOLUTION INTRAVENOUS
Status: DISCONTINUED | OUTPATIENT
Start: 2022-07-01 | End: 2022-07-01

## 2022-07-01 RX ORDER — SODIUM CHLORIDE, SODIUM LACTATE, POTASSIUM CHLORIDE, CALCIUM CHLORIDE 600; 310; 30; 20 MG/100ML; MG/100ML; MG/100ML; MG/100ML
INJECTION, SOLUTION INTRAVENOUS CONTINUOUS
Status: DISCONTINUED | OUTPATIENT
Start: 2022-07-01 | End: 2022-07-01 | Stop reason: HOSPADM

## 2022-07-01 RX ORDER — OXYCODONE AND ACETAMINOPHEN 5; 325 MG/1; MG/1
1 TABLET ORAL
Status: DISCONTINUED | OUTPATIENT
Start: 2022-07-01 | End: 2022-07-01 | Stop reason: HOSPADM

## 2022-07-01 RX ORDER — PROCHLORPERAZINE EDISYLATE 5 MG/ML
5 INJECTION INTRAMUSCULAR; INTRAVENOUS EVERY 10 MIN PRN
Status: DISCONTINUED | OUTPATIENT
Start: 2022-07-01 | End: 2022-07-01 | Stop reason: HOSPADM

## 2022-07-01 RX ORDER — PREGABALIN 25 MG/1
50 CAPSULE ORAL
Status: COMPLETED | OUTPATIENT
Start: 2022-07-01 | End: 2022-07-01

## 2022-07-01 RX ORDER — FAMOTIDINE 20 MG/1
20 TABLET, FILM COATED ORAL
Status: COMPLETED | OUTPATIENT
Start: 2022-07-01 | End: 2022-07-01

## 2022-07-01 RX ORDER — MIDAZOLAM HYDROCHLORIDE 1 MG/ML
INJECTION, SOLUTION INTRAMUSCULAR; INTRAVENOUS
Status: DISCONTINUED | OUTPATIENT
Start: 2022-07-01 | End: 2022-07-01

## 2022-07-01 RX ORDER — HYDROCODONE BITARTRATE AND ACETAMINOPHEN 5; 325 MG/1; MG/1
1 TABLET ORAL EVERY 6 HOURS PRN
Qty: 15 TABLET | Refills: 0 | Status: SHIPPED | OUTPATIENT
Start: 2022-07-01 | End: 2022-07-08

## 2022-07-01 RX ORDER — ROCURONIUM BROMIDE 10 MG/ML
INJECTION, SOLUTION INTRAVENOUS
Status: DISCONTINUED | OUTPATIENT
Start: 2022-07-01 | End: 2022-07-01

## 2022-07-01 RX ORDER — PROPOFOL 10 MG/ML
VIAL (ML) INTRAVENOUS
Status: DISCONTINUED | OUTPATIENT
Start: 2022-07-01 | End: 2022-07-01

## 2022-07-01 RX ORDER — ONDANSETRON 2 MG/ML
4 INJECTION INTRAMUSCULAR; INTRAVENOUS DAILY PRN
Status: DISCONTINUED | OUTPATIENT
Start: 2022-07-01 | End: 2022-07-01 | Stop reason: HOSPADM

## 2022-07-01 RX ORDER — SODIUM CHLORIDE 0.9 % (FLUSH) 0.9 %
3 SYRINGE (ML) INJECTION
Status: DISCONTINUED | OUTPATIENT
Start: 2022-07-01 | End: 2022-07-01 | Stop reason: HOSPADM

## 2022-07-01 RX ORDER — PHENYLEPHRINE HYDROCHLORIDE 10 MG/ML
INJECTION INTRAVENOUS
Status: DISCONTINUED | OUTPATIENT
Start: 2022-07-01 | End: 2022-07-01

## 2022-07-01 RX ORDER — ONDANSETRON 2 MG/ML
INJECTION INTRAMUSCULAR; INTRAVENOUS
Status: DISCONTINUED | OUTPATIENT
Start: 2022-07-01 | End: 2022-07-01

## 2022-07-01 RX ORDER — HYDROMORPHONE HYDROCHLORIDE 2 MG/ML
0.2 INJECTION, SOLUTION INTRAMUSCULAR; INTRAVENOUS; SUBCUTANEOUS EVERY 5 MIN PRN
Status: DISCONTINUED | OUTPATIENT
Start: 2022-07-01 | End: 2022-07-01 | Stop reason: HOSPADM

## 2022-07-01 RX ORDER — LIDOCAINE HYDROCHLORIDE 20 MG/ML
INJECTION, SOLUTION EPIDURAL; INFILTRATION; INTRACAUDAL; PERINEURAL
Status: DISCONTINUED | OUTPATIENT
Start: 2022-07-01 | End: 2022-07-01

## 2022-07-01 RX ORDER — CELECOXIB 100 MG/1
400 CAPSULE ORAL
Status: COMPLETED | OUTPATIENT
Start: 2022-07-01 | End: 2022-07-01

## 2022-07-01 RX ORDER — EPHEDRINE SULFATE 50 MG/ML
INJECTION, SOLUTION INTRAVENOUS
Status: DISCONTINUED | OUTPATIENT
Start: 2022-07-01 | End: 2022-07-01

## 2022-07-01 RX ORDER — FENTANYL CITRATE 50 UG/ML
INJECTION, SOLUTION INTRAMUSCULAR; INTRAVENOUS
Status: DISCONTINUED | OUTPATIENT
Start: 2022-07-01 | End: 2022-07-01

## 2022-07-01 RX ORDER — CEFAZOLIN SODIUM 2 G/50ML
2 SOLUTION INTRAVENOUS
Status: COMPLETED | OUTPATIENT
Start: 2022-07-01 | End: 2022-07-01

## 2022-07-01 RX ORDER — HYDROCODONE BITARTRATE AND ACETAMINOPHEN 5; 325 MG/1; MG/1
1 TABLET ORAL EVERY 4 HOURS PRN
Status: DISCONTINUED | OUTPATIENT
Start: 2022-07-01 | End: 2022-07-01 | Stop reason: HOSPADM

## 2022-07-01 RX ORDER — MEPERIDINE HYDROCHLORIDE 25 MG/ML
12.5 INJECTION INTRAMUSCULAR; INTRAVENOUS; SUBCUTANEOUS ONCE AS NEEDED
Status: DISCONTINUED | OUTPATIENT
Start: 2022-07-01 | End: 2022-07-01 | Stop reason: HOSPADM

## 2022-07-01 RX ORDER — LIDOCAINE HYDROCHLORIDE 10 MG/ML
0.5 INJECTION, SOLUTION EPIDURAL; INFILTRATION; INTRACAUDAL; PERINEURAL
Status: DISCONTINUED | OUTPATIENT
Start: 2022-07-01 | End: 2022-07-01 | Stop reason: HOSPADM

## 2022-07-01 RX ORDER — ACETAMINOPHEN 500 MG
1000 TABLET ORAL
Status: COMPLETED | OUTPATIENT
Start: 2022-07-01 | End: 2022-07-01

## 2022-07-01 RX ADMIN — FENTANYL CITRATE 100 MCG: 50 INJECTION, SOLUTION INTRAMUSCULAR; INTRAVENOUS at 09:07

## 2022-07-01 RX ADMIN — ONDANSETRON 4 MG: 2 INJECTION, SOLUTION INTRAMUSCULAR; INTRAVENOUS at 09:07

## 2022-07-01 RX ADMIN — PROPOFOL 150 MG: 10 INJECTION, EMULSION INTRAVENOUS at 09:07

## 2022-07-01 RX ADMIN — ACETAMINOPHEN 1000 MG: 500 TABLET ORAL at 07:07

## 2022-07-01 RX ADMIN — FAMOTIDINE 20 MG: 20 TABLET ORAL at 07:07

## 2022-07-01 RX ADMIN — HYDROMORPHONE HYDROCHLORIDE 0.2 MG: 2 INJECTION INTRAMUSCULAR; INTRAVENOUS; SUBCUTANEOUS at 11:07

## 2022-07-01 RX ADMIN — FENTANYL CITRATE 50 MCG: 50 INJECTION, SOLUTION INTRAMUSCULAR; INTRAVENOUS at 09:07

## 2022-07-01 RX ADMIN — CELECOXIB 400 MG: 100 CAPSULE ORAL at 07:07

## 2022-07-01 RX ADMIN — ROCURONIUM BROMIDE 30 MG: 10 INJECTION, SOLUTION INTRAVENOUS at 09:07

## 2022-07-01 RX ADMIN — OXYCODONE HYDROCHLORIDE AND ACETAMINOPHEN 1 TABLET: 5; 325 TABLET ORAL at 11:07

## 2022-07-01 RX ADMIN — LIDOCAINE HYDROCHLORIDE 100 MG: 20 INJECTION, SOLUTION EPIDURAL; INFILTRATION; INTRACAUDAL; PERINEURAL at 09:07

## 2022-07-01 RX ADMIN — PROPOFOL 50 MG: 10 INJECTION, EMULSION INTRAVENOUS at 09:07

## 2022-07-01 RX ADMIN — GLYCOPYRROLATE 0.2 MG: 0.2 INJECTION, SOLUTION INTRAMUSCULAR; INTRAVENOUS at 09:07

## 2022-07-01 RX ADMIN — MIDAZOLAM 2 MG: 1 INJECTION INTRAMUSCULAR; INTRAVENOUS at 08:07

## 2022-07-01 RX ADMIN — EPHEDRINE SULFATE 25 MG: 50 INJECTION INTRAVENOUS at 09:07

## 2022-07-01 RX ADMIN — ROCURONIUM BROMIDE 20 MG: 10 INJECTION, SOLUTION INTRAVENOUS at 09:07

## 2022-07-01 RX ADMIN — SODIUM CHLORIDE, SODIUM LACTATE, POTASSIUM CHLORIDE, AND CALCIUM CHLORIDE: 600; 310; 30; 20 INJECTION, SOLUTION INTRAVENOUS at 08:07

## 2022-07-01 RX ADMIN — GLYCOPYRROLATE 0.6 MG: 0.2 INJECTION, SOLUTION INTRAMUSCULAR; INTRAVENOUS at 10:07

## 2022-07-01 RX ADMIN — CEFAZOLIN SODIUM 2 G: 2 SOLUTION INTRAVENOUS at 09:07

## 2022-07-01 RX ADMIN — NEOSTIGMINE METHYLSULFATE 4 MG: 1 INJECTION INTRAVENOUS at 10:07

## 2022-07-01 RX ADMIN — PHENYLEPHRINE HYDROCHLORIDE 200 MCG: 10 INJECTION INTRAVENOUS at 09:07

## 2022-07-01 RX ADMIN — PREGABALIN 50 MG: 25 CAPSULE ORAL at 07:07

## 2022-07-01 NOTE — DISCHARGE SUMMARY
[facility] - Surgery (Hospital)  Discharge Note  Short Stay    Procedure(s) (LRB):  XI ROBOTIC HYSTERECTOMY,WITH SALPINGO-OOPHORECTOMY (Bilateral)    OUTCOME: Patient tolerated treatment/procedure well without complication and is now ready for discharge.    DISPOSITION: Home or Self Care    FINAL DIAGNOSIS:  Genetic Risk pelvic cancer     FOLLOWUP: In clinic  DISCHARGE INSTRUCTIONS:  No discharge procedures on file.     TIME SPENT ON DISCHARGE: 5 minutes

## 2022-07-01 NOTE — H&P
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

## 2022-07-01 NOTE — OP NOTE
[facility] - Surgery ([facility])  Surgery Department  Operative Note    SUMMARY     Date of Procedure: 7/1/2022     Procedure: Procedure(s) (LRB):  XI ROBOTIC HYSTERECTOMY,WITH SALPINGO-OOPHORECTOMY (Bilateral)     Surgeon(s) and Role:     * CORDELL Bowen MD - Primary    Assisting Surgeon: Amy WELLS assistance deemed necessary by MD surgeon     Pre-Operative Diagnosis: CHEK2-related breast cancer [C50.919, Z15.02, Z15.89, Z15.09]  Ductal carcinoma in situ (DCIS) of right breast [D05.11]    Post-Operative Diagnosis: Post-Op Diagnosis Codes:     * CHEK2-related breast cancer [C50.919, Z15.02, Z15.89, Z15.09]     * Ductal carcinoma in situ (DCIS) of right breast [D05.11]    Anesthesia: General    Operative Findings (including complications, if any):     Description of Technical Procedures:     Procedure in Detail/Findings:  Operative findings    Upper abdomen normal liver , no adhesions, no bowel abnormalities    Pelvis:  Uterus normal size and shape,  Ovaries and tubes normal ,                  No Adhesions, No endometriosis                 Ureters in there normal retroperitoneal position through the pelvis                 Bladder Flap with no adhesions         The patient was taken to the surgical suite.  General              intubation  anesthesia is induced.                                                                   The patient was placed in supine lithotomy  position  with low Darren stirrups                                                                   The vagina was prepped with Betadine.  A Kohler     catheter, YAHIR manipulator with  KOH colpotomizer placed.    Abdomen prepped   with  chlorhexidine solution                                                                Sterile drapes applied     Time-out taken with all members of the operating team.       Veress  needle placed through the umbilicus, abdomen elevated with towel clamps.     CO2 gas insufflation to 15 mmHg.      Trochar  placement as follows:       8 mm bladeless trochars:      4 trochars, transverse line 2 cm above the umbilicus, 8 cm apart with direct visualization after the initial placement                     The [employer] robotic XI   system docked from the right side of the patient.     .    Instrument placement as follows   Camera:  Right midline port   Unipolar Scissors:  Right lateral port   [place] Bipolar Forceps:  Left midline port   Fenestrated Forceps:  Left lateral port    The surgeon moved to the robotic console and the first assistant remained at the bedside for uterine manipulation and utilization of instruments placed through the left lateral trochar                                                                           Prior to starting the hysterectomy, the anatomical landmarks of the pelvis and the pelvic course of the ureters right and left are identified.   The [place] bipolar is used for cautery and the scissors used for transection of the pedicles .  The  round ligaments are  Transected after cautery on each side and dissection of the broad ligament and development of the bladder flap is accomplished  with the unipolar scissors.  Retroperitoneal dissection lateral to the IP vessels done bilaterally to expose the blood supply of the ovaries at the pelvic brim 3 cm proximal to the ovary and tube.  The IP vessels taken down with Maryland cautery and cut with scissors bilaterally     The anterior and posterior vagina entered over the colpotomizer with unipolar scissors and the colpotomy is extended over the colpotomizer anterior and posterior to the ascending uterine bilateral uterine arteries.    The Uterine vessels are then cauterized with the Maryland instrument and transected.     With the uterus free of blood supply and vaginal connection, it is removed with the cervix and bilateral tubes and ovaries  through the vagina.    Bleeding of the uterine pedicle or vaginal cuff is cauterized.   Closure of  the vaginal cuff done with Zero Vicryl suture on the angles followed by running double layer 2 zero V Lock closure        All needles used in the abdomen removed through the vagina or  the 8 mm trocar on the left are  accounted for.       Good hemostasis was  Appreciated in the operative field, any bleeding noted cauterized with bipolar instrument     Any excess fluid was suctioned free.    After the instruments were removed, the da Jonathan robotic system was undocked       All trochar  sites closed with 4-0 Monocryl  subcuticular and Dermabond.       The specimen removed from the vagina.  Vagina examined for any tears.       The  patient taken to Recovery in stable condition with gary catheter in place              Significant Surgical Tasks Conducted by the Assistant(s), if Applicable: bedside robotic assistance     Estimated Blood Loss (EBL): 25 mL           Implants: * No implants in log *    Specimens:   Specimen (24h ago, onward)             Start     Ordered    07/01/22 0956  Specimen to Pathology, Surgery Gynecology and Obstetrics  Once        Comments: Pre-op Diagnosis: CHEK2-related breast cancer [C50.919, Z15.02, Z15.89, Z15.09]Ductal carcinoma in situ (DCIS) of right breast [D05.11]Procedure(s):XI ROBOTIC HYSTERECTOMY,WITH SALPINGO-OOPHORECTOMY Number of specimens:1Name of specimens: 1. Uterus, cervix, bilateral fallopian tube and bilateral ovaries-perm     References:    Click here for ordering Quick Tip   Question Answer Comment   Procedure Type: Gynecology and Obstetrics    Specimen Class: Routine/Screening    Which provider would you like to cc? CORDELL LEMA    Release to patient Immediate        07/01/22 1011                        Condition: Good    Disposition: PACU - hemodynamically stable.    Attestation: I was present and scrubbed for the entire procedure.

## 2022-07-01 NOTE — ANESTHESIA PROCEDURE NOTES
Intubation    Date/Time: 7/1/2022 9:07 AM  Performed by: Cynthia Hartley CRNA  Authorized by: Brad Jung II, MD     Intubation:     Induction:  Intravenous    Intubated:  Postinduction    Mask Ventilation:  Easy mask    Attempts:  2    Attempted By:  CRNA    Method of Intubation:  Direct    Blade:  Mehta 2    Laryngeal View Grade: Grade III - only epiglottis visible      Attempted By (2nd Attempt):  CRNA    Method of Intubation (2nd Attempt):  Video laryngoscopy    Blade (2nd Attempt):  Purdy 3    Laryngeal View Grade (2nd Attempt): Grade I - full view of cords      Difficult Airway Encountered?: No      Complications:  None    Airway Device:  Oral endotracheal tube    Airway Device Size:  7.0    Style/Cuff Inflation:  Cuffed    Tube secured:  22    Secured at:  The lips    Placement Verified By:  Capnometry    Complicating Factors:  Small mouth and anterior larynx    Findings Post-Intubation:  BS equal bilateral and atraumatic/condition of teeth unchanged

## 2022-07-01 NOTE — ANESTHESIA POSTPROCEDURE EVALUATION
Anesthesia Post Evaluation    Patient: Gina Garcia    Procedure(s) Performed: Procedure(s) (LRB):  XI ROBOTIC HYSTERECTOMY,WITH SALPINGO-OOPHORECTOMY (Bilateral)    Final Anesthesia Type: general      Patient location during evaluation: PACU  Patient participation: Yes- Able to Participate  Level of consciousness: awake and alert  Post-procedure vital signs: reviewed and stable  Pain management: adequate  Airway patency: patent  SHIV mitigation strategies: Verification of full reversal of neuromuscular block  PONV status at discharge: No PONV  Anesthetic complications: no      Cardiovascular status: hemodynamically stable  Respiratory status: spontaneous ventilation  Hydration status: euvolemic  Follow-up not needed.          Vitals Value Taken Time   /65 07/01/22 1153   Temp 36.5 °C (97.7 °F) 07/01/22 1153   Pulse 89 07/01/22 1156   Resp 20 07/01/22 1154   SpO2 100 % 07/01/22 1156   Vitals shown include unvalidated device data.      Event Time   Out of Recovery 12:01:29         Pain/Jimy Score: Pain Rating Prior to Med Admin: 4 (7/1/2022 11:38 AM)  Jimy Score: 9 (7/1/2022 11:57 AM)

## 2022-07-08 VITALS
TEMPERATURE: 98 F | SYSTOLIC BLOOD PRESSURE: 115 MMHG | HEIGHT: 59 IN | OXYGEN SATURATION: 100 % | WEIGHT: 109.13 LBS | DIASTOLIC BLOOD PRESSURE: 68 MMHG | RESPIRATION RATE: 16 BRPM | HEART RATE: 62 BPM | BODY MASS INDEX: 22 KG/M2

## 2022-07-15 LAB
FINAL PATHOLOGIC DIAGNOSIS: NORMAL
GROSS: NORMAL
Lab: NORMAL

## 2022-07-27 ENCOUNTER — OFFICE VISIT (OUTPATIENT)
Dept: OBSTETRICS AND GYNECOLOGY | Facility: CLINIC | Age: 37
End: 2022-07-27
Payer: OTHER GOVERNMENT

## 2022-07-27 VITALS
HEIGHT: 59 IN | SYSTOLIC BLOOD PRESSURE: 104 MMHG | WEIGHT: 106.5 LBS | BODY MASS INDEX: 21.47 KG/M2 | DIASTOLIC BLOOD PRESSURE: 70 MMHG

## 2022-07-27 DIAGNOSIS — Z90.710 STATUS POST LAPAROSCOPIC HYSTERECTOMY: Primary | ICD-10-CM

## 2022-07-27 PROCEDURE — 99213 OFFICE O/P EST LOW 20 MIN: CPT | Mod: PBBFAC | Performed by: OBSTETRICS & GYNECOLOGY

## 2022-07-27 PROCEDURE — 99999 PR PBB SHADOW E&M-EST. PATIENT-LVL III: CPT | Mod: PBBFAC,,, | Performed by: OBSTETRICS & GYNECOLOGY

## 2022-07-27 PROCEDURE — 99024 PR POST-OP FOLLOW-UP VISIT: ICD-10-PCS | Mod: ,,, | Performed by: OBSTETRICS & GYNECOLOGY

## 2022-07-27 PROCEDURE — 99999 PR PBB SHADOW E&M-EST. PATIENT-LVL III: ICD-10-PCS | Mod: PBBFAC,,, | Performed by: OBSTETRICS & GYNECOLOGY

## 2022-07-27 PROCEDURE — 99024 POSTOP FOLLOW-UP VISIT: CPT | Mod: ,,, | Performed by: OBSTETRICS & GYNECOLOGY

## 2022-07-27 NOTE — PROGRESS NOTES
"Subjective:      Gina Garcia is a 36 y.o. female who presents to the clinic 4 weeks status post Davinci assisted hysterectomy, bilateral oophorectomy and bilateral salpingectomy for genetic risk of cancer . Eating a regular diet without difficulty. Bowel movements are normal. The patient is not having any pain.    The following portions of the patient's history were reviewed and updated as appropriate: allergies, current medications, past family history, past medical history, past social history, past surgical history and problem list.    Review of Systems  Pertinent items are noted in HPI.      Objective:     /70   Ht 4' 11" (1.499 m)   Wt 48.3 kg (106 lb 7.7 oz)   LMP 06/20/2022   BMI 21.51 kg/m²   General:  alert, appears stated age and cooperative   Abdomen: soft, bowel sounds active, non-tender   Incision:   healing well, no drainage, no erythema, no hernia, no seroma, no swelling, no dehiscence, incision well approximated     Assessment:      Doing well postoperatively.  Operative findings again reviewed. Pathology report discussed.      Plan:      1. Continue any current medications.  2. Wound care discussed.  3. Activity restrictions: pelvic rest 12 weeks post op   4. Anticipated return to work: now.  5. Follow up: 1 year     Discussed surgical menopause and use of vaginal estrogens post breast cancer   Recommend Replens initially     "

## 2022-08-12 ENCOUNTER — OFFICE VISIT (OUTPATIENT)
Dept: OPHTHALMOLOGY | Facility: CLINIC | Age: 37
End: 2022-08-12
Payer: OTHER GOVERNMENT

## 2022-08-12 ENCOUNTER — LAB VISIT (OUTPATIENT)
Dept: LAB | Facility: HOSPITAL | Age: 37
End: 2022-08-12
Attending: FAMILY MEDICINE
Payer: OTHER GOVERNMENT

## 2022-08-12 DIAGNOSIS — H04.123 DRY EYE SYNDROME, BILATERAL: Primary | ICD-10-CM

## 2022-08-12 DIAGNOSIS — M35.01 KERATOCONJUNCTIVITIS SICCA: ICD-10-CM

## 2022-08-12 DIAGNOSIS — E78.49 FAMILIAL HYPERLIPIDEMIA: ICD-10-CM

## 2022-08-12 LAB
ALBUMIN SERPL BCP-MCNC: 4.8 G/DL (ref 3.5–5.2)
ALP SERPL-CCNC: 67 U/L (ref 55–135)
ALT SERPL W/O P-5'-P-CCNC: 27 U/L (ref 10–44)
ANION GAP SERPL CALC-SCNC: 13 MMOL/L (ref 8–16)
AST SERPL-CCNC: 23 U/L (ref 10–40)
BILIRUB SERPL-MCNC: 0.4 MG/DL (ref 0.1–1)
BUN SERPL-MCNC: 11 MG/DL (ref 6–20)
CALCIUM SERPL-MCNC: 10.5 MG/DL (ref 8.7–10.5)
CHLORIDE SERPL-SCNC: 103 MMOL/L (ref 95–110)
CHOLEST SERPL-MCNC: 162 MG/DL (ref 120–199)
CHOLEST/HDLC SERPL: 2.4 {RATIO} (ref 2–5)
CO2 SERPL-SCNC: 26 MMOL/L (ref 23–29)
CREAT SERPL-MCNC: 0.7 MG/DL (ref 0.5–1.4)
EST. GFR  (NO RACE VARIABLE): >60 ML/MIN/1.73 M^2
GLUCOSE SERPL-MCNC: 87 MG/DL (ref 70–110)
HDLC SERPL-MCNC: 67 MG/DL (ref 40–75)
HDLC SERPL: 41.4 % (ref 20–50)
LDLC SERPL CALC-MCNC: 81.4 MG/DL (ref 63–159)
NONHDLC SERPL-MCNC: 95 MG/DL
POTASSIUM SERPL-SCNC: 4.8 MMOL/L (ref 3.5–5.1)
PROT SERPL-MCNC: 7.4 G/DL (ref 6–8.4)
SODIUM SERPL-SCNC: 142 MMOL/L (ref 136–145)
TRIGL SERPL-MCNC: 68 MG/DL (ref 30–150)

## 2022-08-12 PROCEDURE — 99999 PR PBB SHADOW E&M-EST. PATIENT-LVL III: ICD-10-PCS | Mod: PBBFAC,,, | Performed by: OPTOMETRIST

## 2022-08-12 PROCEDURE — 80061 LIPID PANEL: CPT | Performed by: FAMILY MEDICINE

## 2022-08-12 PROCEDURE — 99999 PR PBB SHADOW E&M-EST. PATIENT-LVL III: CPT | Mod: PBBFAC,,, | Performed by: OPTOMETRIST

## 2022-08-12 PROCEDURE — 80053 COMPREHEN METABOLIC PANEL: CPT | Performed by: FAMILY MEDICINE

## 2022-08-12 PROCEDURE — 36415 COLL VENOUS BLD VENIPUNCTURE: CPT | Mod: PO | Performed by: FAMILY MEDICINE

## 2022-08-12 PROCEDURE — 99213 OFFICE O/P EST LOW 20 MIN: CPT | Mod: PBBFAC,PO | Performed by: OPTOMETRIST

## 2022-08-12 PROCEDURE — 99203 PR OFFICE/OUTPT VISIT, NEW, LEVL III, 30-44 MIN: ICD-10-PCS | Mod: S$PBB,,, | Performed by: OPTOMETRIST

## 2022-08-12 PROCEDURE — 99203 OFFICE O/P NEW LOW 30 MIN: CPT | Mod: S$PBB,,, | Performed by: OPTOMETRIST

## 2022-08-12 NOTE — PROGRESS NOTES
HPI     NP to DKT  Patient here today for pressure OS associated with stinging headaches and   FB sensation  Patient states she has a current sinus infection  Problem with eyes have been present for 2 years  C/O dryness using systane          Last edited by Sharita Cunningham, PCT on 8/12/2022  9:36 AM. (History)              Assessment /Plan     For exam results, see Encounter Report.    Dry eye syndrome, bilateral  Discussed warm compresses twice daily and lid hygiene, with preservative free artificial tears instillation four times daily. Patient to return to clinic in 2 months or sooner if no improvement in symptoms with current treatment.   Keratoconjunctivitis Sicca bilateral   See above, consider Restasis if no improvement.     RTC 2 months for dry eye follow-up, sooner if changes to vision or worsening symptoms

## 2022-08-19 ENCOUNTER — OFFICE VISIT (OUTPATIENT)
Dept: INTERNAL MEDICINE | Facility: CLINIC | Age: 37
End: 2022-08-19
Payer: OTHER GOVERNMENT

## 2022-08-19 ENCOUNTER — PATIENT MESSAGE (OUTPATIENT)
Dept: INTERNAL MEDICINE | Facility: CLINIC | Age: 37
End: 2022-08-19

## 2022-08-19 VITALS
OXYGEN SATURATION: 97 % | HEART RATE: 76 BPM | DIASTOLIC BLOOD PRESSURE: 72 MMHG | HEIGHT: 59 IN | SYSTOLIC BLOOD PRESSURE: 110 MMHG | TEMPERATURE: 98 F | WEIGHT: 101 LBS | BODY MASS INDEX: 20.36 KG/M2

## 2022-08-19 DIAGNOSIS — B96.89 BACTERIAL SINUSITIS: Primary | ICD-10-CM

## 2022-08-19 DIAGNOSIS — J32.9 BACTERIAL SINUSITIS: Primary | ICD-10-CM

## 2022-08-19 DIAGNOSIS — E78.49 FAMILIAL HYPERLIPIDEMIA: ICD-10-CM

## 2022-08-19 DIAGNOSIS — G43.809 OTHER MIGRAINE WITHOUT STATUS MIGRAINOSUS, NOT INTRACTABLE: ICD-10-CM

## 2022-08-19 PROCEDURE — 99214 OFFICE O/P EST MOD 30 MIN: CPT | Mod: S$PBB,,, | Performed by: FAMILY MEDICINE

## 2022-08-19 PROCEDURE — 99999 PR PBB SHADOW E&M-EST. PATIENT-LVL III: ICD-10-PCS | Mod: PBBFAC,,, | Performed by: FAMILY MEDICINE

## 2022-08-19 PROCEDURE — 99999 PR PBB SHADOW E&M-EST. PATIENT-LVL III: CPT | Mod: PBBFAC,,, | Performed by: FAMILY MEDICINE

## 2022-08-19 PROCEDURE — 99213 OFFICE O/P EST LOW 20 MIN: CPT | Mod: PBBFAC,PO | Performed by: FAMILY MEDICINE

## 2022-08-19 PROCEDURE — 99214 PR OFFICE/OUTPT VISIT, EST, LEVL IV, 30-39 MIN: ICD-10-PCS | Mod: S$PBB,,, | Performed by: FAMILY MEDICINE

## 2022-08-19 RX ORDER — AMOXICILLIN AND CLAVULANATE POTASSIUM 875; 125 MG/1; MG/1
1 TABLET, FILM COATED ORAL EVERY 12 HOURS
Qty: 20 TABLET | Refills: 0 | Status: SHIPPED | OUTPATIENT
Start: 2022-08-19 | End: 2022-08-29

## 2022-08-19 NOTE — PROGRESS NOTES
Subjective:      Patient ID: Gina Garcia is a 36 y.o. female.    Chief Complaint: Follow-up    HPI 36 y.o.   female patient with a PMHx of anxiety, CHEK2, and migraines presents to clinic for follow-up. The patient was last seen on April 20, 2022      Today she reports she is doing good. Patient reports she is in need of an antibiotic because she have been experience sinus infection x 2-3 wks.. She state the sinus infections does trigger her migraines, have been having migraines every other day. Patient otherwise without complaints. Denies SOB, chest pain, and bowel changes.    Her migraines have worsened after having her hysterectomy.  Long hx of migraines.  Saw Neuro in past.  Has had imaging.  Tried triptans, topamax, elavil and got to where she had to have botox injections.  Asking to try injectable medication.  Taking statin  Will be having reconstructive surgery in Sept for breasts.    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    Migraines      Family History   Problem Relation Age of Onset    Breast cancer Mother 45        L lumptectomy, chemo; R (dx 69) plans to have BL mastectomy    Skin cancer Mother     Cancer Mother         Breast cancer    Prostate cancer Father 68        radiation complete    Asthma Sister     Lung cancer Maternal Grandmother         non-smoker    Prostate cancer Maternal Grandfather         metastasis to brain    Cancer Maternal Grandfather         Prostate cancer    Breast cancer Paternal Grandmother     Cancer Paternal Grandmother         Breast cancer    Prostate cancer Paternal Grandfather     Cancer Paternal Grandfather         Prostate cancer    ADD / ADHD Son     Autism Son     Diabetes Paternal Uncle     Asthma Sister     Colon cancer Neg Hx         colon cancer    Ovarian cancer Neg Hx     Thrombophilia Neg Hx      Past Surgical History:   Procedure Laterality Date    ACHILLES TENDON SURGERY    "   AXILLARY NODE DISSECTION Right 04/25/2022    Procedure: LYMPHADENECTOMY, AXILLARY;  Surgeon: Micheal Bartholomew MD;  Location: [facility] OR;  Service: General;  Laterality: Right;  Right axillary node dissection    BILATERAL MASTECTOMY Bilateral 04/25/2022    Procedure: MASTECTOMY, BILATERAL;  Surgeon: Micheal Bartholomew MD;  Location: [facility] OR;  Service: General;  Laterality: Bilateral;    COLONOSCOPY N/A 03/28/2022    Procedure: COLONOSCOPY;  Surgeon: Winnie Polanco MD;  Location: [facility] ENDO;  Service: Endoscopy;  Laterality: N/A;    DEBRIDEMENT, BREAST Right 05/30/2022    Procedure: DEBRIDEMENT, BREAST;  Surgeon: Esther Carpio MD;  Location: [facility] OR;  Service: General;  Laterality: Right;  FLAP WASHOUT AND CLOSURE    EXTERNAL EAR SURGERY      cosmetic    INSERTION OF BREAST TISSUE EXPANDER Bilateral 04/25/2022    Procedure: INSERTION, TISSUE EXPANDER, BREAST;  Surgeon: Micheal Bartholomew MD;  Location: [facility] OR;  Service: General;  Laterality: Bilateral;  Procedure performed by KRAIG Carpio    sciatic nerve exploration      SENTINEL LYMPH NODE BIOPSY Right 04/25/2022    Procedure: BIOPSY, LYMPH NODE, SENTINEL;  Surgeon: Micheal Bartholomew MD;  Location: [facility] OR;  Service: General;  Laterality: Right;    XI ROBOTIC HYSTERECTOMY, WITH SALPINGO-OOPHORECTOMY Bilateral 07/01/2022    Procedure: XI ROBOTIC HYSTERECTOMY,WITH SALPINGO-OOPHORECTOMY;  Surgeon: CORDELL Bowen MD;  Location: [facility] OR;  Service: OB/GYN;  Laterality: Bilateral;     Social History     Tobacco Use    Smoking status: Never Smoker    Smokeless tobacco: Never Used   Substance Use Topics    Alcohol use: No    Drug use: No       /72   Pulse 76   Temp 97.7 °F (36.5 °C)   Ht 4' 11" (1.499 m)   Wt 45.8 kg (100 lb 15.5 oz)   LMP 06/20/2022   SpO2 97%   BMI 20.39 kg/m²     Review of Systems   Constitutional: Negative for activity change, appetite change, chills, diaphoresis, fatigue, fever and unexpected weight change.   HENT: Positive for " congestion, sinus pressure and sinus pain. Negative for ear pain, hearing loss, postnasal drip, rhinorrhea and sore throat.    Eyes: Negative for pain, discharge, itching and visual disturbance.   Respiratory: Negative for cough, chest tightness, shortness of breath and wheezing.    Cardiovascular: Negative for chest pain, palpitations and leg swelling.   Gastrointestinal: Negative for abdominal pain, constipation, diarrhea, nausea and vomiting.   Endocrine: Negative for polydipsia and polyuria.   Genitourinary: Negative for difficulty urinating, dysuria, flank pain, frequency, menstrual problem, pelvic pain and urgency.   Musculoskeletal: Negative for arthralgias, back pain, joint swelling, myalgias and neck pain.   Skin: Negative for color change and rash.   Neurological: Positive for headaches (chronic migraines). Negative for dizziness, weakness and light-headedness.   Hematological: Negative for adenopathy.   Psychiatric/Behavioral: Negative for confusion, decreased concentration and dysphoric mood.       Objective:     Physical Exam  Vitals and nursing note reviewed.   Constitutional:       General: She is not in acute distress.  HENT:      Right Ear: External ear normal.      Left Ear: External ear normal.      Nose: Nose normal.   Eyes:      Conjunctiva/sclera: Conjunctivae normal.      Pupils: Pupils are equal, round, and reactive to light.   Cardiovascular:      Rate and Rhythm: Normal rate and regular rhythm.      Heart sounds: Normal heart sounds.   Pulmonary:      Effort: Pulmonary effort is normal. No respiratory distress.      Breath sounds: Normal breath sounds. No wheezing or rales.   Abdominal:      General: Bowel sounds are normal. There is no distension.      Palpations: Abdomen is soft.      Tenderness: There is no abdominal tenderness. There is no guarding.   Musculoskeletal:      Cervical back: Normal range of motion and neck supple.      Right lower leg: No edema.      Left lower leg: No  edema.   Skin:     General: Skin is warm and dry.      Findings: No rash.   Neurological:      Mental Status: She is alert and oriented to person, place, and time.   Psychiatric:         Behavior: Behavior normal.         Thought Content: Thought content normal.         Judgment: Judgment normal.         Lab Results   Component Value Date    WBC 8.23 06/29/2022    HGB 12.9 06/29/2022    HCT 40.9 06/29/2022     06/29/2022    CHOL 162 08/12/2022    TRIG 68 08/12/2022    HDL 67 08/12/2022    ALT 27 08/12/2022    AST 23 08/12/2022     08/12/2022    K 4.8 08/12/2022     08/12/2022    CREATININE 0.7 08/12/2022    BUN 11 08/12/2022    CO2 26 08/12/2022    TSH 2.041 03/16/2022    GLUF 89 07/02/2008       Assessment:     1. Bacterial sinusitis    2. Familial hyperlipidemia    3. Other migraine without status migrainosus, not intractable         Plan:     Bacterial sinusitis    Familial hyperlipidemia    Other migraine without status migrainosus, not intractable    Other orders  -     amoxicillin-clavulanate 875-125mg (AUGMENTIN) 875-125 mg per tablet; Take 1 tablet by mouth every 12 (twelve) hours. for 10 days  Dispense: 20 tablet; Refill: 0  -     galcanezumab-gnlm 120 mg/mL Syrg; Inject 120 mg into the skin every 28 days.  Dispense: 1 mL; Refill: 6        Vitals reviewed. BP within normal range at 110/72.    Labs reviewed and discussed. Cholesterol have improved at 162 from 281 on the Crestor.  Sent in Augmentin for sinus infection, advised to use flonase daily.  Migraines//have failed first line treatment//in past was doing botox.  Would be a good candidate for emgality//will send scriptd  F/u 4-6 mos  Patient overall doing well.  Questions and concerns addressed.          Documentation entered by Delonte Hopkins, acting as scribe for Dr. Kenneth Dewitt. 08/19/2022 9:08 AM.

## 2022-09-15 ENCOUNTER — DOCUMENTATION ONLY (OUTPATIENT)
Dept: PREADMISSION TESTING | Facility: HOSPITAL | Age: 37
End: 2022-09-15
Payer: OTHER GOVERNMENT

## 2022-09-15 ENCOUNTER — PATIENT MESSAGE (OUTPATIENT)
Dept: PREADMISSION TESTING | Facility: HOSPITAL | Age: 37
End: 2022-09-15
Payer: OTHER GOVERNMENT

## 2022-09-15 NOTE — H&P (VIEW-ONLY)
Pre op instructions reviewed with patient per phone.      To confirm, your doctor has instructed you: Surgery is scheduled for 9/23/2022 at [facility] [place].     Surgery will be at [facility],  The address is [address]. [place]  [id number].      Pre admit office will call the afternoon prior to surgery between 1PM and 3PM with arrival time.          IMPORTANT INSTRUCTIONS!    Do not eat, drink, or smoke after 12 midnight, including water.   OK to brush teeth, but no gum, candy, or mints!      Take only these medicines with a small swallow of water-morning of surgery.      NONE.      ____  Do Not wear makeup, mascara nail polish or artificial nails     ____  NO powder, lotions, deodorants, or creams to surgical area.     ____  Please remove all jewelry, including piercings and leave at home. SURGERY WILL BE CANCELLED IF PIERCINGS ARE PRESENT!!!     ____  Dentures, Hearing Aids and Contact Lens will need to be removed prior to the start of surgery.     ____  Please bring photo ID and insurance information to hospital.     ____  You must have transportation, and they MUST stay the entire time. You will not be able to drive due to Anesthesia.       ____  Wear clean, loose-fitting clothing. Allow for dressings/bandages/surgical equipment to fit.     ____  Stop Aspirin, Ibuprofen, Motrin and Aleve at least 5-7 days before surgery, unless otherwise instructed by your doctor, or the nurse. You MAY use Tylenol/acetaminophen until day of surgery.     ____  If you take diabetic medication, do NOT take morning of surgery unless instructed by Doctor. Metformin must be stopped 24 hrs prior to surgery time.      ____ Stop taking any Fish Oil supplements or Vitamins at least 5 days prior to surgery, unless instructed otherwise by your Doctor.               Bathing Instructions: The night before surgery and the morning prior to coming to the hospital:   -Do not shave your face.   -Do not shave pubic hair 7 days  prior to surgery (gyn pt's).   -Do not shave legs/underarms 3 days prior to surgery.   -Shower & Rinse your body as usual with anti-bacterial Soap (Dial, Lever 2000, or Hibiclens)   -Do not use hibiclens on your head, face, or genitals.   -Do not wash with anti-bacterial soap after you use the hibiclens.   -Rinse your body thoroughly.          Pediatric patients do not need to use anti-bacterial soap or Hibiclens.                  [facility] Visitor/Ride Policy:   Only 1 adult allowed (over the age of 18) to accompany you into Pre-op/Recovery Surgery Dept and must stay through the entire length of admission.     Must have a ride home from a responsible adult that you know and trust.      Pediatric Patients are allowed 2 adult visitors.     Medical Transport, Uber or Lyft can only be used if patient has a responsible adult to accompany them during ride home.           Post-Op Instructions: You will receive surgery post-op instructions by your Discharge Nurse prior to going home.     Surgical Site Infection:   Prevention of surgical site infections:   -Keep incisions clean and dry.   -Do not soak/submerge incisions in water until completely healed.   -Do not apply lotions, powders, creams, or deodorants to site.   -Always make sure hands are cleaned with antibacterial soap/ alcohol-based   prior to touching the surgical site.       Signs and symptoms:               -Redness and pain around the area where you had surgery               -Drainage of cloudy fluid from your surgical wound               -Fever over 100.4 or chills     >>>Call Surgeon office/on-call Surgeon if you experience any of these signs & symptoms post-surgery.        *Please Call [facility] Pre-Admissions Department with surgery instruction questions at [phone].     *Insurance Questions, please call [phone] or [phone]    Spoke about pre op process and surgery instructions, verbalized understanding.

## 2022-09-15 NOTE — PROGRESS NOTES
Pre op instructions reviewed with patient per phone.      To confirm, your doctor has instructed you: Surgery is scheduled for 9/23/2022 at [facility] [place].     Surgery will be at [facility],  The address is [address]. [place]  [id number].      Pre admit office will call the afternoon prior to surgery between 1PM and 3PM with arrival time.          IMPORTANT INSTRUCTIONS!    Do not eat, drink, or smoke after 12 midnight, including water.   OK to brush teeth, but no gum, candy, or mints!      Take only these medicines with a small swallow of water-morning of surgery.      NONE.      ____  Do Not wear makeup, mascara nail polish or artificial nails     ____  NO powder, lotions, deodorants, or creams to surgical area.     ____  Please remove all jewelry, including piercings and leave at home. SURGERY WILL BE CANCELLED IF PIERCINGS ARE PRESENT!!!     ____  Dentures, Hearing Aids and Contact Lens will need to be removed prior to the start of surgery.     ____  Please bring photo ID and insurance information to hospital.     ____  You must have transportation, and they MUST stay the entire time. You will not be able to drive due to Anesthesia.       ____  Wear clean, loose-fitting clothing. Allow for dressings/bandages/surgical equipment to fit.     ____  Stop Aspirin, Ibuprofen, Motrin and Aleve at least 5-7 days before surgery, unless otherwise instructed by your doctor, or the nurse. You MAY use Tylenol/acetaminophen until day of surgery.     ____  If you take diabetic medication, do NOT take morning of surgery unless instructed by Doctor. Metformin must be stopped 24 hrs prior to surgery time.      ____ Stop taking any Fish Oil supplements or Vitamins at least 5 days prior to surgery, unless instructed otherwise by your Doctor.               Bathing Instructions: The night before surgery and the morning prior to coming to the hospital:   -Do not shave your face.   -Do not shave pubic hair 7 days  prior to surgery (gyn pt's).   -Do not shave legs/underarms 3 days prior to surgery.   -Shower & Rinse your body as usual with anti-bacterial Soap (Dial, Lever 2000, or Hibiclens)   -Do not use hibiclens on your head, face, or genitals.   -Do not wash with anti-bacterial soap after you use the hibiclens.   -Rinse your body thoroughly.          Pediatric patients do not need to use anti-bacterial soap or Hibiclens.                  [facility] Visitor/Ride Policy:   Only 1 adult allowed (over the age of 18) to accompany you into Pre-op/Recovery Surgery Dept and must stay through the entire length of admission.     Must have a ride home from a responsible adult that you know and trust.      Pediatric Patients are allowed 2 adult visitors.     Medical Transport, Uber or Lyft can only be used if patient has a responsible adult to accompany them during ride home.           Post-Op Instructions: You will receive surgery post-op instructions by your Discharge Nurse prior to going home.     Surgical Site Infection:   Prevention of surgical site infections:   -Keep incisions clean and dry.   -Do not soak/submerge incisions in water until completely healed.   -Do not apply lotions, powders, creams, or deodorants to site.   -Always make sure hands are cleaned with antibacterial soap/ alcohol-based   prior to touching the surgical site.       Signs and symptoms:               -Redness and pain around the area where you had surgery               -Drainage of cloudy fluid from your surgical wound               -Fever over 100.4 or chills     >>>Call Surgeon office/on-call Surgeon if you experience any of these signs & symptoms post-surgery.        *Please Call [facility] Pre-Admissions Department with surgery instruction questions at [phone].     *Insurance Questions, please call [phone] or [phone]    Spoke about pre op process and surgery instructions, verbalized understanding.

## 2022-09-15 NOTE — TELEPHONE ENCOUNTER
Pre op instructions reviewed with patient per phone.      To confirm, your doctor has instructed you: Surgery is scheduled for 9/23/2022 at [facility] [place].     Surgery will be at [facility],  The [address] is [address]. [place]  [id number].      Pre admit office will call the afternoon prior to surgery between 1PM and 3PM with arrival time.          IMPORTANT INSTRUCTIONS!    Do not eat, drink, or smoke after 12 midnight, including water.   OK to brush teeth, but no gum, candy, or mints!      Take only these medicines with a small swallow of water-morning of surgery.      NONE.      ____  Do Not wear makeup, mascara nail polish or artificial nails     ____  NO powder, lotions, deodorants, or creams to surgical area.     ____  Please remove all jewelry, including piercings and leave at home. SURGERY WILL BE CANCELLED IF PIERCINGS ARE PRESENT!!!     ____  Dentures, Hearing Aids and Contact Lens will need to be removed prior to the start of surgery.     ____  Please bring photo ID and insurance information to hospital.     ____  You must have transportation, and they MUST stay the entire time. You will not be able to drive due to Anesthesia.       ____  Wear clean, loose-fitting clothing. Allow for dressings/bandages/surgical equipment to fit.     ____  Stop Aspirin, Ibuprofen, Motrin and Aleve at least 5-7 days before surgery, unless otherwise instructed by your doctor, or the nurse. You MAY use Tylenol/acetaminophen until day of surgery.     ____  If you take diabetic medication, do NOT take morning of surgery unless instructed by Doctor. Metformin must be stopped 24 hrs prior to surgery time.      ____ Stop taking any Fish Oil supplements or Vitamins at least 5 days prior to surgery, unless instructed otherwise by your Doctor.               Bathing Instructions: The night before surgery and the morning prior to coming to the hospital:   -Do not shave your face.   -Do not shave pubic hair 7 days  prior to surgery (gyn pt's).   -Do not shave legs/underarms 3 days prior to surgery.   -Shower & Rinse your body as usual with anti-bacterial Soap (Dial, Lever 2000, or Hibiclens)   -Do not use hibiclens on your head, face, or genitals.   -Do not wash with anti-bacterial soap after you use the hibiclens.   -Rinse your body thoroughly.          Pediatric patients do not need to use anti-bacterial soap or Hibiclens.                  [facility] Visitor/Ride Policy:   Only 1 adult allowed (over the age of 18) to accompany you into Pre-op/Recovery Surgery Dept and must stay through the entire length of admission.     Must have a ride home from a responsible adult that you know and trust.      Pediatric Patients are allowed 2 adult visitors.     Medical Transport, Uber or Lyft can only be used if patient has a responsible adult to accompany them during ride home.           Post-Op Instructions: You will receive surgery post-op instructions by your Discharge Nurse prior to going home.     Surgical Site Infection:   Prevention of surgical site infections:   -Keep incisions clean and dry.   -Do not soak/submerge incisions in water until completely healed.   -Do not apply lotions, powders, creams, or deodorants to site.   -Always make sure hands are cleaned with antibacterial soap/ alcohol-based   prior to touching the surgical site.       Signs and symptoms:               -Redness and pain around the area where you had surgery               -Drainage of cloudy fluid from your surgical wound               -Fever over 100.4 or chills     >>>Call Surgeon office/on-call Surgeon if you experience any of these signs & symptoms post-surgery.        *Please Call [facility] Pre-Admissions Department with surgery instruction questions at [phone].     *Insurance Questions, please call [phone] or [phone]    Spoke about pre op process and surgery instructions, verbalized understanding.

## 2022-09-21 ENCOUNTER — ANESTHESIA EVENT (OUTPATIENT)
Dept: SURGERY | Facility: HOSPITAL | Age: 37
End: 2022-09-21
Payer: OTHER GOVERNMENT

## 2022-09-21 NOTE — ANESTHESIA PREPROCEDURE EVALUATION
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  Past Surgical History:   Procedure Laterality Date    ACHILLES TENDON SURGERY      AXILLARY NODE DISSECTION Right 04/25/2022    Procedure: LYMPHADENECTOMY, AXILLARY;  Surgeon: Micheal Bartholomew MD;  Location: [facility] OR;  Service: General;  Laterality: Right;  Right axillary node dissection    BILATERAL MASTECTOMY Bilateral 04/25/2022    Procedure: MASTECTOMY, BILATERAL;  Surgeon: Micheal Bartholomew MD;  Location: [facility] OR;  Service: General;  Laterality: Bilateral;    COLONOSCOPY N/A 03/28/2022    Procedure: COLONOSCOPY;  Surgeon: Winnie Polanco MD;  Location: [facility] ENDO;  Service: Endoscopy;  Laterality: N/A;    DEBRIDEMENT, BREAST Right 05/30/2022    Procedure: DEBRIDEMENT, BREAST;  Surgeon: Esther Carpio MD;  Location: [facility] OR;  Service: General;  Laterality: Right;  FLAP WASHOUT AND CLOSURE    EXTERNAL EAR SURGERY      cosmetic    INSERTION OF BREAST TISSUE EXPANDER Bilateral 04/25/2022    Procedure: INSERTION, TISSUE EXPANDER, BREAST;  Surgeon: Micheal Bartholomew MD;  Location: [facility] OR;  Service: General;  Laterality: Bilateral;  Procedure performed by KRAIG Carpio    sciatic nerve exploration      SENTINEL LYMPH NODE BIOPSY Right 04/25/2022    Procedure: BIOPSY, LYMPH NODE, SENTINEL;  Surgeon: Micheal Bartholomew MD;  Location: [facility] OR;  Service: General;  Laterality: Right;    XI ROBOTIC HYSTERECTOMY, WITH SALPINGO-OOPHORECTOMY Bilateral 07/01/2022    Procedure: XI ROBOTIC HYSTERECTOMY,WITH SALPINGO-OOPHORECTOMY;  Surgeon: CORDELL Bowen MD;  Location: [facility] OR;  Service: OB/GYN;  Laterality: Bilateral;           Pre-op Assessment    I have reviewed the Patient Summary Reports.     I have reviewed the Nursing Notes. I have reviewed  the NPO Status.   I have reviewed the Medications.     Review of Systems  Anesthesia Hx:  No problems with previous Anesthesia Easy mask vent. intubated without problems, small mouth.  History of prior surgery of interest to airway management or planning: Previous anesthesia: General Denies Family Hx of Anesthesia complications.   Denies Personal Hx of Anesthesia complications.   Social:  Non-Smoker, No Alcohol Use    Hematology/Oncology:  Hematology Normal      Current/Recent Cancer. Breast   Cardiovascular:   hyperlipidemia    Pulmonary:  Pulmonary Normal    Renal/:  Renal/ Normal     Hepatic/GI:  Hepatic/GI Normal    Neurological:   Headaches    Endocrine:  Endocrine Normal    Psych:  Psychiatric Normal           Physical Exam  General: Well nourished, Cooperative, Alert and Oriented    Airway:  Mallampati: III   Mouth Opening: Small, but > 3cm  TM Distance: Normal  Tongue: Normal  Neck ROM: Normal ROM    Dental:  Intact    Chest/Lungs:  Clear to auscultation, Normal Respiratory Rate    Heart:  Rate: Normal  Rhythm: Regular Rhythm      Wt Readings from Last 3 Encounters:   08/19/22 45.8 kg (100 lb 15.5 oz)   07/27/22 48.3 kg (106 lb 7.7 oz)   07/01/22 49.5 kg (109 lb 2 oz)     Temp Readings from Last 3 Encounters:   08/19/22 36.5 °C (97.7 °F)   07/01/22 36.5 °C (97.7 °F) (Temporal)   06/20/22 36.4 °C (97.5 °F) (Tympanic)     BP Readings from Last 3 Encounters:   08/19/22 110/72   07/27/22 104/70   07/01/22 115/68     Pulse Readings from Last 3 Encounters:   08/19/22 76   07/01/22 62   06/20/22 65     Lab Results   Component Value Date    WBC 8.23 06/29/2022    HGB 12.9 06/29/2022    HCT 40.9 06/29/2022     (H) 06/29/2022     06/29/2022       CMP  Sodium   Date Value Ref Range Status   08/12/2022 142 136 - 145 mmol/L Final     Potassium   Date Value Ref Range Status   08/12/2022 4.8 3.5 - 5.1 mmol/L Final     Chloride   Date Value Ref Range Status   08/12/2022 103 95 - 110 mmol/L Final     CO2    Date Value Ref Range Status   08/12/2022 26 23 - 29 mmol/L Final     Glucose   Date Value Ref Range Status   08/12/2022 87 70 - 110 mg/dL Final     BUN   Date Value Ref Range Status   08/12/2022 11 6 - 20 mg/dL Final     Creatinine   Date Value Ref Range Status   08/12/2022 0.7 0.5 - 1.4 mg/dL Final     Calcium   Date Value Ref Range Status   08/12/2022 10.5 8.7 - 10.5 mg/dL Final     Total Protein   Date Value Ref Range Status   08/12/2022 7.4 6.0 - 8.4 g/dL Final     Albumin   Date Value Ref Range Status   08/12/2022 4.8 3.5 - 5.2 g/dL Final     Total Bilirubin   Date Value Ref Range Status   08/12/2022 0.4 0.1 - 1.0 mg/dL Final     Comment:     For infants and newborns, interpretation of results should be based  on gestational age, weight and in agreement with clinical  observations.    Premature Infant recommended reference ranges:  Up to 24 hours.............<8.0 mg/dL  Up to 48 hours............<12.0 mg/dL  3-5 days..................<15.0 mg/dL  6-29 days.................<15.0 mg/dL       Alkaline Phosphatase   Date Value Ref Range Status   08/12/2022 67 55 - 135 U/L Final     AST   Date Value Ref Range Status   08/12/2022 23 10 - 40 U/L Final     ALT   Date Value Ref Range Status   08/12/2022 27 10 - 44 U/L Final     Anion Gap   Date Value Ref Range Status   08/12/2022 13 8 - 16 mmol/L Final     eGFR if    Date Value Ref Range Status   06/29/2022 >60.0 >60 mL/min/1.73 m^2 Final     eGFR if non    Date Value Ref Range Status   06/29/2022 >60.0 >60 mL/min/1.73 m^2 Final     Comment:     Calculation used to obtain the estimated glomerular filtration  rate (eGFR) is the CKD-EPI equation.            Anesthesia Plan  Type of Anesthesia, risks & benefits discussed:    Anesthesia Type: Gen ETT  Intra-op Monitoring Plan: Standard ASA Monitors  Post Op Pain Control Plan: multimodal analgesia and IV/PO Opioids PRN  Induction:  IV  ASA Score: 2  Day of Surgery Review of History &  Physical: H&P Update referred to the surgeon/provider.    Ready For Surgery From Anesthesia Perspective.     .

## 2022-09-23 ENCOUNTER — HOSPITAL ENCOUNTER (OUTPATIENT)
Facility: HOSPITAL | Age: 37
Discharge: HOME | End: 2022-09-23
Attending: STUDENT IN AN ORGANIZED HEALTH CARE EDUCATION/TRAINING PROGRAM | Admitting: STUDENT IN AN ORGANIZED HEALTH CARE EDUCATION/TRAINING PROGRAM
Payer: OTHER GOVERNMENT

## 2022-09-23 ENCOUNTER — ANESTHESIA (OUTPATIENT)
Dept: SURGERY | Facility: HOSPITAL | Age: 37
End: 2022-09-23
Payer: OTHER GOVERNMENT

## 2022-09-23 VITALS
DIASTOLIC BLOOD PRESSURE: 72 MMHG | WEIGHT: 97.25 LBS | OXYGEN SATURATION: 98 % | RESPIRATION RATE: 20 BRPM | HEART RATE: 90 BPM | BODY MASS INDEX: 19.6 KG/M2 | TEMPERATURE: 97 F | SYSTOLIC BLOOD PRESSURE: 118 MMHG | HEIGHT: 59 IN

## 2022-09-23 DIAGNOSIS — Z15.09 BIALLELIC MUTATION OF CHEK2 GENE WITHOUT DIAGNOSED MALIGNANCY: Primary | ICD-10-CM

## 2022-09-23 DIAGNOSIS — D05.11 DUCTAL CARCINOMA IN SITU (DCIS) OF RIGHT BREAST: ICD-10-CM

## 2022-09-23 DIAGNOSIS — Z15.01 BIALLELIC MUTATION OF CHEK2 GENE WITHOUT DIAGNOSED MALIGNANCY: Primary | ICD-10-CM

## 2022-09-23 DIAGNOSIS — Z15.89 BIALLELIC MUTATION OF CHEK2 GENE WITHOUT DIAGNOSED MALIGNANCY: Primary | ICD-10-CM

## 2022-09-23 PROCEDURE — 71000016 HC POSTOP RECOV ADDL HR: Performed by: STUDENT IN AN ORGANIZED HEALTH CARE EDUCATION/TRAINING PROGRAM

## 2022-09-23 PROCEDURE — 63600175 PHARM REV CODE 636 W HCPCS: Performed by: STUDENT IN AN ORGANIZED HEALTH CARE EDUCATION/TRAINING PROGRAM

## 2022-09-23 PROCEDURE — 63600175 PHARM REV CODE 636 W HCPCS: Performed by: NURSE ANESTHETIST, CERTIFIED REGISTERED

## 2022-09-23 PROCEDURE — 88300 PR  SURG PATH,GROSS,LEVEL I: ICD-10-PCS | Mod: 26,,, | Performed by: PATHOLOGY

## 2022-09-23 PROCEDURE — D9220A PRA ANESTHESIA: Mod: CRNA,,, | Performed by: NURSE ANESTHETIST, CERTIFIED REGISTERED

## 2022-09-23 PROCEDURE — 71000039 HC RECOVERY, EACH ADD'L HOUR: Performed by: STUDENT IN AN ORGANIZED HEALTH CARE EDUCATION/TRAINING PROGRAM

## 2022-09-23 PROCEDURE — 25000003 PHARM REV CODE 250: Performed by: STUDENT IN AN ORGANIZED HEALTH CARE EDUCATION/TRAINING PROGRAM

## 2022-09-23 PROCEDURE — 88300 SURGICAL PATH GROSS: CPT | Mod: 59 | Performed by: PATHOLOGY

## 2022-09-23 PROCEDURE — 27201423 OPTIME MED/SURG SUP & DEVICES STERILE SUPPLY: Performed by: STUDENT IN AN ORGANIZED HEALTH CARE EDUCATION/TRAINING PROGRAM

## 2022-09-23 PROCEDURE — D9220A PRA ANESTHESIA: ICD-10-PCS | Mod: ANES,,, | Performed by: ANESTHESIOLOGY

## 2022-09-23 PROCEDURE — D9220A PRA ANESTHESIA: Mod: ANES,,, | Performed by: ANESTHESIOLOGY

## 2022-09-23 PROCEDURE — 36000707: Performed by: STUDENT IN AN ORGANIZED HEALTH CARE EDUCATION/TRAINING PROGRAM

## 2022-09-23 PROCEDURE — D9220A PRA ANESTHESIA: ICD-10-PCS | Mod: CRNA,,, | Performed by: NURSE ANESTHETIST, CERTIFIED REGISTERED

## 2022-09-23 PROCEDURE — 00400 ANES INTEGUMENTARY SYS NOS: CPT | Performed by: STUDENT IN AN ORGANIZED HEALTH CARE EDUCATION/TRAINING PROGRAM

## 2022-09-23 PROCEDURE — 71000015 HC POSTOP RECOV 1ST HR: Performed by: STUDENT IN AN ORGANIZED HEALTH CARE EDUCATION/TRAINING PROGRAM

## 2022-09-23 PROCEDURE — C1789 PROSTHESIS, BREAST, IMP: HCPCS | Performed by: STUDENT IN AN ORGANIZED HEALTH CARE EDUCATION/TRAINING PROGRAM

## 2022-09-23 PROCEDURE — 88300 SURGICAL PATH GROSS: CPT | Mod: 26,,, | Performed by: PATHOLOGY

## 2022-09-23 PROCEDURE — 63600175 PHARM REV CODE 636 W HCPCS: Performed by: ANESTHESIOLOGY

## 2022-09-23 PROCEDURE — 36000706: Performed by: STUDENT IN AN ORGANIZED HEALTH CARE EDUCATION/TRAINING PROGRAM

## 2022-09-23 PROCEDURE — 37000008 HC ANESTHESIA 1ST 15 MINUTES: Performed by: STUDENT IN AN ORGANIZED HEALTH CARE EDUCATION/TRAINING PROGRAM

## 2022-09-23 PROCEDURE — 37000009 HC ANESTHESIA EA ADD 15 MINS: Performed by: STUDENT IN AN ORGANIZED HEALTH CARE EDUCATION/TRAINING PROGRAM

## 2022-09-23 PROCEDURE — 71000033 HC RECOVERY, INTIAL HOUR: Performed by: STUDENT IN AN ORGANIZED HEALTH CARE EDUCATION/TRAINING PROGRAM

## 2022-09-23 PROCEDURE — 25000003 PHARM REV CODE 250: Performed by: NURSE ANESTHETIST, CERTIFIED REGISTERED

## 2022-09-23 DEVICE — IMPLANTABLE DEVICE: Type: IMPLANTABLE DEVICE | Site: BREAST | Status: FUNCTIONAL

## 2022-09-23 RX ORDER — ALBUTEROL SULFATE 0.83 MG/ML
2.5 SOLUTION RESPIRATORY (INHALATION) EVERY 4 HOURS PRN
Status: DISCONTINUED | OUTPATIENT
Start: 2022-09-23 | End: 2022-09-23 | Stop reason: HOSPADM

## 2022-09-23 RX ORDER — ONDANSETRON 2 MG/ML
INJECTION INTRAMUSCULAR; INTRAVENOUS
Status: DISCONTINUED | OUTPATIENT
Start: 2022-09-23 | End: 2022-09-23

## 2022-09-23 RX ORDER — EPINEPHRINE 1 MG/ML
INJECTION, SOLUTION INTRACARDIAC; INTRAMUSCULAR; INTRAVENOUS; SUBCUTANEOUS
Status: DISCONTINUED
Start: 2022-09-23 | End: 2022-09-23 | Stop reason: HOSPADM

## 2022-09-23 RX ORDER — SODIUM CHLORIDE, SODIUM LACTATE, POTASSIUM CHLORIDE, CALCIUM CHLORIDE 600; 310; 30; 20 MG/100ML; MG/100ML; MG/100ML; MG/100ML
INJECTION, SOLUTION INTRAVENOUS CONTINUOUS
Status: DISCONTINUED | OUTPATIENT
Start: 2022-09-23 | End: 2022-12-27

## 2022-09-23 RX ORDER — HYDROMORPHONE HYDROCHLORIDE 2 MG/ML
0.5 INJECTION, SOLUTION INTRAMUSCULAR; INTRAVENOUS; SUBCUTANEOUS EVERY 10 MIN PRN
Status: DISCONTINUED | OUTPATIENT
Start: 2022-09-23 | End: 2022-09-23 | Stop reason: HOSPADM

## 2022-09-23 RX ORDER — ONDANSETRON 2 MG/ML
4 INJECTION INTRAMUSCULAR; INTRAVENOUS ONCE AS NEEDED
Status: COMPLETED | OUTPATIENT
Start: 2022-09-23 | End: 2022-09-23

## 2022-09-23 RX ORDER — HYDROCODONE BITARTRATE AND ACETAMINOPHEN 5; 325 MG/1; MG/1
1 TABLET ORAL
Status: DISCONTINUED | OUTPATIENT
Start: 2022-09-23 | End: 2022-09-23 | Stop reason: HOSPADM

## 2022-09-23 RX ORDER — BUPIVACAINE HYDROCHLORIDE AND EPINEPHRINE 2.5; 5 MG/ML; UG/ML
INJECTION, SOLUTION EPIDURAL; INFILTRATION; INTRACAUDAL; PERINEURAL
Status: DISCONTINUED | OUTPATIENT
Start: 2022-09-23 | End: 2022-09-23 | Stop reason: HOSPADM

## 2022-09-23 RX ORDER — PROPOFOL 10 MG/ML
VIAL (ML) INTRAVENOUS
Status: DISCONTINUED | OUTPATIENT
Start: 2022-09-23 | End: 2022-09-23

## 2022-09-23 RX ORDER — KETOROLAC TROMETHAMINE 30 MG/ML
15 INJECTION, SOLUTION INTRAMUSCULAR; INTRAVENOUS ONCE
Status: COMPLETED | OUTPATIENT
Start: 2022-09-23 | End: 2022-09-23

## 2022-09-23 RX ORDER — DEXAMETHASONE SODIUM PHOSPHATE 4 MG/ML
INJECTION, SOLUTION INTRA-ARTICULAR; INTRALESIONAL; INTRAMUSCULAR; INTRAVENOUS; SOFT TISSUE
Status: DISCONTINUED | OUTPATIENT
Start: 2022-09-23 | End: 2022-09-23

## 2022-09-23 RX ORDER — FENTANYL CITRATE 50 UG/ML
INJECTION, SOLUTION INTRAMUSCULAR; INTRAVENOUS
Status: DISCONTINUED | OUTPATIENT
Start: 2022-09-23 | End: 2022-09-23

## 2022-09-23 RX ORDER — BUPIVACAINE HYDROCHLORIDE AND EPINEPHRINE 2.5; 5 MG/ML; UG/ML
INJECTION, SOLUTION EPIDURAL; INFILTRATION; INTRACAUDAL; PERINEURAL
Status: DISCONTINUED
Start: 2022-09-23 | End: 2022-09-23 | Stop reason: HOSPADM

## 2022-09-23 RX ORDER — DEXMEDETOMIDINE HYDROCHLORIDE 100 UG/ML
INJECTION, SOLUTION INTRAVENOUS
Status: DISCONTINUED | OUTPATIENT
Start: 2022-09-23 | End: 2022-09-23

## 2022-09-23 RX ORDER — NEOSTIGMINE METHYLSULFATE 1 MG/ML
INJECTION, SOLUTION INTRAVENOUS
Status: DISCONTINUED | OUTPATIENT
Start: 2022-09-23 | End: 2022-09-23

## 2022-09-23 RX ORDER — ROCURONIUM BROMIDE 10 MG/ML
INJECTION, SOLUTION INTRAVENOUS
Status: DISCONTINUED | OUTPATIENT
Start: 2022-09-23 | End: 2022-09-23

## 2022-09-23 RX ORDER — DIAZEPAM 10 MG/2ML
2.5 INJECTION INTRAMUSCULAR
Status: DISCONTINUED | OUTPATIENT
Start: 2022-09-23 | End: 2022-09-23 | Stop reason: HOSPADM

## 2022-09-23 RX ORDER — CYCLOBENZAPRINE HCL 5 MG
10 TABLET ORAL ONCE AS NEEDED
Status: DISCONTINUED | OUTPATIENT
Start: 2022-09-23 | End: 2022-09-23 | Stop reason: HOSPADM

## 2022-09-23 RX ORDER — GABAPENTIN 100 MG/1
100 CAPSULE ORAL ONCE
Status: COMPLETED | OUTPATIENT
Start: 2022-09-23 | End: 2022-09-23

## 2022-09-23 RX ORDER — LIDOCAINE HYDROCHLORIDE AND EPINEPHRINE 20; 10 MG/ML; UG/ML
INJECTION, SOLUTION INFILTRATION; PERINEURAL
Status: DISCONTINUED | OUTPATIENT
Start: 2022-09-23 | End: 2022-09-23 | Stop reason: HOSPADM

## 2022-09-23 RX ORDER — GENTAMICIN SULFATE 40 MG/ML
INJECTION, SOLUTION INTRAMUSCULAR; INTRAVENOUS
Status: DISCONTINUED
Start: 2022-09-23 | End: 2022-09-23 | Stop reason: HOSPADM

## 2022-09-23 RX ORDER — FENTANYL CITRATE 50 UG/ML
25 INJECTION, SOLUTION INTRAMUSCULAR; INTRAVENOUS EVERY 5 MIN PRN
Status: DISCONTINUED | OUTPATIENT
Start: 2022-09-23 | End: 2022-09-23 | Stop reason: HOSPADM

## 2022-09-23 RX ORDER — MEPERIDINE HYDROCHLORIDE 25 MG/ML
12.5 INJECTION INTRAMUSCULAR; INTRAVENOUS; SUBCUTANEOUS ONCE
Status: DISCONTINUED | OUTPATIENT
Start: 2022-09-23 | End: 2022-09-23 | Stop reason: HOSPADM

## 2022-09-23 RX ORDER — METHYLENE BLUE 5 MG/ML
INJECTION INTRAVENOUS
Status: DISCONTINUED
Start: 2022-09-23 | End: 2022-09-23 | Stop reason: WASHOUT

## 2022-09-23 RX ORDER — CEFAZOLIN SODIUM 1 G/50ML
SOLUTION INTRAVENOUS
Status: DISCONTINUED
Start: 2022-09-23 | End: 2022-09-23 | Stop reason: HOSPADM

## 2022-09-23 RX ORDER — EPHEDRINE SULFATE 50 MG/ML
INJECTION, SOLUTION INTRAVENOUS
Status: DISCONTINUED | OUTPATIENT
Start: 2022-09-23 | End: 2022-09-23

## 2022-09-23 RX ORDER — LIDOCAINE HYDROCHLORIDE 20 MG/ML
INJECTION INTRAVENOUS
Status: DISCONTINUED | OUTPATIENT
Start: 2022-09-23 | End: 2022-09-23

## 2022-09-23 RX ORDER — ACETAMINOPHEN 10 MG/ML
INJECTION, SOLUTION INTRAVENOUS
Status: DISCONTINUED | OUTPATIENT
Start: 2022-09-23 | End: 2022-09-23

## 2022-09-23 RX ORDER — LIDOCAINE HYDROCHLORIDE 10 MG/ML
INJECTION, SOLUTION EPIDURAL; INFILTRATION; INTRACAUDAL; PERINEURAL
Status: DISCONTINUED
Start: 2022-09-23 | End: 2022-09-23 | Stop reason: WASHOUT

## 2022-09-23 RX ORDER — DIPHENHYDRAMINE HYDROCHLORIDE 50 MG/ML
25 INJECTION INTRAMUSCULAR; INTRAVENOUS EVERY 6 HOURS PRN
Status: DISCONTINUED | OUTPATIENT
Start: 2022-09-23 | End: 2022-09-23 | Stop reason: HOSPADM

## 2022-09-23 RX ORDER — SUCCINYLCHOLINE CHLORIDE 20 MG/ML
INJECTION INTRAMUSCULAR; INTRAVENOUS
Status: DISCONTINUED | OUTPATIENT
Start: 2022-09-23 | End: 2022-09-23

## 2022-09-23 RX ORDER — LIDOCAINE HYDROCHLORIDE 20 MG/ML
INJECTION, SOLUTION INFILTRATION; PERINEURAL
Status: DISCONTINUED
Start: 2022-09-23 | End: 2022-09-23 | Stop reason: HOSPADM

## 2022-09-23 RX ORDER — NEOMYCIN AND POLYMYXIN B SULFATES 40; 200000 MG/ML; [USP'U]/ML
SOLUTION IRRIGATION
Status: DISCONTINUED
Start: 2022-09-23 | End: 2022-09-23 | Stop reason: WASHOUT

## 2022-09-23 RX ORDER — EPINEPHRINE 1 MG/ML
INJECTION, SOLUTION INTRACARDIAC; INTRAMUSCULAR; INTRAVENOUS; SUBCUTANEOUS
Status: DISCONTINUED | OUTPATIENT
Start: 2022-09-23 | End: 2022-09-23 | Stop reason: HOSPADM

## 2022-09-23 RX ORDER — CEFAZOLIN SODIUM 1 G/3ML
INJECTION, POWDER, FOR SOLUTION INTRAMUSCULAR; INTRAVENOUS
Status: DISCONTINUED | OUTPATIENT
Start: 2022-09-23 | End: 2022-09-23 | Stop reason: HOSPADM

## 2022-09-23 RX ORDER — BACITRACIN ZINC 500 UNIT/G
OINTMENT (GRAM) TOPICAL
Status: DISCONTINUED
Start: 2022-09-23 | End: 2022-09-23 | Stop reason: WASHOUT

## 2022-09-23 RX ORDER — MIDAZOLAM HYDROCHLORIDE 1 MG/ML
INJECTION, SOLUTION INTRAMUSCULAR; INTRAVENOUS
Status: DISCONTINUED | OUTPATIENT
Start: 2022-09-23 | End: 2022-09-23

## 2022-09-23 RX ADMIN — SUCCINYLCHOLINE CHLORIDE 100 MG: 20 INJECTION, SOLUTION INTRAMUSCULAR; INTRAVENOUS at 07:09

## 2022-09-23 RX ADMIN — ACETAMINOPHEN 1000 MG: 10 INJECTION, SOLUTION INTRAVENOUS at 10:09

## 2022-09-23 RX ADMIN — GABAPENTIN 100 MG: 100 CAPSULE ORAL at 12:09

## 2022-09-23 RX ADMIN — FENTANYL CITRATE 25 MCG: 50 INJECTION, SOLUTION INTRAMUSCULAR; INTRAVENOUS at 11:09

## 2022-09-23 RX ADMIN — FENTANYL CITRATE 25 MCG: 50 INJECTION, SOLUTION INTRAMUSCULAR; INTRAVENOUS at 12:09

## 2022-09-23 RX ADMIN — ROCURONIUM BROMIDE 10 MG: 10 INJECTION, SOLUTION INTRAVENOUS at 08:09

## 2022-09-23 RX ADMIN — MIDAZOLAM 2 MG: 1 INJECTION INTRAMUSCULAR; INTRAVENOUS at 07:09

## 2022-09-23 RX ADMIN — DEXMEDETOMIDINE HYDROCHLORIDE 4 MCG: 100 INJECTION, SOLUTION INTRAVENOUS at 08:09

## 2022-09-23 RX ADMIN — EPHEDRINE SULFATE 5 MG: 50 INJECTION INTRAVENOUS at 10:09

## 2022-09-23 RX ADMIN — EPHEDRINE SULFATE 5 MG: 50 INJECTION INTRAVENOUS at 09:09

## 2022-09-23 RX ADMIN — ROCURONIUM BROMIDE 35 MG: 10 INJECTION, SOLUTION INTRAVENOUS at 07:09

## 2022-09-23 RX ADMIN — DIAZEPAM 2.5 MG: 10 INJECTION, SOLUTION INTRAMUSCULAR; INTRAVENOUS at 01:09

## 2022-09-23 RX ADMIN — HYDROMORPHONE HYDROCHLORIDE 0.5 MG: 2 INJECTION INTRAMUSCULAR; INTRAVENOUS; SUBCUTANEOUS at 12:09

## 2022-09-23 RX ADMIN — DEXMEDETOMIDINE HYDROCHLORIDE 4 MCG: 100 INJECTION, SOLUTION INTRAVENOUS at 11:09

## 2022-09-23 RX ADMIN — PROPOFOL 150 MG: 10 INJECTION, EMULSION INTRAVENOUS at 07:09

## 2022-09-23 RX ADMIN — FENTANYL CITRATE 50 MCG: 50 INJECTION, SOLUTION INTRAMUSCULAR; INTRAVENOUS at 07:09

## 2022-09-23 RX ADMIN — ROCURONIUM BROMIDE 5 MG: 10 INJECTION, SOLUTION INTRAVENOUS at 07:09

## 2022-09-23 RX ADMIN — KETOROLAC TROMETHAMINE 15 MG: 30 INJECTION, SOLUTION INTRAMUSCULAR at 12:09

## 2022-09-23 RX ADMIN — DEXAMETHASONE SODIUM PHOSPHATE 4 MG: 4 INJECTION, SOLUTION INTRA-ARTICULAR; INTRALESIONAL; INTRAMUSCULAR; INTRAVENOUS; SOFT TISSUE at 07:09

## 2022-09-23 RX ADMIN — ONDANSETRON HYDROCHLORIDE 4 MG: 2 SOLUTION INTRAMUSCULAR; INTRAVENOUS at 02:09

## 2022-09-23 RX ADMIN — DEXTROSE 2 G: 50 INJECTION, SOLUTION INTRAVENOUS at 07:09

## 2022-09-23 RX ADMIN — ONDANSETRON 4 MG: 2 INJECTION, SOLUTION INTRAMUSCULAR; INTRAVENOUS at 10:09

## 2022-09-23 RX ADMIN — Medication 50 MG: at 07:09

## 2022-09-23 RX ADMIN — NEOSTIGMINE METHYLSULFATE 4 MG: 1 INJECTION INTRAVENOUS at 11:09

## 2022-09-23 RX ADMIN — GLYCOPYRROLATE 0.4 MG: 0.2 INJECTION, SOLUTION INTRAMUSCULAR; INTRAVITREAL at 11:09

## 2022-09-23 RX ADMIN — FENTANYL CITRATE 50 MCG: 50 INJECTION, SOLUTION INTRAMUSCULAR; INTRAVENOUS at 11:09

## 2022-09-23 RX ADMIN — DEXTROSE 1 G: 50 INJECTION, SOLUTION INTRAVENOUS at 11:09

## 2022-09-23 NOTE — ANESTHESIA PROCEDURE NOTES
Intubation    Date/Time: 9/23/2022 7:16 AM  Performed by: Denisse Joe CRNA  Authorized by: Jessica Delvalle MD     Intubation:     Induction:  Intravenous    Intubated:  Postinduction    Mask Ventilation:  Easy mask    Attempts:  1    Attempted By:  CRNA    Method of Intubation:  Video laryngoscopy    Blade:  Glidescope 3    Laryngeal View Grade: Grade IIA - cords partially seen      Difficult Airway Encountered?: No      Complications:  None    Airway Device:  Oral endotracheal tube    Airway Device Size:  7.0    Style/Cuff Inflation:  Cuffed (inflated to minimal occlusive pressure)    Tube secured:  20    Secured at:  The lips    Placement Verified By:  Capnometry    Complicating Factors:  Anterior larynx, narrow palate, small mouth and large prominent central incisors    Findings Post-Intubation:  BS equal bilateral  Notes:      Intubated with glidescope-small mouth large prominent incisors- no complications noted.

## 2022-09-23 NOTE — PLAN OF CARE
Pt c/o burning pain to L breast and L side, unrelieved by PO gabapentin, IV toradol, and IV fentanyl.  Dr. Delvalle, anesthesia, notified.  Verbal order received.

## 2022-09-23 NOTE — TRANSFER OF CARE
"Anesthesia Transfer of Care Note    Patient: Gina Garcia    Procedure(s) Performed: Procedure(s) (LRB):  TISSUE EXCHANGE TO IMPLANT, CAPSULORRHAPY, CAPSALOTOMY (Bilateral)  BREAST RECONSTRUCTION SECOND STAGE, FAT GRAFTING BILATERAL BREAST (Bilateral)    Patient location: PACU    Anesthesia Type: general    Transport from OR: Transported from OR on room air with adequate spontaneous ventilation    Post pain: adequate analgesia    Post assessment: no apparent anesthetic complications and tolerated procedure well    Post vital signs: stable    Level of consciousness: awake    Nausea/Vomiting: no nausea/vomiting    Complications: none    Transfer of care protocol was followed      Last vitals:   Visit Vitals  /67 (BP Location: Left arm, Patient Position: Lying)   Pulse 72   Temp 36.3 °C (97.3 °F) (Temporal)   Resp 13   Ht 4' 11" (1.499 m)   Wt 44.1 kg (97 lb 3.6 oz)   LMP 06/20/2022   SpO2 96%   Breastfeeding No   BMI 19.64 kg/m²     "

## 2022-09-23 NOTE — PLAN OF CARE
"Pt c/o L breast and L side pain, described as "burning" and "pulling."  Dr. Carpio contacted via telephone, notified of pt complaint. Telephone orders received.    "

## 2022-09-23 NOTE — BRIEF OP NOTE
[facility] - Periop Services  Brief Operative Note    Surgery Date: 9/23/2022     Surgeon(s) and Role:     * Patsy Carpio MD - Primary    Assisting Surgeon: None    Pre-op Diagnosis:  Personal history of malignant neoplasm of breast [Z85.3]    Post-op Diagnosis:  Post-Op Diagnosis Codes:     * Personal history of malignant neoplasm of breast [Z85.3]    Procedure(s) (LRB):  TISSUE EXCHANGE TO IMPLANT, CAPSULORRHAPY, CAPSALOTOMY (Bilateral)  BREAST RECONSTRUCTION SECOND STAGE, FAT GRAFTING BILATERAL BREAST (Bilateral)    Anesthesia: General    Operative Findings: sientra 415  gels placed bilaterally; 100cc R and 80cc L for AFG     Estimated Blood Loss: * No values recorded between 9/23/2022  7:50 AM and 9/23/2022 11:22 AM *         Specimens:   Specimen (24h ago, onward)       Start     Ordered    09/23/22 1028  Specimen to Pathology, Surgery Breast  Once        Comments: Pre-op Diagnosis: Personal history of malignant neoplasm of breast [Z85.3]Procedure(s):TISSUE EXCHANGE TO IMPLANT, CAPSULORRHAPY, CAPSALOTOMYBREAST RECONSTRUCTION SECOND STAGE, FAT GRAFTING BILATERAL BREAST Number of specimens: 2Name of specimens: 1. Left breast tissue expander-GROSS2. Right breast tissue expander-GROSS     References:    Click here for ordering Quick Tip   Question Answer Comment   Procedure Type: Breast    Which provider would you like to cc? PATSY CARPIO    Release to patient Immediate        09/23/22 1029                      Discharge Note    OUTCOME: Patient tolerated treatment/procedure well without complication and is now ready for discharge.    DISPOSITION: Home or Self Care    FINAL DIAGNOSIS:  <principal problem not specified>    FOLLOWUP: In clinic    DISCHARGE INSTRUCTIONS:    Discharge Procedure Orders   Diet Adult Regular     Ice to affected area     Lifting restrictions     Notify your health care provider if you experience any of the following:  temperature >100.4     Notify your health care  provider if you experience any of the following:  persistent nausea and vomiting or diarrhea     Notify your health care provider if you experience any of the following:  severe uncontrolled pain     Notify your health care provider if you experience any of the following:  redness, tenderness, or signs of infection (pain, swelling, redness, odor or green/yellow discharge around incision site)     Notify your health care provider if you experience any of the following:  difficulty breathing or increased cough     Notify your health care provider if you experience any of the following:  worsening rash     Notify your health care provider if you experience any of the following:  persistent dizziness, light-headedness, or visual disturbances     No dressing needed     Shower on day dressing removed (No bath)

## 2022-09-23 NOTE — PLAN OF CARE
Pt reports pain has decreased, burning sensation remains but is not as severe.  Dr. Delvalle notified, verbal order received.

## 2022-09-23 NOTE — DISCHARGE INSTRUCTIONS
Post Op Instructions following Breast Surgery and Fat Grafting    For the Breast:  Your incisions are dressed with tapes.  These are covered with some padded gauze to catch the oozing that will occur in the first 24 hours.  You may remove the additional pads the day after surgery.  Do not remove the steri strip tapes - these will stay on until your post op visit in clinic.  If they begin to fall off on their own, then you may remove them.   If your breasts are covered with clear plastic dressings, leave these on until your clinic visit.  They are water proof so you may shower the day after surgery over this.   You may shower with soap and water the day after surgery.  No bathing, swimming or soaking incisions in water.   You should wear the supportive bra during day and night, at all times except when in shower.  If your garment feels very uncomfortable, please call the office to trouble shoot it with us.   If you have drains, you should empty and record the output of each drain twice a day.  Bring the log record with you to your clinic appointment, as this will help us know when we can remove the drains.    When you get in the shower, you should tie a string around your neck to clip the drains to, so that they dont pull at your sides.      For the fat donor sites:  The liposuction incisions are covered loosely with thick absorptive pads.  You should expect to have drainage of clear-yellow to clear-red fluid from these sites.  This is some of the tumescent fluid from the liposuction and is not blood (even if it is red).  Change the pads as often as necessary to keep them dry.  You may use maxi pads as a cheaper alternative dressing.    You may want to place extra towels on your chairs or bed at home to help keep your sheets clean if there is significant drainage.    You will have bruising and swelling at the liposuction areas.  It may be comfortable for you to wear a compressive garment such as Spanx if you have  them.  This may help the edema (swelling) resolve more quickly.   You may also purchase a lipo roller online (multiple options on [employer]) to help roll out any areas of lumps, bumps or unevenness.  You can start this as soon as your liposuctioned areas are not too tender.     Medications:   You were prescribed an antibiotic.  Take this with food so as not to get nauseated  The narcotic pain medication may also make you nauseated so take it with food.  This medicine will also cause constipation.  As soon as you are able to take ibuprofen or regular tylenol for pain control, try to take this instead of the narcotic medications if possible.   You may take the muscle relaxant if you have muscle spasm or soreness type pain   Take plenty of water and consider taking an over the counter stool softener like Colace if needed to prevent constipation     When to call your physician   Fever > 101 F, increasing swelling in one or both breasts, drainage of >200ml of fluid in a drain per day, persistent nausea or vomiting, uncontrolled severe or worsening pain, persistent or worsening drainage from your incisions or bleeding.  If you have questions or concerns, you may call the office at [phone] during normal business hours, or you may reach Dr Carpio directly at [phone] after 5pm and on weekends.

## 2022-09-23 NOTE — ANESTHESIA POSTPROCEDURE EVALUATION
Anesthesia Post Evaluation    Patient: Gina Garcia    Procedure(s) Performed: Procedure(s) (LRB):  TISSUE EXCHANGE TO IMPLANT, CAPSULORRHAPY, CAPSALOTOMY (Bilateral)  BREAST RECONSTRUCTION SECOND STAGE, FAT GRAFTING BILATERAL BREAST (Bilateral)    Final Anesthesia Type: general      Patient location during evaluation: PACU  Patient participation: Yes- Able to Participate  Level of consciousness: awake and alert and oriented  Post-procedure vital signs: reviewed and stable  Pain management: adequate  Airway patency: patent    PONV status at discharge: No PONV  Anesthetic complications: no      Cardiovascular status: blood pressure returned to baseline, stable and hemodynamically stable  Respiratory status: unassisted  Hydration status: euvolemic  Follow-up not needed.          Vitals Value Taken Time   /56 09/23/22 1359   Temp 36.3 °C (97.3 °F) 09/23/22 1151   Pulse 66 09/23/22 1401   Resp 13 09/23/22 1401   SpO2 96 % 09/23/22 1401   Vitals shown include unvalidated device data.      Event Time   Out of Recovery 13:15:00         Pain/Jimy Score: Pain Rating Prior to Med Admin: 10 (9/23/2022 12:54 PM)  Jimy Score: 10 (9/23/2022  1:45 PM)

## 2022-10-07 NOTE — OP NOTE
[facility] Periop Services  Surgery Department  Operative Note    SUMMARY     Date of Procedure: 9/23/2022     Procedure: Procedure(s) (LRB):  TISSUE EXCHANGE TO IMPLANT, CAPSULORRHAPY, CAPSALOTOMY (Bilateral)  BREAST RECONSTRUCTION SECOND STAGE, FAT GRAFTING BILATERAL BREAST (Bilateral)     Surgeon(s) and Role:     * Esther Carpio MD - Primary    Assisting Surgeon: None    Pre-Operative Diagnosis: Personal history of malignant neoplasm of breast [Z85.3]    Post-Operative Diagnosis: Post-Op Diagnosis Codes:     * Personal history of malignant neoplasm of breast [Z85.3]    Anesthesia: General    Operative Findings (including complications, if any):   Sientral 107 415cc HP silicone gel implants placed bilaterally   100cc AFG to the right breast and 80cc injected to the left breast     Description of Technical Procedures:   The patient was seen in the preoperative holding area where the history and physical were reviewed and informed consent was confirmed.  Surgical markings were applied as usual. The patient was taken to the operating room and placed on the table supine and frog legged and all appropriate pressure points were padded. General anesthesia was induced without event.  A gary was not placed and preoperative antibiotics were given.  A time out was called prior to beginning the procedure.      I began with injecting tumescent in the donor site for fat grafting.  I used the bilateral inner and outer thighs as well as the lower abdomen and mons for donor site.  Stab incisions were made with the 11 blade scalpel in the groin creases, upper inner thigh and upper outer thigh/hip after first injecting with local anesthesia.  A total of 1000 cc's were injected for tumescent, which was Kleins solution.  I then suctioned 350 cc's of lipoaspirate into the Revolve system.  The lipoaspirate was washed with a liter of lactated ringers and then the fat  from the aspirate.  A total of 200-250 cc of  autologous fat was harvested for use.  It was processed in the usual manner and prepared for injection.      The tissue expanders were removed through making an incision in the inframammary folds where I intended to remove some of her excess skin and revise the scar.  Scalpel was used to cut the skin and then the Bovie used to dissect down to the level of the capsule.  The capsule was incised with the Bovie.  The expander was intact and suture tabs were cut and freed as needed.  The expander was then sent off the field and discarded.  The capsule and pocket were inspected and found to be normal and without any pathology.  Superomedial capsulotomies were made to release the tight scar here.  Other capsule work included bilateral superior and superomedial capsulotomies until healthy yellow fat was seen and then bilateral lateral thermal capsulorrhaphies.  This was done with the Bovie on 80.     I then selected the 415cc high profile sizer and placed it into the left breast.  I was happy with this so removed it and realized that the permanent implant had been opened, not the sizer.  The implant was then placed in triple antibiotic solution.  A similar procedure was done on the contralateral breast and the 415cc HP sizer was placed on this side. Then with one temporary gel sizer and one implant in place, I sat the patient up to confirm the pockets were symmetric and confirmed where I would inject the fat.  I then removed the permanent implant and soaked it in triple antibiotic solution and used the sizer in each breast while doing the fat grafting.  I injected autologous fat into each breast using the fat grafting cannulas and 10cc syringes, focusing on the superior mastectomy flap, medial breast border, and throughout the mastectomy flap.  Injections were done in multiple directions so as to achieve some cross-hatching of the fat grafts.  Care was taken to keep the autologous fat in the plane between the capsule and  skin, and care was taken to inject such that large collections of fat were avoided.      Once I was satisfied with the fat injection, the pocket on each side was copiously irrigated with saline and checked for hemostasis.  I then irrigated each pocket with at least 500cc of triple antibiotic solution and let some of this dwell inside.  The skin of the bilateral chest was then wiped with a lap soaked in triple antibiotic solution and then a large Ioban drape was placed over the breasts and chest to create a more sterile field.  I and the scrub tech changed into new gloves and the instruments used for implant insertion were wiped down with triple antibiotic solution.  The second implant was opened and bathed in a little triple antibiotic solution.  The ioban was cut over the incision and then the implant was placed with a no-touch technique.  The capsule was closed down with a 3-0 PDS suture.  The deep dermis of the incision was closed with interrupted 3-0 monocryl sutures.  Once the implant was closed off I turned attention to the contralateral breast and placed the second implant using an identical technique.  Once the deep dermis was closed the final layer was closed with a 4-0 Monocryl in the running subcuticular plane. Before closing the incision completely I did extensive scar revisions on the bilateral breast - on the right breast this was a 15cm scar revision on the lateral aspect of the IMF extending through the lateral chest; on the left breast this was 15cm on the medial aspect of the IMF and 30cm laterally which extended almost to the axilla.       The injection points for the fat grafting were closed with 5-0 chromic.                     Significant Surgical Tasks Conducted by the Assistant(s), if Applicable: na    Estimated Blood Loss (EBL): * No values recorded between 9/23/2022  7:50 AM and 9/23/2022 11:50 AM *           Implants:   Implant Name Type Inv. Item Serial No.  Lot No. LRB No.  Used Action   Silicone Gel Breast Implant, Smooth Round, High Projection, HSC+ High-Strength Cohesive Gel    SIENTRA [id number] Right 1 Implanted   Silicone Gel Breast Implant, Smooth Round, High Projection, HSC+ High-Strength Cohesive Gel    [employer]TRA [id number] Left 1 Implanted       Specimens:   Specimen (24h ago, onward)      None                    Condition: Good    Disposition: PACU - hemodynamically stable.    Attestation: I was present and scrubbed for the entire procedure.

## 2022-10-11 LAB
FINAL PATHOLOGIC DIAGNOSIS: NORMAL
GROSS: NORMAL
Lab: NORMAL

## 2022-10-21 ENCOUNTER — OFFICE VISIT (OUTPATIENT)
Dept: OPHTHALMOLOGY | Facility: CLINIC | Age: 37
End: 2022-10-21
Payer: OTHER GOVERNMENT

## 2022-10-21 DIAGNOSIS — H04.123 DRY EYE SYNDROME, BILATERAL: ICD-10-CM

## 2022-10-21 DIAGNOSIS — H57.12 EYE PAIN, LEFT: ICD-10-CM

## 2022-10-21 DIAGNOSIS — H40.053 OCULAR HYPERTENSION, BILATERAL: Primary | ICD-10-CM

## 2022-10-21 PROCEDURE — 92020 PR SPECIAL EYE EVAL,GONIOSCOPY: ICD-10-PCS | Mod: S$PBB,,, | Performed by: OPTOMETRIST

## 2022-10-21 PROCEDURE — 92012 PR EYE EXAM, EST PATIENT,INTERMED: ICD-10-PCS | Mod: S$PBB,,, | Performed by: OPTOMETRIST

## 2022-10-21 PROCEDURE — 92020 GONIOSCOPY: CPT | Mod: S$PBB,,, | Performed by: OPTOMETRIST

## 2022-10-21 PROCEDURE — 99999 PR PBB SHADOW E&M-EST. PATIENT-LVL II: ICD-10-PCS | Mod: PBBFAC,,, | Performed by: OPTOMETRIST

## 2022-10-21 PROCEDURE — 99212 OFFICE O/P EST SF 10 MIN: CPT | Mod: PBBFAC,PO | Performed by: OPTOMETRIST

## 2022-10-21 PROCEDURE — 92020 GONIOSCOPY: CPT | Mod: PBBFAC,PO | Performed by: OPTOMETRIST

## 2022-10-21 PROCEDURE — 99999 PR PBB SHADOW E&M-EST. PATIENT-LVL II: CPT | Mod: PBBFAC,,, | Performed by: OPTOMETRIST

## 2022-10-21 PROCEDURE — 92012 INTRM OPH EXAM EST PATIENT: CPT | Mod: S$PBB,,, | Performed by: OPTOMETRIST

## 2022-10-21 NOTE — Clinical Note
Can we please get this patient in with [facility] for IOP check and gonio eval due to ocular hypertension.

## 2022-10-21 NOTE — PROGRESS NOTES
HPI    Last seen by RADHAT  Patient here today for 2 month dry eye follow up   Using PF tears, Systane Gel, Systane Ultra, Omega 3, Cellulics   Eyes aren't better c/o tiredness OS with mild irritation and burning   almost like pressure    Last edited by Sharita Cunningham, PCT on 10/21/2022  9:36 AM.            Assessment /Plan     For exam results, see Encounter Report.    Ocular hypertension, bilateral [H40.053 (ICD-10-CM)]  Elevated IOP on examination today, not present at previous visit. Patient states she notices eye pain in OS>OD progressively worsening, possible narrow angle though difficulty opening eye during gonioscopy today. Consult Dr ALFARO for IOP check and repeat gonio. gOCT today normal.     Eye pain, left  See above.     Dry eye syndrome, bilateral  Discussed warm compresses twice daily and lid hygiene, with preservative free artificial tears instillation four times daily. Patient to return to clinic if no improvement in symptoms with current treatment.     RTC with Dr CPG for IOP check with gonio

## 2022-11-21 ENCOUNTER — OFFICE VISIT (OUTPATIENT)
Dept: OPHTHALMOLOGY | Facility: CLINIC | Age: 37
End: 2022-11-21
Payer: OTHER GOVERNMENT

## 2022-11-21 DIAGNOSIS — H40.013 OPEN ANGLE WITH BORDERLINE FINDINGS AND LOW GLAUCOMA RISK IN BOTH EYES: Primary | ICD-10-CM

## 2022-11-21 DIAGNOSIS — H40.053 OCULAR HYPERTENSION, BILATERAL: ICD-10-CM

## 2022-11-21 DIAGNOSIS — H57.12 EYE PAIN, LEFT: ICD-10-CM

## 2022-11-21 DIAGNOSIS — H04.123 DRY EYE SYNDROME, BILATERAL: ICD-10-CM

## 2022-11-21 PROCEDURE — 92002 INTRM OPH EXAM NEW PATIENT: CPT | Mod: S$PBB,,, | Performed by: OPHTHALMOLOGY

## 2022-11-21 PROCEDURE — 92002 PR EYE EXAM, NEW PATIENT,INTERMED: ICD-10-PCS | Mod: S$PBB,,, | Performed by: OPHTHALMOLOGY

## 2022-11-21 PROCEDURE — 99999 PR PBB SHADOW E&M-EST. PATIENT-LVL III: ICD-10-PCS | Mod: PBBFAC,,, | Performed by: OPHTHALMOLOGY

## 2022-11-21 PROCEDURE — 92133 CPTRZD OPH DX IMG PST SGM ON: CPT | Mod: PBBFAC | Performed by: OPHTHALMOLOGY

## 2022-11-21 PROCEDURE — 99999 PR PBB SHADOW E&M-EST. PATIENT-LVL III: CPT | Mod: PBBFAC,,, | Performed by: OPHTHALMOLOGY

## 2022-11-21 PROCEDURE — 92133 POSTERIOR SEGMENT OCT OPTIC NERVE(OCULAR COHERENCE TOMOGRAPHY) - OU - BOTH EYES: ICD-10-PCS | Mod: 26,S$PBB,, | Performed by: OPHTHALMOLOGY

## 2022-11-21 PROCEDURE — 99213 OFFICE O/P EST LOW 20 MIN: CPT | Mod: PBBFAC | Performed by: OPHTHALMOLOGY

## 2022-11-21 NOTE — PROGRESS NOTES
SUBJECTIVE  Gina Garcia is 37 y.o. female  Uncorrected distance visual acuity was 20/20 in the right eye and 20/20 in the left eye.   Chief Complaint   Patient presents with    Eye Problem     Pt reports for IOP and gonio. Pt states pain OU, mostly OS. Pt states lots of pressure and straining feeling. Pt states compliance with Systane ultra OU           HPI     Eye Problem     Additional comments: Pt reports for IOP and gonio. Pt states pain OU,   mostly OS. Pt states lots of pressure and straining feeling. Pt states   compliance with Systane ultra OU           Last edited by Geeta Hearn MA on 11/21/2022  1:28 PM.         Assessment /Plan :  1. Open angle with borderline findings and low glaucoma risk in both eyes No evidence of glaucoma at this time but based on risk factors recommend to continue monitoring.    Return to clinic in 6 months  or as needed.  With IOP Check and GOCT     2. Ocular hypertension, bilateral [H40.053 (ICD-10-CM)]    3. Dry eye syndrome, bilateral    4 Left periorbital pressure pain - a pressure of 21 is high normal and will not cause discomfort. Likely sinus discomfort - has tried 4 courses of antibiotics with PCP with no relief. Rec pt see ENT and possible imaging if indicated.

## 2022-12-05 ENCOUNTER — OFFICE VISIT (OUTPATIENT)
Dept: OTOLARYNGOLOGY | Facility: CLINIC | Age: 37
End: 2022-12-05
Payer: OTHER GOVERNMENT

## 2022-12-05 VITALS
WEIGHT: 95.88 LBS | DIASTOLIC BLOOD PRESSURE: 81 MMHG | TEMPERATURE: 98 F | SYSTOLIC BLOOD PRESSURE: 122 MMHG | HEART RATE: 56 BPM | BODY MASS INDEX: 19.37 KG/M2

## 2022-12-05 DIAGNOSIS — J34.89 INTRANASAL SYNECHIAE: ICD-10-CM

## 2022-12-05 DIAGNOSIS — J01.41 ACUTE RECURRENT PANSINUSITIS: Primary | ICD-10-CM

## 2022-12-05 DIAGNOSIS — H57.12 EYE PAIN, LEFT: ICD-10-CM

## 2022-12-05 PROCEDURE — 99999 PR PBB SHADOW E&M-EST. PATIENT-LVL IV: ICD-10-PCS | Mod: PBBFAC,,, | Performed by: OTOLARYNGOLOGY

## 2022-12-05 PROCEDURE — 99203 PR OFFICE/OUTPT VISIT, NEW, LEVL III, 30-44 MIN: ICD-10-PCS | Mod: 25,S$PBB,, | Performed by: OTOLARYNGOLOGY

## 2022-12-05 PROCEDURE — 31231 NASAL ENDOSCOPY DX: CPT | Mod: PBBFAC | Performed by: OTOLARYNGOLOGY

## 2022-12-05 PROCEDURE — 99203 OFFICE O/P NEW LOW 30 MIN: CPT | Mod: 25,S$PBB,, | Performed by: OTOLARYNGOLOGY

## 2022-12-05 PROCEDURE — 31231 NASAL ENDOSCOPY DX: CPT | Mod: S$PBB,,, | Performed by: OTOLARYNGOLOGY

## 2022-12-05 PROCEDURE — 31231 PR NASAL ENDOSCOPY, DX: ICD-10-PCS | Mod: S$PBB,,, | Performed by: OTOLARYNGOLOGY

## 2022-12-05 PROCEDURE — 99214 OFFICE O/P EST MOD 30 MIN: CPT | Mod: PBBFAC,25 | Performed by: OTOLARYNGOLOGY

## 2022-12-05 PROCEDURE — 99999 PR PBB SHADOW E&M-EST. PATIENT-LVL IV: CPT | Mod: PBBFAC,,, | Performed by: OTOLARYNGOLOGY

## 2022-12-05 NOTE — PROGRESS NOTES
Referring Provider:    Leonel Hernandez Md  [address] [facility]  [place] [id number]  Subjective:   Patient: Gina Garcia [id number], :1985   Visit date:2022 10:31 AM    Chief Complaint:  Other (Left eye pain )    HPI:    Prior notes reviewed by myself.  Clinical documentation obtained by nursing staff reviewed.     37-year-old female presents for evaluation of a left eye pain.  She was recently seen by Ophthalmology and evaluated for this issue.  Her ophthalmology exam was essentially normal except for some borderline high/normal ocular pressure.  She has been treated for recurrent acute sinusitis 4 times in the last 12 months with oral antibiotics.  She reports frequent left brow pain and the sensation that her eye is closing on that side.  She denies any significant history of allergy symptoms or allergy testing.  She is not had any recent sinus imaging.  She does have a history of breast cancer and had a bilateral prophylactic mastectomy.      Objective:     Physical Exam:  Vitals:  /81   Pulse (!) 56   Temp 97.7 °F (36.5 °C) (Temporal)   Wt 43.5 kg (95 lb 14.4 oz)   LMP 2022   BMI 19.37 kg/m²   General appearance:  Well developed, well nourished    Ears:  Otoscopy of external auditory canals and tympanic membranes was normal, clinical speech reception thresholds grossly intact, no mass/lesion of auricle.    Nose:  No masses/lesions of external nose, nasal mucosa, septum, and turbinates were hypertrophied 3+ with synechiae between turbinates and septum.    Mouth:  No mass/lesion of lips, teeth, gums, hard/soft palate, tongue, tonsils, or oropharynx.    Neck & Lymphatics:  No cervical lymphadenopathy, no neck mass/crepitus/ asymmetry, trachea is midline, no thyroid enlargement/tenderness/mass.        [x]  Data Reviewed:    Lab Results   Component Value Date    WBC 8.23 2022    HGB 12.9 2022    HCT 40.9 2022     (H) 2022    EOSINOPHIL 1.7  06/29/2022       PROCEDURE NOTE:  Diagnostic nasal endoscopy  Preprocedure diagnosis:  Chronic sinusitis  Postprocedure diangosis:  Same  Complications:  None  Blood Loss:  None    Procedure in detail:  After verbal consent was obtained, the patient's nasal cavity was anesthesized using topical Tetracaine and Neosynepherine.  A rigid 30 degree endoscope was placed in first the right, then the left nasal cavity.  The inferior and middle turbinates were examined, and found to be hypertrophied with synechiae between inferior turbinate and septum.  The middle meatus and maxillary antrum was also examined, and found to be normal bilaterally.  No purulent drainage or masses seen.  The patient tolerated the procedure well and there were no complications.        Assessment & Plan:   Acute recurrent pansinusitis  -     CT [place] Sinuses without; Future; Expected date: 12/05/2022    Eye pain, left  -     Ambulatory referral/consult to ENT    Intranasal synechiae      She does have a history of recurrent acute sinusitis.  She has fairly minimal sinonasal symptoms, but she has nearly incapacitating pain in this area on a daily basis.  We elected to proceed with dedicated sinus imaging.  Further management to follow.  We did discuss her prior diagnosis of migraine headache and the contributions that this may have to her current symptoms.  She understands and if the sinus imaging is completely normal we may ask her to re-visit this with neurology.

## 2022-12-07 ENCOUNTER — TELEPHONE (OUTPATIENT)
Dept: OTOLARYNGOLOGY | Facility: CLINIC | Age: 37
End: 2022-12-07
Payer: OTHER GOVERNMENT

## 2022-12-07 ENCOUNTER — HOSPITAL ENCOUNTER (OUTPATIENT)
Dept: RADIOLOGY | Facility: HOSPITAL | Age: 37
Discharge: HOME | End: 2022-12-07
Attending: OTOLARYNGOLOGY
Payer: OTHER GOVERNMENT

## 2022-12-07 DIAGNOSIS — J01.41 ACUTE RECURRENT PANSINUSITIS: ICD-10-CM

## 2022-12-07 PROCEDURE — 70486 CT MAXILLOFACIAL W/O DYE: CPT | Mod: TC,PN

## 2022-12-07 NOTE — TELEPHONE ENCOUNTER
----- Message from Austyn Xiao MD sent at 12/7/2022 12:14 PM CST -----  I reviewed your CT scan report and the images.  You have a very small area of inflammation/possible cyst in your right cheek sinus.  This does not appear to be creating any issues with the sinus itself.  All of your other sinuses were completely clear.  Most notably, the left frontal sinus which is the area where you keep having the pain is completely clear.  I would revisit things with your neurologist to see if they can find a solution.  Unfortunately there is no evidence that your sinuses/sinusitis would be causing this issue.  Call if you have any questions.

## 2022-12-07 NOTE — TELEPHONE ENCOUNTER
Spoke to pt and informed per Dr. Xiao it does not appear that the sinuses or sinusitis is the cause of the problem she is experiencing. Instructed to revisit with the neurologist in hopes for a cause and solution. Voiced understanding.

## 2022-12-27 ENCOUNTER — OFFICE VISIT (OUTPATIENT)
Dept: INTERNAL MEDICINE | Facility: CLINIC | Age: 37
End: 2022-12-27
Payer: OTHER GOVERNMENT

## 2022-12-27 VITALS
WEIGHT: 95.44 LBS | SYSTOLIC BLOOD PRESSURE: 100 MMHG | DIASTOLIC BLOOD PRESSURE: 80 MMHG | HEART RATE: 72 BPM | HEIGHT: 59 IN | OXYGEN SATURATION: 99 % | TEMPERATURE: 97 F | BODY MASS INDEX: 19.24 KG/M2

## 2022-12-27 DIAGNOSIS — L03.032 INFECTED NAILBED OF TOE, LEFT: Primary | ICD-10-CM

## 2022-12-27 PROCEDURE — 99999 PR PBB SHADOW E&M-EST. PATIENT-LVL III: ICD-10-PCS | Mod: PBBFAC,,, | Performed by: NURSE PRACTITIONER

## 2022-12-27 PROCEDURE — 99213 OFFICE O/P EST LOW 20 MIN: CPT | Mod: PBBFAC,PO | Performed by: NURSE PRACTITIONER

## 2022-12-27 PROCEDURE — 99999 PR PBB SHADOW E&M-EST. PATIENT-LVL III: CPT | Mod: PBBFAC,,, | Performed by: NURSE PRACTITIONER

## 2022-12-27 PROCEDURE — 99213 PR OFFICE/OUTPT VISIT, EST, LEVL III, 20-29 MIN: ICD-10-PCS | Mod: S$PBB,,, | Performed by: NURSE PRACTITIONER

## 2022-12-27 PROCEDURE — 99213 OFFICE O/P EST LOW 20 MIN: CPT | Mod: S$PBB,,, | Performed by: NURSE PRACTITIONER

## 2022-12-27 RX ORDER — MUPIROCIN 20 MG/G
OINTMENT TOPICAL 3 TIMES DAILY
Qty: 30 G | Refills: 0 | Status: SHIPPED | OUTPATIENT
Start: 2022-12-27 | End: 2023-01-06

## 2022-12-27 RX ORDER — SULFAMETHOXAZOLE AND TRIMETHOPRIM 800; 160 MG/1; MG/1
1 TABLET ORAL 2 TIMES DAILY
Qty: 20 TABLET | Refills: 0 | Status: SHIPPED | OUTPATIENT
Start: 2022-12-27 | End: 2023-01-06

## 2022-12-27 NOTE — PROGRESS NOTES
"Subjective:       Patient ID: Gina Garcia is a 37 y.o. female.    Chief Complaint: Toe Pain    Patient here with infection to left great toe  Ongoing x 3 weeks  Did have purulent drainage and swelling  No injury or trauma  She states she woke up one day with mild swelling to cuticle of toe, then toe began to swell and has progressively worsened over the last 3 weeks  Has been spraying Bactine and using neosporin over the last week that has helped.     Toe Pain       /80   Pulse 72   Temp 96.6 °F (35.9 °C) (Temporal)   Ht 4' 11" (1.499 m)   Wt 43.3 kg (95 lb 7.4 oz)   LMP 06/20/2022   SpO2 99%   BMI 19.28 kg/m²     Review of Systems   Constitutional:  Negative for activity change and unexpected weight change.   HENT:  Negative for hearing loss, rhinorrhea and trouble swallowing.    Eyes:  Negative for discharge and visual disturbance.   Respiratory:  Negative for chest tightness and wheezing.    Cardiovascular:  Negative for chest pain and palpitations.   Gastrointestinal:  Negative for blood in stool, constipation, diarrhea and vomiting.   Endocrine: Negative for polydipsia and polyuria.   Genitourinary:  Negative for difficulty urinating, dysuria, hematuria and menstrual problem.   Musculoskeletal:  Positive for gait problem. Negative for arthralgias, joint swelling and neck pain.   Skin:  Positive for color change and wound.   Neurological:  Negative for weakness and headaches.   Psychiatric/Behavioral:  Negative for confusion and dysphoric mood.      Objective:      Physical Exam  Constitutional:       General: She is not in acute distress.     Appearance: Normal appearance. She is well-developed. She is not diaphoretic.   HENT:      Head: Normocephalic and atraumatic.      Right Ear: External ear normal.      Left Ear: External ear normal.   Eyes:      General: No scleral icterus.        Right eye: No discharge.         Left eye: No discharge.      Conjunctiva/sclera: Conjunctivae normal. "   Pulmonary:      Effort: Pulmonary effort is normal. No tachypnea, accessory muscle usage or respiratory distress.      Breath sounds: No stridor.   Musculoskeletal:      Cervical back: Normal range of motion and neck supple.   Skin:     Findings: No rash.      Comments: Left great toe with swelling, redness at base of nail. See picture insert   Neurological:      Mental Status: She is alert. She is not disoriented.   Psychiatric:         Attention and Perception: She is attentive.         Mood and Affect: Mood normal. Mood is not anxious or depressed. Affect is not labile, blunt, angry or inappropriate.         Speech: Speech normal.         Behavior: Behavior normal.         Thought Content: Thought content normal.         Judgment: Judgment normal.         Assessment:       1. Infected nailbed of toe, left        Plan:       Gina was seen today for toe pain.    Diagnoses and all orders for this visit:    Infected nailbed of toe, left  -     sulfamethoxazole-trimethoprim 800-160mg (BACTRIM DS) 800-160 mg Tab; Take 1 tablet by mouth 2 (two) times daily. for 10 days  -     mupirocin (BACTROBAN) 2 % ointment; Apply topically 3 (three) times daily. for 10 days    Epsom salt soaks  Antibacterial soap  Meds as above  Podiatry if not improving or worse

## 2023-01-27 ENCOUNTER — PATIENT MESSAGE (OUTPATIENT)
Dept: INTERNAL MEDICINE | Facility: CLINIC | Age: 38
End: 2023-01-27
Payer: OTHER GOVERNMENT

## 2023-01-30 ENCOUNTER — OFFICE VISIT (OUTPATIENT)
Dept: INTERNAL MEDICINE | Facility: CLINIC | Age: 38
End: 2023-01-30
Payer: OTHER GOVERNMENT

## 2023-01-30 DIAGNOSIS — L03.032 INFECTED NAILBED OF TOE, LEFT: Primary | ICD-10-CM

## 2023-01-30 PROCEDURE — 99213 OFFICE O/P EST LOW 20 MIN: CPT | Mod: 95,,, | Performed by: NURSE PRACTITIONER

## 2023-01-30 PROCEDURE — 99213 PR OFFICE/OUTPT VISIT, EST, LEVL III, 20-29 MIN: ICD-10-PCS | Mod: 95,,, | Performed by: NURSE PRACTITIONER

## 2023-01-30 RX ORDER — SULFAMETHOXAZOLE AND TRIMETHOPRIM 800; 160 MG/1; MG/1
1 TABLET ORAL 2 TIMES DAILY
Qty: 28 TABLET | Refills: 0 | Status: SHIPPED | OUTPATIENT
Start: 2023-01-30 | End: 2023-02-13

## 2023-01-30 NOTE — PROGRESS NOTES
Subjective:       Patient ID: Gina Garcia is a 37 y.o. female.    Chief Complaint: Recurrent Skin Infections    The patient location is: home  The chief complaint leading to consultation is: toe infection    Visit type: audiovisual    Face to Face time with patient: 15 min  20 minutes of total time spent on the encounter, which includes face to face time and non-face to face time preparing to see the patient (eg, review of tests), Obtaining and/or reviewing separately obtained history, Documenting clinical information in the electronic or other health record, Independently interpreting results (not separately reported) and communicating results to the patient/family/caregiver, or Care coordination (not separately reported).         Each patient to whom he or she provides medical services by telemedicine is:  (1) informed of the relationship between the physician and patient and the respective role of any other health care provider with respect to management of the patient; and (2) notified that he or she may decline to receive medical services by telemedicine and may withdraw from such care at any time.    Notes:   Patient doing virtual for toe infection  Area healed completely after last visit, however, 1 week ago Dead part of cuticle fell off and area became opened, red and swollen, there is some drainage from the area per patient  No fever  Mildly tender  She has been using bactine spray and bactroban ointment      LMP 06/20/2022     Review of Systems   Constitutional:  Negative for activity change and unexpected weight change.   HENT:  Negative for hearing loss, rhinorrhea and trouble swallowing.    Eyes:  Negative for discharge and visual disturbance.   Respiratory:  Negative for chest tightness and wheezing.    Cardiovascular:  Negative for chest pain and palpitations.   Gastrointestinal:  Negative for blood in stool, constipation, diarrhea and vomiting.   Endocrine: Negative for polydipsia and  polyuria.   Genitourinary:  Negative for difficulty urinating, dysuria, hematuria and menstrual problem.   Musculoskeletal:  Negative for arthralgias, joint swelling and neck pain.   Skin:  Positive for color change and wound.   Neurological:  Negative for weakness and headaches.   Psychiatric/Behavioral:  Negative for confusion and dysphoric mood.      Objective:      Physical Exam  Constitutional:       General: She is not in acute distress.     Appearance: Normal appearance. She is well-developed. She is not diaphoretic.   HENT:      Head: Normocephalic and atraumatic.      Right Ear: External ear normal.      Left Ear: External ear normal.   Eyes:      General: No scleral icterus.        Right eye: No discharge.         Left eye: No discharge.      Conjunctiva/sclera: Conjunctivae normal.   Pulmonary:      Effort: Pulmonary effort is normal. No tachypnea, accessory muscle usage or respiratory distress.      Breath sounds: No stridor.   Musculoskeletal:      Cervical back: Normal range of motion and neck supple.   Skin:     Findings: No rash.      Comments: Great toe with erythema, swelling around nail bed and to distal 1/2 of the toe.    Neurological:      Mental Status: She is alert. She is not disoriented.   Psychiatric:         Attention and Perception: She is attentive.         Mood and Affect: Mood normal. Mood is not anxious or depressed. Affect is not labile, blunt, angry or inappropriate.         Speech: Speech normal.         Behavior: Behavior normal.         Thought Content: Thought content normal.         Judgment: Judgment normal.       Assessment:       1. Infected nailbed of toe, left        Plan:       Gina was seen today for recurrent skin infections.    Diagnoses and all orders for this visit:    Infected nailbed of toe, left  -     sulfamethoxazole-trimethoprim 800-160mg (BACTRIM DS) 800-160 mg Tab; Take 1 tablet by mouth 2 (two) times daily. for 14 days      Add epom salt soaks  Bactroban  ointment tid  Follow up with podiatry if not improving/worse

## 2023-02-06 ENCOUNTER — OFFICE VISIT (OUTPATIENT)
Dept: SURGERY | Facility: CLINIC | Age: 38
End: 2023-02-06
Payer: OTHER GOVERNMENT

## 2023-02-06 VITALS
HEIGHT: 59 IN | HEART RATE: 62 BPM | DIASTOLIC BLOOD PRESSURE: 79 MMHG | TEMPERATURE: 98 F | SYSTOLIC BLOOD PRESSURE: 123 MMHG | BODY MASS INDEX: 19.73 KG/M2 | WEIGHT: 97.88 LBS

## 2023-02-06 DIAGNOSIS — D05.11 DUCTAL CARCINOMA IN SITU (DCIS) OF RIGHT BREAST: Primary | ICD-10-CM

## 2023-02-06 DIAGNOSIS — Z15.01 BIALLELIC MUTATION OF CHEK2 GENE WITHOUT DIAGNOSED MALIGNANCY: ICD-10-CM

## 2023-02-06 DIAGNOSIS — Z15.09 BIALLELIC MUTATION OF CHEK2 GENE WITHOUT DIAGNOSED MALIGNANCY: ICD-10-CM

## 2023-02-06 DIAGNOSIS — Z15.89 BIALLELIC MUTATION OF CHEK2 GENE WITHOUT DIAGNOSED MALIGNANCY: ICD-10-CM

## 2023-02-06 PROCEDURE — 99999 PR PBB SHADOW E&M-EST. PATIENT-LVL III: ICD-10-PCS | Mod: PBBFAC,,, | Performed by: SURGERY

## 2023-02-06 PROCEDURE — 99999 PR PBB SHADOW E&M-EST. PATIENT-LVL III: CPT | Mod: PBBFAC,,, | Performed by: SURGERY

## 2023-02-06 PROCEDURE — 99214 PR OFFICE/OUTPT VISIT, EST, LEVL IV, 30-39 MIN: ICD-10-PCS | Mod: S$PBB,,, | Performed by: SURGERY

## 2023-02-06 PROCEDURE — 99214 OFFICE O/P EST MOD 30 MIN: CPT | Mod: S$PBB,,, | Performed by: SURGERY

## 2023-02-06 PROCEDURE — 99213 OFFICE O/P EST LOW 20 MIN: CPT | Mod: PBBFAC | Performed by: SURGERY

## 2023-02-06 RX ORDER — FERROUS SULFATE, DRIED 160(50) MG
1 TABLET, EXTENDED RELEASE ORAL 2 TIMES DAILY WITH MEALS
COMMUNITY

## 2023-02-06 NOTE — PROGRESS NOTES
[facility]  Breast Surgery  Department of General Surgery      Date of Visit:  2023    Referring provider:  No referring provider defined for this encounter.    PCP:  Kenneth Barraza MD    HIGH RISK    Gina Garcia is a 37 y.o. premenopausal female s/p bilateral mastectomy right axillary dissection with reconstruction by Dr. Carpio 22 presents breast cancer surveillance. In the interim she has undergone revision of her reconstruction with plans for further evaluation next month with Dr. Carpio.     presents to discuss right breast biopsy. Her biopsy showed right breast DCIS ER+/MT+.    presents for abnormal mammogram of the right breast and high risk breast cancer check 2 mutation.  She recently underwent mammogram showing calcifications of the right breast and is currently awaiting stereotactic core biopsy.  She had previously undergone evaluation for her check 2 mutation with discussions about prophylactic bilateral mastectomy and reconstruction with Dr. Duckworth and Dr. Almaraz.  It was recommended that she undergo weight loss prior to surgery which she has done in the interim approximately 40-50 lbs.      Per Dr. Duckworth:  referred for evaluation of increased risk of breast cancer based on family history and + CHECK2 mutation.  Here today to discussed options of high risk screening and risk reduction.    Other breast cancer risk factors include family hx on mother's side, family hx on father's side, mom with breast CA, family hx of colon CA and family hx of Prostate CA.     Age of menarche was 15.    Age of menopause was NA.    Last menstrual period was 2020.    Patient denies hormonal therapy. IUD for 8 years - paraguard.     Patient is .    Age of first live birth was 23.    Patient did breast feed.       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    Migraines      Past Surgical History:   Procedure Laterality Date    ACHILLES TENDON SURGERY      AXILLARY NODE DISSECTION Right 04/25/2022    Procedure: LYMPHADENECTOMY, AXILLARY;  Surgeon: Micheal Bartholomew MD;  Location: [facility];  Service: General;  Laterality: Right;  Right axillary node dissection    BILATERAL MASTECTOMY Bilateral 04/25/2022    Procedure: MASTECTOMY, BILATERAL;  Surgeon: Micheal Bartholomew MD;  Location: [facility];  Service: General;  Laterality: Bilateral;    BREAST REVISION SURGERY Bilateral 9/23/2022    Procedure: BREAST RECONSTRUCTION SECOND STAGE, FAT GRAFTING BILATERAL BREAST;  Surgeon: Esther Carpio MD;  Location: [facility];  Service: Plastics;  Laterality: Bilateral;    COLONOSCOPY N/A 03/28/2022    Procedure: COLONOSCOPY;  Surgeon: Winnie Polanco MD;  Location: [facility] ENDO;  Service: Endoscopy;  Laterality: N/A;    DEBRIDEMENT, BREAST Right 05/30/2022    Procedure: DEBRIDEMENT, BREAST;  Surgeon: Esther Carpio MD;  Location: [facility];  Service: General;  Laterality: Right;  FLAP WASHOUT AND CLOSURE    EXTERNAL EAR SURGERY      cosmetic    INSERTION OF BREAST TISSUE EXPANDER Bilateral 04/25/2022    Procedure: INSERTION, TISSUE EXPANDER, BREAST;  Surgeon: Micheal Bartholomew MD;  Location: [facility];  Service: General;  Laterality: Bilateral;  Procedure performed by KRAIG Carpio    REPLACEMENT OF IMPLANT OF BREAST Bilateral 9/23/2022    Procedure: TISSUE EXCHANGE TO IMPLANT, CAPSULORRHAPY, CAPSALOTOMY;  Surgeon: Esther Carpio MD;  Location: [facility];  Service: Plastics;  Laterality: Bilateral;    sciatic nerve exploration      SENTINEL LYMPH NODE BIOPSY Right  04/25/2022    Procedure: BIOPSY, LYMPH NODE, SENTINEL;  Surgeon: Micheal Bartholomew MD;  Location: [facility] OR;  Service: General;  Laterality: Right;    XI ROBOTIC HYSTERECTOMY, WITH SALPINGO-OOPHORECTOMY Bilateral 07/01/2022    Procedure: XI ROBOTIC HYSTERECTOMY,WITH SALPINGO-OOPHORECTOMY;  Surgeon: CORDELL Bowen MD;  Location: [facility] OR;  Service: OB/GYN;  Laterality: Bilateral;     Current Outpatient Medications on File Prior to Visit   Medication Sig Dispense Refill    ascorbic acid, vitamin C, (VITAMIN C) 250 MG tablet Take 250 mg by mouth once daily.      b complex vitamins capsule Take 1 capsule by mouth once daily.      calcium-vitamin D3 (CALCIUM 500 + D) 500 mg-5 mcg (200 unit) per tablet Take 1 tablet by mouth 2 (two) times daily with meals.      folic acid (FOLVITE) 400 MCG tablet Take 400 mcg by mouth once daily.      magnesium 200 mg Tab Take 500 mg by mouth as needed.      rosuvastatin (CRESTOR) 20 MG tablet Take 1 tablet (20 mg total) by mouth once daily. 90 tablet 3    sulfamethoxazole-trimethoprim 800-160mg (BACTRIM DS) 800-160 mg Tab Take 1 tablet by mouth 2 (two) times daily. for 14 days 28 tablet 0     No current facility-administered medications on file prior to visit.         Review of patient's allergies indicates:  No Known Allergies      Review of Systems  Review of Systems   Constitutional:  Negative for chills and fever.   HENT:  Negative for congestion.    Eyes:  Negative for blurred vision.   Respiratory:  Negative for shortness of breath.    Cardiovascular:  Negative for chest pain.   Gastrointestinal:  Negative for abdominal pain, constipation, diarrhea, nausea and vomiting.   Genitourinary:  Negative for dysuria.   Musculoskeletal:  Negative for back pain.   Skin:  Negative for itching and rash.   Neurological:  Negative for dizziness.   Endo/Heme/Allergies:  Does not bruise/bleed easily.   Psychiatric/Behavioral:  The patient is not nervous/anxious.         Objective:   /79 (BP  "Location: Left arm, Patient Position: Sitting)   Pulse 62   Temp 98.2 °F (36.8 °C) (Oral)   Ht 4' 11" (1.499 m)   Wt 44.4 kg (97 lb 14.2 oz)   LMP 06/20/2022   BMI 19.77 kg/m²     Physical Exam  Vitals reviewed.   Constitutional:       Appearance: Normal appearance. She is not ill-appearing.   HENT:      Head: Normocephalic and atraumatic.   Eyes:      Extraocular Movements: Extraocular movements intact.   Cardiovascular:      Rate and Rhythm: Normal rate and regular rhythm.      Pulses: Normal pulses.      Heart sounds: Normal heart sounds.   Pulmonary:      Effort: Pulmonary effort is normal.      Breath sounds: Normal breath sounds.   Chest:   Breasts:     Right: No swelling, bleeding, inverted nipple, mass, nipple discharge, skin change or tenderness.      Left: No swelling, bleeding, inverted nipple, mass, nipple discharge, skin change or tenderness.       Abdominal:      General: There is no distension.      Palpations: Abdomen is soft.   Musculoskeletal:         General: Normal range of motion.      Cervical back: Normal range of motion.   Skin:     General: Skin is warm and dry.   Neurological:      General: No focal deficit present.      Mental Status: She is alert and oriented to person, place, and time.   Psychiatric:         Mood and Affect: Mood normal.         Imaging  Result:   Mammo Digital Diagnostic Bilat with Tony  US Breast Bilateral Limited     History:  Patient is 36 y.o. and is seen for diagnostic imaging.     Films Compared:  Prior images (if available) were compared.     Findings:  This procedure was performed using tomosynthesis. Computer-aided detection was utilized in the interpretation of this examination.  The breasts have scattered areas of fibroglandular density.      Right  Mammo Digital Diagnostic Bilat with Tony  There are no corresponding areas of architectural distortion seen on this modality. Ultrasound of the lower-outer right breast demonstrates no concerning " abnormality.         There are fine linear or fine-linear branching calcifications in a linear distribution seen in the retroareolar region of the right breast.      Left   Small mass within the outer left breast is less conspicuous with somewhat lucent appearance. Ultrasound was performed and demonstrates a minimally complicated 9 mm cyst at the 03:00 o'clock location, 8 cm from the nipple.         Impression:  Right  Calcifications: Right breast calcifications at the retroareolar position. Assessment: 4 - Suspicious finding. Stereotactic Biopsy is recommended.      Left  There is no mammographic or sonographic evidence of malignancy in the left breast.     BI-RADS Category:   Overall: 4 - Suspicious     Recommendation:  Stereotactic biopsy recommended of the right breast calcifications.   Six month follow-up diagnostic left mammogram and ultrasound can be performed for the minimally complicated cyst.      Final Pathologic Diagnosis 1. Right breast sentinel node #1, excision:   No metastatic carcinoma identified in 1 lymph node (0/1), supported by   immunohistochemical stains for AE1/AE3, cam 5.2, and WSK with adequate   controls   2. Right breast sentinel node #2, excision:   No metastatic carcinoma identified in 1 lymph node (0/1), supported by   immunohistochemical stains for AE1/AE3, cam 5.2, and WSK with adequate   controls   3. Right breast sentinel node #3, excision:   No metastatic carcinoma identified in 1 lymph node (0/1), supported by   immunohistochemical stains for AE1/AE3, cam 5.2, and WSK with adequate   controls   4. Right axillary content, excision:   No metastatic carcinoma identified in 13 lymph nodes (0/13), supported by   AE1/AE3 immunostains with adequate controls   5. Right breast sentinel node #4, excision:   No metastatic carcinoma identified in 1 lymph node (0/1), supported by   immunohistochemical stains for AE1/AE3, cam 5.2, and WSK with adequate   controls   6. Right breast, mastectomy:    High-grade ductal carcinoma in Situ (DCIS), cribriform, solid, micropapillary   patterns, approximately 12 mm in size, present in 3/31 blocks   DCIS measures 8 mm to the posterior surgical margin, 8 mm to the inferior   surgical margin, and at least 10 mm to the remainder of the surgical margins   Biopsy marker and biopsy site change identified   Nipple and skin uninvolved   No invasive carcinoma identified.   Please see complete Surgical pathology cancer case summary below   PATHOLOGIC TNM STAGING: pTis(sn)N0   7. Left breast, mastectomy:   Benign breast tissue with fibrocystic changes   Unremarkable nipple and skin   No evidence of in-situ or invasive carcinoma   Surgical pathology cancer case summary   Procedure-total mastectomy   Specimen laterality-right   Histologic type-ductal carcinoma in-situ   Size (extent) of DCIS-approximately 12 mm, present in 3/31 blocks   Architectural patterns-cribriform, solid, micropapillary   Nuclear grade-grade 3 (high)   Necrosis-present, focal   Microcalcifications-present in DCIS   Margins   Margin status   DCIS measures 8 mm to the posterior surgical margin, 8 mm to the inferior   surgical margin, and at least 10 mm to the remainder of the surgical margins   Regional lymph nodes   Regional lymph node status-all regional lymph nodes negative for tumor   Total number of lymph nodes examined (sentinel and nonsentinel) -17   Number of sentinel nodes examined-4   Pathologic stage classification   pT category- pTis(DCIS):  Ductal carcinoma in Situ   Regional lymph nodes modifier-(sn):  [place] nodes examined   pN category-pN0:  No regional lymph node metastasis identified   Additional findings-fibrocystic changes   Breast biomarker testing performed on previous biopsy [id number] as follows:   BREAST BIOMARKER RESULTS   Estrogen receptor (ER): Positive (100%, strong)   Progesterone receptor (IN): Positive (5%, strong)   All immunostain controls react appropriately.   Diagnosed by  RICHY VILLASENOR M.D.   For future possible ancillary studies, DCIS is present in block 6D.    Comment: Interp By Nkechi Chan D.O., Signed on 05/06/2022 at 14:08   Frozen Section Diagnosis 1.Postive   2.Negative   3.Negative   4.Negative             Assessment:     This is a 37 y.o. female  With CHECK 2 mutation/DCIS s/p bilateral mastectomy with right axillary dissection and reconstruction        Plan:     Normal chest wall exm  Keep plastic surgery follow up  F/u in 6 months for surveillance    30 minutes of total time spent on the encounter, which includes face to face time and non-face to face time preparing to see the patient (eg, review of tests), Obtaining and/or reviewing separately obtained history, Documenting clinical information in the electronic or other health record, Independently interpreting results (not separately reported) and communicating results to the patient/family/caregiver, or Care coordination (not separately reported).

## 2023-02-08 ENCOUNTER — PATIENT MESSAGE (OUTPATIENT)
Dept: INTERNAL MEDICINE | Facility: CLINIC | Age: 38
End: 2023-02-08
Payer: OTHER GOVERNMENT

## 2023-02-08 DIAGNOSIS — L03.032 INFECTED NAILBED OF TOE, LEFT: Primary | ICD-10-CM

## 2023-02-16 ENCOUNTER — PATIENT MESSAGE (OUTPATIENT)
Dept: INTERNAL MEDICINE | Facility: CLINIC | Age: 38
End: 2023-02-16
Payer: OTHER GOVERNMENT

## 2023-02-17 ENCOUNTER — OFFICE VISIT (OUTPATIENT)
Dept: PODIATRY | Facility: CLINIC | Age: 38
End: 2023-02-17
Payer: OTHER GOVERNMENT

## 2023-02-17 VITALS — HEIGHT: 59 IN | BODY MASS INDEX: 19.56 KG/M2 | WEIGHT: 97 LBS

## 2023-02-17 DIAGNOSIS — L03.032 PARONYCHIA OF TOE OF LEFT FOOT: ICD-10-CM

## 2023-02-17 DIAGNOSIS — L60.0 INGROWN TOENAIL OF LEFT FOOT WITH INFECTION: Primary | ICD-10-CM

## 2023-02-17 PROCEDURE — 99999 PR PBB SHADOW E&M-EST. PATIENT-LVL III: CPT | Mod: PBBFAC,,, | Performed by: PODIATRIST

## 2023-02-17 PROCEDURE — 99204 OFFICE O/P NEW MOD 45 MIN: CPT | Mod: S$PBB,,, | Performed by: PODIATRIST

## 2023-02-17 PROCEDURE — 99204 PR OFFICE/OUTPT VISIT, NEW, LEVL IV, 45-59 MIN: ICD-10-PCS | Mod: S$PBB,,, | Performed by: PODIATRIST

## 2023-02-17 PROCEDURE — 99213 OFFICE O/P EST LOW 20 MIN: CPT | Mod: PBBFAC | Performed by: PODIATRIST

## 2023-02-17 PROCEDURE — 99999 PR PBB SHADOW E&M-EST. PATIENT-LVL III: ICD-10-PCS | Mod: PBBFAC,,, | Performed by: PODIATRIST

## 2023-02-17 RX ORDER — ALPRAZOLAM 1 MG/1
1 TABLET ORAL ONCE
Qty: 1 TABLET | Refills: 0 | Status: SHIPPED | OUTPATIENT
Start: 2023-02-17 | End: 2023-08-29

## 2023-02-17 RX ORDER — AMOXICILLIN AND CLAVULANATE POTASSIUM 875; 125 MG/1; MG/1
1 TABLET, FILM COATED ORAL EVERY 12 HOURS
Qty: 20 TABLET | Refills: 0 | Status: SHIPPED | OUTPATIENT
Start: 2023-02-17 | End: 2023-02-27

## 2023-02-17 RX ORDER — HYDROCODONE BITARTRATE AND ACETAMINOPHEN 5; 325 MG/1; MG/1
1 TABLET ORAL EVERY 6 HOURS PRN
Qty: 15 TABLET | Refills: 0 | Status: SHIPPED | OUTPATIENT
Start: 2023-02-17 | End: 2023-08-29

## 2023-02-17 NOTE — PROGRESS NOTES
PODIATRIC MEDICINE AND SURGERY   2/17/2023    Reason for Visit   Chief Complaint   Patient presents with    Nail Problem     C/o pain left great toe, redness, rates pain 5/10, x 2 months, pus at the base of the nail prev, pt has taken Bactrim prev, no improvement, non-diabetic, wears tennis and socks, last seen PCP Amalia Ahmadi on 01/30/23         HPI  This is Gina Garcia is a 37 y.o. female who presents today complaining of painful ingrown toenail. Pt states painful toenail for >2 months with associated redness at base of cuticle. Denies trauma.  Previous treatment includes two rounds of Bactrim antibiotics. Pt has severe anxiety. States she does not want nail removed due to anxiety with procedure. Pain is present with touch. Pain is 5/10. Patient denies other pedal complaints at this time. Denies any N/V/F/C/SOB/CP.     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      b complex vitamins capsule Take 1 capsule by mouth once daily.      calcium-vitamin D3 (OS-JAMEL 500 + D3) 500 mg-5 mcg (200 unit) per tablet Take 1 tablet by mouth 2 (two) times daily with meals.      folic acid (FOLVITE) 400 MCG tablet Take 400 mcg by mouth once daily.      magnesium 200 mg Tab Take 500 mg by mouth as needed.      rosuvastatin (CRESTOR) 20 MG tablet Take 1 tablet (20  mg total) by mouth once daily. 90 tablet 3     No current facility-administered medications on file prior to visit.       PSH     Past Surgical History:   Procedure Laterality Date    ACHILLES TENDON SURGERY      AXILLARY NODE DISSECTION Right 04/25/2022    Procedure: LYMPHADENECTOMY, AXILLARY;  Surgeon: Micheal Bartholomew MD;  Location: [facility] OR;  Service: General;  Laterality: Right;  Right axillary node dissection    BILATERAL MASTECTOMY Bilateral 04/25/2022    Procedure: MASTECTOMY, BILATERAL;  Surgeon: Micheal Bartholomew MD;  Location: [facility] OR;  Service: General;  Laterality: Bilateral;    BREAST REVISION SURGERY Bilateral 9/23/2022    Procedure: BREAST RECONSTRUCTION SECOND STAGE, FAT GRAFTING BILATERAL BREAST;  Surgeon: Esther Carpio MD;  Location: [facility] OR;  Service: Plastics;  Laterality: Bilateral;    COLONOSCOPY N/A 03/28/2022    Procedure: COLONOSCOPY;  Surgeon: Winnie Polanco MD;  Location: [facility] ENDO;  Service: Endoscopy;  Laterality: N/A;    DEBRIDEMENT, BREAST Right 05/30/2022    Procedure: DEBRIDEMENT, BREAST;  Surgeon: Esther Carpio MD;  Location: [facility] OR;  Service: General;  Laterality: Right;  FLAP WASHOUT AND CLOSURE    EXTERNAL EAR SURGERY      cosmetic    INSERTION OF BREAST TISSUE EXPANDER Bilateral 04/25/2022    Procedure: INSERTION, TISSUE EXPANDER, BREAST;  Surgeon: Micheal Bartholomew MD;  Location: [facility];  Service: General;  Laterality: Bilateral;  Procedure performed by KRAIG Carpio    REPLACEMENT OF IMPLANT OF BREAST Bilateral 9/23/2022    Procedure: TISSUE EXCHANGE TO IMPLANT, CAPSULORRHAPY, CAPSALOTOMY;  Surgeon: Esther Carpio MD;  Location: [facility] OR;  Service: Plastics;  Laterality: Bilateral;    sciatic nerve exploration      SENTINEL LYMPH NODE BIOPSY Right 04/25/2022    Procedure: BIOPSY, LYMPH NODE, SENTINEL;  Surgeon: Micheal Bartholomew MD;  Location: [facility] OR;  Service: General;  Laterality: Right;    XI ROBOTIC HYSTERECTOMY, WITH SALPINGO-OOPHORECTOMY Bilateral 07/01/2022     "Procedure: XI ROBOTIC HYSTERECTOMY,WITH SALPINGO-OOPHORECTOMY;  Surgeon: CORDELL Bowen MD;  Location: [facility];  Service: OB/GYN;  Laterality: Bilateral;        ALL  Review of patient's allergies indicates:  No Known Allergies    SOC     Social History     Tobacco Use    Smoking status: Never    Smokeless tobacco: Never   Substance Use Topics    Alcohol use: No    Drug use: No       Family HX    Family History   Problem Relation Age of Onset    Breast cancer Mother 45        L lumptectomy, chemo; R (dx 69) plans to have BL mastectomy    Skin cancer Mother     Cancer Mother         Breast cancer    Prostate cancer Father 68        radiation complete    Asthma Sister     Lung cancer Maternal Grandmother         non-smoker    Prostate cancer Maternal Grandfather         metastasis to brain    Cancer Maternal Grandfather         Prostate cancer    Breast cancer Paternal Grandmother     Cancer Paternal Grandmother         Breast cancer    Prostate cancer Paternal Grandfather     Cancer Paternal Grandfather         Prostate cancer    ADD / ADHD Son     Autism Son     Diabetes Paternal Uncle     Asthma Sister     Colon cancer Neg Hx         colon cancer    Ovarian cancer Neg Hx     Thrombophilia Neg Hx           REVIEW OF SYSTEMS   General: This patient is well-developed, well-nourished and appears stated age, well-oriented to person, place and time, and cooperative and pleasant on today's visit  Constitutional: Negative for chills and fever.   Respiratory: Negative for shortness of breath.    Cardiovascular: Negative for chest pain, palpitations, orthopnea  Gastrointestinal: Negative for diarrhea, nausea and vomiting.   Musculoskeletal: Positive for above noted in HPI  Skin: Positive for nail changes   Peripheral Vascular: no claudication or cyanosis  Psychiatric/Behavioral: Negative for altered mental status       Vitals:    02/17/23 1022   Weight: 44 kg (97 lb)   Height: 4' 11" (1.499 m)   PainSc:   5       LOWER " EXTREMITY PHYSICAL EXAM    VASCULAR  Dorsalis pedis and posterior tibial pulses palpable 2/4 bilaterally.   Capillary refill time immediate to the toes.   Feet are warm to the touch. Skin temperature warm to warm from proximally to distally   There are no ecchymoses noted to bilateral foot and ankle regions.   There is  edema noted to proximal nail fold left hallux.    DERMATOLOGIC  Skin moist with healthy texture and turgor.  Hypertrophy noted nail plate left hallux with proximal nail fold erythema and dry drainage.   There are no open ulcerations, lacerations, or fissures to bilateral foot and ankle regions.       NEUROLOGIC  Epicritic sensation is intact as the patient is able to sense light touch to bilateral foot and ankle regions.   Achilles and patellar deep tendon reflexes intact  Babinski reflex absent    ORTHOPEDIC/BIOMECHANICAL  Tenderness to palpation dorsal aspect left hallux/  Muscle strength AT/EHL/EDL/PT: 5/5; Achilles/Gastroc/Soleus: 5/5; PB/PL: 5/5 Muscle tone is normal.  Ankle joint ROM INTACT DF/PF, non-crepitus    ASSESSMENT  Ingrown toenail of left foot with infection    Paronychia of toe of left foot    Other orders  -     ALPRAZolam (XANAX) 1 MG tablet; Take 1 tablet (1 mg total) by mouth once. for 1 dose  Dispense: 1 tablet; Refill: 0  -     HYDROcodone-acetaminophen (NORCO) 5-325 mg per tablet; Take 1 tablet by mouth every 6 (six) hours as needed for Pain.  Dispense: 15 tablet; Refill: 0  -     amoxicillin-clavulanate 875-125mg (AUGMENTIN) 875-125 mg per tablet; Take 1 tablet by mouth every 12 (twelve) hours. for 10 days  Dispense: 20 tablet; Refill: 0          PLAN    -Discussed presenting problems, etiology, pathologic processes and management options with patient today.   -will need nail surgery.will schedule per patient preference.   -Antibiotics Rx'd. Discussed spectrum of antibiotic coverage.   -premedicate prior to procedure- Xanax.  -Patient received instructions for post op care,  pain management with OTC analgesics, and soaking.  -If any signs of infection--increased redness, purulent drainage, increased pain then patient is advised to return to clinic or ER if after clinic hours. Advised to limit activities.  -RTC in 2 weeks for f/u visit, or sooner if any signs of infection noted.    Future Appointments   Date Time Provider Department Center   2/28/2023 10:00 AM Samantha Ramon DPM [facility] POD [place]   5/22/2023 10:45 AM Leonel Hernandez MD [facility] [place]   8/7/2023  9:40 AM Micheal Bartholomew MD [facility] [place]       Report Electronically Signed By:     Samantha Ramon DPM   Podiatric Medicine & Surgery  [facility]  2/17/2023

## 2023-02-28 ENCOUNTER — OFFICE VISIT (OUTPATIENT)
Dept: PODIATRY | Facility: CLINIC | Age: 38
End: 2023-02-28
Payer: OTHER GOVERNMENT

## 2023-02-28 VITALS — BODY MASS INDEX: 19.56 KG/M2 | HEIGHT: 59 IN | WEIGHT: 97 LBS

## 2023-02-28 DIAGNOSIS — L60.0 INGROWN TOENAIL OF LEFT FOOT WITH INFECTION: Primary | ICD-10-CM

## 2023-02-28 PROCEDURE — 99999 PR PBB SHADOW E&M-EST. PATIENT-LVL III: CPT | Mod: PBBFAC,,, | Performed by: PODIATRIST

## 2023-02-28 PROCEDURE — 99213 PR OFFICE/OUTPT VISIT, EST, LEVL III, 20-29 MIN: ICD-10-PCS | Mod: S$PBB,,, | Performed by: PODIATRIST

## 2023-02-28 PROCEDURE — 99213 OFFICE O/P EST LOW 20 MIN: CPT | Mod: S$PBB,,, | Performed by: PODIATRIST

## 2023-02-28 PROCEDURE — 99999 PR PBB SHADOW E&M-EST. PATIENT-LVL III: ICD-10-PCS | Mod: PBBFAC,,, | Performed by: PODIATRIST

## 2023-02-28 PROCEDURE — 99213 OFFICE O/P EST LOW 20 MIN: CPT | Mod: PBBFAC,PO | Performed by: PODIATRIST

## 2023-02-28 NOTE — PROGRESS NOTES
"PODIATRY NOTE    S/   Gina Garcia is a 37 y.o. female, RTC for follow up status post left hallux nail avulsion.  Patient has been soaking and covering the toe as instructed.  Denies any F/C/N/V or pain.  No complaints.      O/  Vitals:    02/28/23 1021   Weight: 44 kg (97 lb)   Height: 4' 11" (1.499 m)   PainSc:   2       Lower extremity exam:  -Neurovascular status intact.    -Cap refill wnl.    -No edema noted to affected border  -left hallux nail bed clean, dry.  -No erythema or drainage.    -No tenderness to palpation of affected border     A/  Encounter Diagnosis   Name Primary?    Ingrown toenail of left foot with infection Yes         P/  -Nail border cleaned. No offending agent or spicule noted.    -RTC prn    Report Electronically Signed By:     Samantha Ramon DPM   Podiatric Medicine & Surgery  [facility]  3/13/2023                           "

## 2023-03-07 NOTE — TELEPHONE ENCOUNTER
Called pt to inform ok to cancel unless would like to come in and discuss results further. Instructed pt to either call or go on to portal to cancel the appt if she so wishes. No answer, lmom   Well appearing, awake, alert, oriented to person, place, time/situation and in no apparent distress. normal...

## 2023-03-20 ENCOUNTER — OFFICE VISIT (OUTPATIENT)
Dept: PODIATRY | Facility: CLINIC | Age: 38
End: 2023-03-20
Payer: OTHER GOVERNMENT

## 2023-03-20 VITALS — WEIGHT: 97 LBS | HEIGHT: 59 IN | BODY MASS INDEX: 19.56 KG/M2

## 2023-03-20 DIAGNOSIS — L60.0 INGROWN TOENAIL OF LEFT FOOT WITH INFECTION: Primary | ICD-10-CM

## 2023-03-20 PROCEDURE — 99999 PR PBB SHADOW E&M-EST. PATIENT-LVL III: CPT | Mod: PBBFAC,,, | Performed by: PODIATRIST

## 2023-03-20 PROCEDURE — 99213 PR OFFICE/OUTPT VISIT, EST, LEVL III, 20-29 MIN: ICD-10-PCS | Mod: S$PBB,,, | Performed by: PODIATRIST

## 2023-03-20 PROCEDURE — 99213 OFFICE O/P EST LOW 20 MIN: CPT | Mod: S$PBB,,, | Performed by: PODIATRIST

## 2023-03-20 PROCEDURE — 99999 PR PBB SHADOW E&M-EST. PATIENT-LVL III: ICD-10-PCS | Mod: PBBFAC,,, | Performed by: PODIATRIST

## 2023-03-20 PROCEDURE — 99213 OFFICE O/P EST LOW 20 MIN: CPT | Mod: PBBFAC | Performed by: PODIATRIST

## 2023-03-20 NOTE — PROGRESS NOTES
"PODIATRY NOTE    S/   Gina Garcia is a 37 y.o. female, RTC for follow up status post left hallux total nail avulsion.  Patient has been soaking and covering the toe as instructed.  Denies any F/C/N/V or pain.  No complaints.      O/  Vitals:    03/20/23 0905   Weight: 44 kg (97 lb)   Height: 4' 11" (1.499 m)   PainSc: 0-No pain       Lower extremity exam:  -Neurovascular status intact.    -Cap refill wnl.    -No edema noted to affected border  -left hallux nail bed clean with minimal dry scab from soaking.    -No erythema or drainage.    -No tenderness to palpation of affected border     A/  Encounter Diagnosis   Name Primary?    Ingrown toenail of left foot with infection Yes         P/  -Nail border cleaned. No offending agent or spicule noted.    -Okay to resume activities.  No soaking needed.  -RTC prn    Report Electronically Signed By:     Samantha Ramon DPM   Podiatric Medicine & Surgery  [facility]  3/20/2023                    "

## 2023-07-18 ENCOUNTER — OFFICE VISIT (OUTPATIENT)
Dept: OPHTHALMOLOGY | Facility: CLINIC | Age: 38
End: 2023-07-18
Payer: OTHER GOVERNMENT

## 2023-07-18 DIAGNOSIS — H57.12 EYE PAIN, LEFT: ICD-10-CM

## 2023-07-18 DIAGNOSIS — H40.053 OCULAR HYPERTENSION, BILATERAL: Primary | ICD-10-CM

## 2023-07-18 PROCEDURE — 99999 PR PBB SHADOW E&M-EST. PATIENT-LVL III: CPT | Mod: PBBFAC,,, | Performed by: OPHTHALMOLOGY

## 2023-07-18 PROCEDURE — 99213 OFFICE O/P EST LOW 20 MIN: CPT | Mod: PBBFAC | Performed by: OPHTHALMOLOGY

## 2023-07-18 PROCEDURE — 92133 CPTRZD OPH DX IMG PST SGM ON: CPT | Mod: PBBFAC | Performed by: OPHTHALMOLOGY

## 2023-07-18 PROCEDURE — 99999 PR PBB SHADOW E&M-EST. PATIENT-LVL III: ICD-10-PCS | Mod: PBBFAC,,, | Performed by: OPHTHALMOLOGY

## 2023-07-18 PROCEDURE — 99214 OFFICE O/P EST MOD 30 MIN: CPT | Mod: S$PBB,,, | Performed by: OPHTHALMOLOGY

## 2023-07-18 PROCEDURE — 92133 POSTERIOR SEGMENT OCT OPTIC NERVE(OCULAR COHERENCE TOMOGRAPHY) - OU - BOTH EYES: ICD-10-PCS | Mod: 26,S$PBB,, | Performed by: OPHTHALMOLOGY

## 2023-07-18 PROCEDURE — 99214 PR OFFICE/OUTPT VISIT, EST, LEVL IV, 30-39 MIN: ICD-10-PCS | Mod: S$PBB,,, | Performed by: OPHTHALMOLOGY

## 2023-07-18 RX ORDER — LATANOPROST 50 UG/ML
1 SOLUTION/ DROPS OPHTHALMIC NIGHTLY
Qty: 2.5 ML | Refills: 12 | Status: SHIPPED | OUTPATIENT
Start: 2023-07-18

## 2023-07-18 NOTE — PROGRESS NOTES
SUBJECTIVE  Gina Garcia is 37 y.o. female  Uncorrected distance visual acuity was 20/20 in the right eye and 20/25 +1 in the left eye.   Chief Complaint   Patient presents with    Eye Pain     Pt here due to eye pain and pressure OS>OD. Pt says she has noticed that her left eye is getting worse since her last appointment to the point it is affecting her daily life. She says she saw ENT and was told everything in her sinuses is normal.           HPI     Eye Pain     Additional comments: Pt here due to eye pain and pressure OS>OD. Pt says   she has noticed that her left eye is getting worse since her last   appointment to the point it is affecting her daily life. She says she saw   ENT and was told everything in her sinuses is normal.            Comments    1. Fhx glaucoma (Paternal Grandfather)          Last edited by Alfonso Casas MA on 7/18/2023  3:34 PM.         Assessment /Plan :  1. Ocular hypertension, bilateral - increased IOP today, add Latanoprost one drop in each eye every      2. Eye pain, left -  Pt has experienced daily eye pain OS and despite normal sinus CT and evaluation. Although patients do not generally feel IOPs in mid 20's consult and she does not exhibit any glaucomatous changes I will start Latanoprost to see if any symptomatic relief. ( One gtt OU QHS) Also  she had some left inferior occipital pain to digital compression, will refer to Dr. Butterfield to evaluate for referred pain.     RTC in 3-4 weeks for IOP check Latanoprost trial

## 2023-07-19 ENCOUNTER — TELEPHONE (OUTPATIENT)
Dept: PHYSICAL MEDICINE AND REHAB | Facility: CLINIC | Age: 38
End: 2023-07-19
Payer: OTHER GOVERNMENT

## 2023-07-31 ENCOUNTER — OFFICE VISIT (OUTPATIENT)
Dept: PHYSICAL MEDICINE AND REHAB | Facility: CLINIC | Age: 38
End: 2023-07-31
Payer: OTHER GOVERNMENT

## 2023-07-31 VITALS
DIASTOLIC BLOOD PRESSURE: 78 MMHG | WEIGHT: 97 LBS | HEART RATE: 61 BPM | RESPIRATION RATE: 12 BRPM | BODY MASS INDEX: 19.56 KG/M2 | SYSTOLIC BLOOD PRESSURE: 131 MMHG | HEIGHT: 59 IN

## 2023-07-31 DIAGNOSIS — M79.18 CERVICAL MYOFASCIAL PAIN SYNDROME: Primary | ICD-10-CM

## 2023-07-31 DIAGNOSIS — R29.898 ARM WEAKNESS: ICD-10-CM

## 2023-07-31 PROCEDURE — 99204 OFFICE O/P NEW MOD 45 MIN: CPT | Mod: S$PBB,,, | Performed by: PHYSICAL MEDICINE & REHABILITATION

## 2023-07-31 PROCEDURE — 99999 PR PBB SHADOW E&M-EST. PATIENT-LVL IV: ICD-10-PCS | Mod: PBBFAC,,, | Performed by: PHYSICAL MEDICINE & REHABILITATION

## 2023-07-31 PROCEDURE — 99204 PR OFFICE/OUTPT VISIT, NEW, LEVL IV, 45-59 MIN: ICD-10-PCS | Mod: S$PBB,,, | Performed by: PHYSICAL MEDICINE & REHABILITATION

## 2023-07-31 PROCEDURE — 99999 PR PBB SHADOW E&M-EST. PATIENT-LVL IV: CPT | Mod: PBBFAC,,, | Performed by: PHYSICAL MEDICINE & REHABILITATION

## 2023-07-31 PROCEDURE — 99214 OFFICE O/P EST MOD 30 MIN: CPT | Mod: PBBFAC | Performed by: PHYSICAL MEDICINE & REHABILITATION

## 2023-07-31 RX ORDER — TIZANIDINE 2 MG/1
2 TABLET ORAL NIGHTLY PRN
Qty: 30 TABLET | Refills: 1 | Status: SHIPPED | OUTPATIENT
Start: 2023-07-31 | End: 2023-08-14 | Stop reason: SDUPTHER

## 2023-07-31 NOTE — PROGRESS NOTES
PM&R NEW PATIENT HISTORY & PHYSICAL :    Referring Physician: Dr Hernandez  Chief Complaint   Patient presents with    Headache    Muscle Pain       HPI: This is a 37 y.o.  female being seen in clinic today for evaluation of neck/head achy pain and tightness with radiation to her eyes and face.  Her symptoms can occur at any time but often trigger her migraines.  She has chronic tightness in her neck/shoulder/jaw with her right TMJ clicking.  She will see her dentist soon.    History obtained from patient    Functional History:  Walking: Not limited  Transfers: Independent  Assistive devices: No  Power mobility: No  Falls: None     Needs help with:  Nothing - all ADLS normal    Cooking   Cleaning  Bathing   Dressing   Toileting     Past family, medical, social, and surgical history reviewed in chart    Review of Systems:     General- denies lethargy, weight change, fever, chills  Head/neck- denies swallowing difficulties  ENT- denies hearing changes  Cardiovascular-denies chest pain  Pulmonary- denies shortness of breath  GI- denies constipation or bowel incontinence  - denies bladder incontinence  Skin- denies wounds or rashes  Musculoskeletal- denies weakness, + pain  Neurologic- denies numbness and tingling  Psychiatric- denies depressive or psychotic features, denies anxiety  Lymphatic-+ h/o right sided breast CA with lymph node removal  Endocrine- denies hypoglycemic symptoms/DM history  All other pertinent systems negative     Physical Examination:  General: Well developed, well nourished female, NAD  HEENT:NCAT EOMI bilaterally   Pulmonary:Normal respirations    Spinal Examination: CERVICAL  Active ROM is within normal limits.  Inspection: No deformity of spinal alignment.forward rounded shoulders and placed head  Palpation: No vertebral tenderness to percussion.  Tight and ttp at splenius capitus with reproduction of symptoms, tight and ttp at trapezius, levator scapula, SCM  Spurling test: neg    Spinal  Examination: LUMBAR or THORACIC  Active ROM is within normal limits.  Inspection: No deformity of spinal alignment.    Musculoskeletal Tests:    Elbow compression (ulnar): neg  Tinels at wrist: neg  Phalen: neg    Bilateral Upper and Lower Extremities:  Pulses are 2+ at radial, DP and PT bilaterally.  Shoulder/Elbow/Wrist/Hand ROM wnl except rotator cuff weakness bilaterally-worse on right  Hip/Knee/Ankle ROM   Bilateral Extremities show normal capillary refill.  No signs of cyanosis, rubor, edema, skin changes, or dysvascular changes of appendages.  Nails appear intact.    Neurological Exam:  Cranial Nerves:  II-XII grossly intact    Manual Muscle Testing: (Motor 5=normal)    RIGHT Upper extremity: Shoulder abduction 5/5, Biceps 5/5, Triceps 5/5, Wrist extension 5/5, Abductor pollicis brevis 5/5, Ulnar hand intrinsics 5/5,  LEFT Upper extremity: Shoulder abduction 5/5, Biceps 5/5, Triceps 5/5, Wrist extension 5/5, Abductor pollicis brevis 5/5, Ulnar hand intrinsics 5/5,  No focal atrophy is noted of either upper extremity.    Bilateral Reflexes:1+ bic tric br  Gilbert's response is absent bilaterally.    Sensation: tested to light touch  - intact in arms    Gait: Narrow base and good arm swing.      IMPRESSION/PLAN: This is a 37 y.o.  female with cervical myofascial pain, tension headaches  Discussed in detail for 30 minutes about diagnosis and treatment plan    Rx for PT=*Marlee posture, myofascial release, stretch, rotator cuff strengthening, TMJ, Dry needle, modalities   2. Handouts on neck/shoulder exercises provided  3. Natural anti inflammatory and muscle health supplements recommended. Zanaflex 2mg QHS prn  4. Fu prn    Lola Butterfield M.D.  Physical Medicine and Rehab

## 2023-08-03 ENCOUNTER — CLINICAL SUPPORT (OUTPATIENT)
Dept: REHABILITATION | Facility: HOSPITAL | Age: 38
End: 2023-08-03
Attending: PHYSICAL MEDICINE & REHABILITATION
Payer: OTHER GOVERNMENT

## 2023-08-03 DIAGNOSIS — M26.623 BILATERAL TEMPOROMANDIBULAR JOINT PAIN: ICD-10-CM

## 2023-08-03 DIAGNOSIS — Z74.09 DECREASED FUNCTIONAL MOBILITY AND ENDURANCE: Primary | ICD-10-CM

## 2023-08-03 DIAGNOSIS — R29.3 POOR POSTURE: ICD-10-CM

## 2023-08-03 DIAGNOSIS — R29.898 DECREASED RANGE OF MOTION OF NECK: ICD-10-CM

## 2023-08-03 DIAGNOSIS — M62.81 PROXIMAL MUSCLE WEAKNESS: ICD-10-CM

## 2023-08-03 DIAGNOSIS — M79.18 CERVICAL MYOFASCIAL PAIN SYNDROME: ICD-10-CM

## 2023-08-03 DIAGNOSIS — R29.898 ARM WEAKNESS: ICD-10-CM

## 2023-08-03 PROCEDURE — 97163 PT EVAL HIGH COMPLEX 45 MIN: CPT

## 2023-08-03 PROCEDURE — 97140 MANUAL THERAPY 1/> REGIONS: CPT

## 2023-08-03 PROCEDURE — 97535 SELF CARE MNGMENT TRAINING: CPT

## 2023-08-03 NOTE — PLAN OF CARE
[facility]   Physical Therapy Initial Evaluation      Date: 8/3/2023   Name: Gina Garcia  Clinic Number: [id number]    Therapy Diagnosis:    Encounter Diagnoses   Name Primary?    Cervical myofascial pain syndrome     Arm weakness-rotator cuff weakness     Decreased functional mobility and endurance Yes    Proximal muscle weakness     Bilateral temporomandibular joint pain     Poor posture     Decreased range of motion of neck       Physician: Lola Butterfield MD     Physician Orders: PT Eval and Treat  Medical Diagnosis from Referral: Cervical myofascial pain syndrome [M79.18], Arm weakness [R29.898]  Evaluation Date: 8/3/2023  Authorization Period Expiration: 11/23/2023  Plan of Care Expiration: 11/3/2023  Progress Note Due: 9/3/2023  Visit # / Visits authorized: 1/1   FOTO: 1/3 (last performed on 8/3/2023)    Precautions: Standard, Immunosuppression, and cancer    Time In: 1505  Time Out: 1610  Total Billable Time (timed & untimed codes): 50 minutes    Subjective     Date of onset: chronic, ~2 years     History of current condition - Gina reports chronic history of migraines with pain and pressure in the eyes. Constant stiffness and soreness in the neck. R jaw pain worsening  over the last couple of years. Notes TMD started with a painful snapping ~ 2 years ago. No longer has snapping but notes ROM is significantly limited and she is no longer able to eat normal foods such as chicken and salad. She is currently eating a lot of yogurt and smoothies. Planning to go to the dentist soon to be evaluated for TMD and a possible . Also notes history of breast cancer and has had 4 different surgeries. Notes that the right arm is very weak following those surgeries. Chronic history of neck pain which is impacting her sleep. Difficulty sitting up for prolonged periods of time and has to have something supporting the curve of her neck when she lays down.       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   Current Level of Function: unable to open her mouth to take bites of food. Unable to chew tough foods. Currently mostly eating soft foods such as yogurt and smoothies. Difficulty sleeping at night due to neck pain and headaches. Unable to sit unsupported for prolonged periods of time. Has to have support under her neck when lying down. Difficulty using the right arm due to weakness and fear of movement following breast cancer surgeries     Dominant Extremity:    [x] Right    [] Left    Pts goals: Pt reported goals are to decrease overall pain levels in order to return to prior functional level.     Medical History:   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    Migraines        Surgical History:   Gina Garcia  has a past surgical history that includes Achilles tendon surgery; External ear surgery; Colonoscopy (N/A, 03/28/2022); Bilateral mastectomy (Bilateral, 04/25/2022); [place] lymph node biopsy (Right, 04/25/2022); Insertion of breast tissue expander (Bilateral, 04/25/2022); Axillary node dissection (Right, 04/25/2022); sciatic nerve exploration; debridement, breast (Right, 05/30/2022); xi robotic hysterectomy, with salpingo-oophorectomy (Bilateral, 07/01/2022); Replacement of implant of breast (Bilateral, 9/23/2022); and Breast revision surgery  (Bilateral, 9/23/2022).    Medications:   Gina has a current medication list which includes the following prescription(s): alprazolam, ascorbic acid (vitamin c), b complex vitamins, calcium-vitamin d3, folic acid, hydrocodone-acetaminophen, latanoprost, magnesium, rosuvastatin, and tizanidine.    Allergies:   Review of patient's allergies indicates:  No Known Allergies     Objective        RANGE OF MOTION:     Temporomandibular   Joint ROM Right  (spine) Left   Pain/Dysfunction with Movement Goal   Opening 20 ---  40   Lateral Trusion 5 3  8   Protrusion 0 ---  8     Cervical Right   (spine) Left    Pain/Dysfunction with Movement Goal   Cervical Flexion (60º) 75% ---  100%   Cervical Extension (80º) 50% ---  75%   Cervical Side Bending (45º) 50% 50%  75%   Cervical Rotation (75º) 50% 50%  75%       Shoulder AROM/PROM Right Left Pain/Dysfunction with Movement Goal   Shoulder Flexion (180º) 180 180     Shoulder Abduction (180º) 180 180     Shoulder Extension (60º) 60 60     Functional ER T4 T4     Functional IR T12 scapula            STRENGTH:   U/E MMT Right Left Pain/Dysfunction with Movement Goal   Shoulder Flexion 4-/5 4/5  4+/5 B   Shoulder Extension 4/5 4/5  4+/5 B   Shoulder Abduction 4-/5 4/5  4+/5 B   Shoulder IR 4-/5 4-/5  4+/5 B   Shoulder ER 4-/5 4-/5  4+/5 B   Elbow Flexion  4/5 4/5  5/5 B   Elbow Extension 4/5 4/5  5/5 B              SENSATION  [x] Intact to Light Touch   [] Impaired:      PALPATION: Muscles: Increased tone and tenderness to palpation of: bilateral suboccipitals, paraspinals, scalenes , SCM, upper trapezius, levator scapulae , periscapular musculature, masseter, temporalis, lateral pterygoid, medial pterygoid, posterior digastric, . Structures: Increased tenderness to palpation of: bilateral TMJ, occiput,       POSTURE:  Pt presents with postural abnormalities which include:    [x] Forward Head   [] Increased Lumbar Lordosis   [x] Rounded Shoulder   [] Genu Recurvatum   [x] Increased  Thoracic Kyphosis [] Genu Valgus   [] Trunk Deviated    [] Pes Planus   [] Scapular Winging    [] Other:             Function:     Intake Outcome Measure for FOTO NT Survey    Therapist reviewed FOTO scores for Gina on 8/3/2023.   FOTO documents entered into [employer] - see Media section.    Intake Score: NT%         Treatment     Total Treatment time (time-based codes) separate from Evaluation: (25) minutes     Gina received the treatments listed below:        MANUAL THERAPY TECHNIQUES were applied for (15) minutes, including:    Soft tissue mobilization:   bilateral suboccipitals, paraspinals, upper trapezius, levator scapulae , periscapular musculature, masseter, temporalis, posterior digastric   Joint mobilizations:     Functional dry needling: DEFERRED      Next Session:  Series 6   Open books   Pec corner stretch   Scapular retraction   Extensions   B shoulder ER          Patient Education and Home Exercises     Education provided: (10) minutes  PURPOSE: Patient educated on the impairments noted above and the effects of physical therapy intervention to improve overall condition and QOL.   STRENGTH: Patient educated on the importance of improved core and extremity strength in order to improve alignment of the spine and extremities with static positions and dynamic movement.   POSTURE: Patient educated on postural awareness to reduce stress and maintain optimal alignment of the spine with static positions and dynamic movement     Written Home Exercises Provided: yes.  Exercises were reviewed and Gina was able to demonstrate them prior to the end of the session.  Gina demonstrated good  understanding of the education provided. See EMR under Patient Instructions for exercises provided during therapy sessions.    Assessment     Gina is a 37 y.o. female referred to outpatient Physical Therapy with a medical diagnosis of cervical myofascial syndrome and arm weakness. Pt presents with impairments in the following  "categories: IMPAIRMENTS: ROM, strength, posture, core strength and stability, and functional movement patterns    Pt prognosis is Fair  Pt will benefit from skilled outpatient Physical Therapy to address the deficits stated above and in the chart below, provide pt/family education, and to maximize pt's level of independence.     Plan of care discussed with patient: Yes  Pt's spiritual, cultural and educational needs considered and patient is agreeable to the plan of care and goals as stated below:     Anticipated Barriers for therapy: co-morbidities, sedentary lifestyle, chronicity of condition, adherence to treatment plan, and coping style    Medical Necessity is demonstrated by the following  History  Co-morbidities and personal factors that may impact the plan of care [] LOW: no personal factors / co-morbidities  [] MODERATE: 1-2 personal factors / co-morbidities  [x] HIGH: 3+ personal factors / co-morbidities    Moderate / High Support Documentation: history of 4 breast surgeries/reconstructions, immunocompromised   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    Migraines         Examination  Body Structures and Functions, activity limitations and participation restrictions that may impact the plan of care [] LOW: addressing 1-2 elements  [] MODERATE: 3+ elements  [x] HIGH: 4+ elements (please support below)    Moderate / High Support Documentation: See above in "Current Level of Function"      Clinical Presentation [] LOW: stable  [] MODERATE: Evolving  [x] HIGH: Unstable     Decision Making/ Complexity Score: high         Short Term Goals:  6 weeks Status  Date Met   PAIN: Pt will report worst pain of 6/10 in order to progress toward max functional ability and improve quality of life. [x] Progressing  [] Met  [] Not Met    FUNCTION: Patient will demonstrate improved function as indicated by a score of greater than or equal to NT out of 100 on FOTO. [x] " Progressing  [] Met  [] Not Met    MOBILITY: Patient will improve AROM to 50% of stated goals, listed in objective measures above, in order to progress towards independence with functional activities.  [x] Progressing  [] Met  [] Not Met    STRENGTH: Patient will improve strength to 50% of stated goals, listed in objective measures above, in order to progress towards independence with functional activities. [x] Progressing  [] Met  [] Not Met    POSTURE: Patient will correct postural deviations in sitting and standing, to decrease pain and promote long term stability.  [x] Progressing  [] Met  [] Not Met    HEP: Patient will demonstrate independence with HEP in order to progress toward functional independence. [x] Progressing  [] Met  [] Not Met      Long Term Goals:  12 weeks Status Date Met   PAIN: Pt will report worst pain of 3/10 in order to progress toward max functional ability and improve quality of life [x] Progressing  [] Met  [] Not Met    FUNCTION: Patient will demonstrate improved function as indicated by a score of greater than or equal to NT out of 100 on FOTO. [x] Progressing  [] Met  [] Not Met    MOBILITY: Patient will improve AROM to stated goals, listed in objective measures above, in order to return to maximal functional potential and improve quality of life.  [x] Progressing  [] Met  [] Not Met    STRENGTH: Patient will improve strength to stated goals, listed in objective measures above, in order to improve functional independence and quality of life.  [x] Progressing  [] Met  [] Not Met    Patient will return to normal ADL's, IADL's, community involvement, recreational activities, and work-related activities with less than or equal to 3/10 pain and maximal function.  [x] Progressing  [] Met  [] Not Met      Plan     Plan of care Certification: 8/3/2023 to 11/3/2023.    Outpatient Physical Therapy 2 times weekly for 12 weeks to include any combination of the following interventions: virtual  visits, dry needling, modalities, electrical stimulation (IFC, Pre-Mod, Attended with Functional Dry Needling), Cervical/Lumbar Traction, Manual Therapy, Neuromuscular Re-ed, Patient Education, Self Care, Therapeutic Exercise, and Therapeutic Activites     Marlee Willoughby, PT, DPT

## 2023-08-04 PROBLEM — R29.898 DECREASED RANGE OF MOTION OF NECK: Status: ACTIVE | Noted: 2023-08-04

## 2023-08-04 PROBLEM — M26.623 BILATERAL TEMPOROMANDIBULAR JOINT PAIN: Status: ACTIVE | Noted: 2023-08-04

## 2023-08-04 PROBLEM — R29.3 POOR POSTURE: Status: ACTIVE | Noted: 2023-08-04

## 2023-08-07 ENCOUNTER — OFFICE VISIT (OUTPATIENT)
Dept: SURGERY | Facility: CLINIC | Age: 38
End: 2023-08-07
Payer: OTHER GOVERNMENT

## 2023-08-07 VITALS
HEIGHT: 59 IN | HEART RATE: 52 BPM | BODY MASS INDEX: 19.64 KG/M2 | WEIGHT: 97.44 LBS | SYSTOLIC BLOOD PRESSURE: 123 MMHG | DIASTOLIC BLOOD PRESSURE: 69 MMHG | TEMPERATURE: 98 F

## 2023-08-07 DIAGNOSIS — D05.11 DUCTAL CARCINOMA IN SITU (DCIS) OF RIGHT BREAST: Primary | ICD-10-CM

## 2023-08-07 DIAGNOSIS — N64.89 BREAST ASYMMETRY: ICD-10-CM

## 2023-08-07 PROCEDURE — 99214 PR OFFICE/OUTPT VISIT, EST, LEVL IV, 30-39 MIN: ICD-10-PCS | Mod: S$PBB,,, | Performed by: SURGERY

## 2023-08-07 PROCEDURE — 99999 PR PBB SHADOW E&M-EST. PATIENT-LVL IV: CPT | Mod: PBBFAC,,, | Performed by: SURGERY

## 2023-08-07 PROCEDURE — 99214 OFFICE O/P EST MOD 30 MIN: CPT | Mod: PBBFAC | Performed by: SURGERY

## 2023-08-07 PROCEDURE — 99999 PR PBB SHADOW E&M-EST. PATIENT-LVL IV: ICD-10-PCS | Mod: PBBFAC,,, | Performed by: SURGERY

## 2023-08-07 PROCEDURE — 99214 OFFICE O/P EST MOD 30 MIN: CPT | Mod: S$PBB,,, | Performed by: SURGERY

## 2023-08-09 ENCOUNTER — TELEPHONE (OUTPATIENT)
Dept: INTERNAL MEDICINE | Facility: CLINIC | Age: 38
End: 2023-08-09
Payer: OTHER GOVERNMENT

## 2023-08-09 DIAGNOSIS — Z98.890 H/O BREAST RECONSTRUCTION: Primary | ICD-10-CM

## 2023-08-09 NOTE — PROGRESS NOTES
[facility]  Breast Surgery  Department of General Surgery      Date of Visit:  2023    Referring provider:  No referring provider defined for this encounter.    PCP:  Kenneth Galvez MD    HIGH RISK    Gina Garcia is a 37 y.o. premenopausal female s/p bilateral mastectomy right axillary dissection with reconstruction by Dr. Carpio 22  and subsequent revision presents breast cancer surveillance. She is working with physical therapy on ROM and strength of upper extremities.      presents to discuss right breast biopsy. Her biopsy showed right breast DCIS ER+/UT+.    presents for abnormal mammogram of the right breast and high risk breast cancer check 2 mutation.  She recently underwent mammogram showing calcifications of the right breast and is currently awaiting stereotactic core biopsy.  She had previously undergone evaluation for her check 2 mutation with discussions about prophylactic bilateral mastectomy and reconstruction with Dr. Duckworth and Dr. Almaraz.  It was recommended that she undergo weight loss prior to surgery which she has done in the interim approximately 40-50 lbs.      Per Dr. Duckworth:  referred for evaluation of increased risk of breast cancer based on family history and + CHECK2 mutation.  Here today to discussed options of high risk screening and risk reduction.    Other breast cancer risk factors include family hx on mother's side, family hx on father's side, mom with breast CA, family hx of colon CA and family hx of Prostate CA.     Age of menarche was 15.    Age of menopause was NA.    Last menstrual period was 2020.    Patient denies hormonal therapy. IUD for 8 years - paraguard.     Patient is .    Age of first live birth was 23.    Patient did breast feed.       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    Migraines      Past Surgical History:   Procedure Laterality Date    ACHILLES TENDON SURGERY      AXILLARY NODE DISSECTION Right 04/25/2022    Procedure: LYMPHADENECTOMY, AXILLARY;  Surgeon: Micheal Bartholomew MD;  Location: [facility];  Service: General;  Laterality: Right;  Right axillary node dissection    BILATERAL MASTECTOMY Bilateral 04/25/2022    Procedure: MASTECTOMY, BILATERAL;  Surgeon: Micheal Bartholomew MD;  Location: [facility];  Service: General;  Laterality: Bilateral;    BREAST REVISION SURGERY Bilateral 9/23/2022    Procedure: BREAST RECONSTRUCTION SECOND STAGE, FAT GRAFTING BILATERAL BREAST;  Surgeon: Esther Carpio MD;  Location: [facility];  Service: Plastics;  Laterality: Bilateral;    COLONOSCOPY N/A 03/28/2022    Procedure: COLONOSCOPY;  Surgeon: Winnie Polanco MD;  Location[facility];  Service: Endoscopy;  Laterality: N/A;    DEBRIDEMENT, BREAST Right 05/30/2022    Procedure: DEBRIDEMENT, BREAST;  Surgeon: Esther Carpio MD;  Location: [facility];  Service: General;  Laterality: Right;  FLAP WASHOUT AND CLOSURE    EXTERNAL EAR SURGERY      cosmetic    INSERTION OF BREAST TISSUE EXPANDER Bilateral 04/25/2022    Procedure: INSERTION, TISSUE EXPANDER, BREAST;  Surgeon: Micheal Bartholomew MD;  Location: [facility];  Service: General;  Laterality: Bilateral;  Procedure performed by KRAIG Carpio    REPLACEMENT OF IMPLANT OF BREAST Bilateral 9/23/2022    Procedure: TISSUE EXCHANGE TO IMPLANT, CAPSULORRHAPY, CAPSALOTOMY;  Surgeon: Esther Carpio MD;  Location: [facility];  Service: Plastics;  Laterality: Bilateral;    sciatic nerve exploration      SENTINEL LYMPH NODE BIOPSY Right  04/25/2022    Procedure: BIOPSY, LYMPH NODE, SENTINEL;  Surgeon: Micheal Bartholomew MD;  Location: [facility] OR;  Service: General;  Laterality: Right;    XI ROBOTIC HYSTERECTOMY, WITH SALPINGO-OOPHORECTOMY Bilateral 07/01/2022    Procedure: XI ROBOTIC HYSTERECTOMY,WITH SALPINGO-OOPHORECTOMY;  Surgeon: CORDELL Bowen MD;  Location: [facility];  Service: OB/GYN;  Laterality: Bilateral;     Current Outpatient Medications on File Prior to Visit   Medication Sig Dispense Refill    ascorbic acid, vitamin C, (VITAMIN C) 250 MG tablet Take 250 mg by mouth once daily.      b complex vitamins capsule Take 1 capsule by mouth once daily.      calcium-vitamin D3 (OS-JAMEL 500 + D3) 500 mg-5 mcg (200 unit) per tablet Take 1 tablet by mouth 2 (two) times daily with meals.      folic acid (FOLVITE) 400 MCG tablet Take 400 mcg by mouth once daily.      HYDROcodone-acetaminophen (NORCO) 5-325 mg per tablet Take 1 tablet by mouth every 6 (six) hours as needed for Pain. 15 tablet 0    latanoprost 0.005 % ophthalmic solution Place 1 drop into both eyes every evening. 2.5 mL 12    magnesium 200 mg Tab Take 500 mg by mouth as needed.      tiZANidine (ZANAFLEX) 2 MG tablet Take 1 tablet (2 mg total) by mouth nightly as needed (muscles spasms). 30 tablet 1    ALPRAZolam (XANAX) 1 MG tablet Take 1 tablet (1 mg total) by mouth once. for 1 dose 1 tablet 0    [DISCONTINUED] rosuvastatin (CRESTOR) 20 MG tablet Take 1 tablet (20 mg total) by mouth once daily. 90 tablet 3     No current facility-administered medications on file prior to visit.         Review of patient's allergies indicates:  No Known Allergies      Review of Systems  Review of Systems   Constitutional:  Negative for chills and fever.   HENT:  Negative for congestion.    Eyes:  Negative for blurred vision.   Respiratory:  Negative for shortness of breath.    Cardiovascular:  Negative for chest pain.   Gastrointestinal:  Negative for abdominal pain, constipation, diarrhea, nausea  "and vomiting.   Genitourinary:  Negative for dysuria.   Musculoskeletal:  Negative for back pain.   Skin:  Negative for itching and rash.   Neurological:  Negative for dizziness.   Endo/Heme/Allergies:  Does not bruise/bleed easily.   Psychiatric/Behavioral:  The patient is not nervous/anxious.           Objective:   /69 (BP Location: Left arm, Patient Position: Sitting, BP Method: Medium (Automatic))   Pulse (!) 52   Temp 98.1 °F (36.7 °C) (Temporal)   Ht 4' 11" (1.499 m)   Wt 44.2 kg (97 lb 7.1 oz)   LMP 06/20/2022   BMI 19.68 kg/m²     Physical Exam  Vitals reviewed.   Constitutional:       Appearance: Normal appearance. She is not ill-appearing.   HENT:      Head: Normocephalic and atraumatic.   Eyes:      Extraocular Movements: Extraocular movements intact.   Cardiovascular:      Rate and Rhythm: Normal rate and regular rhythm.      Pulses: Normal pulses.      Heart sounds: Normal heart sounds.   Pulmonary:      Effort: Pulmonary effort is normal.      Breath sounds: Normal breath sounds.   Chest:   Breasts:     Right: No swelling, bleeding, inverted nipple, mass, nipple discharge, skin change or tenderness.      Left: No swelling, bleeding, inverted nipple, mass, nipple discharge, skin change or tenderness.       Abdominal:      General: There is no distension.      Palpations: Abdomen is soft.   Musculoskeletal:         General: Normal range of motion.      Cervical back: Normal range of motion.   Skin:     General: Skin is warm and dry.   Neurological:      General: No focal deficit present.      Mental Status: She is alert and oriented to person, place, and time.   Psychiatric:         Mood and Affect: Mood normal.         Imaging  Result:   Mammo Digital Diagnostic Bilat with Tony  US Breast Bilateral Limited     History:  Patient is 36 y.o. and is seen for diagnostic imaging.     Films Compared:  Prior images (if available) were compared.     Findings:  This procedure was performed using " tomosynthesis. Computer-aided detection was utilized in the interpretation of this examination.  The breasts have scattered areas of fibroglandular density.      Right  Mammo Digital Diagnostic Bilat with Tony  There are no corresponding areas of architectural distortion seen on this modality. Ultrasound of the lower-outer right breast demonstrates no concerning abnormality.         There are fine linear or fine-linear branching calcifications in a linear distribution seen in the retroareolar region of the right breast.      Left   Small mass within the outer left breast is less conspicuous with somewhat lucent appearance. Ultrasound was performed and demonstrates a minimally complicated 9 mm cyst at the 03:00 o'clock location, 8 cm from the nipple.         Impression:  Right  Calcifications: Right breast calcifications at the retroareolar position. Assessment: 4 - Suspicious finding. Stereotactic Biopsy is recommended.      Left  There is no mammographic or sonographic evidence of malignancy in the left breast.     BI-RADS Category:   Overall: 4 - Suspicious     Recommendation:  Stereotactic biopsy recommended of the right breast calcifications.   Six month follow-up diagnostic left mammogram and ultrasound can be performed for the minimally complicated cyst.      Final Pathologic Diagnosis 1. Right breast sentinel node #1, excision:   No metastatic carcinoma identified in 1 lymph node (0/1), supported by   immunohistochemical stains for AE1/AE3, cam 5.2, and WSK with adequate   controls   2. Right breast sentinel node #2, excision:   No metastatic carcinoma identified in 1 lymph node (0/1), supported by   immunohistochemical stains for AE1/AE3, cam 5.2, and WSK with adequate   controls   3. Right breast sentinel node #3, excision:   No metastatic carcinoma identified in 1 lymph node (0/1), supported by   immunohistochemical stains for AE1/AE3, cam 5.2, and WSK with adequate   controls   4. Right axillary content,  excision:   No metastatic carcinoma identified in 13 lymph nodes (0/13), supported by   AE1/AE3 immunostains with adequate controls   5. Right breast sentinel node #4, excision:   No metastatic carcinoma identified in 1 lymph node (0/1), supported by   immunohistochemical stains for AE1/AE3, cam 5.2, and WSK with adequate   controls   6. Right breast, mastectomy:   High-grade ductal carcinoma in Situ (DCIS), cribriform, solid, micropapillary   patterns, approximately 12 mm in size, present in 3/31 blocks   DCIS measures 8 mm to the posterior surgical margin, 8 mm to the inferior   surgical margin, and at least 10 mm to the remainder of the surgical margins   Biopsy marker and biopsy site change identified   Nipple and skin uninvolved   No invasive carcinoma identified.   Please see complete Surgical pathology cancer case summary below   PATHOLOGIC TNM STAGING: pTis(sn)N0   7. Left breast, mastectomy:   Benign breast tissue with fibrocystic changes   Unremarkable nipple and skin   No evidence of in-situ or invasive carcinoma   Surgical pathology cancer case summary   Procedure-total mastectomy   Specimen laterality-right   Histologic type-ductal carcinoma in-situ   Size (extent) of DCIS-approximately 12 mm, present in 3/31 blocks   Architectural patterns-cribriform, solid, micropapillary   Nuclear grade-grade 3 (high)   Necrosis-present, focal   Microcalcifications-present in DCIS   Margins   Margin status   DCIS measures 8 mm to the posterior surgical margin, 8 mm to the inferior   surgical margin, and at least 10 mm to the remainder of the surgical margins   Regional lymph nodes   Regional lymph node status-all regional lymph nodes negative for tumor   Total number of lymph nodes examined (sentinel and nonsentinel) -17   Number of sentinel nodes examined-4   Pathologic stage classification   pT category- pTis(DCIS):  Ductal carcinoma in Situ   Regional lymph nodes modifier-(sn):  [place] nodes examined   pN  category-pN0:  No regional lymph node metastasis identified   Additional findings-fibrocystic changes   Breast biomarker testing performed on previous biopsy [id number] as follows:   BREAST BIOMARKER RESULTS   Estrogen receptor (ER): Positive (100%, strong)   Progesterone receptor ([place]): Positive (5%, strong)   All immunostain controls react appropriately.   Diagnosed by RICHY VILLASENOR M.D.   For future possible ancillary studies, DCIS is present in block 6D.    Comment: Interp By Nkechi Chan D.O., Signed on 05/06/2022 at 14:08   Frozen Section Diagnosis 1.Postive   2.Negative   3.Negative   4.Negative             Assessment:     This is a 37 y.o. female  With CHECK 2 mutation/DCIS s/p bilateral mastectomy with right axillary dissection and reconstruction        Plan:     Normal chest wall exam  Keep plastic surgery follow up  F/u in 6 months for surveillance    Plastic surgery referral  Dr. Kelsey - discussed ongoing concerns with asymmetry and contour of reconstruction. She would like a second opinion to discuss her ongoing concerns     30 minutes of total time spent on the encounter, which includes face to face time and non-face to face time preparing to see the patient (eg, review of tests), Obtaining and/or reviewing separately obtained history, Documenting clinical information in the electronic or other health record, Independently interpreting results (not separately reported) and communicating results to the patient/family/caregiver, or Care coordination (not separately reported).

## 2023-08-09 NOTE — TELEPHONE ENCOUNTER
Patient notified that Dr. Barraza has placed a referral along with Dr. Bartholomew have placed a referral for her surgeon of her choice. Patient thank us and stated that we have been amazing with this whole thing.I told her to let us know if she needs anything else from us to give us a call

## 2023-08-09 NOTE — TELEPHONE ENCOUNTER
----- Message from Micheal Bartholomew MD sent at 8/8/2023  9:55 PM CDT -----  Regarding: Referral  I wanted to see if I could get some help with getting Gina a referral to another plastic surgeon for a second opinion. She underwent bilateral mastectomy last year with us. Initially she had her reconstruction done which required staged procedures which were more complicated. She has been following with her original plastic surgeon and discussing concerns but ultimately she was unhappy with the way her cosmetic results. The last two appts with me for her breast cancer surveillance we discussed getting a second opinion with a different plastic surgeon. She would like to see Trenton Kelsey who performed her mother's breast reconstruction. I    I placed a referral but wanted to see if I could get you to possibly place one as well. From what Gina and our breast cancer navigator told me was that the referral must come from her PCP with  insurance. I had my clinic send over our clinic note and referral but just wanted to make sure she ends up getting to the place she needs to. I know this whole process from high risk diagnosis to cancer diagnosis has been stressful on Gina. She will continue to follow me for the breast cancer surveillance and am still working with her on continuing  physical therapy to keep her ROM and strength improving. I appreciate the help.    Micheal

## 2023-08-09 NOTE — TELEPHONE ENCOUNTER
Let pt know that I as well as  placed the referral for the 2nd opinion with her surgeon of choice.

## 2023-08-14 ENCOUNTER — OFFICE VISIT (OUTPATIENT)
Dept: OPHTHALMOLOGY | Facility: CLINIC | Age: 38
End: 2023-08-14
Payer: OTHER GOVERNMENT

## 2023-08-14 ENCOUNTER — PATIENT MESSAGE (OUTPATIENT)
Dept: PHYSICAL MEDICINE AND REHAB | Facility: CLINIC | Age: 38
End: 2023-08-14
Payer: OTHER GOVERNMENT

## 2023-08-14 DIAGNOSIS — M79.18 CERVICAL MYOFASCIAL PAIN SYNDROME: ICD-10-CM

## 2023-08-14 DIAGNOSIS — M35.01 KERATITIS SICCA, BILATERAL: ICD-10-CM

## 2023-08-14 DIAGNOSIS — H40.053 OCULAR HYPERTENSION, BILATERAL: Primary | ICD-10-CM

## 2023-08-14 PROCEDURE — 99999 PR PBB SHADOW E&M-EST. PATIENT-LVL III: CPT | Mod: PBBFAC,,, | Performed by: OPHTHALMOLOGY

## 2023-08-14 PROCEDURE — 92012 INTRM OPH EXAM EST PATIENT: CPT | Mod: S$PBB,,, | Performed by: OPHTHALMOLOGY

## 2023-08-14 PROCEDURE — 92012 PR EYE EXAM, EST PATIENT,INTERMED: ICD-10-PCS | Mod: S$PBB,,, | Performed by: OPHTHALMOLOGY

## 2023-08-14 PROCEDURE — 99213 OFFICE O/P EST LOW 20 MIN: CPT | Mod: PBBFAC | Performed by: OPHTHALMOLOGY

## 2023-08-14 PROCEDURE — 99999 PR PBB SHADOW E&M-EST. PATIENT-LVL III: ICD-10-PCS | Mod: PBBFAC,,, | Performed by: OPHTHALMOLOGY

## 2023-08-14 RX ORDER — CYCLOSPORINE 0.5 MG/ML
1 EMULSION OPHTHALMIC 2 TIMES DAILY
Qty: 60 EACH | Refills: 12 | Status: SHIPPED | OUTPATIENT
Start: 2023-08-14 | End: 2024-03-18

## 2023-08-14 RX ORDER — TIZANIDINE 4 MG/1
6 TABLET ORAL NIGHTLY PRN
Qty: 45 TABLET | Refills: 1 | Status: SHIPPED | OUTPATIENT
Start: 2023-08-14 | End: 2023-10-20 | Stop reason: SDUPTHER

## 2023-08-14 NOTE — PROGRESS NOTES
SUBJECTIVE  Gina Garcia is 37 y.o. female  Uncorrected distance visual acuity was 20/20 in the right eye and 20/20 in the left eye.   Chief Complaint   Patient presents with    Ocular Hypertension     Pt here for Latanoprost trial. Pt says she has noticed a slight improvement in her eyes since she started the drops. 100% compliant with gtts.           HPI     Ocular Hypertension     Additional comments: Pt here for Latanoprost trial. Pt says she has   noticed a slight improvement in her eyes since she started the drops. 100%   compliant with gtts.            Comments    1. Ocular hypertension  Fhx glaucoma (Paternal Grandmother)      Latanoprost QHS OU            Last edited by Alfonso Casas MA on 8/14/2023 10:13 AM.         Assessment /Plan :  1. Ocular hypertension, bilateral Doing well, intraocular pressure (IOP) within acceptable range relative to target IOP and no evidence of progression. Continue current treatment. Reviewed importance of continued compliance with treatment and follow up.      Patient instructed to continue using the following glaucoma medication as follows:  Latanoprost one drop in each eye nightly    Return to clinic in 4 months  or as needed.  With IOP Check and GOCT     2.      Keratitis sicca, bilateral - symptoms persist with the use of artificial tears, add Restasis one drop in each eye 2 times a day

## 2023-08-16 ENCOUNTER — PATIENT MESSAGE (OUTPATIENT)
Dept: REHABILITATION | Facility: HOSPITAL | Age: 38
End: 2023-08-16

## 2023-08-16 ENCOUNTER — CLINICAL SUPPORT (OUTPATIENT)
Dept: REHABILITATION | Facility: HOSPITAL | Age: 38
End: 2023-08-16
Payer: OTHER GOVERNMENT

## 2023-08-16 DIAGNOSIS — R29.3 POOR POSTURE: ICD-10-CM

## 2023-08-16 DIAGNOSIS — R29.898 DECREASED RANGE OF MOTION OF NECK: ICD-10-CM

## 2023-08-16 DIAGNOSIS — M26.623 BILATERAL TEMPOROMANDIBULAR JOINT PAIN: Primary | ICD-10-CM

## 2023-08-16 DIAGNOSIS — M62.81 PROXIMAL MUSCLE WEAKNESS: ICD-10-CM

## 2023-08-16 PROCEDURE — 97112 NEUROMUSCULAR REEDUCATION: CPT

## 2023-08-16 PROCEDURE — 97140 MANUAL THERAPY 1/> REGIONS: CPT

## 2023-08-16 PROCEDURE — 97535 SELF CARE MNGMENT TRAINING: CPT

## 2023-08-16 NOTE — PROGRESS NOTES
[facility]   Physical Therapy Treatment Note        Name: Gina Garcia  Clinic Number: [id number]    Therapy Diagnosis:   Encounter Diagnoses   Name Primary?    Bilateral temporomandibular joint pain Yes    Poor posture     Proximal muscle weakness     Decreased range of motion of neck      Physician: Lola Butterfield MD    Visit Date: 8/16/2023    Physician Orders: PT Eval and Treat  Medical Diagnosis from Referral: Cervical myofascial pain syndrome [M79.18], Arm weakness [R29.898]  Evaluation Date: 8/3/2023  Authorization Period Expiration: 11/23/2023  Plan of Care Expiration: 11/3/2023  Progress Note Due: 9/3/2023  Visit # / Visits authorized: 1/15 (+1 for evaluation)   FOTO: 1/3 (last performed on 8/3/2023)     Precautions: Standard, Immunosuppression, and cancer    Time In: 0900  Time Out: 1006  Total Billable Time: 54 minutes (Billing reflects 1 on 1 treatment time spent with patient)    Subjective     Patient reports: significant muscle tension and pain in the neck. Continued jaw pain and popping. States she would like to avoid working on the jaw today due to significant pain and headache following previous session     He/She N/A compliant with home exercise program.  Response to previous treatment: increased headache the following day   Functional change: none noted at this time    Pain: 5/10     Location: head, neck, jaw     Objective      Objective Measures updated at progress report or POC update only unless otherwise noted.       Treatment     Gina received the treatments listed below:       MANUAL THERAPY TECHNIQUES were applied for (24) minutes, including:    Soft tissue mobilization:   bilateral suboccipitals, paraspinals, scalenes , SCM, upper trapezius, levator scapulae , periscapular musculature, temporalis, posterior digastric   Joint mobilizations:     Functional dry needling: DEFERRED        THERAPEUTIC EXERCISES to develop strength, endurance, ROM,  flexibility, posture, and core stabilization for (0) minutes including:    Performed Today:      Interventions DEFERRED today                  NEUROMUSCULAR RE-EDUCATION ACTIVITIES to improve Balance, Coordination, Kinesthetic, Sense, Proprioception, and Posture for (18) minutes.  The following were included:    Performed Today:     Cervical isometrics   Seated retraction 30x   Standing side bending- 10x then discontinued   Supine retraction 30x   Supine side bending 30x B   Scapular retraction isometric 30x   Open books 12x B    Interventions DEFERRED today                  THERAPEUTIC ACTIVITIES to improve dynamic and functional  performance for (0) minutes including:    Performed Today:      Interventions DEFERRED today                  Next Session:  Jaw isometrics        Patient Education and Home Exercises       Home Exercises Provided and Patient Education Provided     Education provided: (12) minutes  PURPOSE: Patient educated on the impairments noted above and the effects of physical therapy intervention to improve overall condition and QOL.   EXERCISE: Patient was educated on all the above exercise prior/during/after for proper posture, positioning, and execution for safe performance with home exercise program.   STRENGTH: Patient educated on the importance of improved core and extremity strength in order to improve alignment of the spine and extremities with static positions and dynamic movement.   POSTURE: Patient educated on postural awareness to reduce stress and maintain optimal alignment of the spine with static positions and dynamic movement   Education on spoon theory and conservation of energy throughout the day. Education on breaking up exercise and activity into multiple smaller increments throughout the day.   Education on TMD etiology and treatment.     Written Home Exercises Provided: yes.  Exercises were reviewed and Gina was able to demonstrate them prior to the end of the session.  Gina  demonstrated good  understanding of the education provided. See EMR under Patient Instructions for exercises provided during therapy sessions.    Assessment     Pt demonstrates limited tolerance for session due to significant pain, tension and adverse symptoms following previous session. Pt and I spent extensive time discussing possible increased soreness and headaches following sessions as well as modifications to minimize symptoms outside of therapy. Pt verbalizes good understanding. Incorporated extensive soft tissue mobilization to reduce muscle tension and pain. Attempted cervical isometrics in upright position; however, these interventions were regressed to supine due to significant symptom increase. Incorporated open books to improve chest and thoracic mobility.     Gina is progressing well towards her goals.   Patient prognosis is Fair.     Patient will continue to benefit from skilled outpatient physical therapy to address the deficits listed in the problem list box on initial evaluation, provide pt/family education and to maximize patient's level of independence in the home and community environment.     Patient's spiritual, cultural and educational needs considered and pt agreeable to plan of care and goals.     Anticipated Barriers for therapy: co-morbidities, sedentary lifestyle, chronicity of condition, adherence to treatment plan, and coping style        Short Term Goals:  6 weeks Status  Date Met   PAIN: Pt will report worst pain of 6/10 in order to progress toward max functional ability and improve quality of life. [x] Progressing  [] Met  [] Not Met     FUNCTION: Patient will demonstrate improved function as indicated by a score of greater than or equal to NT out of 100 on FOTO. [x] Progressing  [] Met  [] Not Met     MOBILITY: Patient will improve AROM to 50% of stated goals, listed in objective measures above, in order to progress towards independence with functional activities.  [x]  Progressing  [] Met  [] Not Met     STRENGTH: Patient will improve strength to 50% of stated goals, listed in objective measures above, in order to progress towards independence with functional activities. [x] Progressing  [] Met  [] Not Met     POSTURE: Patient will correct postural deviations in sitting and standing, to decrease pain and promote long term stability.  [x] Progressing  [] Met  [] Not Met     HEP: Patient will demonstrate independence with HEP in order to progress toward functional independence. [x] Progressing  [] Met  [] Not Met        Long Term Goals:  12 weeks Status Date Met   PAIN: Pt will report worst pain of 3/10 in order to progress toward max functional ability and improve quality of life [x] Progressing  [] Met  [] Not Met     FUNCTION: Patient will demonstrate improved function as indicated by a score of greater than or equal to NT out of 100 on FOTO. [x] Progressing  [] Met  [] Not Met     MOBILITY: Patient will improve AROM to stated goals, listed in objective measures above, in order to return to maximal functional potential and improve quality of life.  [x] Progressing  [] Met  [] Not Met     STRENGTH: Patient will improve strength to stated goals, listed in objective measures above, in order to improve functional independence and quality of life.  [x] Progressing  [] Met  [] Not Met     Patient will return to normal ADL's, IADL's, community involvement, recreational activities, and work-related activities with less than or equal to 3/10 pain and maximal function.  [x] Progressing  [] Met  [] Not Met          Plan     Continue Plan of Care (POC) and progress per patient tolerance. See treatment section for details on planned progressions next session.      Marlee Willoughby, PT

## 2023-08-21 ENCOUNTER — CLINICAL SUPPORT (OUTPATIENT)
Dept: REHABILITATION | Facility: HOSPITAL | Age: 38
End: 2023-08-21
Payer: OTHER GOVERNMENT

## 2023-08-21 ENCOUNTER — PATIENT MESSAGE (OUTPATIENT)
Dept: INTERNAL MEDICINE | Facility: CLINIC | Age: 38
End: 2023-08-21
Payer: OTHER GOVERNMENT

## 2023-08-21 DIAGNOSIS — M26.623 BILATERAL TEMPOROMANDIBULAR JOINT PAIN: Primary | ICD-10-CM

## 2023-08-21 DIAGNOSIS — M54.2 CHRONIC NECK PAIN: Primary | ICD-10-CM

## 2023-08-21 DIAGNOSIS — M62.81 PROXIMAL MUSCLE WEAKNESS: ICD-10-CM

## 2023-08-21 DIAGNOSIS — R29.898 DECREASED RANGE OF MOTION OF NECK: ICD-10-CM

## 2023-08-21 DIAGNOSIS — R29.3 POOR POSTURE: ICD-10-CM

## 2023-08-21 DIAGNOSIS — G89.29 CHRONIC NECK PAIN: Primary | ICD-10-CM

## 2023-08-21 PROCEDURE — 97014 ELECTRIC STIMULATION THERAPY: CPT

## 2023-08-21 PROCEDURE — 97112 NEUROMUSCULAR REEDUCATION: CPT

## 2023-08-21 PROCEDURE — 97140 MANUAL THERAPY 1/> REGIONS: CPT

## 2023-08-21 NOTE — PROGRESS NOTES
[facility]   Physical Therapy Treatment Note        Name: Gina Garcia  Clinic Number: [id number]    Therapy Diagnosis:   Encounter Diagnoses   Name Primary?    Bilateral temporomandibular joint pain Yes    Poor posture     Proximal muscle weakness     Decreased range of motion of neck      Physician: Lola Butterfield MD    Visit Date: 8/21/2023    Physician Orders: PT Eval and Treat  Medical Diagnosis from Referral: Cervical myofascial pain syndrome [M79.18], Arm weakness [R29.898]  Evaluation Date: 8/3/2023  Authorization Period Expiration: 11/23/2023  Plan of Care Expiration: 11/3/2023  Progress Note Due: 9/3/2023  Visit # / Visits authorized: 2/15 (+1 for evaluation)   FOTO: 1/3 (last performed on 8/3/2023)     Precautions: Standard, Immunosuppression, and cancer    Time In: 1100  Time Out: 1159  Total Billable Time: 56 minutes (Billing reflects 1 on 1 treatment time spent with patient)    Subjective     Patient reports: she was very sore following previous session and notes no significant changes in overall symptoms at this point. Has not yet seen her dentist. Has reached out to PCP to get a referral for Botox injections in the head and jaw     He/She was compliant with home exercise program.  Response to previous treatment: increased headache the following day   Functional change: none noted at this time    Pain: 5/10     Location: head, neck, jaw     Objective      Objective Measures updated at progress report or POC update only unless otherwise noted.       Treatment     Gina received the treatments listed below:       MANUAL THERAPY TECHNIQUES were applied for (18) minutes, including:    Soft tissue mobilization:   bilateral suboccipitals, paraspinals, scalenes , SCM, upper trapezius, levator scapulae , periscapular musculature, temporalis, posterior digastric   Joint mobilizations:     Functional dry needling: DEFERRED        THERAPEUTIC EXERCISES to develop strength,  endurance, ROM, flexibility, posture, and core stabilization for (0) minutes including:    Performed Today:      Interventions DEFERRED today                  NEUROMUSCULAR RE-EDUCATION ACTIVITIES to improve Balance, Coordination, Kinesthetic, Sense, Proprioception, and Posture for (28) minutes.  The following were included:    Performed Today:     Cervical isometrics   Supine retraction 2x10   Supine flexion (into hand) 2x10   Side bending (manual resist) 2x10 B  Jaw isometrics:   Opening (into hand) 2x10   Lateral trusion 2x10 B   Scapular retraction isometric 30x   Open books 12x B      Interventions DEFERRED today                    THERAPEUTIC ACTIVITIES to improve dynamic and functional  performance for (0) minutes including:    Performed Today:      Interventions DEFERRED today                  The following Modalities were applied for (10) minutes after being cleared for all contraindications with the following parameters    Modality Location Details   Electrical Stimulation and Moist Hot Pack  B upper trapezius             Next Session:  Jaw isometrics        Patient Education and Home Exercises       Home Exercises Provided and Patient Education Provided     Education provided: (12) minutes  PURPOSE: Patient educated on the impairments noted above and the effects of physical therapy intervention to improve overall condition and QOL.   EXERCISE: Patient was educated on all the above exercise prior/during/after for proper posture, positioning, and execution for safe performance with home exercise program.   STRENGTH: Patient educated on the importance of improved core and extremity strength in order to improve alignment of the spine and extremities with static positions and dynamic movement.   POSTURE: Patient educated on postural awareness to reduce stress and maintain optimal alignment of the spine with static positions and dynamic movement   Education on spoon theory and conservation of energy throughout  the day. Education on breaking up exercise and activity into multiple smaller increments throughout the day.   Education on TMD etiology and treatment.     Written Home Exercises Provided: yes.  Exercises were reviewed and Gina was able to demonstrate them prior to the end of the session.  Gina demonstrated good  understanding of the education provided. See EMR under Patient Instructions for exercises provided during therapy sessions.    Assessment     Pt continues to demonstrate limited tolerance for session due to significant pain. Incorporated electrical stimulation and heat prior to exercises with reports of mild improvement in symptoms. Added cervical flexion isometrics and jaw isometrics to improve neuromuscular control of the cervical spine and temporomandibular joint. Very light soft tissue mobilization performed with minimal discomfort reported by pt during intervention. Pt reports she is very sore following exercises and manual interventions despite low level intensity throughout session    Gina is progressing well towards her goals.   Patient prognosis is Fair.     Patient will continue to benefit from skilled outpatient physical therapy to address the deficits listed in the problem list box on initial evaluation, provide pt/family education and to maximize patient's level of independence in the home and community environment.     Patient's spiritual, cultural and educational needs considered and pt agreeable to plan of care and goals.     Anticipated Barriers for therapy: co-morbidities, sedentary lifestyle, chronicity of condition, adherence to treatment plan, and coping style        Short Term Goals:  6 weeks Status  Date Met   PAIN: Pt will report worst pain of 6/10 in order to progress toward max functional ability and improve quality of life. [x] Progressing  [] Met  [] Not Met     FUNCTION: Patient will demonstrate improved function as indicated by a score of greater than or equal to NT out of 100  on FOTO. [x] Progressing  [] Met  [] Not Met     MOBILITY: Patient will improve AROM to 50% of stated goals, listed in objective measures above, in order to progress towards independence with functional activities.  [x] Progressing  [] Met  [] Not Met     STRENGTH: Patient will improve strength to 50% of stated goals, listed in objective measures above, in order to progress towards independence with functional activities. [x] Progressing  [] Met  [] Not Met     POSTURE: Patient will correct postural deviations in sitting and standing, to decrease pain and promote long term stability.  [x] Progressing  [] Met  [] Not Met     HEP: Patient will demonstrate independence with HEP in order to progress toward functional independence. [x] Progressing  [] Met  [] Not Met        Long Term Goals:  12 weeks Status Date Met   PAIN: Pt will report worst pain of 3/10 in order to progress toward max functional ability and improve quality of life [x] Progressing  [] Met  [] Not Met     FUNCTION: Patient will demonstrate improved function as indicated by a score of greater than or equal to NT out of 100 on FOTO. [x] Progressing  [] Met  [] Not Met     MOBILITY: Patient will improve AROM to stated goals, listed in objective measures above, in order to return to maximal functional potential and improve quality of life.  [x] Progressing  [] Met  [] Not Met     STRENGTH: Patient will improve strength to stated goals, listed in objective measures above, in order to improve functional independence and quality of life.  [x] Progressing  [] Met  [] Not Met     Patient will return to normal ADL's, IADL's, community involvement, recreational activities, and work-related activities with less than or equal to 3/10 pain and maximal function.  [x] Progressing  [] Met  [] Not Met          Plan     Continue Plan of Care (POC) and progress per patient tolerance. See treatment section for details on planned progressions next session.      Marlee  Case, PT

## 2023-08-28 ENCOUNTER — CLINICAL SUPPORT (OUTPATIENT)
Dept: REHABILITATION | Facility: HOSPITAL | Age: 38
End: 2023-08-28
Payer: OTHER GOVERNMENT

## 2023-08-28 DIAGNOSIS — M26.623 BILATERAL TEMPOROMANDIBULAR JOINT PAIN: Primary | ICD-10-CM

## 2023-08-28 DIAGNOSIS — R29.3 POOR POSTURE: ICD-10-CM

## 2023-08-28 DIAGNOSIS — M62.81 PROXIMAL MUSCLE WEAKNESS: ICD-10-CM

## 2023-08-28 DIAGNOSIS — R29.898 DECREASED RANGE OF MOTION OF NECK: ICD-10-CM

## 2023-08-28 PROCEDURE — 97112 NEUROMUSCULAR REEDUCATION: CPT

## 2023-08-28 PROCEDURE — 97140 MANUAL THERAPY 1/> REGIONS: CPT

## 2023-08-28 PROCEDURE — 97110 THERAPEUTIC EXERCISES: CPT

## 2023-08-28 NOTE — PROGRESS NOTES
[facility]   Physical Therapy Treatment Note        Name: Gina Garcia  Clinic Number: [id number]    Therapy Diagnosis:   Encounter Diagnoses   Name Primary?    Bilateral temporomandibular joint pain Yes    Poor posture     Proximal muscle weakness     Decreased range of motion of neck      Physician: Lola Butterfield MD    Visit Date: 8/28/2023    Physician Orders: PT Eval and Treat  Medical Diagnosis from Referral: Cervical myofascial pain syndrome [M79.18], Arm weakness [R29.898]  Evaluation Date: 8/3/2023  Authorization Period Expiration: 11/23/2023  Plan of Care Expiration: 11/3/2023  Progress Note Due: 9/3/2023  Visit # / Visits authorized: 3/15 (+1 for evaluation)   FOTO: 1/3 (last performed on 8/3/2023)     Precautions: Standard, Immunosuppression, and cancer    Time In: 1100  Time Out: 1202  Total Billable Time: 56 minutes (Billing reflects 1 on 1 treatment time spent with patient)    Subjective     Patient reports: she is feeling okay and has slight improved pain currently but notes that this is just temporary and won't last long     He/She was compliant with home exercise program.  Response to previous treatment: she felt good with no exacerbation of symptoms   Functional change: none noted at this time    Pain: 5/10     Location: head, neck, jaw     Objective      Objective Measures updated at progress report or POC update only unless otherwise noted.       Treatment     Gina received the treatments listed below:       MANUAL THERAPY TECHNIQUES were applied for (12) minutes, including:    Soft tissue mobilization:   bilateral suboccipitals, paraspinals, scalenes , SCM, upper trapezius, levator scapulae , periscapular musculature, temporalis, posterior digastric   Joint mobilizations:     Functional dry needling: DEFERRED        THERAPEUTIC EXERCISES to develop strength, endurance, ROM, flexibility, posture, and core stabilization for (10) minutes  including:    Performed Today:     Series 6- supine on table, 1 minute each   Pec stretch  Scissors   serratus punches  shoulder circles   Seated scaption 2x10    Interventions DEFERRED today                  NEUROMUSCULAR RE-EDUCATION ACTIVITIES to improve Balance, Coordination, Kinesthetic, Sense, Proprioception, and Posture for (24) minutes.  The following were included:    Performed Today:     Cervical isometrics   Supine retraction 20x3s holds   Supine flexion (into hand) 20x3s holds   Rotation (manual resist) 20x3s holds   Jaw isometrics:   Opening (into hand) 20x3s holds   Lateral trusion 20x3s holds   Scapular retraction isometric 20x3s holds    B shoulder ER: yellow band 2x10    Interventions DEFERRED today     Open books 12x B                THERAPEUTIC ACTIVITIES to improve dynamic and functional  performance for (0) minutes including:    Performed Today:      Interventions DEFERRED today                  The following Modalities were applied for (10) minutes after being cleared for all contraindications with the following parameters    Modality Location Details   Electrical Stimulation and Moist Hot Pack  B upper trapezius             Next Session:  Jaw isometrics        Patient Education and Home Exercises       Home Exercises Provided and Patient Education Provided     Education provided: (0) minutes  PURPOSE: Patient educated on the impairments noted above and the effects of physical therapy intervention to improve overall condition and QOL.   EXERCISE: Patient was educated on all the above exercise prior/during/after for proper posture, positioning, and execution for safe performance with home exercise program.   STRENGTH: Patient educated on the importance of improved core and extremity strength in order to improve alignment of the spine and extremities with static positions and dynamic movement.   POSTURE: Patient educated on postural awareness to reduce stress and maintain optimal alignment of the  spine with static positions and dynamic movement   Education on spoon theory and conservation of energy throughout the day. Education on breaking up exercise and activity into multiple smaller increments throughout the day.   Education on TMD etiology and treatment.     Written Home Exercises Provided: yes.  Exercises were reviewed and Gina was able to demonstrate them prior to the end of the session.  Gina demonstrated good  understanding of the education provided. See EMR under Patient Instructions for exercises provided during therapy sessions.    Assessment     Pt tolerated session well today. Again incorporated electrical stimulation and heat to reduce pain and help patient relax prior to start of session. Pt was able to tolerate increased hold time with cervical, jaw and scapular isometrics. Added series 6 to improve shoulder mobility; pt reports discomfort in axillary region with some movements due to decreased mobility of tissues following breast surgeries     Gina is progressing well towards her goals.   Patient prognosis is Fair.     Patient will continue to benefit from skilled outpatient physical therapy to address the deficits listed in the problem list box on initial evaluation, provide pt/family education and to maximize patient's level of independence in the home and community environment.     Patient's spiritual, cultural and educational needs considered and pt agreeable to plan of care and goals.     Anticipated Barriers for therapy: co-morbidities, sedentary lifestyle, chronicity of condition, adherence to treatment plan, and coping style        Short Term Goals:  6 weeks Status  Date Met   PAIN: Pt will report worst pain of 6/10 in order to progress toward max functional ability and improve quality of life. [x] Progressing  [] Met  [] Not Met     FUNCTION: Patient will demonstrate improved function as indicated by a score of greater than or equal to NT out of 100 on FOTO. [x] Progressing  []  Met  [] Not Met     MOBILITY: Patient will improve AROM to 50% of stated goals, listed in objective measures above, in order to progress towards independence with functional activities.  [x] Progressing  [] Met  [] Not Met     STRENGTH: Patient will improve strength to 50% of stated goals, listed in objective measures above, in order to progress towards independence with functional activities. [x] Progressing  [] Met  [] Not Met     POSTURE: Patient will correct postural deviations in sitting and standing, to decrease pain and promote long term stability.  [x] Progressing  [] Met  [] Not Met     HEP: Patient will demonstrate independence with HEP in order to progress toward functional independence. [x] Progressing  [] Met  [] Not Met        Long Term Goals:  12 weeks Status Date Met   PAIN: Pt will report worst pain of 3/10 in order to progress toward max functional ability and improve quality of life [x] Progressing  [] Met  [] Not Met     FUNCTION: Patient will demonstrate improved function as indicated by a score of greater than or equal to NT out of 100 on FOTO. [x] Progressing  [] Met  [] Not Met     MOBILITY: Patient will improve AROM to stated goals, listed in objective measures above, in order to return to maximal functional potential and improve quality of life.  [x] Progressing  [] Met  [] Not Met     STRENGTH: Patient will improve strength to stated goals, listed in objective measures above, in order to improve functional independence and quality of life.  [x] Progressing  [] Met  [] Not Met     Patient will return to normal ADL's, IADL's, community involvement, recreational activities, and work-related activities with less than or equal to 3/10 pain and maximal function.  [x] Progressing  [] Met  [] Not Met          Plan     Continue Plan of Care (POC) and progress per patient tolerance. See treatment section for details on planned progressions next session.      Marlee Willoughby, PT

## 2023-08-29 ENCOUNTER — HOSPITAL ENCOUNTER (OUTPATIENT)
Dept: RADIOLOGY | Facility: HOSPITAL | Age: 38
Discharge: HOME | End: 2023-08-29
Attending: ANESTHESIOLOGY
Payer: OTHER GOVERNMENT

## 2023-08-29 ENCOUNTER — PATIENT MESSAGE (OUTPATIENT)
Dept: SURGERY | Facility: CLINIC | Age: 38
End: 2023-08-29
Payer: OTHER GOVERNMENT

## 2023-08-29 ENCOUNTER — OFFICE VISIT (OUTPATIENT)
Dept: PAIN MEDICINE | Facility: CLINIC | Age: 38
End: 2023-08-29
Payer: OTHER GOVERNMENT

## 2023-08-29 VITALS
HEART RATE: 58 BPM | HEIGHT: 59 IN | BODY MASS INDEX: 19.15 KG/M2 | WEIGHT: 95 LBS | RESPIRATION RATE: 14 BRPM | SYSTOLIC BLOOD PRESSURE: 130 MMHG | DIASTOLIC BLOOD PRESSURE: 75 MMHG

## 2023-08-29 DIAGNOSIS — G89.29 CHRONIC NECK PAIN: ICD-10-CM

## 2023-08-29 DIAGNOSIS — S03.00XD DISLOCATION OF TEMPOROMANDIBULAR JOINT, SUBSEQUENT ENCOUNTER: ICD-10-CM

## 2023-08-29 DIAGNOSIS — M79.18 MYOFASCIAL PAIN: ICD-10-CM

## 2023-08-29 DIAGNOSIS — M47.812 CERVICAL SPONDYLOSIS: ICD-10-CM

## 2023-08-29 DIAGNOSIS — G43.709 CHRONIC MIGRAINE WITHOUT AURA WITHOUT STATUS MIGRAINOSUS, NOT INTRACTABLE: Primary | ICD-10-CM

## 2023-08-29 DIAGNOSIS — M50.30 DDD (DEGENERATIVE DISC DISEASE), CERVICAL: ICD-10-CM

## 2023-08-29 DIAGNOSIS — M54.2 CHRONIC NECK PAIN: ICD-10-CM

## 2023-08-29 PROCEDURE — 99214 OFFICE O/P EST MOD 30 MIN: CPT | Mod: PBBFAC,PN | Performed by: ANESTHESIOLOGY

## 2023-08-29 PROCEDURE — 72052 X-RAY EXAM NECK SPINE 6/>VWS: CPT | Mod: 26,,, | Performed by: RADIOLOGY

## 2023-08-29 PROCEDURE — 72052 X-RAY EXAM NECK SPINE 6/>VWS: CPT | Mod: TC,PN

## 2023-08-29 PROCEDURE — 99999 PR PBB SHADOW E&M-EST. PATIENT-LVL IV: CPT | Mod: PBBFAC,,, | Performed by: ANESTHESIOLOGY

## 2023-08-29 PROCEDURE — 99204 PR OFFICE/OUTPT VISIT, NEW, LEVL IV, 45-59 MIN: ICD-10-PCS | Mod: S$PBB,,, | Performed by: ANESTHESIOLOGY

## 2023-08-29 PROCEDURE — 99999 PR PBB SHADOW E&M-EST. PATIENT-LVL IV: ICD-10-PCS | Mod: PBBFAC,,, | Performed by: ANESTHESIOLOGY

## 2023-08-29 PROCEDURE — 72052 XR CERVICAL SPINE 5 VIEW WITH FLEX AND EXT: ICD-10-PCS | Mod: 26,,, | Performed by: RADIOLOGY

## 2023-08-29 PROCEDURE — 99204 OFFICE O/P NEW MOD 45 MIN: CPT | Mod: S$PBB,,, | Performed by: ANESTHESIOLOGY

## 2023-08-29 RX ORDER — RIMEGEPANT SULFATE 75 MG/75MG
75 TABLET, ORALLY DISINTEGRATING ORAL
Qty: 15 TABLET | Refills: 0 | Status: SHIPPED | OUTPATIENT
Start: 2023-08-29 | End: 2023-09-07

## 2023-08-29 NOTE — PROGRESS NOTES
New Patient Interventional Pain Note (Initial Visit)    Referring Physician: Lola Butterfield MD    PCP: Kenneth Galvez MD    Chief Complaint:   Migraine headaches and neck pain       SUBJECTIVE:    Gina Garcia is a 38 y.o. female who presents to the clinic for the evaluation of migraine headaches and neck pain pain.   Patient reports over 10 year history of migraine headaches and neck pain that is worsened over the last 3 years after hysterectomy.  Of note patient has had previous bilateral mastectomy.  Migraine headaches are described as a throbbing stabbing pain that starts over her left face and significant become focal over her right forehead and behind her right eye.  Patient reports associated nausea, photophobia, sensitivity loud sounds, and blurred vision.  She denies any significant aura or focal weakness.  Patient reports getting 1 episode per week.  Patient reports 5 years ago she was receiving Botox injections for migraines with significant relief.  Pain is worse with worsening jaw pain and with stress, better with anti-inflammatories.    Neck pain described as a throbbing aching pain at the top of her neck.  Patient reports she primarily gets this neck pain in conjunction with her migraine headaches.  Patient denies any significant radiation to her bilateral upper extremities.  She denies any numbness and tingling in her bilateral hands.  Pain is worse with headaches, better with anti-inflammatories.    Pain is currently rated at 3 out 10, but can increase to an 8/10 with exacerbating activities. Denies any fevers, chills, changes in gait, saddle anesthesia, or bowel and bladder incontinence  Additionally, patient reports 3 history of bilateral TMJ.    Non-Pharmacologic Treatments:  Physical Therapy/Home Exercise: yes  Ice/Heat:yes  TENS: no  Acupuncture: no  Massage: yes  Chiropractic: no        Previous Pain Medications:  NSAIDs, Tylenol, muscle relaxers, triptans including  sumatriptan, Topamax, zonisamide, Nurtec, Ubrelvy, ibuprofen, Tylenol neuropathics, opioids, topicals       report:  Reviewed and consistent with medication use as prescribed.    Pain Procedures:   None    Pain Disability Index Review:         8/29/2023     9:49 AM   Last 3 PDI Scores   Pain Disability Index (PDI) 35       Imaging:   .  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    Migraines      Past Surgical History:   Procedure Laterality Date    ACHILLES TENDON SURGERY      AXILLARY NODE DISSECTION Right 04/25/2022    Procedure: LYMPHADENECTOMY, AXILLARY;  Surgeon: Micheal Bartholomew MD;  Location: [facility] OR;  Service: General;  Laterality: Right;  Right axillary node dissection    BILATERAL MASTECTOMY Bilateral 04/25/2022    Procedure: MASTECTOMY, BILATERAL;  Surgeon: Micheal Bartholomew MD;  Location: [facility] OR;  Service: General;  Laterality: Bilateral;    BREAST REVISION SURGERY Bilateral 9/23/2022    Procedure: BREAST RECONSTRUCTION SECOND STAGE, FAT GRAFTING BILATERAL BREAST;  Surgeon: Esther Carpio MD;  Location: [facility] OR;  Service: Plastics;  Laterality: Bilateral;    COLONOSCOPY N/A 03/28/2022    Procedure: COLONOSCOPY;  Surgeon: Winnie Polanco MD;  Location: [facility] ENDO;  Service: Endoscopy;  Laterality: N/A;    DEBRIDEMENT, BREAST Right 05/30/2022    Procedure: DEBRIDEMENT, BREAST;  Surgeon: Esther Carpio MD;  Location: [facility] OR;  Service: General;  Laterality: Right;  FLAP WASHOUT AND CLOSURE    EXTERNAL EAR SURGERY      cosmetic    INSERTION OF BREAST TISSUE EXPANDER Bilateral 04/25/2022    Procedure: INSERTION, TISSUE EXPANDER, BREAST;  Surgeon: Micheal Bartholomew MD;  Location: [facility];  Service: General;  Laterality: Bilateral;  Procedure performed by KRAIG Carpio    REPLACEMENT OF IMPLANT OF BREAST Bilateral 9/23/2022    Procedure: TISSUE EXCHANGE TO IMPLANT, CAPSULORRHAPY, CAPSALOTOMY;  Surgeon: Esther MARTINEZ  MD Shirin;  Location: [facility] OR;  Service: Plastics;  Laterality: Bilateral;    sciatic nerve exploration      SENTINEL LYMPH NODE BIOPSY Right 04/25/2022    Procedure: BIOPSY, LYMPH NODE, SENTINEL;  Surgeon: Micheal Bartholomew MD;  Location: [facility] OR;  Service: General;  Laterality: Right;    XI ROBOTIC HYSTERECTOMY, WITH SALPINGO-OOPHORECTOMY Bilateral 07/01/2022    Procedure: XI ROBOTIC HYSTERECTOMY,WITH SALPINGO-OOPHORECTOMY;  Surgeon: CORDELL Bowen MD;  [facility];  Service: OB/GYN;  Laterality: Bilateral;     Social History     Socioeconomic History    Marital status:     Number of children: 2   Occupational History    Occupation: Stay at home mom   Tobacco Use    Smoking status: Never    Smokeless tobacco: Never   Substance and Sexual Activity    Alcohol use: No    Drug use: No    Sexual activity: Yes     Partners: Male     Birth control/protection: None     Family History   Problem Relation Age of Onset    Breast cancer Mother 45        L lumptectomy, chemo; R (dx 69) plans to have BL mastectomy    Skin cancer Mother     Cancer Mother         Breast cancer    Prostate cancer Father 68        radiation complete    Asthma Sister     Lung cancer Maternal Grandmother         non-smoker    Prostate cancer Maternal Grandfather         metastasis to brain    Cancer Maternal Grandfather         Prostate cancer    Breast cancer Paternal Grandmother     Cancer Paternal Grandmother         Breast cancer    Prostate cancer Paternal Grandfather     Cancer Paternal Grandfather         Prostate cancer    ADD / ADHD Son     Autism Son     Diabetes Paternal Uncle     Asthma Sister     Colon cancer Neg Hx         colon cancer    Ovarian cancer Neg Hx     Thrombophilia Neg Hx        Review of patient's allergies indicates:  No Known Allergies    Current Outpatient Medications   Medication Sig    ascorbic acid, vitamin C, (VITAMIN C) 250 MG tablet Take 250 mg by mouth once daily.    b complex vitamins capsule  Take 1 capsule by mouth once daily.    calcium-vitamin D3 (OS-JAMEL 500 + D3) 500 mg-5 mcg (200 unit) per tablet Take 1 tablet by mouth 2 (two) times daily with meals.    cycloSPORINE (RESTASIS) 0.05 % ophthalmic emulsion Place 1 drop into both eyes 2 (two) times daily.    folic acid (FOLVITE) 400 MCG tablet Take 400 mcg by mouth once daily.    latanoprost 0.005 % ophthalmic solution Place 1 drop into both eyes every evening.    magnesium 200 mg Tab Take 500 mg by mouth as needed.    rosuvastatin (CRESTOR) 20 MG tablet TAKE 1 TABLET BY MOUTH EVERY DAY FOR CHOLESTEROL    tiZANidine (ZANAFLEX) 4 MG tablet Take 1.5 tablets (6 mg total) by mouth nightly as needed (muscles spasms).    ALPRAZolam (XANAX) 1 MG tablet Take 1 tablet (1 mg total) by mouth once. for 1 dose    HYDROcodone-acetaminophen (NORCO) 5-325 mg per tablet Take 1 tablet by mouth every 6 (six) hours as needed for Pain.    rimegepant (NURTEC) 75 mg odt Take 1 tablet (75 mg total) by mouth every 24 hours as needed for Migraine. Place ODT tablet on the tongue; alternatively the ODT tablet may be placed under the tongue     Current Facility-Administered Medications   Medication    onabotulinumtoxina injection 200 Units         ROS  Review of Systems   Constitutional:  Negative for chills, diaphoresis, fatigue and fever.   HENT:  Positive for dental problem. Negative for ear discharge, ear pain, rhinorrhea, trouble swallowing and voice change.    Eyes:  Positive for photophobia, pain and visual disturbance. Negative for discharge and itching.   Respiratory:  Negative for chest tightness, shortness of breath, wheezing and stridor.    Cardiovascular:  Negative for chest pain and leg swelling.   Gastrointestinal:  Negative for blood in stool, diarrhea, nausea and vomiting.   Endocrine: Negative for cold intolerance and heat intolerance.   Genitourinary:  Negative for dysuria, hematuria and urgency.   Musculoskeletal:  Positive for arthralgias, myalgias, neck pain  "and neck stiffness. Negative for back pain, gait problem and joint swelling.   Skin:  Negative for rash.   Neurological:  Positive for headaches. Negative for tremors, seizures, speech difficulty, weakness, light-headedness and numbness.   Hematological:  Does not bruise/bleed easily.   Psychiatric/Behavioral:  Negative for agitation, confusion and suicidal ideas.             OBJECTIVE:  /75   Pulse (!) 58   Resp 14   Ht 4' 11" (1.499 m)   Wt 43.1 kg (95 lb 0.3 oz)   LMP 06/20/2022   BMI 19.19 kg/m²         Physical Exam  Constitutional:       General: She is not in acute distress.     Appearance: Normal appearance. She is not ill-appearing.   HENT:      Head: Normocephalic and atraumatic.   Eyes:      Extraocular Movements: Extraocular movements intact.      Pupils: Pupils are equal, round, and reactive to light.   Cardiovascular:      Pulses: Normal pulses.   Pulmonary:      Effort: Pulmonary effort is normal.      Breath sounds: Normal breath sounds.   Abdominal:      General: Abdomen is flat. Bowel sounds are normal.      Palpations: Abdomen is soft.   Skin:     General: Skin is warm and dry.      Capillary Refill: Capillary refill takes less than 2 seconds.   Neurological:      General: No focal deficit present.      Mental Status: She is alert and oriented to person, place, and time.      Sensory: No sensory deficit.      Motor: No weakness.      Coordination: Coordination normal.      Gait: Gait normal.      Deep Tendon Reflexes: Reflexes normal.   Psychiatric:         Mood and Affect: Mood normal.         Behavior: Behavior normal.         Thought Content: Thought content normal.           Musculoskeletal:    Cervical Exam  Incision: no  Pain with Flexion: no  Pain with Extension: yes  Paraspinous TTP:  Positive bilaterally near the base of the skull  Facet TTP:  C2-C3  Spurling:  Negative bilaterally  ROM: FROM    LABS:  Lab Results   Component Value Date    WBC 8.23 06/29/2022    HGB 12.9 " 06/29/2022    HCT 40.9 06/29/2022     (H) 06/29/2022     06/29/2022       CMP  Sodium   Date Value Ref Range Status   08/12/2022 142 136 - 145 mmol/L Final     Potassium   Date Value Ref Range Status   08/12/2022 4.8 3.5 - 5.1 mmol/L Final     Chloride   Date Value Ref Range Status   08/12/2022 103 95 - 110 mmol/L Final     CO2   Date Value Ref Range Status   08/12/2022 26 23 - 29 mmol/L Final     Glucose   Date Value Ref Range Status   08/12/2022 87 70 - 110 mg/dL Final     BUN   Date Value Ref Range Status   08/12/2022 11 6 - 20 mg/dL Final     Creatinine   Date Value Ref Range Status   08/12/2022 0.7 0.5 - 1.4 mg/dL Final     Calcium   Date Value Ref Range Status   08/12/2022 10.5 8.7 - 10.5 mg/dL Final     Total Protein   Date Value Ref Range Status   08/12/2022 7.4 6.0 - 8.4 g/dL Final     Albumin   Date Value Ref Range Status   08/12/2022 4.8 3.5 - 5.2 g/dL Final     Total Bilirubin   Date Value Ref Range Status   08/12/2022 0.4 0.1 - 1.0 mg/dL Final     Comment:     For infants and newborns, interpretation of results should be based  on gestational age, weight and in agreement with clinical  observations.    Premature Infant recommended reference ranges:  Up to 24 hours.............<8.0 mg/dL  Up to 48 hours............<12.0 mg/dL  3-5 days..................<15.0 mg/dL  6-29 days.................<15.0 mg/dL       Alkaline Phosphatase   Date Value Ref Range Status   08/12/2022 67 55 - 135 U/L Final     AST   Date Value Ref Range Status   08/12/2022 23 10 - 40 U/L Final     ALT   Date Value Ref Range Status   08/12/2022 27 10 - 44 U/L Final     Anion Gap   Date Value Ref Range Status   08/12/2022 13 8 - 16 mmol/L Final     eGFR if    Date Value Ref Range Status   06/29/2022 >60.0 >60 mL/min/1.73 m^2 Final     eGFR if non    Date Value Ref Range Status   06/29/2022 >60.0 >60 mL/min/1.73 m^2 Final     Comment:     Calculation used to obtain the estimated glomerular  "filtration  rate (eGFR) is the CKD-EPI equation.          No results found for: "LABA1C", "HGBA1C"          ASSESSMENT:       38 y.o. year old female with migraine headaches and neck pain, consistent with     1. Chronic migraine without aura without status migrainosus, not intractable  rimegepant (NURTEC) 75 mg odt    Ambulatory referral/consult to Neurology    onabotulinumtoxina injection 200 Units    Prior authorization Order      2. Chronic neck pain  Ambulatory referral/consult to Pain Clinic      3. Dislocation of temporomandibular joint, subsequent encounter        4. Cervical spondylosis  X-Ray Cervical Spine 5 View With Flex And Ext      5. DDD (degenerative disc disease), cervical  X-Ray Cervical Spine 5 View With Flex And Ext      6. Myofascial pain  X-Ray Cervical Spine 5 View With Flex And Ext        Chronic migraine without aura without status migrainosus, not intractable  -     rimegepant (NURTEC) 75 mg odt; Take 1 tablet (75 mg total) by mouth every 24 hours as needed for Migraine. Place ODT tablet on the tongue; alternatively the ODT tablet may be placed under the tongue  Dispense: 15 tablet; Refill: 0  -     Ambulatory referral/consult to Neurology; Future; Expected date: 09/05/2023  -     onabotulinumtoxina injection 200 Units  -     Prior authorization Order    Chronic neck pain  -     Ambulatory referral/consult to Pain Clinic    Dislocation of temporomandibular joint, subsequent encounter    Cervical spondylosis  -     X-Ray Cervical Spine 5 View With Flex And Ext; Future; Expected date: 08/29/2023    DDD (degenerative disc disease), cervical  -     X-Ray Cervical Spine 5 View With Flex And Ext; Future; Expected date: 08/29/2023    Myofascial pain  -     X-Ray Cervical Spine 5 View With Flex And Ext; Future; Expected date: 08/29/2023             PLAN:   - Interventions:   We will schedule patient for Botox injections for chronic migraine headaches.  Patient reports having 1 migraine headache " episode per week.  Patient has previously failed ibuprofen, Tylenol, Topamax, zonisamide, and sumatriptan.  Patient reports receiving Botox injections 5 years ago with significant relief and reduction of her migraine headache symptoms.  We will obtain x-rays of cervical spine to rule out any significant cervical facet mediated disease particularly at C2-C3 with overlapping 3rd occipital nerve irritation.   - Anticoagulation use:   No no anticoagulation    - Medications:   Start Nurtec 75 mg daily as needed for migraine headaches.  Patient has previously failed ibuprofen, Tylenol, Topamax, zonisamide, and sumatriptan.   Continue tizanidine 6 mg at night    - Therapy:    Continue formal physical therapy for neck pain as well as TMJ    - Imaging/Diagnostic:   We will order order x-rays of cervical spine to further evaluate any significant cervical spondylosis particularly at C2-C3 with possible 3rd occipital nerve involvement.    - Consults:   We will refer patient to Neurology for evaluation of chronic migraine headaches.  Patient had previously been established with [facility]rology at [facility] 5 years ago.  Continue follow-up with Ophthalmology for visual disturbances associated with migraine headaches  Continue follow-up with dentist for TMJ.  Patient is scheduled to receive x-rays in the upcoming week.        - Patient Questions: Answered all of the patient's questions regarding diagnosis, therapy, and treatment     This condition does not require this patient to take time off of work, and the primary goal of our Pain Management services is to improve the patient's functional capacity.     - Follow up visit: return to clinic for Botox injections        The above plan and management options were discussed at length with patient. Patient is in agreement with the above and verbalized understanding.    I discussed the goals of interventional chronic pain management with the patient on today's visit.  I explained the  utility of injections for diagnostic and therapeutic purposes.  We discussed a multimodal approach to pain including treating the patient's given worst pain at any given time.  We will use a systematic approach to addressing pain.  We will also adopt a multimodal approach that includes injections, adjuvant medications, physical therapy, at times psychiatry.  There may be a limited role for opioid use intermittently in the treatment of pain, more particularly for acute pain although no one approach can be used as a sole treatment modality.    I emphasized the importance of regular exercise, core strengthening and stretching, diet and weight loss as a cornerstone of long-term pain management.      Raul Coe MD  Interventional Pain Management  [facility]    Disclaimer:  This note was prepared using voice recognition system and is likely to have sound alike errors that may have been overlooked even after proof reading.  Please call me with any questions

## 2023-09-05 ENCOUNTER — CLINICAL SUPPORT (OUTPATIENT)
Dept: REHABILITATION | Facility: HOSPITAL | Age: 38
End: 2023-09-05
Payer: OTHER GOVERNMENT

## 2023-09-05 DIAGNOSIS — R29.3 POOR POSTURE: ICD-10-CM

## 2023-09-05 DIAGNOSIS — M26.623 BILATERAL TEMPOROMANDIBULAR JOINT PAIN: Primary | ICD-10-CM

## 2023-09-05 DIAGNOSIS — M62.81 PROXIMAL MUSCLE WEAKNESS: ICD-10-CM

## 2023-09-05 DIAGNOSIS — R29.898 DECREASED RANGE OF MOTION OF NECK: ICD-10-CM

## 2023-09-05 PROCEDURE — 97530 THERAPEUTIC ACTIVITIES: CPT

## 2023-09-05 PROCEDURE — 97140 MANUAL THERAPY 1/> REGIONS: CPT

## 2023-09-05 PROCEDURE — 97014 ELECTRIC STIMULATION THERAPY: CPT

## 2023-09-05 PROCEDURE — 97112 NEUROMUSCULAR REEDUCATION: CPT

## 2023-09-05 NOTE — PROGRESS NOTES
[facility]   Physical Therapy Treatment Note        Name: Gina Garcia  Clinic Number: [id number]    Therapy Diagnosis:   Encounter Diagnoses   Name Primary?    Bilateral temporomandibular joint pain Yes    Poor posture     Proximal muscle weakness     Decreased range of motion of neck      Physician: Lola Butterfield MD    Visit Date: 9/5/2023    Physician Orders: PT Eval and Treat  Medical Diagnosis from Referral: Cervical myofascial pain syndrome [M79.18], Arm weakness [R29.898]  Evaluation Date: 8/3/2023  Authorization Period Expiration: 11/23/2023  Plan of Care Expiration: 11/3/2023  Progress Note Due: 9/3/2023  Visit # / Visits authorized: 4/15 (+1 for evaluation)   FOTO: 1/3 (last performed on 8/3/2023)     Precautions: Standard, Immunosuppression, and cancer    Time In: 0902  Time Out: 1005  Total Billable Time: 58 minutes (Billing reflects 1 on 1 treatment time spent with patient)    Subjective     Patient reports: she is feeling okay. Notes no significant changes since previous session    He/She was compliant with home exercise program.  Response to previous treatment: she felt good with no exacerbation of symptoms   Functional change: none noted at this time    Pain: 5/10     Location: head, neck, jaw     Objective      Objective Measures updated at progress report or POC update only unless otherwise noted.       Treatment     Gina received the treatments listed below:       MANUAL THERAPY TECHNIQUES were applied for (10) minutes, including:    Soft tissue mobilization:   bilateral suboccipitals, paraspinals, scalenes , SCM, upper trapezius, levator scapulae , periscapular musculature, temporalis, posterior digastric   Joint mobilizations:     Functional dry needling: DEFERRED        THERAPEUTIC EXERCISES to develop strength, endurance, ROM, flexibility, posture, and core stabilization for (3) minutes including:    Performed Today:     Series 6- supine on table, 1  minute each   Pec stretch  Bear hugs   Scissors     Interventions DEFERRED today                  NEUROMUSCULAR RE-EDUCATION ACTIVITIES to improve Balance, Coordination, Kinesthetic, Sense, Proprioception, and Posture for (25) minutes.  The following were included:    Performed Today:     Cervical isometrics   Supine retraction 30x3s holds   Supine flexion (into hand) 30x3s holds   Rotation (manual resist) 30x3s holds   Jaw isometrics:   Opening (into hand) 30x3s holds   Lateral trusion 30x3s holds   Supine serratus punches 40x    Interventions DEFERRED today     Open books 12x B                THERAPEUTIC ACTIVITIES to improve dynamic and functional  performance for (12) minutes including:    Performed Today:     Scapular retractions: yellow band 2x1 minute   Shoulder extensions: yellow band 2x1 minute   B shoulder ER: yellow band 2x1 minute    Interventions DEFERRED today                  The following Modalities were applied for (8) minutes after being cleared for all contraindications with the following parameters    Modality Location Details   Electrical Stimulation and Moist Hot Pack  B upper trapezius             Next Session:        Patient Education and Home Exercises       Home Exercises Provided and Patient Education Provided     Education provided: (0) minutes  PURPOSE: Patient educated on the impairments noted above and the effects of physical therapy intervention to improve overall condition and QOL.   EXERCISE: Patient was educated on all the above exercise prior/during/after for proper posture, positioning, and execution for safe performance with home exercise program.   STRENGTH: Patient educated on the importance of improved core and extremity strength in order to improve alignment of the spine and extremities with static positions and dynamic movement.   POSTURE: Patient educated on postural awareness to reduce stress and maintain optimal alignment of the spine with static positions and dynamic  movement   Education on spoon theory and conservation of energy throughout the day. Education on breaking up exercise and activity into multiple smaller increments throughout the day.   Education on TMD etiology and treatment.     Written Home Exercises Provided: yes.  Exercises were reviewed and Gina was able to demonstrate them prior to the end of the session.  Gina demonstrated good  understanding of the education provided. See EMR under Patient Instructions for exercises provided during therapy sessions.    Assessment     Pt tolerated session well today. Progressed to rows and shoulder extensions in a standing position to improve functional strength; cueing required to reduce shoulder hiking throughout intervention. Increased reps of cervical and jaw isometrics to improve muscle endurance and stability. Mild decreased muscle tension and tenderness to palpation noted throughout cervical region.     Gina is progressing well towards her goals.   Patient prognosis is Fair.     Patient will continue to benefit from skilled outpatient physical therapy to address the deficits listed in the problem list box on initial evaluation, provide pt/family education and to maximize patient's level of independence in the home and community environment.     Patient's spiritual, cultural and educational needs considered and pt agreeable to plan of care and goals.     Anticipated Barriers for therapy: co-morbidities, sedentary lifestyle, chronicity of condition, adherence to treatment plan, and coping style        Short Term Goals:  6 weeks Status  Date Met   PAIN: Pt will report worst pain of 6/10 in order to progress toward max functional ability and improve quality of life. [x] Progressing  [] Met  [] Not Met     FUNCTION: Patient will demonstrate improved function as indicated by a score of greater than or equal to NT out of 100 on FOTO. [x] Progressing  [] Met  [] Not Met     MOBILITY: Patient will improve AROM to 50% of stated  goals, listed in objective measures above, in order to progress towards independence with functional activities.  [x] Progressing  [] Met  [] Not Met     STRENGTH: Patient will improve strength to 50% of stated goals, listed in objective measures above, in order to progress towards independence with functional activities. [x] Progressing  [] Met  [] Not Met     POSTURE: Patient will correct postural deviations in sitting and standing, to decrease pain and promote long term stability.  [x] Progressing  [] Met  [] Not Met     HEP: Patient will demonstrate independence with HEP in order to progress toward functional independence. [x] Progressing  [] Met  [] Not Met        Long Term Goals:  12 weeks Status Date Met   PAIN: Pt will report worst pain of 3/10 in order to progress toward max functional ability and improve quality of life [x] Progressing  [] Met  [] Not Met     FUNCTION: Patient will demonstrate improved function as indicated by a score of greater than or equal to NT out of 100 on FOTO. [x] Progressing  [] Met  [] Not Met     MOBILITY: Patient will improve AROM to stated goals, listed in objective measures above, in order to return to maximal functional potential and improve quality of life.  [x] Progressing  [] Met  [] Not Met     STRENGTH: Patient will improve strength to stated goals, listed in objective measures above, in order to improve functional independence and quality of life.  [x] Progressing  [] Met  [] Not Met     Patient will return to normal ADL's, IADL's, community involvement, recreational activities, and work-related activities with less than or equal to 3/10 pain and maximal function.  [x] Progressing  [] Met  [] Not Met          Plan     Continue Plan of Care (POC) and progress per patient tolerance. See treatment section for details on planned progressions next session.      Marlee Willoughby, PT

## 2023-09-07 ENCOUNTER — OFFICE VISIT (OUTPATIENT)
Dept: PAIN MEDICINE | Facility: CLINIC | Age: 38
End: 2023-09-07
Payer: OTHER GOVERNMENT

## 2023-09-07 ENCOUNTER — PATIENT MESSAGE (OUTPATIENT)
Dept: PAIN MEDICINE | Facility: CLINIC | Age: 38
End: 2023-09-07

## 2023-09-07 ENCOUNTER — PATIENT MESSAGE (OUTPATIENT)
Dept: INTERNAL MEDICINE | Facility: CLINIC | Age: 38
End: 2023-09-07
Payer: OTHER GOVERNMENT

## 2023-09-07 ENCOUNTER — TELEPHONE (OUTPATIENT)
Dept: INTERNAL MEDICINE | Facility: CLINIC | Age: 38
End: 2023-09-07
Payer: OTHER GOVERNMENT

## 2023-09-07 VITALS
SYSTOLIC BLOOD PRESSURE: 129 MMHG | HEART RATE: 75 BPM | RESPIRATION RATE: 17 BRPM | WEIGHT: 95.88 LBS | BODY MASS INDEX: 19.33 KG/M2 | HEIGHT: 59 IN | DIASTOLIC BLOOD PRESSURE: 80 MMHG

## 2023-09-07 DIAGNOSIS — S03.00XD DISLOCATION OF TEMPOROMANDIBULAR JOINT, SUBSEQUENT ENCOUNTER: ICD-10-CM

## 2023-09-07 DIAGNOSIS — M79.18 MYOFASCIAL PAIN: ICD-10-CM

## 2023-09-07 DIAGNOSIS — Z00.00 ANNUAL PHYSICAL EXAM: Primary | ICD-10-CM

## 2023-09-07 DIAGNOSIS — M25.50 ARTHRALGIA, UNSPECIFIED JOINT: ICD-10-CM

## 2023-09-07 DIAGNOSIS — G43.709 CHRONIC MIGRAINE WITHOUT AURA WITHOUT STATUS MIGRAINOSUS, NOT INTRACTABLE: Primary | ICD-10-CM

## 2023-09-07 DIAGNOSIS — M50.30 DDD (DEGENERATIVE DISC DISEASE), CERVICAL: ICD-10-CM

## 2023-09-07 DIAGNOSIS — G43.709 CHRONIC MIGRAINE WITHOUT AURA WITHOUT STATUS MIGRAINOSUS, NOT INTRACTABLE: ICD-10-CM

## 2023-09-07 DIAGNOSIS — M47.812 CERVICAL SPONDYLOSIS: Primary | ICD-10-CM

## 2023-09-07 PROCEDURE — 99999 PR PBB SHADOW E&M-EST. PATIENT-LVL III: ICD-10-PCS | Mod: PBBFAC,,, | Performed by: ANESTHESIOLOGY

## 2023-09-07 PROCEDURE — 99214 PR OFFICE/OUTPT VISIT, EST, LEVL IV, 30-39 MIN: ICD-10-PCS | Mod: S$PBB,25,, | Performed by: ANESTHESIOLOGY

## 2023-09-07 PROCEDURE — 64615 CHEMODENERV MUSC MIGRAINE: CPT | Mod: PBBFAC | Performed by: ANESTHESIOLOGY

## 2023-09-07 PROCEDURE — 64615 PR CHEMODENERVATION OF MUSCLE FOR CHRONIC MIGRAINE: ICD-10-PCS | Mod: S$PBB,,, | Performed by: ANESTHESIOLOGY

## 2023-09-07 PROCEDURE — 64615 CHEMODENERV MUSC MIGRAINE: CPT | Mod: S$PBB,,, | Performed by: ANESTHESIOLOGY

## 2023-09-07 PROCEDURE — 99213 OFFICE O/P EST LOW 20 MIN: CPT | Mod: PBBFAC | Performed by: ANESTHESIOLOGY

## 2023-09-07 PROCEDURE — 99999 PR PBB SHADOW E&M-EST. PATIENT-LVL III: CPT | Mod: PBBFAC,,, | Performed by: ANESTHESIOLOGY

## 2023-09-07 PROCEDURE — 99214 OFFICE O/P EST MOD 30 MIN: CPT | Mod: S$PBB,25,, | Performed by: ANESTHESIOLOGY

## 2023-09-07 RX ORDER — SUMATRIPTAN SUCCINATE 100 MG/1
100 TABLET ORAL DAILY PRN
Qty: 15 TABLET | Refills: 1 | Status: SHIPPED | OUTPATIENT
Start: 2023-09-07 | End: 2023-11-15

## 2023-09-07 RX ORDER — MELOXICAM 15 MG/1
15 TABLET ORAL DAILY PRN
Qty: 30 TABLET | Refills: 1 | Status: SHIPPED | OUTPATIENT
Start: 2023-09-07 | End: 2023-11-18 | Stop reason: SDUPTHER

## 2023-09-07 NOTE — PROGRESS NOTES
Interventional Pain Progress Note     Referring Physician: No ref. provider found    PCP: Kenneth Galvez MD    Chief Complaint:   Migraine headaches and neck pain    Interval History (09/07/2023):  Patient returns to clinic for evaluation of headaches and neck pain as well as for Botox injections.  Patient reports improvement in headaches and neck pain with physical therapy and Nurtec medication.  Patient reports having approximately 1 migraine episode per week currently, these episodes last approximately 2 hours typically.  Neck pain described as a throbbing aching pain that starts near the top of the neck.  Patient reports associated cephalgia headaches with this pain.  Pain radiates to the bilateral trapezius areas, right-greater-than-left.  Patient denies any significant radiation to her bilateral upper extremities.  Patient denies any significant numbness or tingling in her bilateral hands.  Pain is worse with lateral bending and extension, better with heat and anti-inflammatories.  Pain is currently rated a 4/10, but can increase to a 7/10 with exacerbating activities. Denies any fevers, chills, changes in gait, saddle anesthesia, or bowel and bladder incontinence       SUBJECTIVE:    Gina Garcia is a 38 y.o. female who presents to the clinic for the evaluation of migraine headaches and neck pain pain.   Patient reports over 10 year history of migraine headaches and neck pain that is worsened over the last 3 years after hysterectomy.  Of note patient has had previous bilateral mastectomy.  Migraine headaches are described as a throbbing stabbing pain that starts over her left face and significant become focal over her right forehead and behind her right eye.  Patient reports associated nausea, photophobia, sensitivity loud sounds, and blurred vision.  She denies any significant aura or focal weakness.  Patient reports getting 1 episode per week.  Patient reports 5 years ago she was receiving  Botox injections for migraines with significant relief.  Pain is worse with worsening jaw pain and with stress, better with anti-inflammatories.    Neck pain described as a throbbing aching pain at the top of her neck.  Patient reports she primarily gets this neck pain in conjunction with her migraine headaches.  Patient denies any significant radiation to her bilateral upper extremities.  She denies any numbness and tingling in her bilateral hands.  Pain is worse with headaches, better with anti-inflammatories.    Pain is currently rated at 3 out 10, but can increase to an 8/10 with exacerbating activities. Denies any fevers, chills, changes in gait, saddle anesthesia, or bowel and bladder incontinence  Additionally, patient reports 3 history of bilateral TMJ.    Non-Pharmacologic Treatments:  Physical Therapy/Home Exercise: yes  Ice/Heat:yes  TENS: no  Acupuncture: no  Massage: yes  Chiropractic: no        Previous Pain Medications:  NSAIDs, Tylenol, muscle relaxers, triptans including sumatriptan, Topamax, zonisamide, Nurtec, Ubrelvy, ibuprofen, Tylenol neuropathics, opioids, topicals       report:  Reviewed and consistent with medication use as prescribed.    Pain Procedures:   None    Pain Disability Index Review:         9/7/2023     8:05 AM 8/29/2023     9:49 AM   Last 3 PDI Scores   Pain Disability Index (PDI) 36 35       Imaging:   Results for orders placed during the hospital encounter of 08/29/23    X-Ray Cervical Spine 5 View With Flex And Ext    Narrative  EXAMINATION:  XR CERVICAL SPINE 5 VIEW WITH FLEX AND EXT    CLINICAL HISTORY:  Spondylosis without myelopathy or radiculopathy, cervical region    TECHNIQUE:  Five views of the cervical spine plus flexion and extension views were performed.    COMPARISON:  None.    FINDINGS:  Straightening of the cervical spine.  No disc space narrowing or spondylolisthesis.  No bony foraminal stenosis.    No dynamic instability with flexion.    With extension,  there is developing 1-2 mm retrolisthesis of C3 on C4.    Impression  Straightening of the cervical spine within dynamic instability at C3-C4.      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    Migraines      Past Surgical History:   Procedure Laterality Date    ACHILLES TENDON SURGERY      AXILLARY NODE DISSECTION Right 04/25/2022    Procedure: LYMPHADENECTOMY, AXILLARY;  Surgeon: Micheal Bartholomew MD;  Location: [facility] OR;  Service: General;  Laterality: Right;  Right axillary node dissection    BILATERAL MASTECTOMY Bilateral 04/25/2022    Procedure: MASTECTOMY, BILATERAL;  Surgeon: Micheal Bartholomew MD;  Location: [facility] OR;  Service: General;  Laterality: Bilateral;    BREAST REVISION SURGERY Bilateral 9/23/2022    Procedure: BREAST RECONSTRUCTION SECOND STAGE, FAT GRAFTING BILATERAL BREAST;  Surgeon: Esther Carpio MD;  Location: [facility] OR;  Service: Plastics;  Laterality: Bilateral;    COLONOSCOPY N/A 03/28/2022    Procedure: COLONOSCOPY;  Surgeon: Winnie Polanco MD;  Location: [facility] ENDO;  Service: Endoscopy;  Laterality: N/A;    DEBRIDEMENT, BREAST Right 05/30/2022    Procedure: DEBRIDEMENT, BREAST;  Surgeon: Esther Carpio MD;  Location: [facility] OR;  Service: General;  Laterality: Right;  FLAP WASHOUT AND CLOSURE    EXTERNAL EAR SURGERY      cosmetic    INSERTION OF BREAST TISSUE EXPANDER Bilateral 04/25/2022    Procedure: INSERTION, TISSUE EXPANDER, BREAST;  Surgeon: Micheal Bartholomew MD;  Location: [facility];  Service: General;  Laterality: Bilateral;  Procedure performed by KRAIG Carpio    REPLACEMENT OF IMPLANT OF BREAST Bilateral 9/23/2022    Procedure: TISSUE EXCHANGE TO IMPLANT, CAPSULORRHAPY, CAPSALOTOMY;  Surgeon: Esther Carpio MD;  Location: [facility] OR;  Service: Plastics;  Laterality: Bilateral;    sciatic nerve exploration      SENTINEL LYMPH NODE BIOPSY  Right 04/25/2022    Procedure: BIOPSY, LYMPH NODE, SENTINEL;  Surgeon: Micheal Bartholomew MD;  Location: [facility] OR;  Service: General;  Laterality: Right;    XI ROBOTIC HYSTERECTOMY, WITH SALPINGO-OOPHORECTOMY Bilateral 07/01/2022    Procedure: XI ROBOTIC HYSTERECTOMY,WITH SALPINGO-OOPHORECTOMY;  Surgeon: CORDELL Bowen MD;  Location: [facility] OR;  Service: OB/GYN;  Laterality: Bilateral;     Social History     Socioeconomic History    Marital status:     Number of children: 2   Occupational History    Occupation: Stay at home mom   Tobacco Use    Smoking status: Never    Smokeless tobacco: Never   Substance and Sexual Activity    Alcohol use: No    Drug use: No    Sexual activity: Yes     Partners: Male     Birth control/protection: None     Family History   Problem Relation Age of Onset    Breast cancer Mother 45        L lumptectomy, chemo; R (dx 69) plans to have BL mastectomy    Skin cancer Mother     Cancer Mother         Breast cancer    Prostate cancer Father 68        radiation complete    Asthma Sister     Lung cancer Maternal Grandmother         non-smoker    Prostate cancer Maternal Grandfather         metastasis to brain    Cancer Maternal Grandfather         Prostate cancer    Breast cancer Paternal Grandmother     Cancer Paternal Grandmother         Breast cancer    Prostate cancer Paternal Grandfather     Cancer Paternal Grandfather         Prostate cancer    ADD / ADHD Son     Autism Son     Diabetes Paternal Uncle     Asthma Sister     Colon cancer Neg Hx         colon cancer    Ovarian cancer Neg Hx     Thrombophilia Neg Hx        Review of patient's allergies indicates:  No Known Allergies    Current Outpatient Medications   Medication Sig    ascorbic acid, vitamin C, (VITAMIN C) 250 MG tablet Take 250 mg by mouth once daily.    b complex vitamins capsule Take 1 capsule by mouth once daily.    calcium-vitamin D3 (OS-JAMEL 500 + D3) 500 mg-5 mcg (200 unit) per tablet Take 1 tablet by mouth 2 (two)  times daily with meals.    cycloSPORINE (RESTASIS) 0.05 % ophthalmic emulsion Place 1 drop into both eyes 2 (two) times daily.    folic acid (FOLVITE) 400 MCG tablet Take 400 mcg by mouth once daily.    latanoprost 0.005 % ophthalmic solution Place 1 drop into both eyes every evening.    magnesium 200 mg Tab Take 500 mg by mouth as needed.    rimegepant (NURTEC) 75 mg odt Take 1 tablet (75 mg total) by mouth every 24 hours as needed for Migraine. Place ODT tablet on the tongue; alternatively the ODT tablet may be placed under the tongue    rosuvastatin (CRESTOR) 20 MG tablet TAKE 1 TABLET BY MOUTH EVERY DAY FOR CHOLESTEROL    tiZANidine (ZANAFLEX) 4 MG tablet Take 1.5 tablets (6 mg total) by mouth nightly as needed (muscles spasms).    meloxicam (MOBIC) 15 MG tablet Take 1 tablet (15 mg total) by mouth daily as needed for Pain.     Current Facility-Administered Medications   Medication    onabotulinumtoxina injection 200 Units         ROS  Review of Systems   Constitutional:  Negative for chills, diaphoresis, fatigue and fever.   HENT:  Positive for dental problem. Negative for ear discharge, ear pain, rhinorrhea, trouble swallowing and voice change.    Eyes:  Positive for photophobia, pain and visual disturbance. Negative for discharge and itching.   Respiratory:  Negative for chest tightness, shortness of breath, wheezing and stridor.    Cardiovascular:  Negative for chest pain and leg swelling.   Gastrointestinal:  Negative for blood in stool, diarrhea, nausea and vomiting.   Endocrine: Negative for cold intolerance and heat intolerance.   Genitourinary:  Negative for dysuria, hematuria and urgency.   Musculoskeletal:  Positive for arthralgias, myalgias, neck pain and neck stiffness. Negative for back pain, gait problem and joint swelling.   Skin:  Negative for rash.   Neurological:  Positive for headaches. Negative for tremors, seizures, speech difficulty, weakness, light-headedness and numbness.  "  Hematological:  Does not bruise/bleed easily.   Psychiatric/Behavioral:  Negative for agitation, confusion and suicidal ideas.             OBJECTIVE:  /80   Pulse 75   Resp 17   Ht 4' 11" (1.499 m)   Wt 43.5 kg (95 lb 14.4 oz)   LMP 06/20/2022   BMI 19.37 kg/m²         Physical Exam  Constitutional:       General: She is not in acute distress.     Appearance: Normal appearance. She is not ill-appearing.   HENT:      Head: Normocephalic and atraumatic.   Eyes:      Extraocular Movements: Extraocular movements intact.      Pupils: Pupils are equal, round, and reactive to light.   Cardiovascular:      Pulses: Normal pulses.   Pulmonary:      Effort: Pulmonary effort is normal.   Abdominal:      General: Abdomen is flat.   Skin:     General: Skin is warm and dry.      Capillary Refill: Capillary refill takes less than 2 seconds.   Neurological:      General: No focal deficit present.      Mental Status: She is alert and oriented to person, place, and time.      Sensory: No sensory deficit.      Motor: No weakness.      Coordination: Coordination normal.      Gait: Gait normal.      Deep Tendon Reflexes: Reflexes normal.   Psychiatric:         Mood and Affect: Mood normal.         Behavior: Behavior normal.         Thought Content: Thought content normal.           Musculoskeletal:    Cervical Exam  Incision: no  Pain with Flexion: no  Pain with Extension: yes  Paraspinous TTP:  Positive bilaterally near the base of the skull  Facet TTP:  C2-C3  Spurling:  Negative bilaterally  ROM: FROM    LABS:  Lab Results   Component Value Date    WBC 8.23 06/29/2022    HGB 12.9 06/29/2022    HCT 40.9 06/29/2022     (H) 06/29/2022     06/29/2022       CMP  Sodium   Date Value Ref Range Status   08/12/2022 142 136 - 145 mmol/L Final     Potassium   Date Value Ref Range Status   08/12/2022 4.8 3.5 - 5.1 mmol/L Final     Chloride   Date Value Ref Range Status   08/12/2022 103 95 - 110 mmol/L Final     CO2 " "  Date Value Ref Range Status   08/12/2022 26 23 - 29 mmol/L Final     Glucose   Date Value Ref Range Status   08/12/2022 87 70 - 110 mg/dL Final     BUN   Date Value Ref Range Status   08/12/2022 11 6 - 20 mg/dL Final     Creatinine   Date Value Ref Range Status   08/12/2022 0.7 0.5 - 1.4 mg/dL Final     Calcium   Date Value Ref Range Status   08/12/2022 10.5 8.7 - 10.5 mg/dL Final     Total Protein   Date Value Ref Range Status   08/12/2022 7.4 6.0 - 8.4 g/dL Final     Albumin   Date Value Ref Range Status   08/12/2022 4.8 3.5 - 5.2 g/dL Final     Total Bilirubin   Date Value Ref Range Status   08/12/2022 0.4 0.1 - 1.0 mg/dL Final     Comment:     For infants and newborns, interpretation of results should be based  on gestational age, weight and in agreement with clinical  observations.    Premature Infant recommended reference ranges:  Up to 24 hours.............<8.0 mg/dL  Up to 48 hours............<12.0 mg/dL  3-5 days..................<15.0 mg/dL  6-29 days.................<15.0 mg/dL       Alkaline Phosphatase   Date Value Ref Range Status   08/12/2022 67 55 - 135 U/L Final     AST   Date Value Ref Range Status   08/12/2022 23 10 - 40 U/L Final     ALT   Date Value Ref Range Status   08/12/2022 27 10 - 44 U/L Final     Anion Gap   Date Value Ref Range Status   08/12/2022 13 8 - 16 mmol/L Final     eGFR if    Date Value Ref Range Status   06/29/2022 >60.0 >60 mL/min/1.73 m^2 Final     eGFR if non    Date Value Ref Range Status   06/29/2022 >60.0 >60 mL/min/1.73 m^2 Final     Comment:     Calculation used to obtain the estimated glomerular filtration  rate (eGFR) is the CKD-EPI equation.          No results found for: "LABA1C", "HGBA1C"          ASSESSMENT:       38 y.o. year old female with migraine headaches and neck pain, consistent with     1. Cervical spondylosis  meloxicam (MOBIC) 15 MG tablet      2. DDD (degenerative disc disease), cervical  meloxicam (MOBIC) 15 MG tablet "      3. Chronic migraine without aura without status migrainosus, not intractable  meloxicam (MOBIC) 15 MG tablet      4. Dislocation of temporomandibular joint, subsequent encounter  meloxicam (MOBIC) 15 MG tablet      5. Myofascial pain  meloxicam (MOBIC) 15 MG tablet        Cervical spondylosis  -     meloxicam (MOBIC) 15 MG tablet; Take 1 tablet (15 mg total) by mouth daily as needed for Pain.  Dispense: 30 tablet; Refill: 1    DDD (degenerative disc disease), cervical  -     meloxicam (MOBIC) 15 MG tablet; Take 1 tablet (15 mg total) by mouth daily as needed for Pain.  Dispense: 30 tablet; Refill: 1    Chronic migraine without aura without status migrainosus, not intractable  -     meloxicam (MOBIC) 15 MG tablet; Take 1 tablet (15 mg total) by mouth daily as needed for Pain.  Dispense: 30 tablet; Refill: 1    Dislocation of temporomandibular joint, subsequent encounter  -     meloxicam (MOBIC) 15 MG tablet; Take 1 tablet (15 mg total) by mouth daily as needed for Pain.  Dispense: 30 tablet; Refill: 1    Myofascial pain  -     meloxicam (MOBIC) 15 MG tablet; Take 1 tablet (15 mg total) by mouth daily as needed for Pain.  Dispense: 30 tablet; Refill: 1             PLAN:   - Interventions:   Botox injections for migraine headaches today in clinic.  Note below.  If patient continues to have axial neck pain 2 weeks after receiving Botox injections, can schedule patient for bilateral C2-C3 and C3-C4 medial branch block for facet mediated pain.  - Anticoagulation use:   No no anticoagulation    - Medications:   Start Mobic 15 mg daily as needed   Continue  Nurtec 75 mg daily as needed for migraine headaches.  Patient has previously failed ibuprofen, Tylenol, Topamax, zonisamide, and sumatriptan.   Continue tizanidine 6 mg at night    - Therapy:    Continue formal physical therapy for neck pain as well as TMJ    - Imaging/Diagnostic:   X-rays of cervical spine reviewed and findings discussed with patient significant  for facet arthropathy at C2-C3 and C3-C4 with 1 mm of retrolisthesis C3 upon C4 with extension.    - Consults:   We will refer patient to Neurology for evaluation of chronic migraine headaches.  Patient had previously been established with [facility]rology at [facility] 5 years ago.  Continue follow-up with Ophthalmology for visual disturbances associated with migraine headaches  Continue follow-up with dentist for TMJ.  Patient is scheduled to receive x-rays in the upcoming week.        - Patient Questions: Answered all of the patient's questions regarding diagnosis, therapy, and treatment     This condition does not require this patient to take time off of work, and the primary goal of our Pain Management services is to improve the patient's functional capacity.     - Follow up visit: return to clinic in 3 months for Botox    Botox Injections for Migraine Headaches    Informed consent was obtained and the procedure was described to the patient, a timeout was performed prior to starting the procedure.    Surgeon: Raul Coe MD      Pre-Op Diagnosis: chronic intractable migraine    Post-Op Diagnosis:  Same    Procedure:  Botox injections for headache    EBL: None    Complications: None    Specimens: None    Description of Procedure:    The patient was placed in the sitting position and the areas over the planned injections were all prepped with alcohol.  Botox was reconstituted in preservative free normal saline, 100 units was placed in 2 mLof saline on a 30-gauge needle.  The botox was then injected along the following sites:    Botox mapping:        - muscles:  5 units bilaterally, 10 units total  -Procerus muscle: 5 units  -Frontalis muscle:  4 sites, 5 units each, 20 units total  -Temporalis muscle: 5 units , 4 sites bilaterally, 20 units total  -Occipitalis muscle: 5 units, 3 sites bilaterally, 30 units total  -Trapezius muscles:  5 units, 3 sites bilaterally, 30 units total  -cervical paraspinous:   5 units, 2 sites bilaterally, 20 units total    Patient tolerated the procedure well and bleeding was nil. No Band-Aids were applied and the patient was discharged in stable condition.      The above plan and management options were discussed at length with patient. Patient is in agreement with the above and verbalized understanding.    I discussed the goals of interventional chronic pain management with the patient on today's visit.  I explained the utility of injections for diagnostic and therapeutic purposes.  We discussed a multimodal approach to pain including treating the patient's given worst pain at any given time.  We will use a systematic approach to addressing pain.  We will also adopt a multimodal approach that includes injections, adjuvant medications, physical therapy, at times psychiatry.  There may be a limited role for opioid use intermittently in the treatment of pain, more particularly for acute pain although no one approach can be used as a sole treatment modality.    I emphasized the importance of regular exercise, core strengthening and stretching, diet and weight loss as a cornerstone of long-term pain management.      Raul Coe MD  Interventional Pain Management  [facility]    Disclaimer:  This note was prepared using voice recognition system and is likely to have sound alike errors that may have been overlooked even after proof reading.  Please call me with any questions

## 2023-09-08 ENCOUNTER — PATIENT MESSAGE (OUTPATIENT)
Dept: REHABILITATION | Facility: HOSPITAL | Age: 38
End: 2023-09-08
Payer: OTHER GOVERNMENT

## 2023-09-08 ENCOUNTER — LAB VISIT (OUTPATIENT)
Dept: LAB | Facility: HOSPITAL | Age: 38
End: 2023-09-08
Attending: FAMILY MEDICINE
Payer: OTHER GOVERNMENT

## 2023-09-08 DIAGNOSIS — Z00.00 ANNUAL PHYSICAL EXAM: ICD-10-CM

## 2023-09-08 DIAGNOSIS — M25.50 ARTHRALGIA, UNSPECIFIED JOINT: ICD-10-CM

## 2023-09-08 LAB
ALBUMIN SERPL BCP-MCNC: 4.7 G/DL (ref 3.5–5.2)
ALP SERPL-CCNC: 83 U/L (ref 55–135)
ALT SERPL W/O P-5'-P-CCNC: 30 U/L (ref 10–44)
ANION GAP SERPL CALC-SCNC: 13 MMOL/L (ref 8–16)
AST SERPL-CCNC: 30 U/L (ref 10–40)
BASOPHILS # BLD AUTO: 0.05 K/UL (ref 0–0.2)
BASOPHILS NFR BLD: 0.7 % (ref 0–1.9)
BILIRUB SERPL-MCNC: 0.6 MG/DL (ref 0.1–1)
BUN SERPL-MCNC: 19 MG/DL (ref 6–20)
CALCIUM SERPL-MCNC: 10.1 MG/DL (ref 8.7–10.5)
CHLORIDE SERPL-SCNC: 107 MMOL/L (ref 95–110)
CHOLEST SERPL-MCNC: 175 MG/DL (ref 120–199)
CHOLEST/HDLC SERPL: 2.4 {RATIO} (ref 2–5)
CO2 SERPL-SCNC: 23 MMOL/L (ref 23–29)
CREAT SERPL-MCNC: 0.7 MG/DL (ref 0.5–1.4)
CRP SERPL-MCNC: <0.3 MG/L (ref 0–8.2)
DIFFERENTIAL METHOD: ABNORMAL
EOSINOPHIL # BLD AUTO: 0.1 K/UL (ref 0–0.5)
EOSINOPHIL NFR BLD: 1.4 % (ref 0–8)
ERYTHROCYTE [DISTWIDTH] IN BLOOD BY AUTOMATED COUNT: 11.2 % (ref 11.5–14.5)
ERYTHROCYTE [SEDIMENTATION RATE] IN BLOOD BY PHOTOMETRIC METHOD: <2 MM/HR (ref 0–36)
EST. GFR  (NO RACE VARIABLE): >60 ML/MIN/1.73 M^2
ESTIMATED AVG GLUCOSE: 94 MG/DL (ref 68–131)
GLUCOSE SERPL-MCNC: 75 MG/DL (ref 70–110)
HBA1C MFR BLD: 4.9 % (ref 4–5.6)
HCT VFR BLD AUTO: 43.2 % (ref 37–48.5)
HCV AB SERPL QL IA: NORMAL
HDLC SERPL-MCNC: 72 MG/DL (ref 40–75)
HDLC SERPL: 41.1 % (ref 20–50)
HGB BLD-MCNC: 14.4 G/DL (ref 12–16)
IMM GRANULOCYTES # BLD AUTO: 0.01 K/UL (ref 0–0.04)
IMM GRANULOCYTES NFR BLD AUTO: 0.1 % (ref 0–0.5)
LDLC SERPL CALC-MCNC: 90.2 MG/DL (ref 63–159)
LYMPHOCYTES # BLD AUTO: 2.8 K/UL (ref 1–4.8)
LYMPHOCYTES NFR BLD: 38.4 % (ref 18–48)
MCH RBC QN AUTO: 32.5 PG (ref 27–31)
MCHC RBC AUTO-ENTMCNC: 33.3 G/DL (ref 32–36)
MCV RBC AUTO: 98 FL (ref 82–98)
MONOCYTES # BLD AUTO: 0.4 K/UL (ref 0.3–1)
MONOCYTES NFR BLD: 5.5 % (ref 4–15)
NEUTROPHILS # BLD AUTO: 3.9 K/UL (ref 1.8–7.7)
NEUTROPHILS NFR BLD: 53.9 % (ref 38–73)
NONHDLC SERPL-MCNC: 103 MG/DL
NRBC BLD-RTO: 0 /100 WBC
PLATELET # BLD AUTO: 341 K/UL (ref 150–450)
PMV BLD AUTO: 11.7 FL (ref 9.2–12.9)
POTASSIUM SERPL-SCNC: 4.3 MMOL/L (ref 3.5–5.1)
PROT SERPL-MCNC: 7.6 G/DL (ref 6–8.4)
RBC # BLD AUTO: 4.43 M/UL (ref 4–5.4)
RHEUMATOID FACT SERPL-ACNC: <13 IU/ML (ref 0–15)
SODIUM SERPL-SCNC: 143 MMOL/L (ref 136–145)
TRIGL SERPL-MCNC: 64 MG/DL (ref 30–150)
TSH SERPL DL<=0.005 MIU/L-ACNC: 0.7 UIU/ML (ref 0.4–4)
WBC # BLD AUTO: 7.3 K/UL (ref 3.9–12.7)

## 2023-09-08 PROCEDURE — 86038 ANTINUCLEAR ANTIBODIES: CPT | Performed by: FAMILY MEDICINE

## 2023-09-08 PROCEDURE — 86803 HEPATITIS C AB TEST: CPT | Performed by: FAMILY MEDICINE

## 2023-09-08 PROCEDURE — 83036 HEMOGLOBIN GLYCOSYLATED A1C: CPT | Performed by: FAMILY MEDICINE

## 2023-09-08 PROCEDURE — 80061 LIPID PANEL: CPT | Performed by: FAMILY MEDICINE

## 2023-09-08 PROCEDURE — 85025 COMPLETE CBC W/AUTO DIFF WBC: CPT | Performed by: FAMILY MEDICINE

## 2023-09-08 PROCEDURE — 36415 COLL VENOUS BLD VENIPUNCTURE: CPT | Mod: PO | Performed by: FAMILY MEDICINE

## 2023-09-08 PROCEDURE — 86431 RHEUMATOID FACTOR QUANT: CPT | Performed by: FAMILY MEDICINE

## 2023-09-08 PROCEDURE — 85652 RBC SED RATE AUTOMATED: CPT | Performed by: FAMILY MEDICINE

## 2023-09-08 PROCEDURE — 84443 ASSAY THYROID STIM HORMONE: CPT | Performed by: FAMILY MEDICINE

## 2023-09-08 PROCEDURE — 80053 COMPREHEN METABOLIC PANEL: CPT | Performed by: FAMILY MEDICINE

## 2023-09-08 PROCEDURE — 86140 C-REACTIVE PROTEIN: CPT | Performed by: FAMILY MEDICINE

## 2023-09-11 LAB — ANA SER QL IF: NORMAL

## 2023-09-18 ENCOUNTER — CLINICAL SUPPORT (OUTPATIENT)
Dept: REHABILITATION | Facility: HOSPITAL | Age: 38
End: 2023-09-18
Payer: OTHER GOVERNMENT

## 2023-09-18 DIAGNOSIS — R29.3 POOR POSTURE: ICD-10-CM

## 2023-09-18 DIAGNOSIS — M26.623 BILATERAL TEMPOROMANDIBULAR JOINT PAIN: Primary | ICD-10-CM

## 2023-09-18 DIAGNOSIS — M62.81 PROXIMAL MUSCLE WEAKNESS: ICD-10-CM

## 2023-09-18 DIAGNOSIS — R29.898 DECREASED RANGE OF MOTION OF NECK: ICD-10-CM

## 2023-09-18 PROCEDURE — 97530 THERAPEUTIC ACTIVITIES: CPT

## 2023-09-18 PROCEDURE — 97110 THERAPEUTIC EXERCISES: CPT

## 2023-09-18 PROCEDURE — 97112 NEUROMUSCULAR REEDUCATION: CPT

## 2023-09-18 NOTE — PROGRESS NOTES
[facility]   Physical Therapy Treatment Note        Name: Gina Garcia  Clinic Number: [id number]    Therapy Diagnosis:   Encounter Diagnoses   Name Primary?    Bilateral temporomandibular joint pain Yes    Poor posture     Proximal muscle weakness     Decreased range of motion of neck        Physician: Lola Butterfield MD    Visit Date: 9/18/2023    Physician Orders: PT Eval and Treat  Medical Diagnosis from Referral: Cervical myofascial pain syndrome [M79.18], Arm weakness [R29.898]  Evaluation Date: 8/3/2023  Authorization Period Expiration: 11/23/2023  Plan of Care Expiration: 11/3/2023  Progress Note Due: 9/3/2023  Visit # / Visits authorized: 5/15 (+1 for evaluation)   FOTO: 1/3 (last performed on 8/3/2023)     Precautions: Standard, Immunosuppression, and cancer    Time In: 0905  Time Out: 1018  Total Billable Time: 58 minutes (Billing reflects 1 on 1 treatment time spent with patient)    Subjective     Patient reports: she has continued to have a lot of pain. Recently received botox injections with no changes noted. States she recently lifted a watering can and helped her  to hang a picture and noticed significant soreness in the cervical and scapular region following both incidents     He/She was compliant with home exercise program.  Response to previous treatment: she felt good with no exacerbation of symptoms   Functional change: none noted at this time    Pain: 5/10     Location: head, neck, jaw     Objective      Objective Measures updated at progress report or POC update only unless otherwise noted.       Treatment     Gina received the treatments listed below:       MANUAL THERAPY TECHNIQUES were applied for (10) minutes, including:    Soft tissue mobilization:   bilateral suboccipitals, paraspinals, scalenes , SCM, upper trapezius, levator scapulae , periscapular musculature, temporalis, posterior digastric   Joint mobilizations:     Functional dry  needling: DEFERRED        THERAPEUTIC EXERCISES to develop strength, endurance, ROM, flexibility, posture, and core stabilization for (15) minutes including:    Performed Today:     Upper trapezius stretch 10x10s holds B   Levator scapulae stretch 10x10s holds B     Series 6- supine on table, 1 minute each   Pec stretch  Bear hugs   Scissors   Serratus punches     Interventions DEFERRED today                  NEUROMUSCULAR RE-EDUCATION ACTIVITIES to improve Balance, Coordination, Kinesthetic, Sense, Proprioception, and Posture for (8) minutes.  The following were included:    Performed Today:     Supine chin tucks 20x   Supine cervical retractions 30x  Interventions DEFERRED today     Open books 12x B   Cervical isometrics   Supine retraction 30x3s holds   Supine flexion (into hand) 30x3s holds   Rotation (manual resist) 30x3s holds   Jaw isometrics:   Opening (into hand) 30x3s holds   Lateral trusion 30x3s holds   Supine serratus punches 40x              THERAPEUTIC ACTIVITIES to improve dynamic and functional  performance for (15) minutes including:    Performed Today:     Scapular retractions: yellow band 3x10  Shoulder extensions: yellow band 3x10  Wall slides flexion: 2x10 B    Interventions DEFERRED today     B shoulder ER: yellow band 2x1 minute              The following Modalities were applied for (10) minutes after being cleared for all contraindications with the following parameters    Modality Location Details   Electrical Stimulation and Moist Hot Pack  B upper trapezius             Next Session:        Patient Education and Home Exercises       Home Exercises Provided and Patient Education Provided     Education provided: (0) minutes  PURPOSE: Patient educated on the impairments noted above and the effects of physical therapy intervention to improve overall condition and QOL.   EXERCISE: Patient was educated on all the above exercise prior/during/after for proper posture, positioning, and execution for  safe performance with home exercise program.   STRENGTH: Patient educated on the importance of improved core and extremity strength in order to improve alignment of the spine and extremities with static positions and dynamic movement.   POSTURE: Patient educated on postural awareness to reduce stress and maintain optimal alignment of the spine with static positions and dynamic movement   Education on spoon theory and conservation of energy throughout the day. Education on breaking up exercise and activity into multiple smaller increments throughout the day.   Education on TMD etiology and treatment.     Written Home Exercises Provided: yes.  Exercises were reviewed and Gina was able to demonstrate them prior to the end of the session.  Gina demonstrated good  understanding of the education provided. See EMR under Patient Instructions for exercises provided during therapy sessions.    Assessment     Pt tolerated session okay today. Incorporated wall slides to improve functional overhead reaching strength and endurance. Added chin tucks to improve cervical stability with good form and only mild fatigue noted. Ended session with head and electrical stimulation to decrease pain and muscle tension following exercises     Gina is progressing well towards her goals.   Patient prognosis is Fair.     Patient will continue to benefit from skilled outpatient physical therapy to address the deficits listed in the problem list box on initial evaluation, provide pt/family education and to maximize patient's level of independence in the home and community environment.     Patient's spiritual, cultural and educational needs considered and pt agreeable to plan of care and goals.     Anticipated Barriers for therapy: co-morbidities, sedentary lifestyle, chronicity of condition, adherence to treatment plan, and coping style        Short Term Goals:  6 weeks Status  Date Met   PAIN: Pt will report worst pain of 6/10 in order to  progress toward max functional ability and improve quality of life. [x] Progressing  [] Met  [] Not Met     FUNCTION: Patient will demonstrate improved function as indicated by a score of greater than or equal to NT out of 100 on FOTO. [x] Progressing  [] Met  [] Not Met     MOBILITY: Patient will improve AROM to 50% of stated goals, listed in objective measures above, in order to progress towards independence with functional activities.  [x] Progressing  [] Met  [] Not Met     STRENGTH: Patient will improve strength to 50% of stated goals, listed in objective measures above, in order to progress towards independence with functional activities. [x] Progressing  [] Met  [] Not Met     POSTURE: Patient will correct postural deviations in sitting and standing, to decrease pain and promote long term stability.  [x] Progressing  [] Met  [] Not Met     HEP: Patient will demonstrate independence with HEP in order to progress toward functional independence. [x] Progressing  [] Met  [] Not Met        Long Term Goals:  12 weeks Status Date Met   PAIN: Pt will report worst pain of 3/10 in order to progress toward max functional ability and improve quality of life [x] Progressing  [] Met  [] Not Met     FUNCTION: Patient will demonstrate improved function as indicated by a score of greater than or equal to NT out of 100 on FOTO. [x] Progressing  [] Met  [] Not Met     MOBILITY: Patient will improve AROM to stated goals, listed in objective measures above, in order to return to maximal functional potential and improve quality of life.  [x] Progressing  [] Met  [] Not Met     STRENGTH: Patient will improve strength to stated goals, listed in objective measures above, in order to improve functional independence and quality of life.  [x] Progressing  [] Met  [] Not Met     Patient will return to normal ADL's, IADL's, community involvement, recreational activities, and work-related activities with less than or equal to 3/10 pain and  maximal function.  [x] Progressing  [] Met  [] Not Met          Plan     Continue Plan of Care (POC) and progress per patient tolerance. See treatment section for details on planned progressions next session.      Marlee Willoughby, PT

## 2023-09-25 ENCOUNTER — CLINICAL SUPPORT (OUTPATIENT)
Dept: REHABILITATION | Facility: HOSPITAL | Age: 38
End: 2023-09-25
Payer: OTHER GOVERNMENT

## 2023-09-25 DIAGNOSIS — M62.81 PROXIMAL MUSCLE WEAKNESS: ICD-10-CM

## 2023-09-25 DIAGNOSIS — M26.623 BILATERAL TEMPOROMANDIBULAR JOINT PAIN: Primary | ICD-10-CM

## 2023-09-25 DIAGNOSIS — R29.898 DECREASED RANGE OF MOTION OF NECK: ICD-10-CM

## 2023-09-25 DIAGNOSIS — R29.3 POOR POSTURE: ICD-10-CM

## 2023-09-25 PROCEDURE — 97140 MANUAL THERAPY 1/> REGIONS: CPT

## 2023-09-25 PROCEDURE — 97112 NEUROMUSCULAR REEDUCATION: CPT

## 2023-09-25 PROCEDURE — 97110 THERAPEUTIC EXERCISES: CPT

## 2023-09-25 PROCEDURE — 97530 THERAPEUTIC ACTIVITIES: CPT

## 2023-09-25 NOTE — PROGRESS NOTES
[facility]   Physical Therapy Treatment Note        Name: Gina Garcia  Clinic Number: [id number]    Therapy Diagnosis:   Encounter Diagnoses   Name Primary?    Bilateral temporomandibular joint pain Yes    Poor posture     Proximal muscle weakness     Decreased range of motion of neck          Physician: Lola Butterfield MD    Visit Date: 9/25/2023    Physician Orders: PT Eval and Treat  Medical Diagnosis from Referral: Cervical myofascial pain syndrome [M79.18], Arm weakness [R29.898]  Evaluation Date: 8/3/2023  Authorization Period Expiration: 11/23/2023  Plan of Care Expiration: 11/3/2023  Progress Note Due: 9/3/2023  Visit # / Visits authorized: 5/15 (+1 for evaluation)   FOTO: 1/3 (last performed on 8/3/2023)     Precautions: Standard, Immunosuppression, and cancer    Time In: 0905  Time Out: 1010  Total Billable Time: 60 minutes (Billing reflects 1 on 1 treatment time spent with patient)    Subjective     Patient reports: she continues to have significant pain and inability to do mild household tasks due to pain    He/She was compliant with home exercise program.  Response to previous treatment: she felt good with no exacerbation of symptoms   Functional change: none noted at this time    Pain: 5/10     Location: head, neck, jaw     Objective      Objective Measures updated at progress report or POC update only unless otherwise noted.       Treatment     Gina received the treatments listed below:       MANUAL THERAPY TECHNIQUES were applied for (10) minutes, including:    Soft tissue mobilization:   bilateral suboccipitals, paraspinals, scalenes , SCM, upper trapezius, levator scapulae , periscapular musculature, temporalis, posterior digastric   Joint mobilizations:     Functional dry needling: DEFERRED        THERAPEUTIC EXERCISES to develop strength, endurance, ROM, flexibility, posture, and core stabilization for (12) minutes including:    Performed Today:     Upper  trapezius stretch 10x10s holds B   Levator scapulae stretch 10x10s holds B     Series 6- supine on table, 1 minute each   Pec stretch  Bear hugs   Scissors   Serratus punches     Interventions DEFERRED today                  NEUROMUSCULAR RE-EDUCATION ACTIVITIES to improve Balance, Coordination, Kinesthetic, Sense, Proprioception, and Posture for (8) minutes.  The following were included:    Performed Today:     Cervical isometrics- ball on wall 30x each   Retractions   Side bending    Jaw isometrics:   Opening (into hand) 30x3s holds   Lateral trusion 30x3s holds    Interventions DEFERRED today     Open books 12x B     Supine serratus punches 40x     Supine chin tucks 20x   Supine cervical retractions 30x            THERAPEUTIC ACTIVITIES to improve dynamic and functional  performance for (20) minutes including:    Performed Today:     Scapular retractions: yellow band 3x10  Shoulder extensions: yellow band 3x10  Scapular strength on foam roll 3x10 each   B shoulder ER: yellow band  Pull aparts: yellow band    Interventions DEFERRED today     Wall slides flexion: 2x10 B            The following Modalities were applied for (10) minutes after being cleared for all contraindications with the following parameters    Modality Location Details   Electrical Stimulation and Moist Hot Pack  B upper trapezius             Next Session:        Patient Education and Home Exercises       Home Exercises Provided and Patient Education Provided     Education provided: (0) minutes  PURPOSE: Patient educated on the impairments noted above and the effects of physical therapy intervention to improve overall condition and QOL.   EXERCISE: Patient was educated on all the above exercise prior/during/after for proper posture, positioning, and execution for safe performance with home exercise program.   STRENGTH: Patient educated on the importance of improved core and extremity strength in order to improve alignment of the spine and  extremities with static positions and dynamic movement.   POSTURE: Patient educated on postural awareness to reduce stress and maintain optimal alignment of the spine with static positions and dynamic movement   Education on spoon theory and conservation of energy throughout the day. Education on breaking up exercise and activity into multiple smaller increments throughout the day.   Education on TMD etiology and treatment.     Written Home Exercises Provided: yes.  Exercises were reviewed and Gina was able to demonstrate them prior to the end of the session.  Gina demonstrated good  understanding of the education provided. See EMR under Patient Instructions for exercises provided during therapy sessions.    Assessment     Pt continues to demonstrate limited tolerance for session due to significant pain with low level exercise, including isometrics and stretching. Pt continues to be very tenderness to palpation and tolerates minimal pressure with soft tissue mobilization. Ended session with head and electrical stimulation which helped to reduce adverse symptoms following session     Gina is progressing well towards her goals.   Patient prognosis is Fair.     Patient will continue to benefit from skilled outpatient physical therapy to address the deficits listed in the problem list box on initial evaluation, provide pt/family education and to maximize patient's level of independence in the home and community environment.     Patient's spiritual, cultural and educational needs considered and pt agreeable to plan of care and goals.     Anticipated Barriers for therapy: co-morbidities, sedentary lifestyle, chronicity of condition, adherence to treatment plan, and coping style        Short Term Goals:  6 weeks Status  Date Met   PAIN: Pt will report worst pain of 6/10 in order to progress toward max functional ability and improve quality of life. [x] Progressing  [] Met  [] Not Met     FUNCTION: Patient will demonstrate  improved function as indicated by a score of greater than or equal to NT out of 100 on FOTO. [x] Progressing  [] Met  [] Not Met     MOBILITY: Patient will improve AROM to 50% of stated goals, listed in objective measures above, in order to progress towards independence with functional activities.  [x] Progressing  [] Met  [] Not Met     STRENGTH: Patient will improve strength to 50% of stated goals, listed in objective measures above, in order to progress towards independence with functional activities. [x] Progressing  [] Met  [] Not Met     POSTURE: Patient will correct postural deviations in sitting and standing, to decrease pain and promote long term stability.  [x] Progressing  [] Met  [] Not Met     HEP: Patient will demonstrate independence with HEP in order to progress toward functional independence. [x] Progressing  [] Met  [] Not Met        Long Term Goals:  12 weeks Status Date Met   PAIN: Pt will report worst pain of 3/10 in order to progress toward max functional ability and improve quality of life [x] Progressing  [] Met  [] Not Met     FUNCTION: Patient will demonstrate improved function as indicated by a score of greater than or equal to NT out of 100 on FOTO. [x] Progressing  [] Met  [] Not Met     MOBILITY: Patient will improve AROM to stated goals, listed in objective measures above, in order to return to maximal functional potential and improve quality of life.  [x] Progressing  [] Met  [] Not Met     STRENGTH: Patient will improve strength to stated goals, listed in objective measures above, in order to improve functional independence and quality of life.  [x] Progressing  [] Met  [] Not Met     Patient will return to normal ADL's, IADL's, community involvement, recreational activities, and work-related activities with less than or equal to 3/10 pain and maximal function.  [x] Progressing  [] Met  [] Not Met          Plan     Continue Plan of Care (POC) and progress per patient tolerance. See  treatment section for details on planned progressions next session.      Marlee Willoughby, PT

## 2023-09-29 ENCOUNTER — PATIENT MESSAGE (OUTPATIENT)
Dept: PAIN MEDICINE | Facility: CLINIC | Age: 38
End: 2023-09-29
Payer: OTHER GOVERNMENT

## 2023-09-29 ENCOUNTER — TELEPHONE (OUTPATIENT)
Dept: PAIN MEDICINE | Facility: CLINIC | Age: 38
End: 2023-09-29
Payer: OTHER GOVERNMENT

## 2023-09-29 DIAGNOSIS — M47.812 CERVICAL SPONDYLOSIS: Primary | ICD-10-CM

## 2023-09-29 NOTE — TELEPHONE ENCOUNTER
Patient reports continued axial neck pain after Botox injections on 09/07/2023.  We will schedule patient for bilateral C2-C3 and C3-C4 medial branch block for axial neck pain that is consistent with cervical facet disease.

## 2023-10-02 ENCOUNTER — CLINICAL SUPPORT (OUTPATIENT)
Dept: REHABILITATION | Facility: HOSPITAL | Age: 38
End: 2023-10-02
Payer: OTHER GOVERNMENT

## 2023-10-02 DIAGNOSIS — M62.81 PROXIMAL MUSCLE WEAKNESS: ICD-10-CM

## 2023-10-02 DIAGNOSIS — R29.898 DECREASED RANGE OF MOTION OF NECK: ICD-10-CM

## 2023-10-02 DIAGNOSIS — M26.623 BILATERAL TEMPOROMANDIBULAR JOINT PAIN: Primary | ICD-10-CM

## 2023-10-02 DIAGNOSIS — R29.3 POOR POSTURE: ICD-10-CM

## 2023-10-02 PROCEDURE — 97530 THERAPEUTIC ACTIVITIES: CPT

## 2023-10-02 PROCEDURE — 97112 NEUROMUSCULAR REEDUCATION: CPT

## 2023-10-02 PROCEDURE — 97110 THERAPEUTIC EXERCISES: CPT

## 2023-10-02 NOTE — PROGRESS NOTES
[facility]   Physical Therapy Treatment Note / Progress Note        Name: Gina Garcia  Clinic Number: [id number]    Therapy Diagnosis:   Encounter Diagnoses   Name Primary?    Bilateral temporomandibular joint pain Yes    Poor posture     Proximal muscle weakness     Decreased range of motion of neck          Physician: Lola Butterfield MD    Visit Date: 10/2/2023    Physician Orders: PT Eval and Treat  Medical Diagnosis from Referral: Cervical myofascial pain syndrome [M79.18], Arm weakness [R29.898]  Evaluation Date: 8/3/2023  Authorization Period Expiration: 11/23/2023  Plan of Care Expiration: 11/3/2023  Progress Note Due: 9/3/2023   Visit # / Visits authorized: 7/15 (+1 for evaluation)   FOTO: 1/3 (last performed on 8/3/2023)     Precautions: Standard, Immunosuppression, and cancer    Time In: 0900  Time Out: 1006  Total Billable Time: 59 minutes (Billing reflects 1 on 1 treatment time spent with patient)    Subjective     Patient reports: no significant changes in overall symptoms since starting PT. She will be starting the process to potentially get a nerve ablation soon with pain management     He/She was compliant with home exercise program.  Response to previous treatment: she felt good with no exacerbation of symptoms   Functional change: none noted at this time    Pain: 5/10     Location: head, neck, jaw     Objective      Objective Measures updated at progress report or POC update only unless otherwise noted.     RANGE OF MOTION:      Temporomandibular   Joint ROM Right  (spine) Left    Pain/Dysfunction with Movement Goal   Opening 20 ---   40   Lateral Trusion 5 3   8   Protrusion 0 ---   8      Cervical Right   (spine) Left     Pain/Dysfunction with Movement Goal   Cervical Flexion (60º) 75% ---   100%   Cervical Extension (80º) 50% ---   75%   Cervical Side Bending (45º) 50% 50%   75%   Cervical Rotation (75º) 75% 75%   75%       Shoulder AROM/PROM Right Left  Pain/Dysfunction with Movement Goal   Shoulder Flexion (180º) 180 180       Shoulder Abduction (180º) 180 180       Shoulder Extension (60º) 60 60       Functional ER T4 T4       Functional IR T12 scapula                STRENGTH:   U/E MMT Right Left Pain/Dysfunction with Movement Goal   Shoulder Flexion 4-/5 4/5   4+/5 B   Shoulder Extension 4/5 4/5   4+/5 B   Shoulder Abduction 4-/5 4/5   4+/5 B   Shoulder IR 4-/5 4-/5   4+/5 B   Shoulder ER 4-/5 4-/5   4+/5 B   Elbow Flexion  4/5 4/5   5/5 B   Elbow Extension 4/5 4/5   5/5 B                  SENSATION  [x] Intact to Light Touch                       [] Impaired:        PALPATION: Muscles: Increased tone and tenderness to palpation of: bilateral suboccipitals, paraspinals, scalenes , SCM, upper trapezius, levator scapulae , periscapular musculature, masseter, temporalis, lateral pterygoid, medial pterygoid, posterior digastric, . Structures: Increased tenderness to palpation of: bilateral TMJ, occiput,         POSTURE:  Pt presents with postural abnormalities which include:               [x] Forward Head                                [] Increased Lumbar Lordosis              [x] Rounded Shoulder                         [] Genu Recurvatum              [x] Increased Thoracic Kyphosis        [] Genu Valgus              [] Trunk Deviated                              [] Pes Planus              [] Scapular Winging                          [] Other:           Treatment     Gina received the treatments listed below:       MANUAL THERAPY TECHNIQUES were applied for (5) minutes, including:    Soft tissue mobilization:   bilateral suboccipitals, paraspinals, scalenes , SCM, upper trapezius, levator scapulae , periscapular musculature, temporalis, posterior digastric   Joint mobilizations:     Functional dry needling: DEFERRED        THERAPEUTIC EXERCISES to develop strength, endurance, ROM, flexibility, posture, and core stabilization for (8) minutes including:    Performed  Today:     Upper trapezius stretch 10x10s holds B   Levator scapulae stretch 10x10s holds B     Series 6- supine on table, 1 minute each   Pec stretch  Bear hugs   Scissors   Serratus punches     Interventions DEFERRED today                  NEUROMUSCULAR RE-EDUCATION ACTIVITIES to improve Balance, Coordination, Kinesthetic, Sense, Proprioception, and Posture for (8) minutes.  The following were included:    Performed Today:       Cervical rotations on wall: 30x   Supine cervical retractions 30x3s holds    Interventions DEFERRED today     Open books 12x B   Jaw isometrics:   Opening (into hand) 30x3s holds   Lateral trusion 30x3s holds   Cervical isometrics- ball on wall 30x each   Retractions   Side bending    Supine chin tucks 20x              THERAPEUTIC ACTIVITIES to improve dynamic and functional  performance for (23) minutes including:    Performed Today:     Scapular retractions: yellow band 3x10  Shoulder extensions: yellow band 3x10  Punches: yellow band 3x10 B   Scapular strength on foam roll 3x10 each   B shoulder ER: yellow band  Pull aparts: yellow band    Interventions DEFERRED today     Wall slides flexion: 2x10 B            The following Modalities were applied for (15) minutes after being cleared for all contraindications with the following parameters    Modality Location Details   Electrical Stimulation and Moist Hot Pack  B upper trapezius             Next Session:        Patient Education and Home Exercises       Home Exercises Provided and Patient Education Provided     Education provided: (0) minutes  PURPOSE: Patient educated on the impairments noted above and the effects of physical therapy intervention to improve overall condition and QOL.   EXERCISE: Patient was educated on all the above exercise prior/during/after for proper posture, positioning, and execution for safe performance with home exercise program.   STRENGTH: Patient educated on the importance of improved core and extremity  strength in order to improve alignment of the spine and extremities with static positions and dynamic movement.   POSTURE: Patient educated on postural awareness to reduce stress and maintain optimal alignment of the spine with static positions and dynamic movement   Education on spoon theory and conservation of energy throughout the day. Education on breaking up exercise and activity into multiple smaller increments throughout the day.   Education on TMD etiology and treatment.     Written Home Exercises Provided: yes.  Exercises were reviewed and Gina was able to demonstrate them prior to the end of the session.  Gina demonstrated good  understanding of the education provided. See EMR under Patient Instructions for exercises provided during therapy sessions.    Assessment     Patient tolerated session well today. Patient demonstrated limited tolerance for session today due to continued poor pain tolerance. Added shoulder punches to improve  gross upper extremity strength and functional movement patterns. Patient continues to have difficulty with cervical retractions due to pain and increased muscle tension in the cervical region following intervention. Pt reports mild but tolerable increase in soreness and fatigue following session.     An assessment was completed today with no significant changes noted in objective measures compared to prior assessment; please see exam for details. Gina continues to have difficulty with cervical spine range of motion, gross upper extremity strength, functional movement patterns, posture, and tenderness to palpation due to poor pain tolerance with therapy interventions.  The above impairments and functional deficits will continue to be addressed over the course of this plan of care. Gina was educated on continued progression of PT, expectations for the duration of care, updates on goals set at initial evaluation, and home exercise program.  Gina will continue to benefit from  physical therapy in order to further address goals, maximize function and quality of life.      Gina is progressing well towards her goals.   Patient prognosis is Fair.     Patient will continue to benefit from skilled outpatient physical therapy to address the deficits listed in the problem list box on initial evaluation, provide pt/family education and to maximize patient's level of independence in the home and community environment.     Patient's spiritual, cultural and educational needs considered and pt agreeable to plan of care and goals.     Anticipated Barriers for therapy: co-morbidities, sedentary lifestyle, chronicity of condition, adherence to treatment plan, and coping style        Short Term Goals:  6 weeks Status  Date Met   PAIN: Pt will report worst pain of 6/10 in order to progress toward max functional ability and improve quality of life. [x] Progressing  [] Met  [] Not Met     FUNCTION: Patient will demonstrate improved function as indicated by a score of greater than or equal to NT out of 100 on FOTO. [x] Progressing  [] Met  [] Not Met     MOBILITY: Patient will improve AROM to 50% of stated goals, listed in objective measures above, in order to progress towards independence with functional activities.  [x] Progressing  [] Met  [] Not Met     STRENGTH: Patient will improve strength to 50% of stated goals, listed in objective measures above, in order to progress towards independence with functional activities. [x] Progressing  [] Met  [] Not Met     POSTURE: Patient will correct postural deviations in sitting and standing, to decrease pain and promote long term stability.  [x] Progressing  [] Met  [] Not Met     HEP: Patient will demonstrate independence with HEP in order to progress toward functional independence. [x] Progressing  [] Met  [] Not Met        Long Term Goals:  12 weeks Status Date Met   PAIN: Pt will report worst pain of 3/10 in order to progress toward max functional ability and  improve quality of life [x] Progressing  [] Met  [] Not Met     FUNCTION: Patient will demonstrate improved function as indicated by a score of greater than or equal to NT out of 100 on FOTO. [x] Progressing  [] Met  [] Not Met     MOBILITY: Patient will improve AROM to stated goals, listed in objective measures above, in order to return to maximal functional potential and improve quality of life.  [x] Progressing  [] Met  [] Not Met     STRENGTH: Patient will improve strength to stated goals, listed in objective measures above, in order to improve functional independence and quality of life.  [x] Progressing  [] Met  [] Not Met     Patient will return to normal ADL's, IADL's, community involvement, recreational activities, and work-related activities with less than or equal to 3/10 pain and maximal function.  [x] Progressing  [] Met  [] Not Met          Plan     Continue Plan of Care (POC) and progress per patient tolerance. See treatment section for details on planned progressions next session.      Marlee Willoughby, PT

## 2023-10-09 ENCOUNTER — PATIENT MESSAGE (OUTPATIENT)
Dept: PAIN MEDICINE | Facility: HOSPITAL | Age: 38
End: 2023-10-09
Payer: OTHER GOVERNMENT

## 2023-10-09 NOTE — PRE-PROCEDURE INSTRUCTIONS
Spoke with patient regarding procedure scheduled on 10.16     Arrival time 0800     Has patient been sick with fever or on antibiotics within the last 7 days? No     Does the patient have any open wounds, sores or rashes? No     Does the patient have any recent fractures? no     Has patient received a vaccination within the last 7 days? No     Received the COVID vaccination? yes     Has the patient stopped all medications as directed? na     Does patient have a pacemaker, defibrillator, or implantable stimulator? No     Does the patient have a ride to and from procedure and someone reliable to remain with patient?       Is the patient diabetic? no     Does the patient have sleep apnea? Or use O2 at home? no     Is the patient receiving sedation? yes     Is the patient instructed to remain NPO beginning at midnight the night before their procedure? yes     Procedure location confirmed with patient? Yes     Covid- Denies signs/symptoms. Instructed to notify PAT/MD if any changes.

## 2023-10-16 ENCOUNTER — HOSPITAL ENCOUNTER (OUTPATIENT)
Facility: HOSPITAL | Age: 38
Discharge: HOME | End: 2023-10-16
Attending: ANESTHESIOLOGY | Admitting: ANESTHESIOLOGY
Payer: OTHER GOVERNMENT

## 2023-10-16 VITALS
TEMPERATURE: 97 F | BODY MASS INDEX: 18.22 KG/M2 | HEIGHT: 59 IN | SYSTOLIC BLOOD PRESSURE: 123 MMHG | DIASTOLIC BLOOD PRESSURE: 75 MMHG | WEIGHT: 90.38 LBS | RESPIRATION RATE: 15 BRPM | HEART RATE: 69 BPM | OXYGEN SATURATION: 99 %

## 2023-10-16 DIAGNOSIS — M47.812 CERVICAL SPONDYLOSIS: Primary | ICD-10-CM

## 2023-10-16 PROCEDURE — 64491 INJ PARAVERT F JNT C/T 2 LEV: CPT | Mod: 50 | Performed by: ANESTHESIOLOGY

## 2023-10-16 PROCEDURE — 64491 INJ PARAVERT F JNT C/T 2 LEV: CPT | Mod: 50,,, | Performed by: ANESTHESIOLOGY

## 2023-10-16 PROCEDURE — 63600175 PHARM REV CODE 636 W HCPCS: Performed by: ANESTHESIOLOGY

## 2023-10-16 PROCEDURE — 64490 PR INJ DX/THER AGNT PARAVERT FACET JOINT,IMG GUIDE,CERV/THORAC, 1ST LEVEL: ICD-10-PCS | Mod: 50,,, | Performed by: ANESTHESIOLOGY

## 2023-10-16 PROCEDURE — 99152 MOD SED SAME PHYS/QHP 5/>YRS: CPT | Performed by: ANESTHESIOLOGY

## 2023-10-16 PROCEDURE — 64490 INJ PARAVERT F JNT C/T 1 LEV: CPT | Mod: 50 | Performed by: ANESTHESIOLOGY

## 2023-10-16 PROCEDURE — 64490 INJ PARAVERT F JNT C/T 1 LEV: CPT | Mod: 50,,, | Performed by: ANESTHESIOLOGY

## 2023-10-16 PROCEDURE — 64491 PR INJ DX/THER AGNT PARAVERT FACET JOINT,IMG GUIDE,CERV/THORAC, 2ND LEVEL: ICD-10-PCS | Mod: 50,,, | Performed by: ANESTHESIOLOGY

## 2023-10-16 RX ORDER — MIDAZOLAM HYDROCHLORIDE 1 MG/ML
INJECTION, SOLUTION INTRAMUSCULAR; INTRAVENOUS
Status: DISCONTINUED | OUTPATIENT
Start: 2023-10-16 | End: 2023-10-16 | Stop reason: HOSPADM

## 2023-10-16 RX ORDER — ONDANSETRON 2 MG/ML
4 INJECTION INTRAMUSCULAR; INTRAVENOUS ONCE AS NEEDED
Status: DISCONTINUED | OUTPATIENT
Start: 2023-10-16 | End: 2023-10-16 | Stop reason: HOSPADM

## 2023-10-16 RX ORDER — BUPIVACAINE HYDROCHLORIDE 5 MG/ML
INJECTION, SOLUTION EPIDURAL; INTRACAUDAL
Status: DISCONTINUED | OUTPATIENT
Start: 2023-10-16 | End: 2023-10-16 | Stop reason: HOSPADM

## 2023-10-16 RX ORDER — FENTANYL CITRATE 50 UG/ML
INJECTION, SOLUTION INTRAMUSCULAR; INTRAVENOUS
Status: DISCONTINUED | OUTPATIENT
Start: 2023-10-16 | End: 2023-10-16 | Stop reason: HOSPADM

## 2023-10-16 NOTE — DISCHARGE SUMMARY
Discharge Note  Short Stay      SUMMARY     Admit Date: 10/16/2023    Attending Physician: Raul Coe MD        Discharge Physician: Raul Coe MD        Discharge Date: 10/16/2023 9:03 AM    Procedure(s) (LRB):  Bilateral C2/C3 and C3/C4 MBB with RN IV sedation (Bilateral)    Final Diagnosis: Cervical spondylosis [M47.812]    Disposition: Home or self care    Patient Instructions:   Current Discharge Medication List        CONTINUE these medications which have NOT CHANGED    Details   rosuvastatin (CRESTOR) 20 MG tablet TAKE 1 TABLET BY MOUTH EVERY DAY FOR CHOLESTEROL  Qty: 90 tablet, Refills: 0      ascorbic acid, vitamin C, (VITAMIN C) 250 MG tablet Take 250 mg by mouth once daily.      b complex vitamins capsule Take 1 capsule by mouth once daily.      calcium-vitamin D3 (OS-JAMEL 500 + D3) 500 mg-5 mcg (200 unit) per tablet Take 1 tablet by mouth 2 (two) times daily with meals.      cycloSPORINE (RESTASIS) 0.05 % ophthalmic emulsion Place 1 drop into both eyes 2 (two) times daily.  Qty: 60 each, Refills: 12    Associated Diagnoses: Keratitis sicca, bilateral      folic acid (FOLVITE) 400 MCG tablet Take 400 mcg by mouth once daily.      latanoprost 0.005 % ophthalmic solution Place 1 drop into both eyes every evening.  Qty: 2.5 mL, Refills: 12    Associated Diagnoses: Ocular hypertension, bilateral      magnesium 200 mg Tab Take 500 mg by mouth as needed.      meloxicam (MOBIC) 15 MG tablet Take 1 tablet (15 mg total) by mouth daily as needed for Pain.  Qty: 30 tablet, Refills: 1    Associated Diagnoses: Cervical spondylosis; DDD (degenerative disc disease), cervical; Chronic migraine without aura without status migrainosus, not intractable; Dislocation of temporomandibular joint, subsequent encounter; Myofascial pain      sumatriptan (IMITREX) 100 MG tablet Take 1 tablet (100 mg total) by mouth daily as needed for Migraine.  Qty: 15 tablet, Refills: 1    Associated Diagnoses: Chronic migraine  without aura without status migrainosus, not intractable      tiZANidine (ZANAFLEX) 4 MG tablet Take 1.5 tablets (6 mg total) by mouth nightly as needed (muscles spasms).  Qty: 45 tablet, Refills: 1    Associated Diagnoses: Cervical myofascial pain syndrome                 Discharge Diagnosis: Cervical spondylosis [M47.812]  Condition on Discharge: Stable with no complications to procedure   Diet on Discharge: Same as before.  Activity: as per instruction sheet.  Discharge to: Home with a responsible adult.  Follow up: 2-4 weeks       Please call the office at [phone] if you experience any weakness or loss of sensation, fever > 101.5, pain uncontrolled with oral medications, persistent nausea/vomiting/or diarrhea, redness or drainage from the incisions, or any other worrisome concerns. If physician on call was not reached or could not communicate with our office for any reason please go to the nearest emergency department

## 2023-10-16 NOTE — H&P
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    Migraines      Past Surgical History:   Procedure Laterality Date    ACHILLES TENDON SURGERY      AXILLARY NODE DISSECTION Right 04/25/2022    Procedure: LYMPHADENECTOMY, AXILLARY;  Surgeon: Micheal Bartholomew MD;  Location: [facility] OR;  Service: General;  Laterality: Right;  Right axillary node dissection    BILATERAL MASTECTOMY Bilateral 04/25/2022    Procedure: MASTECTOMY, BILATERAL;  Surgeon: Micheal Bartholomew MD;  Location: [facility] OR;  Service: General;  Laterality: Bilateral;    BREAST REVISION SURGERY Bilateral 9/23/2022    Procedure: BREAST RECONSTRUCTION SECOND STAGE, FAT GRAFTING BILATERAL BREAST;  Surgeon: Esther Carpio MD;  Location: [facility];  Service: Plastics;  Laterality: Bilateral;    COLONOSCOPY N/A 03/28/2022    Procedure: COLONOSCOPY;  Surgeon: Winnie Polanco MD;  Location: [facility] ENDO;  Service: Endoscopy;  Laterality: N/A;    DEBRIDEMENT, BREAST Right 05/30/2022    Procedure: DEBRIDEMENT, BREAST;  Surgeon: Esther Carpio MD;  Location: [facility] OR;  Service: General;  Laterality: Right;  FLAP WASHOUT AND CLOSURE    EXTERNAL EAR SURGERY      cosmetic    INSERTION OF BREAST TISSUE EXPANDER Bilateral 04/25/2022    Procedure: INSERTION, TISSUE EXPANDER, BREAST;  Surgeon: Micheal Bartholomew MD;  Location: [facility];  Service: General;  Laterality: Bilateral;  Procedure performed by KRAIG Carpio    REPLACEMENT OF IMPLANT OF BREAST Bilateral 9/23/2022    Procedure: TISSUE EXCHANGE TO IMPLANT, CAPSULORRHAPY, CAPSALOTOMY;  Surgeon: Esther Carpio MD;  Location: [facility];  Service: Plastics;  Laterality: Bilateral;    sciatic nerve exploration      SENTINEL LYMPH NODE BIOPSY Right 04/25/2022    Procedure: BIOPSY, LYMPH NODE,  "SENTINEL;  Surgeon: Micheal Bartholomew MD;  Location: [facility] OR;  Service: General;  Laterality: Right;    XI ROBOTIC HYSTERECTOMY, WITH SALPINGO-OOPHORECTOMY Bilateral 07/01/2022    Procedure: XI ROBOTIC HYSTERECTOMY,WITH SALPINGO-OOPHORECTOMY;  Surgeon: CORDELL Bowen MD;  Location: [facility] OR;  Service: OB/GYN;  Laterality: Bilateral;     Review of patient's allergies indicates:  No Known Allergies     No current facility-administered medications on file prior to encounter.     Current Outpatient Medications on File Prior to Encounter   Medication Sig Dispense Refill    rosuvastatin (CRESTOR) 20 MG tablet TAKE 1 TABLET BY MOUTH EVERY DAY FOR CHOLESTEROL 90 tablet 0    ascorbic acid, vitamin C, (VITAMIN C) 250 MG tablet Take 250 mg by mouth once daily.      b complex vitamins capsule Take 1 capsule by mouth once daily.      calcium-vitamin D3 (OS-JAMEL 500 + D3) 500 mg-5 mcg (200 unit) per tablet Take 1 tablet by mouth 2 (two) times daily with meals.      cycloSPORINE (RESTASIS) 0.05 % ophthalmic emulsion Place 1 drop into both eyes 2 (two) times daily. 60 each 12    folic acid (FOLVITE) 400 MCG tablet Take 400 mcg by mouth once daily.      latanoprost 0.005 % ophthalmic solution Place 1 drop into both eyes every evening. 2.5 mL 12    magnesium 200 mg Tab Take 500 mg by mouth as needed.      meloxicam (MOBIC) 15 MG tablet Take 1 tablet (15 mg total) by mouth daily as needed for Pain. 30 tablet 1    sumatriptan (IMITREX) 100 MG tablet Take 1 tablet (100 mg total) by mouth daily as needed for Migraine. 15 tablet 1    tiZANidine (ZANAFLEX) 4 MG tablet Take 1.5 tablets (6 mg total) by mouth nightly as needed (muscles spasms). 45 tablet 1        PMHx, PSHx, Allergies, Medications reviewed in epic    ROS negative except pain complaints in HPI    OBJECTIVE:    /78 (BP Location: Left arm, Patient Position: Sitting)   Pulse 60   Temp 97.2 °F (36.2 °C)   Resp 16   Ht 4' 11" (1.499 m)   Wt 41 kg (90 lb 6.2 oz)   LMP " 06/20/2022   SpO2 100%   Breastfeeding No   BMI 18.26 kg/m²     PHYSICAL EXAMINATION:    GENERAL: Well appearing, in no acute distress, alert and oriented x3.  PSYCH:  Mood and affect appropriate.  SKIN: Skin color, texture, turgor normal, no rashes or lesions which will impact the procedure.  CV: RRR with palpation of the radial artery.  PULM: No evidence of respiratory difficulty, symmetric chest rise. Clear to auscultation.  NEURO: Cranial nerves grossly intact.    Plan:    Proceed with procedure as planned Procedure(s) (LRB):  Bilateral C2/C3 and C3/C4 MBB with RN IV sedation (Bilateral)    Raul Coe MD  10/16/2023

## 2023-10-16 NOTE — DISCHARGE INSTRUCTIONS

## 2023-10-16 NOTE — OP NOTE
CERVICAL Medial Branch Block Under Fluoroscopy  Bilateral  C2/C3 and C3/C4    INFORMED CONSENT: The procedure, risks, benefits and options were discussed with patient. There are no contraindications to the procedure. The patient expressed understanding and agreed to proceed. The personnel performing the procedure was discussed.    Date of procedure 10/16/2023    Time-out taken to identify patient and procedure side prior to starting the procedure.                                                                     PROCEDURE:  Bilateral  medial branch block at the transverse processes of  C2, C3, and C4    Pre Procedure diagnosis:    Cervical spondylosis [M47.812]  1. Cervical spondylosis        Post-Procedure diagnosis:   same    PHYSICIAN: Raul Coe MD    ASSISTANTS: None    MEDICATIONS INJECTED:  1ml 0.5% Bupivicaine PF, at each level  LOCAL ANESTHETIC USED:   1ml Lidocaine PF, at each level    ESTIMATED BLOOD LOSS:  None.    COMPLICATIONS:  None.    Sedation: Conscious sedation provided by M.RORY    SEDATION MEDICATIONS: local/IV sedation: Versed 2 mg and fentanyl 50 mcg IV.  Conscious sedation ordered by MD.  Patient reevaluated and sedation administered by MD and monitored by RN.  Total sedation time was less than 10 min.    Total sedation time was <10 min      TECHNIQUE:   Laying in a prone position, the patient was prepped and draped in the usual sterile fashion using ChloraPrep and fenestrated drape.  The level was determined under fluoroscopic guidance.  Local anesthetic was given by going down to the hub of the 27-gauge 1.25in needle and raising a wheel.  A 25-gauge 3.5inch needle was introduced to the anatomic local of the medial branch at each of the stated above levels using fluoroscopy. Then after negative aspiration, the medication was injected slowly. The patient tolerated the procedure well.       The patient was monitored for approximately 30 minutes after the procedure.  Patient was given  post procedure and discharge instructions to follow at home.  The patient was discharged in a stable condition

## 2023-10-17 ENCOUNTER — TELEPHONE (OUTPATIENT)
Dept: PAIN MEDICINE | Facility: CLINIC | Age: 38
End: 2023-10-17
Payer: OTHER GOVERNMENT

## 2023-10-17 DIAGNOSIS — M47.812 CERVICAL SPONDYLOSIS: Primary | ICD-10-CM

## 2023-10-17 NOTE — TELEPHONE ENCOUNTER
Patient reports 80% relief for 10 hours and improvement in her headache pain after bilateral C2-C3 and C3-C4 medial branch block on 10/16/2023.  We will proceed with 2nd diagnostic medial branch block.

## 2023-10-19 ENCOUNTER — TELEPHONE (OUTPATIENT)
Dept: PAIN MEDICINE | Facility: CLINIC | Age: 38
End: 2023-10-19
Payer: OTHER GOVERNMENT

## 2023-10-19 NOTE — TELEPHONE ENCOUNTER
----- Message from Raul Coe MD sent at 10/17/2023  9:41 AM CDT -----  Pain Provider: Carolin  Patient Name: Gina Garcia  MRN: [id number]  Case: CERVICAL MEDIAL BRANCH BLOCKS  Level: C2, C3, and C4  Laterality: bilateral

## 2023-10-19 NOTE — TELEPHONE ENCOUNTER
Called to schedule pr procedure, pt was scheduled with Dr Coe for Oct 30 for Cervical MBB #2.  .Enrike Valle CCMA

## 2023-10-20 DIAGNOSIS — M79.18 CERVICAL MYOFASCIAL PAIN SYNDROME: ICD-10-CM

## 2023-10-20 RX ORDER — TIZANIDINE 4 MG/1
6 TABLET ORAL NIGHTLY PRN
Qty: 45 TABLET | Refills: 1 | Status: SHIPPED | OUTPATIENT
Start: 2023-10-20 | End: 2024-01-22

## 2023-10-27 ENCOUNTER — PATIENT MESSAGE (OUTPATIENT)
Dept: PAIN MEDICINE | Facility: HOSPITAL | Age: 38
End: 2023-10-27
Payer: OTHER GOVERNMENT

## 2023-10-28 ENCOUNTER — PATIENT MESSAGE (OUTPATIENT)
Dept: PAIN MEDICINE | Facility: HOSPITAL | Age: 38
End: 2023-10-28
Payer: OTHER GOVERNMENT

## 2023-10-28 NOTE — PRE-PROCEDURE INSTRUCTIONS
Spoke with patient regarding procedure scheduled on 10.30     Arrival time 0930     Has patient been sick with fever or on antibiotics within the last 7 days? No     Does the patient have any open wounds, sores or rashes? No     Does the patient have any recent fractures? no     Has patient received a vaccination within the last 7 days? No     Received the COVID vaccination? yes     Has the patient stopped all medications as directed? na     Does patient have a pacemaker, defibrillator, or implantable stimulator? No     Does the patient have a ride to and from procedure and someone reliable to remain with patient?       Is the patient diabetic? no     Does the patient have sleep apnea? Or use O2 at home? no     Is the patient receiving sedation? yes     Is the patient instructed to remain NPO beginning at midnight the night before their procedure? yes     Procedure location confirmed with patient? Yes     Covid- Denies signs/symptoms. Instructed to notify PAT/MD if any changes.

## 2023-10-30 ENCOUNTER — TELEPHONE (OUTPATIENT)
Dept: PAIN MEDICINE | Facility: CLINIC | Age: 38
End: 2023-10-30
Payer: OTHER GOVERNMENT

## 2023-10-30 ENCOUNTER — HOSPITAL ENCOUNTER (OUTPATIENT)
Facility: HOSPITAL | Age: 38
Discharge: HOME | End: 2023-10-30
Attending: ANESTHESIOLOGY | Admitting: ANESTHESIOLOGY
Payer: OTHER GOVERNMENT

## 2023-10-30 VITALS
BODY MASS INDEX: 18.62 KG/M2 | OXYGEN SATURATION: 98 % | DIASTOLIC BLOOD PRESSURE: 64 MMHG | HEART RATE: 71 BPM | RESPIRATION RATE: 15 BRPM | TEMPERATURE: 98 F | SYSTOLIC BLOOD PRESSURE: 125 MMHG | HEIGHT: 59 IN | WEIGHT: 92.38 LBS

## 2023-10-30 DIAGNOSIS — M47.812 CERVICAL SPONDYLOSIS: Primary | ICD-10-CM

## 2023-10-30 PROCEDURE — 64490 INJ PARAVERT F JNT C/T 1 LEV: CPT | Mod: 50,,, | Performed by: ANESTHESIOLOGY

## 2023-10-30 PROCEDURE — 64490 PR INJ DX/THER AGNT PARAVERT FACET JOINT,IMG GUIDE,CERV/THORAC, 1ST LEVEL: ICD-10-PCS | Mod: 50,,, | Performed by: ANESTHESIOLOGY

## 2023-10-30 PROCEDURE — 64491 INJ PARAVERT F JNT C/T 2 LEV: CPT | Mod: 50 | Performed by: ANESTHESIOLOGY

## 2023-10-30 PROCEDURE — 64491 INJ PARAVERT F JNT C/T 2 LEV: CPT | Mod: 50,,, | Performed by: ANESTHESIOLOGY

## 2023-10-30 PROCEDURE — 64490 INJ PARAVERT F JNT C/T 1 LEV: CPT | Mod: 50 | Performed by: ANESTHESIOLOGY

## 2023-10-30 PROCEDURE — 64491 PR INJ DX/THER AGNT PARAVERT FACET JOINT,IMG GUIDE,CERV/THORAC, 2ND LEVEL: ICD-10-PCS | Mod: 50,,, | Performed by: ANESTHESIOLOGY

## 2023-10-30 PROCEDURE — 63600175 PHARM REV CODE 636 W HCPCS: Performed by: ANESTHESIOLOGY

## 2023-10-30 RX ORDER — FENTANYL CITRATE 50 UG/ML
INJECTION, SOLUTION INTRAMUSCULAR; INTRAVENOUS
Status: DISCONTINUED | OUTPATIENT
Start: 2023-10-30 | End: 2023-10-30 | Stop reason: HOSPADM

## 2023-10-30 RX ORDER — BUPIVACAINE HYDROCHLORIDE 5 MG/ML
INJECTION, SOLUTION EPIDURAL; INTRACAUDAL
Status: DISCONTINUED | OUTPATIENT
Start: 2023-10-30 | End: 2023-10-30 | Stop reason: HOSPADM

## 2023-10-30 RX ORDER — ONDANSETRON 2 MG/ML
4 INJECTION INTRAMUSCULAR; INTRAVENOUS ONCE AS NEEDED
Status: DISCONTINUED | OUTPATIENT
Start: 2023-10-30 | End: 2023-10-30 | Stop reason: HOSPADM

## 2023-10-30 RX ORDER — MIDAZOLAM HYDROCHLORIDE 1 MG/ML
INJECTION, SOLUTION INTRAMUSCULAR; INTRAVENOUS
Status: DISCONTINUED | OUTPATIENT
Start: 2023-10-30 | End: 2023-10-30 | Stop reason: HOSPADM

## 2023-10-30 NOTE — TELEPHONE ENCOUNTER
Spoke with patient, she stated that she received 100% of feeling with numbing medication with the 1st MBB and only 10% feeling of numbing medication with MBB she ceived today.  She wants to know if normal, forward to provider for answer.

## 2023-10-30 NOTE — DISCHARGE SUMMARY
Discharge Note  Short Stay      SUMMARY     Admit Date: 10/30/2023    Attending Physician: Raul Coe MD        Discharge Physician: Raul Coe MD        Discharge Date: 10/30/2023 10:49 AM    Procedure(s) (LRB):  Bilateral C2/C3 and C3/C4 MBB with RN IV sedation (Bilateral)    Final Diagnosis: Cervical spondylosis [M47.812]    Disposition: Home or self care    Patient Instructions:   Current Discharge Medication List        CONTINUE these medications which have NOT CHANGED    Details   meloxicam (MOBIC) 15 MG tablet Take 1 tablet (15 mg total) by mouth daily as needed for Pain.  Qty: 30 tablet, Refills: 1    Associated Diagnoses: Cervical spondylosis; DDD (degenerative disc disease), cervical; Chronic migraine without aura without status migrainosus, not intractable; Dislocation of temporomandibular joint, subsequent encounter; Myofascial pain      rosuvastatin (CRESTOR) 20 MG tablet TAKE 1 TABLET BY MOUTH EVERY DAY FOR CHOLESTEROL  Qty: 90 tablet, Refills: 0      sumatriptan (IMITREX) 100 MG tablet Take 1 tablet (100 mg total) by mouth daily as needed for Migraine.  Qty: 15 tablet, Refills: 1    Associated Diagnoses: Chronic migraine without aura without status migrainosus, not intractable      tiZANidine (ZANAFLEX) 4 MG tablet Take 1.5 tablets (6 mg total) by mouth nightly as needed (muscles spasms).  Qty: 45 tablet, Refills: 1    Associated Diagnoses: Cervical myofascial pain syndrome      ascorbic acid, vitamin C, (VITAMIN C) 250 MG tablet Take 250 mg by mouth once daily.      b complex vitamins capsule Take 1 capsule by mouth once daily.      calcium-vitamin D3 (OS-JAMEL 500 + D3) 500 mg-5 mcg (200 unit) per tablet Take 1 tablet by mouth 2 (two) times daily with meals.      cycloSPORINE (RESTASIS) 0.05 % ophthalmic emulsion Place 1 drop into both eyes 2 (two) times daily.  Qty: 60 each, Refills: 12    Associated Diagnoses: Keratitis sicca, bilateral      folic acid (FOLVITE) 400 MCG tablet Take 400  mcg by mouth once daily.      latanoprost 0.005 % ophthalmic solution Place 1 drop into both eyes every evening.  Qty: 2.5 mL, Refills: 12    Associated Diagnoses: Ocular hypertension, bilateral      magnesium 200 mg Tab Take 500 mg by mouth as needed.                 Discharge Diagnosis: Cervical spondylosis [M47.812]  Condition on Discharge: Stable with no complications to procedure   Diet on Discharge: Same as before.  Activity: as per instruction sheet.  Discharge to: Home with a responsible adult.  Follow up: 2-4 weeks       Please call the office at [phone] if you experience any weakness or loss of sensation, fever > 101.5, pain uncontrolled with oral medications, persistent nausea/vomiting/or diarrhea, redness or drainage from the incisions, or any other worrisome concerns. If physician on call was not reached or could not communicate with our office for any reason please go to the nearest emergency department

## 2023-10-30 NOTE — DISCHARGE INSTRUCTIONS

## 2023-10-30 NOTE — OP NOTE
CERVICAL Medial Branch Block Under Fluoroscopy  Bilateral  C2/C3 and C3/C4    INFORMED CONSENT: The procedure, risks, benefits and options were discussed with patient. There are no contraindications to the procedure. The patient expressed understanding and agreed to proceed. The personnel performing the procedure was discussed.    Date of procedure 10/30/2023    Time-out taken to identify patient and procedure side prior to starting the procedure.                                                                     PROCEDURE:  Bilateral  medial branch block at the transverse processes of  C2, C3, and C4    Pre Procedure diagnosis:    Cervical spondylosis [M47.812]  1. Cervical spondylosis        Post-Procedure diagnosis:   same    PHYSICIAN: Raul Coe MD    ASSISTANTS: None    MEDICATIONS INJECTED:  1ml 0.5% Bupivicaine PF, at each level  LOCAL ANESTHETIC USED:   1ml Lidocaine PF, at each level    ESTIMATED BLOOD LOSS:  None.    COMPLICATIONS:  None.    Sedation: Conscious sedation provided by M.RORY    SEDATION MEDICATIONS: local/IV sedation: Versed 2 mg and fentanyl 50 mcg IV.  Conscious sedation ordered by MD.  Patient reevaluated and sedation administered by MD and monitored by RN.  Total sedation time was less than 15 min.    Total sedation time was >10 but <20 min      TECHNIQUE:   Laying in a prone position, the patient was prepped and draped in the usual sterile fashion using ChloraPrep and fenestrated drape.  The level was determined under fluoroscopic guidance.  Local anesthetic was given by going down to the hub of the 27-gauge 1.25in needle and raising a wheel.  A 25-gauge 3.5inch needle was introduced to the anatomic local of the medial branch at each of the stated above levels using fluoroscopy. Then after negative aspiration, the medication was injected slowly. The patient tolerated the procedure well.       The patient was monitored for approximately 30 minutes after the procedure.  Patient was  given post procedure and discharge instructions to follow at home.  The patient was discharged in a stable condition

## 2023-10-30 NOTE — TELEPHONE ENCOUNTER
----- Message from Michelle Valdez MA sent at 10/30/2023 11:34 AM CDT -----  Contact: [phone]  Pt called              In regards to needing provider to give a call back as soon as possible to discuss the numbing injections for neck pain and migraine pain.              Call back [phone]

## 2023-10-30 NOTE — H&P
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    Migraines      Past Surgical History:   Procedure Laterality Date    ACHILLES TENDON SURGERY      AXILLARY NODE DISSECTION Right 04/25/2022    Procedure: LYMPHADENECTOMY, AXILLARY;  Surgeon: Micheal Bartholomew MD;  Location: [facility] OR;  Service: General;  Laterality: Right;  Right axillary node dissection    BILATERAL MASTECTOMY Bilateral 04/25/2022    Procedure: MASTECTOMY, BILATERAL;  Surgeon: Micheal Bartholomew MD;  Location: [facility] OR;  Service: General;  Laterality: Bilateral;    BREAST REVISION SURGERY Bilateral 9/23/2022    Procedure: BREAST RECONSTRUCTION SECOND STAGE, FAT GRAFTING BILATERAL BREAST;  Surgeon: Esther Carpio MD;  Location: [facility] OR;  Service: Plastics;  Laterality: Bilateral;    COLONOSCOPY N/A 03/28/2022    Procedure: COLONOSCOPY;  Surgeon: Winnie Polanco MD;  Location: [facility] ENDO;  Service: Endoscopy;  Laterality: N/A;    DEBRIDEMENT, BREAST Right 05/30/2022    Procedure: DEBRIDEMENT, BREAST;  Surgeon: Esther Carpio MD;  Location: [facility] OR;  Service: General;  Laterality: Right;  FLAP WASHOUT AND CLOSURE    EXTERNAL EAR SURGERY      cosmetic    INJECTION OF ANESTHETIC AGENT AROUND MEDIAL BRANCH NERVES INNERVATING CERVICAL FACET JOINT Bilateral 10/16/2023    Procedure: Bilateral C2/C3 and C3/C4 MBB with RN IV sedation;  Surgeon: Raul Coe MD;  Location: [facility] PAIN MGT;  Service: Pain Management;  Laterality: Bilateral;    INSERTION OF BREAST TISSUE EXPANDER Bilateral 04/25/2022    Procedure: INSERTION, TISSUE EXPANDER, BREAST;  Surgeon: Micheal Bartholomew MD;  Location: [facility] OR;  Service: General;  Laterality: Bilateral;  Procedure performed by KRAIG Carpio    REPLACEMENT OF IMPLANT OF BREAST Bilateral  9/23/2022    Procedure: TISSUE EXCHANGE TO IMPLANT, CAPSULORRHAPY, CAPSALOTOMY;  Surgeon: Esther Carpio MD;  Location: [facility] OR;  Service: Plastics;  Laterality: Bilateral;    sciatic nerve exploration      SENTINEL LYMPH NODE BIOPSY Right 04/25/2022    Procedure: BIOPSY, LYMPH NODE, SENTINEL;  Surgeon: Micheal Bartholomew MD;  Location: [facility] OR;  Service: General;  Laterality: Right;    XI ROBOTIC HYSTERECTOMY, WITH SALPINGO-OOPHORECTOMY Bilateral 07/01/2022    Procedure: XI ROBOTIC HYSTERECTOMY,WITH SALPINGO-OOPHORECTOMY;  Surgeon: CORDELL Bowen MD;  Location: [facility] OR;  Service: OB/GYN;  Laterality: Bilateral;     Review of patient's allergies indicates:  No Known Allergies     No current facility-administered medications on file prior to encounter.     Current Outpatient Medications on File Prior to Encounter   Medication Sig Dispense Refill    meloxicam (MOBIC) 15 MG tablet Take 1 tablet (15 mg total) by mouth daily as needed for Pain. 30 tablet 1    rosuvastatin (CRESTOR) 20 MG tablet TAKE 1 TABLET BY MOUTH EVERY DAY FOR CHOLESTEROL 90 tablet 0    sumatriptan (IMITREX) 100 MG tablet Take 1 tablet (100 mg total) by mouth daily as needed for Migraine. 15 tablet 1    ascorbic acid, vitamin C, (VITAMIN C) 250 MG tablet Take 250 mg by mouth once daily.      b complex vitamins capsule Take 1 capsule by mouth once daily.      calcium-vitamin D3 (OS-JAMEL 500 + D3) 500 mg-5 mcg (200 unit) per tablet Take 1 tablet by mouth 2 (two) times daily with meals.      cycloSPORINE (RESTASIS) 0.05 % ophthalmic emulsion Place 1 drop into both eyes 2 (two) times daily. 60 each 12    folic acid (FOLVITE) 400 MCG tablet Take 400 mcg by mouth once daily.      latanoprost 0.005 % ophthalmic solution Place 1 drop into both eyes every evening. 2.5 mL 12    magnesium 200 mg Tab Take 500 mg by mouth as needed.          PMHx, PSHx, Allergies, Medications reviewed in epic    ROS negative except pain complaints in HPI    OBJECTIVE:    BP  "125/68   Pulse 60   Temp 97.5 °F (36.4 °C)   Resp 16   Ht 4' 11" (1.499 m)   Wt 41.9 kg (92 lb 6 oz)   LMP 06/20/2022   SpO2 100%   BMI 18.66 kg/m²     PHYSICAL EXAMINATION:    GENERAL: Well appearing, in no acute distress, alert and oriented x3.  PSYCH:  Mood and affect appropriate.  SKIN: Skin color, texture, turgor normal, no rashes or lesions which will impact the procedure.  CV: RRR with palpation of the radial artery.  PULM: No evidence of respiratory difficulty, symmetric chest rise. Clear to auscultation.  NEURO: Cranial nerves grossly intact.    Plan:    Proceed with procedure as planned Procedure(s) (LRB):  Bilateral C2/C3 and C3/C4 MBB with RN IV sedation (Bilateral)    Raul Coe MD  10/30/2023            "

## 2023-10-31 ENCOUNTER — TELEPHONE (OUTPATIENT)
Dept: PAIN MEDICINE | Facility: CLINIC | Age: 38
End: 2023-10-31
Payer: OTHER GOVERNMENT

## 2023-10-31 DIAGNOSIS — M47.812 CERVICAL SPONDYLOSIS: Primary | ICD-10-CM

## 2023-10-31 NOTE — TELEPHONE ENCOUNTER
Patient reports 90% relief in neck pain and headache symptoms after bilateral C2-C3 and C3-C4 medial branch block on 10/30/2023.  This is patient's 2nd positive medial branch block.  We will proceed with RFA.

## 2023-11-02 ENCOUNTER — PATIENT MESSAGE (OUTPATIENT)
Dept: PAIN MEDICINE | Facility: CLINIC | Age: 38
End: 2023-11-02
Payer: OTHER GOVERNMENT

## 2023-11-06 ENCOUNTER — PATIENT MESSAGE (OUTPATIENT)
Dept: PAIN MEDICINE | Facility: CLINIC | Age: 38
End: 2023-11-06
Payer: OTHER GOVERNMENT

## 2023-11-15 ENCOUNTER — OFFICE VISIT (OUTPATIENT)
Dept: NEUROLOGY | Facility: CLINIC | Age: 38
End: 2023-11-15
Payer: OTHER GOVERNMENT

## 2023-11-15 VITALS
SYSTOLIC BLOOD PRESSURE: 116 MMHG | HEIGHT: 59 IN | HEART RATE: 75 BPM | RESPIRATION RATE: 16 BRPM | DIASTOLIC BLOOD PRESSURE: 76 MMHG | BODY MASS INDEX: 18.71 KG/M2 | WEIGHT: 92.81 LBS

## 2023-11-15 DIAGNOSIS — G44.099 TRIGEMINAL AUTONOMIC CEPHALGIAS: ICD-10-CM

## 2023-11-15 DIAGNOSIS — M26.623 BILATERAL TEMPOROMANDIBULAR JOINT PAIN: ICD-10-CM

## 2023-11-15 DIAGNOSIS — Z15.09 BIALLELIC MUTATION OF CHEK2 GENE WITHOUT DIAGNOSED MALIGNANCY: ICD-10-CM

## 2023-11-15 DIAGNOSIS — M26.69 OTHER SPECIFIED DISORDERS OF TEMPOROMANDIBULAR JOINT: ICD-10-CM

## 2023-11-15 DIAGNOSIS — G50.1 ATYPICAL FACIAL PAIN: ICD-10-CM

## 2023-11-15 DIAGNOSIS — G50.0 TRIGEMINAL NEURALGIA OF LEFT SIDE OF FACE: ICD-10-CM

## 2023-11-15 DIAGNOSIS — Z80.51 FAMILY HISTORY OF CANCER OF THE KIDNEY: ICD-10-CM

## 2023-11-15 DIAGNOSIS — M21.371 RIGHT FOOT DROP: ICD-10-CM

## 2023-11-15 DIAGNOSIS — M54.81 CERVICO-OCCIPITAL NEURALGIA: ICD-10-CM

## 2023-11-15 DIAGNOSIS — D05.11 DUCTAL CARCINOMA IN SITU (DCIS) OF RIGHT BREAST: ICD-10-CM

## 2023-11-15 DIAGNOSIS — Z15.01 BIALLELIC MUTATION OF CHEK2 GENE WITHOUT DIAGNOSED MALIGNANCY: ICD-10-CM

## 2023-11-15 DIAGNOSIS — F41.9 ANXIETY: ICD-10-CM

## 2023-11-15 DIAGNOSIS — Z15.89 BIALLELIC MUTATION OF CHEK2 GENE WITHOUT DIAGNOSED MALIGNANCY: ICD-10-CM

## 2023-11-15 DIAGNOSIS — F51.01 PRIMARY INSOMNIA: ICD-10-CM

## 2023-11-15 DIAGNOSIS — G43.119 INTRACTABLE MIGRAINE WITH AURA WITHOUT STATUS MIGRAINOSUS: ICD-10-CM

## 2023-11-15 DIAGNOSIS — G57.01 NEUROPATHY OF RIGHT SCIATIC NERVE: ICD-10-CM

## 2023-11-15 DIAGNOSIS — E78.00 HYPERCHOLESTEROLEMIA: ICD-10-CM

## 2023-11-15 DIAGNOSIS — R29.90 MULTIPLE NEUROLOGICAL SYMPTOMS: Primary | ICD-10-CM

## 2023-11-15 PROBLEM — R29.3 POOR POSTURE: Status: RESOLVED | Noted: 2023-08-04 | Resolved: 2023-11-15

## 2023-11-15 PROBLEM — M62.81 PROXIMAL MUSCLE WEAKNESS: Status: RESOLVED | Noted: 2022-05-25 | Resolved: 2023-11-15

## 2023-11-15 PROBLEM — G43.909 NONINTRACTABLE CHRONIC MIGRAINE: Status: ACTIVE | Noted: 2023-11-15

## 2023-11-15 PROBLEM — Z74.09 DECREASED FUNCTIONAL MOBILITY AND ENDURANCE: Status: RESOLVED | Noted: 2022-05-25 | Resolved: 2023-11-15

## 2023-11-15 PROBLEM — G43.909 NONINTRACTABLE CHRONIC MIGRAINE: Status: RESOLVED | Noted: 2023-11-15 | Resolved: 2023-11-15

## 2023-11-15 PROBLEM — R29.898 DECREASED RANGE OF MOTION OF NECK: Status: RESOLVED | Noted: 2023-08-04 | Resolved: 2023-11-15

## 2023-11-15 PROCEDURE — 99417 PROLNG OP E/M EACH 15 MIN: CPT | Mod: S$PBB,,, | Performed by: PSYCHIATRY & NEUROLOGY

## 2023-11-15 PROCEDURE — 99205 OFFICE O/P NEW HI 60 MIN: CPT | Mod: S$PBB,,, | Performed by: PSYCHIATRY & NEUROLOGY

## 2023-11-15 PROCEDURE — 99999 PR PBB SHADOW E&M-EST. PATIENT-LVL V: ICD-10-PCS | Mod: PBBFAC,,, | Performed by: PSYCHIATRY & NEUROLOGY

## 2023-11-15 PROCEDURE — 99417 PR PROLONGED SVC, OUTPT, W/WO DIRECT PT CONTACT,  EA ADDTL 15 MIN: ICD-10-PCS | Mod: S$PBB,,, | Performed by: PSYCHIATRY & NEUROLOGY

## 2023-11-15 PROCEDURE — 99999 PR PBB SHADOW E&M-EST. PATIENT-LVL V: CPT | Mod: PBBFAC,,, | Performed by: PSYCHIATRY & NEUROLOGY

## 2023-11-15 PROCEDURE — 99215 OFFICE O/P EST HI 40 MIN: CPT | Mod: PBBFAC | Performed by: PSYCHIATRY & NEUROLOGY

## 2023-11-15 PROCEDURE — 99205 PR OFFICE/OUTPT VISIT, NEW, LEVL V, 60-74 MIN: ICD-10-PCS | Mod: S$PBB,,, | Performed by: PSYCHIATRY & NEUROLOGY

## 2023-11-15 RX ORDER — GALCANEZUMAB 120 MG/ML
120 INJECTION, SOLUTION SUBCUTANEOUS
Qty: 1 EACH | Refills: 11 | Status: SHIPPED | OUTPATIENT
Start: 2023-11-15

## 2023-11-15 RX ORDER — CARBAMAZEPINE 200 MG/1
200 TABLET ORAL 2 TIMES DAILY
Qty: 180 TABLET | Refills: 3 | Status: SHIPPED | OUTPATIENT
Start: 2023-11-15

## 2023-11-15 RX ORDER — RIMEGEPANT SULFATE 75 MG/75MG
75 TABLET, ORALLY DISINTEGRATING ORAL ONCE AS NEEDED
Qty: 8 TABLET | Refills: 3 | Status: SHIPPED | OUTPATIENT
Start: 2023-11-15 | End: 2023-11-15

## 2023-11-15 NOTE — PROGRESS NOTES
Subjective:       Patient ID: Gina Garcia is a 38 y.o. female.    Chief Complaint: No chief complaint on file.          HPI    The patient presented on 11- for evaluation of numerous chronic neurological problems.      The patient underwent RT sciatic nerve exploration when she was 12-13 years-old for flail foot. The surgeon told her and her family he saw a discoloration with no evidence of neuroma. The patient underwent Achilles release which did not help. The patient has been experiencing escalating pain in the RT sciatic nerve distribution.Failed TPM, GBP, PGB, Amitriptyline and Effexor.    When she was 15 she started experiencing common migraine headaches without aura with chronic neck pain (RT side and Occipital, moderate-severe, throbbing, associated with nausea, vomiting, light, noise and smell sensitivity). Failed OTC medications, Rx NSAIDs, Triptans. Failed TPM, GBP, PGB, Amitriptyline and Effexor. Botox has been helpful.  Zanaflex (Tizanidine) 4 mg QHS does help with the neck spasm and some aspects of the headache.     The patient also reports longstanding and severe B/L TMJDS with pain, clicking and stiffness.     Since 2020 the patient has been experiencing LT side intense facial pain affecting the face, eye and nostril. The pain is very intense and feels like electric shocks. Failed TPM, GBP, PGB, Amitriptyline and Effexor.      Review of Systems   Constitutional:  Negative for appetite change and fatigue.   HENT:  Negative for hearing loss and tinnitus.    Eyes:  Negative for photophobia and visual disturbance.   Respiratory:  Negative for apnea and shortness of breath.    Cardiovascular:  Negative for chest pain and palpitations.   Gastrointestinal:  Negative for nausea and vomiting.   Endocrine: Negative for cold intolerance and heat intolerance.   Genitourinary:  Negative for difficulty urinating and urgency.   Musculoskeletal:  Positive for gait problem and neck pain. Negative for  arthralgias, back pain, joint swelling, myalgias and neck stiffness.   Skin:  Negative for color change and rash.   Allergic/Immunologic: Negative for environmental allergies and immunocompromised state.   Neurological:  Positive for weakness, numbness and headaches. Negative for dizziness, tremors, seizures, syncope, facial asymmetry, speech difficulty and light-headedness.   Hematological:  Negative for adenopathy. Does not bruise/bleed easily.   Psychiatric/Behavioral:  Positive for dysphoric mood and sleep disturbance. Negative for agitation, behavioral problems, confusion, decreased concentration, hallucinations, self-injury and suicidal ideas. The patient is nervous/anxious. The patient is not hyperactive.                Current Outpatient Medications:     ascorbic acid, vitamin C, (VITAMIN C) 250 MG tablet, Take 250 mg by mouth once daily., Disp: , Rfl:     b complex vitamins capsule, Take 1 capsule by mouth once daily., Disp: , Rfl:     calcium-vitamin D3 (OS-JAMEL 500 + D3) 500 mg-5 mcg (200 unit) per tablet, Take 1 tablet by mouth 2 (two) times daily with meals., Disp: , Rfl:     cycloSPORINE (RESTASIS) 0.05 % ophthalmic emulsion, Place 1 drop into both eyes 2 (two) times daily., Disp: 60 each, Rfl: 12    folic acid (FOLVITE) 400 MCG tablet, Take 400 mcg by mouth once daily., Disp: , Rfl:     latanoprost 0.005 % ophthalmic solution, Place 1 drop into both eyes every evening., Disp: 2.5 mL, Rfl: 12    magnesium 200 mg Tab, Take 500 mg by mouth as needed., Disp: , Rfl:     meloxicam (MOBIC) 15 MG tablet, Take 1 tablet (15 mg total) by mouth daily as needed for Pain., Disp: 30 tablet, Rfl: 1    rosuvastatin (CRESTOR) 20 MG tablet, TAKE 1 TABLET BY MOUTH EVERY DAY FOR CHOLESTEROL, Disp: 90 tablet, Rfl: 0    sumatriptan (IMITREX) 100 MG tablet, Take 1 tablet (100 mg total) by mouth daily as needed for Migraine., Disp: 15 tablet, Rfl: 1    tiZANidine (ZANAFLEX) 4 MG tablet, Take 1.5 tablets (6 mg total) by mouth  nightly as needed (muscles spasms)., Disp: 45 tablet, Rfl: 1    Current Facility-Administered Medications:     [START ON 12/7/2023] onabotulinumtoxina injection 200 Units, 200 Units, Intramuscular, 1 time in Clinic/HOD, Raul Coe MD    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    Migraines        Past Surgical History:   Procedure Laterality Date    ACHILLES TENDON SURGERY      AXILLARY NODE DISSECTION Right 04/25/2022    Procedure: LYMPHADENECTOMY, AXILLARY;  Surgeon: Micheal Bartholomew MD;  Location: [facility] OR;  Service: General;  Laterality: Right;  Right axillary node dissection    BILATERAL MASTECTOMY Bilateral 04/25/2022    Procedure: MASTECTOMY, BILATERAL;  Surgeon: Micheal Bartholomew MD;  Location: [facility] OR;  Service: General;  Laterality: Bilateral;    BREAST REVISION SURGERY Bilateral 9/23/2022    Procedure: BREAST RECONSTRUCTION SECOND STAGE, FAT GRAFTING BILATERAL BREAST;  Surgeon: Esther Carpio MD;  Location: [facility] OR;  Service: Plastics;  Laterality: Bilateral;    COLONOSCOPY N/A 03/28/2022    Procedure: COLONOSCOPY;  Surgeon: Winnie Polanco MD;  Location: [facility] ENDO;  Service: Endoscopy;  Laterality: N/A;    DEBRIDEMENT, BREAST Right 05/30/2022    Procedure: DEBRIDEMENT, BREAST;  Surgeon: Esther Carpio MD;  Location: [facility] OR;  Service: General;  Laterality: Right;  FLAP WASHOUT AND CLOSURE    EXTERNAL EAR SURGERY      cosmetic    INJECTION OF ANESTHETIC AGENT AROUND MEDIAL BRANCH NERVES INNERVATING CERVICAL FACET JOINT Bilateral 10/16/2023    Procedure: Bilateral C2/C3 and C3/C4 MBB with RN IV sedation;  Surgeon: Raul Coe MD;  Location: [facility] PAIN MGT;  Service: Pain Management;  Laterality: Bilateral;    INJECTION OF ANESTHETIC AGENT AROUND MEDIAL BRANCH NERVES INNERVATING CERVICAL FACET JOINT Bilateral 10/30/2023    Procedure: Bilateral C2/C3 and C3/C4 MBB with RN IV sedation;  Surgeon: Raul Coe MD;  Location: [facility]  PAIN MGT;  Service: Pain Management;  Laterality: Bilateral;    INSERTION OF BREAST TISSUE EXPANDER Bilateral 04/25/2022    Procedure: INSERTION, TISSUE EXPANDER, BREAST;  Surgeon: Micheal Bartholomew MD;  Location: [facility] OR;  Service: General;  Laterality: Bilateral;  Procedure performed by KRAIG Carpio    REPLACEMENT OF IMPLANT OF BREAST Bilateral 9/23/2022    Procedure: TISSUE EXCHANGE TO IMPLANT, CAPSULORRHAPY, CAPSALOTOMY;  Surgeon: Esther Carpio MD;  Location: [facility] OR;  Service: Plastics;  Laterality: Bilateral;    sciatic nerve exploration      SENTINEL LYMPH NODE BIOPSY Right 04/25/2022    Procedure: BIOPSY, LYMPH NODE, SENTINEL;  Surgeon: Micheal Bartholomew MD;  Location: [facility] OR;  Service: General;  Laterality: Right;    XI ROBOTIC HYSTERECTOMY, WITH SALPINGO-OOPHORECTOMY Bilateral 07/01/2022    Procedure: XI ROBOTIC HYSTERECTOMY,WITH SALPINGO-OOPHORECTOMY;  Surgeon: CORDELL Bowen MD;  Location: [facility] OR;  Service: OB/GYN;  Laterality: Bilateral;       Social History     Socioeconomic History    Marital status:     Number of children: 2   Occupational History    Occupation: Stay at home mom   Tobacco Use    Smoking status: Never    Smokeless tobacco: Never   Substance and Sexual Activity    Alcohol use: No    Drug use: No    Sexual activity: Yes     Partners: Male     Birth control/protection: None             Past/Current Medical/Surgical History, Past/Current Social History, Past/Current Family History and Past/Current Medications were reviewed in detail.        Objective:           VITAL SIGNS WERE REVIEWED      GENERAL APPEARANCE:     The patient looks uncomfortable.    BMI 18.75    No signs of respiratory distress.    Normal breathing pattern.    No dysmorphic features    Normal eye contact.       GENERAL MEDICAL EXAM:    HEENT:  Head is atraumatic normocephalic.     FUNDOSCOPIC (OPHTHALMOSCOPIC) EXAMINATION showed no disc edema (papilledema).      NECK: No JVD. No visible lesions or goiters.      CHEST-CARDIOPULMONARY: No cyanosis. No tachypnea. Normal respiratory effort.    [id number]: No jaundice. No stomas or lesions. No visible hernias. No catheters.     SKIN, HAIR, NAILS: No pathognomonic skin rash.No neurofibromatosis. No visible lesions.No stigmata of autoimmune disease. No clubbing.    LIMBS: No varicose veins. No visible swelling.    MUSCULOSKELETAL: No visible deformities.No visible lesions.             Neurologic Exam     Mental Status   Oriented to person, place, and time.   Follows 3 step commands.   Attention: normal. Concentration: normal.   Speech: speech is normal   Level of consciousness: alert  Able to name object. Able to repeat. Normal comprehension.     Cranial Nerves   Cranial nerves II through XII intact.     CN II   Visual fields full to confrontation.   Visual acuity: normal  Right visual field deficit: none  Left visual field deficit: none     CN III, IV, VI   Pupils are equal, round, and reactive to light.  Extraocular motions are normal.   Right pupil: Size: 2 mm. Shape: regular. Reactivity: brisk. Consensual response: intact. Accommodation: intact.   Left pupil: Size: 2 mm. Shape: regular. Reactivity: brisk. Consensual response: intact. Accommodation: intact.   CN III: no CN III palsy  CN VI: no CN VI palsy  Nystagmus: none   Diplopia: none  Ophthalmoparesis: none  Upgaze: normal  Downgaze: normal  Conjugate gaze: present  Vestibulo-ocular reflex: present    CN V   Facial sensation intact.   Right facial sensation deficit: none  Left facial sensation deficit: none  Jaw jerk: normal    CN VII   Facial expression full, symmetric.   Right facial weakness: none  Left facial weakness: none    CN VIII   CN VIII normal.   Hearing: intact    CN IX, X   CN IX normal.   CN X normal.   Palate: symmetric    CN XI   CN XI normal.   Right sternocleidomastoid strength: normal  Left sternocleidomastoid strength: normal  Right trapezius strength: normal  Left  trapezius strength: normal    CN XII   CN XII normal.   Tongue: not atrophic  Fasciculations: absent  Tongue deviation: none    Motor Exam   Muscle bulk: normal  Overall muscle tone: normal  Right arm tone: normal  Left arm tone: normal  Right arm pronator drift: absent  Left arm pronator drift: absent  Right leg tone: normal  Left leg tone: normal    Strength   Right neck flexion: 5/5  Left neck flexion: 5/5  Right neck extension: 5/5  Left neck extension: 5/5  Right deltoid: 5/5  Left deltoid: 5/5  Right biceps: 5/5  Left biceps: 5/5  Right triceps: 5/5  Left triceps: 5/5  Right wrist flexion: 5/5  Left wrist flexion: 5/5  Right wrist extension: 5/5  Left wrist extension: 5/5  Right interossei: 5/5  Left interossei: 5/5  Right iliopsoas: 5/5  Left iliopsoas: 5/5  Right quadriceps: 5/5  Left quadriceps: 5/5  Right hamstrin/5  Left hamstrin/5  Right glutei: 5/5  Left glutei: 5/5  Right anterior tibial: 4/5  Left anterior tibial: 5/5  Right posterior tibial: 4/5  Left posterior tibial: 5/5  Right peroneal: 4/5  Left peroneal: 5/5  Right gastroc: 4/5  Left gastroc: 5/5    Sensory Exam   Right arm light touch: normal  Left arm light touch: normal  Right leg light touch: decreased from knee  Left leg light touch: normal  Vibration normal.   Right arm vibration: normal  Left arm vibration: normal  Right leg vibration: normal  Left leg vibration: normal  Proprioception normal.   Right arm proprioception: normal  Left arm proprioception: normal  Right leg proprioception: normal  Left leg proprioception: normal  Right arm pinprick: normal  Left arm pinprick: normal  Right leg pinprick: decreased from knee  Left leg pinprick: normal  Sensory deficit distribution on right: sciatic  Graphesthesia: normal  Stereognosis: normal    Gait, Coordination, and Reflexes     Gait  Gait: (RT Flail Foot)    Coordination   Romberg: negative  Finger to nose coordination: normal  Heel to shin coordination: normal    Tremor   Resting  tremor: absent  Intention tremor: absent  Action tremor: absent    Reflexes   Right brachioradialis: 2+  Left brachioradialis: 2+  Right biceps: 2+  Left biceps: 2+  Right triceps: 2+  Left triceps: 2+  Right patellar: 2+  Left patellar: 2+  Right achilles: 1+  Left achilles: 2+  Right plantar: normal  Left plantar: normal  Right Gilbert: absent  Left Gilbert: absent  Right ankle clonus: absent  Left ankle clonus: absent  Right pendular knee jerk: absent  Left pendular knee jerk: absent      Lab Results   Component Value Date    WBC 7.30 09/08/2023    HGB 14.4 09/08/2023    HCT 43.2 09/08/2023    MCV 98 09/08/2023     09/08/2023       Sodium   Date Value Ref Range Status   09/08/2023 143 136 - 145 mmol/L Final     Potassium   Date Value Ref Range Status   09/08/2023 4.3 3.5 - 5.1 mmol/L Final     Chloride   Date Value Ref Range Status   09/08/2023 107 95 - 110 mmol/L Final     CO2   Date Value Ref Range Status   09/08/2023 23 23 - 29 mmol/L Final     Glucose   Date Value Ref Range Status   09/08/2023 75 70 - 110 mg/dL Final     BUN   Date Value Ref Range Status   09/08/2023 19 6 - 20 mg/dL Final     Creatinine   Date Value Ref Range Status   09/08/2023 0.7 0.5 - 1.4 mg/dL Final     Calcium   Date Value Ref Range Status   09/08/2023 10.1 8.7 - 10.5 mg/dL Final     Total Protein   Date Value Ref Range Status   09/08/2023 7.6 6.0 - 8.4 g/dL Final     Albumin   Date Value Ref Range Status   09/08/2023 4.7 3.5 - 5.2 g/dL Final     Total Bilirubin   Date Value Ref Range Status   09/08/2023 0.6 0.1 - 1.0 mg/dL Final     Comment:     For infants and newborns, interpretation of results should be based  on gestational age, weight and in agreement with clinical  observations.    Premature Infant recommended reference ranges:  Up to 24 hours.............<8.0 mg/dL  Up to 48 hours............<12.0 mg/dL  3-5 days..................<15.0 mg/dL  6-29 days.................<15.0 mg/dL       Alkaline Phosphatase   Date Value  Ref Range Status   09/08/2023 83 55 - 135 U/L Final     AST   Date Value Ref Range Status   09/08/2023 30 10 - 40 U/L Final     ALT   Date Value Ref Range Status   09/08/2023 30 10 - 44 U/L Final     Anion Gap   Date Value Ref Range Status   09/08/2023 13 8 - 16 mmol/L Final     eGFR if    Date Value Ref Range Status   06/29/2022 >60.0 >60 mL/min/1.73 m^2 Final     eGFR if non    Date Value Ref Range Status   06/29/2022 >60.0 >60 mL/min/1.73 m^2 Final     Comment:     Calculation used to obtain the estimated glomerular filtration  rate (eGFR) is the CKD-EPI equation.          Lab Results   Component Value Date    TSH 0.704 09/08/2023         LABORATORY EVALUATION      [id number]      CBC, CMP, TFT, HCV, HIV Unremarkable         RADIOLOGY EVALUATION       11-    Brain MRI WWO NL    TMJ MRI showed evidence of RT TMJD      NEUROPHYSIOLOGY EVALUATION     11-    NCS/EMG BLE RT Sciatic Mononeuropathy (Tibial>Peroneal)         PATHOLOGY EVALUATION        NEUROCOGNITIVE AND NEUROPSYCHOLOGY EVALUATION           Reviewed the neuroimaging independently       Assessment:           1. Multiple neurological symptoms    2. Anxiety    3. 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 Bilateral temporomandibular joint pain    5. Ductal carcinoma in situ (DCIS) of right breast    6. Family history of cancer of the kidney    7. Hypercholesterolemia    8. Intractable migraine with aura without status migrainosus    9. Primary insomnia    10. Atypical facial pain    11. Cervico-occipital neuralgia    12. Trigeminal autonomic cephalgias    13. Trigeminal neuralgia of left side of face    14. Other specified disorders of temporomandibular joint    15. Right foot drop    16. Neuropathy of right sciatic nerve          Plan:             HISTORY OF RIGHT SCIATIC MONONEUROPATHY WITH FLAIL FOOT AND NEUROPATHIC PAIN      NCS/EMG RUE.    Try CBZ titration to 200 mg BID.      Continue Tizanidine (Zanaflex) 4 mg QHS     Failed TPM, GBP, PGB and  Amitriptyline.       NEXT OPTIONS:    Cymbalta, LTG, Baclofen.         CHRONIC MIXED HEADACHES: COMMON MIGRAINE WITHOUT AURA, CERVICOGENIC HEADACHE, CERVICO-OCCIPITAL NEURALGIA       Brain MRI WWO.    Try Nurtec 75 mg ODT PRN. Failed OTC medications, Rx NSAIDs, Triptans. NEXT OPTIONS: Ubrely, Reyvow    Continue Botox 200 Units Q 3 months and add Emgality 120 mg SQ monthly.Failed TPM, GBP, PGB, Amitriptyline and Effexor. NEXT OPTIONS: LTG, Aimovig, Ajovy.     (Has >15 headaches a month >8 hours)        BILATERAL TEMPOROMANDIBULAR JOINT DYSFUNCTION (TMJD)      B/L TMJ MRI.    Try TMJ and [place]eter Botox injections.     Continue Tizanidine (Zanaflex) 4 mg QHS       NEXT OPTIONS:    Baclofen.    TRIGEMINAL NEURALGIA (TN), ATYPICAL FACIAL PAIN SYNDROME       Brain MRI WWO       Try CBZ titration to 200 mg BID.     Continue Tizanidine (Zanaflex) 4 mg QHS     Failed TPM, GBP, PGB and  Amitriptyline.       NEXT OPTIONS:    Cymbalta, LTG, Baclofen.        MEDICAL/SURGICAL COMORBIDITIES     All relevant medical comorbidities noted and managed by primary care physician and medical care team.          HEALTHY LIFESTYLE AND PREVENTATIVE CARE    The patient to adhere to the age-appropriate health maintenance guidelines including screening tests and vaccinations. The patient to adhere to  healthy lifestyle, optimal weight, exercise, healthy diet, good sleep hygiene and avoiding drugs including smoking, alcohol and recreational drugs.          RTC FOR BOTOX             Olivia Randall MD, FAAN    Attending Neurologist/Epileptologist         Diplomate, American Board of Psychiatry and Neurology    Diplomate, American Board of Clinical Neurophysiology     Fellow, American Academy of Neurology           I spent a total of 135 minutes on the day of the visit.  This includes face to face time and non-face to face time preparing to see the patient (eg, review of tests),  obtaining and/or reviewing separately obtained history, documenting clinical information in the electronic or other health record, independently interpreting results and communicating results to the patient/family/caregiver, or care coordinator.

## 2023-11-17 ENCOUNTER — PATIENT MESSAGE (OUTPATIENT)
Dept: PAIN MEDICINE | Facility: HOSPITAL | Age: 38
End: 2023-11-17
Payer: OTHER GOVERNMENT

## 2023-11-17 ENCOUNTER — PROCEDURE VISIT (OUTPATIENT)
Dept: NEUROLOGY | Facility: CLINIC | Age: 38
End: 2023-11-17
Payer: OTHER GOVERNMENT

## 2023-11-17 ENCOUNTER — PATIENT MESSAGE (OUTPATIENT)
Dept: NEUROLOGY | Facility: CLINIC | Age: 38
End: 2023-11-17

## 2023-11-17 DIAGNOSIS — G57.01 NEUROPATHY OF RIGHT SCIATIC NERVE: ICD-10-CM

## 2023-11-17 PROCEDURE — 95911 PR NERVE CONDUCTION STUDY; 9-10 STUDIES: ICD-10-PCS | Mod: 26,S$PBB,, | Performed by: PSYCHIATRY & NEUROLOGY

## 2023-11-17 PROCEDURE — 95911 NRV CNDJ TEST 9-10 STUDIES: CPT | Mod: 26,S$PBB,, | Performed by: PSYCHIATRY & NEUROLOGY

## 2023-11-17 PROCEDURE — 95911 NRV CNDJ TEST 9-10 STUDIES: CPT | Mod: PBBFAC | Performed by: PSYCHIATRY & NEUROLOGY

## 2023-11-18 DIAGNOSIS — M47.812 CERVICAL SPONDYLOSIS: ICD-10-CM

## 2023-11-18 DIAGNOSIS — M79.18 MYOFASCIAL PAIN: ICD-10-CM

## 2023-11-18 DIAGNOSIS — M50.30 DDD (DEGENERATIVE DISC DISEASE), CERVICAL: ICD-10-CM

## 2023-11-18 DIAGNOSIS — G43.709 CHRONIC MIGRAINE WITHOUT AURA WITHOUT STATUS MIGRAINOSUS, NOT INTRACTABLE: ICD-10-CM

## 2023-11-18 DIAGNOSIS — S03.00XD DISLOCATION OF TEMPOROMANDIBULAR JOINT, SUBSEQUENT ENCOUNTER: ICD-10-CM

## 2023-11-20 ENCOUNTER — TELEPHONE (OUTPATIENT)
Dept: NEUROLOGY | Facility: CLINIC | Age: 38
End: 2023-11-20
Payer: OTHER GOVERNMENT

## 2023-11-20 RX ORDER — MELOXICAM 15 MG/1
15 TABLET ORAL DAILY PRN
Qty: 30 TABLET | Refills: 1 | Status: SHIPPED | OUTPATIENT
Start: 2023-11-20 | End: 2024-03-08 | Stop reason: SDUPTHER

## 2023-11-20 NOTE — TELEPHONE ENCOUNTER
Good Morning/Afternoon,     Pt is requesting for a refill of: Meloxicam 15 mg  Last filed: 9/7/23  Last encounter: 9/7/23  Up coming apt: 12/7/23  Pharmacy: [facility]  Is this something you can do?      Cedric Jj  Medical Assistant

## 2023-11-20 NOTE — PROCEDURES
[facility]   Lazaro Vincent  Department of Neurology  [address] [place] [address] LADONNA Chang  [id number]  Phone [phone]     Fax  [phone]        Full Name: Gina Garcia Gender: Female  Patient ID: [id number] YOB: 1985      Visit Date: 11/17/2023 10:16 AM  Age: 38 Years  Examining Physician: Olivia Randall M.D.  Referring Physician: Olivia Randall MD  History: C/O: Right foot drop and right sciatic nerve neuropathy. Extensive PMHX including, but not limited to: Breast cancer, migraine, TMJ pain, trigeminal neuralgia, anxiety, right Achilles release.      SUMMARY     Nerve conduction studies were performed in the right and left lower extremity. The right peroneal motor study recording the extensor digitorum brevis showed a normal amplitude, normal distal latency and normal conduction velocity. No conduction block or focal slowing was present across the fibular neck. The right tibial motor study recording the abductor hallucis brevis showed reduced amplitude, normal distal latency and normal conduction velocity.     Right sural sensory response was absent.  Right superficial peroneal sensory response showed a normal amplitude and conduction velocity.     The left peroneal motor study recording the extensor digitorum brevis showed a normal amplitude, normal distal latency and normal conduction velocity. No conduction block or focal slowing was present across the fibular neck. The left tibial motor study recording the abductor hallucis brevis showed a normal amplitude, normal distal latency and normal conduction velocity. Left peroneal minimal F-wave latencies were normal. Left tibial minimal F-wave latencies were normal.     Left sural sensory response showed a normal amplitude and conduction velocity. Left superficial peroneal sensory response showed a normal amplitude and conduction velocity.         IMPRESSION     There is electrophysiologic evidence of right sciatic mononeuropathy with more  significant involvement of the tibial branch.       ---------------------------------             Olivia Randall M.D., F.A.A.N.      Diplomate, American Board of Psychiatry and Neurology  Diplomate, American Board of Clinical Neurophysiology  Fellow, American Academy of Neurology             SENSORY NCS      Nerve / Sites Rec. Site Peak NP Amp PP Amp Dist Eduardo d Lat.2     ms µV µV cm m/s ms   L Superficial peroneal      Lat Leg Ankle 3.35 12.5 11.8 10 41.4 3.35      Ref.  4.50  5.0  40.0    L Sural - Lat Mall      Calf Lat Mall 3.08 13.1 10.7 14 61.1 3.08      Ref.  4.50  5.0  40.0    R Superficial peroneal      Lat Leg Ankle NR NR NR 10 NR NR      Ref.  4.50  5.0  40.0    R Sural - Lat Mall      Calf Lat Mall NR NR NR 14 NR NR      Ref.  4.50  5.0  40.0        Motor NCS      Nerve / Sites Rec. Site Lat Amp Dist Eduardo     ms mV cm m/s   L Peroneal - EDB      Ankle EDB 3.04 5.8 8       Ref.  5.50 3.0        FibHead EDB 8.71 5.7 31 54.7      Ref.     40.0      Knee EDB 10.27 5.4 10 64.0   L Tibial - AH      Ankle AH 3.19 18.0 8       Ref.  6.00 8.0        Knee AH 9.90 17.0 36 53.7      Ref.     40.0   R Peroneal - EDB      Ankle EDB 5.27 4.5 8       Ref.  5.50 3.0        FibHead EDB 11.81 4.2 29 44.3      Ref.     40.0      Knee EDB 13.98 4.1 10 46.2   R Tibial - AH      Ankle AH 5.38 2.7 8       Ref.  6.00 8.0        Knee AH 14.15 2.0 37 42.2      Ref.     40.0

## 2023-11-20 NOTE — TELEPHONE ENCOUNTER
I advised patient of her results and she did verbalized understanding. She wanted to know if you recommend her getting an MRI of the right side to get a closer view of what's going on. Also asked what the next step is.

## 2023-11-20 NOTE — TELEPHONE ENCOUNTER
----- Message from Olivia Randall MD sent at 11/20/2023 11:13 AM CST -----    11-    NCS/EMG BLE confirmed right sciatic neuropathy (Tibial>Peroneal)

## 2023-11-22 ENCOUNTER — HOSPITAL ENCOUNTER (OUTPATIENT)
Dept: RADIOLOGY | Facility: HOSPITAL | Age: 38
Discharge: HOME | End: 2023-11-22
Attending: PSYCHIATRY & NEUROLOGY
Payer: OTHER GOVERNMENT

## 2023-11-22 DIAGNOSIS — G50.0 TRIGEMINAL NEURALGIA OF LEFT SIDE OF FACE: ICD-10-CM

## 2023-11-22 DIAGNOSIS — R29.90 MULTIPLE NEUROLOGICAL SYMPTOMS: ICD-10-CM

## 2023-11-22 DIAGNOSIS — M26.69 OTHER SPECIFIED DISORDERS OF TEMPOROMANDIBULAR JOINT: ICD-10-CM

## 2023-11-22 DIAGNOSIS — G44.099 TRIGEMINAL AUTONOMIC CEPHALGIAS: ICD-10-CM

## 2023-11-22 DIAGNOSIS — D05.11 DUCTAL CARCINOMA IN SITU (DCIS) OF RIGHT BREAST: ICD-10-CM

## 2023-11-22 PROCEDURE — 70553 MRI BRAIN STEM W/O & W/DYE: CPT | Mod: 26,,, | Performed by: RADIOLOGY

## 2023-11-22 PROCEDURE — 70553 MRI IAC/TEMPORAL BONES W W/O CONTRAST: ICD-10-PCS | Mod: 26,,, | Performed by: RADIOLOGY

## 2023-11-22 PROCEDURE — 70336 MAGNETIC IMAGE JAW JOINT: CPT | Mod: 26,,, | Performed by: RADIOLOGY

## 2023-11-22 PROCEDURE — 70336 MAGNETIC IMAGE JAW JOINT: CPT | Mod: TC,PN

## 2023-11-22 PROCEDURE — 70336 MRI TEMPOROMANDIBULAR JOINTS: ICD-10-PCS | Mod: 26,,, | Performed by: RADIOLOGY

## 2023-11-22 PROCEDURE — 70553 MRI BRAIN STEM W/O & W/DYE: CPT | Mod: TC,PN

## 2023-11-22 PROCEDURE — 25500020 PHARM REV CODE 255: Mod: PN | Performed by: PSYCHIATRY & NEUROLOGY

## 2023-11-22 PROCEDURE — A9585 GADOBUTROL INJECTION: HCPCS | Mod: PN | Performed by: PSYCHIATRY & NEUROLOGY

## 2023-11-22 RX ORDER — GADOBUTROL 604.72 MG/ML
4 INJECTION INTRAVENOUS
Status: COMPLETED | OUTPATIENT
Start: 2023-11-22 | End: 2023-11-22

## 2023-11-22 RX ADMIN — GADOBUTROL 4 ML: 604.72 INJECTION INTRAVENOUS at 02:11

## 2023-11-27 ENCOUNTER — PATIENT MESSAGE (OUTPATIENT)
Dept: PAIN MEDICINE | Facility: CLINIC | Age: 38
End: 2023-11-27
Payer: OTHER GOVERNMENT

## 2023-11-27 DIAGNOSIS — M54.2 CERVICALGIA: Primary | ICD-10-CM

## 2023-11-27 DIAGNOSIS — M47.812 CERVICAL SPONDYLOSIS: ICD-10-CM

## 2023-11-27 DIAGNOSIS — M50.30 DDD (DEGENERATIVE DISC DISEASE), CERVICAL: ICD-10-CM

## 2023-11-27 NOTE — TELEPHONE ENCOUNTER
Spoke with patient she has scheduled the MRI for this Friday.  Informed patient that after the MRI has been read we will contact her with the next steps.  Patient verbalized understanding

## 2023-12-01 ENCOUNTER — HOSPITAL ENCOUNTER (OUTPATIENT)
Dept: RADIOLOGY | Facility: HOSPITAL | Age: 38
Discharge: HOME | End: 2023-12-01
Attending: ANESTHESIOLOGY
Payer: OTHER GOVERNMENT

## 2023-12-01 DIAGNOSIS — M47.812 CERVICAL SPONDYLOSIS: ICD-10-CM

## 2023-12-01 DIAGNOSIS — M50.30 DDD (DEGENERATIVE DISC DISEASE), CERVICAL: ICD-10-CM

## 2023-12-01 PROCEDURE — 72141 MRI NECK SPINE W/O DYE: CPT | Mod: TC,PN

## 2023-12-01 PROCEDURE — 72141 MRI CERVICAL SPINE WITHOUT CONTRAST: ICD-10-PCS | Mod: 26,,, | Performed by: STUDENT IN AN ORGANIZED HEALTH CARE EDUCATION/TRAINING PROGRAM

## 2023-12-01 PROCEDURE — 72141 MRI NECK SPINE W/O DYE: CPT | Mod: 26,,, | Performed by: STUDENT IN AN ORGANIZED HEALTH CARE EDUCATION/TRAINING PROGRAM

## 2023-12-04 ENCOUNTER — PROCEDURE VISIT (OUTPATIENT)
Dept: NEUROLOGY | Facility: CLINIC | Age: 38
End: 2023-12-04
Payer: OTHER GOVERNMENT

## 2023-12-04 VITALS
RESPIRATION RATE: 16 BRPM | SYSTOLIC BLOOD PRESSURE: 125 MMHG | HEIGHT: 59 IN | HEART RATE: 64 BPM | WEIGHT: 90 LBS | OXYGEN SATURATION: 99 % | DIASTOLIC BLOOD PRESSURE: 82 MMHG | BODY MASS INDEX: 18.14 KG/M2

## 2023-12-04 DIAGNOSIS — G50.1 ATYPICAL FACIAL PAIN: ICD-10-CM

## 2023-12-04 DIAGNOSIS — G43.109 MIGRAINE WITH AURA AND WITHOUT STATUS MIGRAINOSUS, NOT INTRACTABLE: Primary | ICD-10-CM

## 2023-12-04 DIAGNOSIS — M26.69 OTHER SPECIFIED DISORDERS OF TEMPOROMANDIBULAR JOINT: ICD-10-CM

## 2023-12-04 DIAGNOSIS — M54.81 CERVICO-OCCIPITAL NEURALGIA: ICD-10-CM

## 2023-12-04 PROCEDURE — 64615 CHEMODENERV MUSC MIGRAINE: CPT | Mod: PBBFAC | Performed by: PSYCHIATRY & NEUROLOGY

## 2023-12-04 PROCEDURE — 64615 CHEMODENERV MUSC MIGRAINE: CPT | Mod: S$PBB,,, | Performed by: PSYCHIATRY & NEUROLOGY

## 2023-12-04 PROCEDURE — 99999PBSHW PR PBB SHADOW TECHNICAL ONLY FILED TO HB: ICD-10-PCS | Mod: JZ,PBBFAC,,

## 2023-12-04 PROCEDURE — 99999PBSHW PR PBB SHADOW TECHNICAL ONLY FILED TO HB: Mod: JZ,PBBFAC,,

## 2023-12-04 PROCEDURE — 64615 PR CHEMODENERVATION OF MUSCLE FOR CHRONIC MIGRAINE: ICD-10-PCS | Mod: S$PBB,,, | Performed by: PSYCHIATRY & NEUROLOGY

## 2023-12-04 RX ADMIN — ONABOTULINUMTOXINA 200 UNITS: 100 INJECTION, POWDER, LYOPHILIZED, FOR SOLUTION INTRADERMAL; INTRAMUSCULAR at 09:12

## 2023-12-04 NOTE — PROCEDURES
BOTOX  HAS PROVEN VERY EFFECTIVE IN SIGNIFICANTLY REDUCING THE SEVERITY AND FREQUENCY OF MIGRAINE HEADACHES             PROCEDURE NAME:      INTRAMUSCULAR BOTOX INJECTIONS         PROCEDURE INDICATION:     INTRACTABLE (PHARMACOLOGICALLY UNRESPONSIVE/RESISTANT) MIGRAINE        PROCEDURE DESCRIPTION:    The procedure was discussed in detail with the patient and the consent form was signed. The patient verbalized understanding of the procedure .    I discussed the risks that may include serious immune reaction and distant spread that could results in loss of breathing. Other side effects may include droopy eyelids, trouble swallowing and cardiac arrhythmias. It was also stressed that it is contraindicated in pregnancy.       PROCEDURE TIME OUT PROCESS:     Time Out was called.     Two patient identifiers were used (first and last name in addition to date of birth). The patient stated the procedure being done (Botox injections) in the scalp muscles (both sides).          PROCEDURE EXECUTION:     As per the standard protocol, a total 155 units were injected in 31 sites.         RT  5 units in 1 site    LT  5 units in 1 site     Procerus 5 units in 1 site     RT Frontalis 10 units in 2 sites     LT Frontalis 10 units in 2 sites     RT Temporalis 20 units in 4 sites    LT Temporalis 20 units in 4 sites    RT Occipitalis 15 units in 3 sites    LT Occipitalis 15 units in 3 sites     RT Cervical Paraspinals 10 units in 2 sites    LT Cervical Paraspinals 10 units in 2 sites    RT Trapezius 15 units in 3 sites    LT Trapezius 15 units in 3 sites       Per the standard of care protocol we use 155 units and waste 45 units. I gave the patient the option of following the standard protocol vs.using the extra 45 units to target areas where the pain is maximum which could potentially provide extra help with headache control. I explained to the patient that this will be an off-label use, not the standard  protocol, not evidence-based and could result in more pronounced side effects. The patient verbalized full understanding of all the facts and the risks and benefits and elected to use the extra 45 units for the following sites:      Procerus 5 units in 1 site     RT Masseter 20 units in 4 sites     RT Masseter 20 units in 4 sites       PROCEDURE COURSE:       The standard protocol was followed. The procedure was executed very smoothly.         PROCEDURE COMPLICATIONS:     None. The patient tolerated the procedure very well.         PROCEDURE AFTERCARE:     I encouraged the patient to use ice if the sites of injection get tender and call me with any problems or complications.         FOLLOW UP:      RTC in 3 months for a new set of injections and call with any questions and/or concerns.

## 2023-12-05 ENCOUNTER — TELEPHONE (OUTPATIENT)
Dept: PAIN MEDICINE | Facility: CLINIC | Age: 38
End: 2023-12-05
Payer: OTHER GOVERNMENT

## 2023-12-08 NOTE — PRE-PROCEDURE INSTRUCTIONS
Spoke with patient regarding procedure scheduled on 12.11     Arrival time 0930     Has patient been sick with fever or on antibiotics within the last 7 days? No     Does the patient have any open wounds, sores or rashes? No     Does the patient have any recent fractures? no     Has patient received a vaccination within the last 7 days? No     Received the COVID vaccination? yes     Has the patient stopped all medications as directed? na     Does patient have a pacemaker, defibrillator, or implantable stimulator? No     Does the patient have a ride to and from procedure and someone reliable to remain with patient?       Is the patient diabetic? no     Does the patient have sleep apnea? Or use O2 at home? No and no     Is the patient receiving sedation? yes     Is the patient instructed to remain NPO beginning at midnight the night before their procedure? yes     Procedure location confirmed with patient? Yes     Covid- Denies signs/symptoms. Instructed to notify PAT/MD if any changes.

## 2023-12-10 RX ORDER — ONDANSETRON 2 MG/ML
4 INJECTION INTRAMUSCULAR; INTRAVENOUS ONCE AS NEEDED
Status: CANCELLED | OUTPATIENT
Start: 2023-12-10 | End: 2035-05-08

## 2023-12-11 ENCOUNTER — HOSPITAL ENCOUNTER (OUTPATIENT)
Facility: HOSPITAL | Age: 38
Discharge: HOME | End: 2023-12-11
Attending: ANESTHESIOLOGY | Admitting: ANESTHESIOLOGY
Payer: OTHER GOVERNMENT

## 2023-12-11 ENCOUNTER — PATIENT MESSAGE (OUTPATIENT)
Dept: PAIN MEDICINE | Facility: HOSPITAL | Age: 38
End: 2023-12-11

## 2023-12-11 VITALS
SYSTOLIC BLOOD PRESSURE: 129 MMHG | BODY MASS INDEX: 19.32 KG/M2 | DIASTOLIC BLOOD PRESSURE: 76 MMHG | TEMPERATURE: 98 F | WEIGHT: 95.81 LBS | OXYGEN SATURATION: 100 % | RESPIRATION RATE: 18 BRPM | HEART RATE: 78 BPM | HEIGHT: 59 IN

## 2023-12-11 DIAGNOSIS — M47.812 CERVICAL SPONDYLOSIS: Primary | ICD-10-CM

## 2023-12-11 PROCEDURE — 63600175 PHARM REV CODE 636 W HCPCS: Performed by: ANESTHESIOLOGY

## 2023-12-11 PROCEDURE — 99153 MOD SED SAME PHYS/QHP EA: CPT | Performed by: ANESTHESIOLOGY

## 2023-12-11 PROCEDURE — 64633 DESTROY CERV/THOR FACET JNT: CPT | Mod: 50,,, | Performed by: ANESTHESIOLOGY

## 2023-12-11 PROCEDURE — 25000003 PHARM REV CODE 250: Performed by: ANESTHESIOLOGY

## 2023-12-11 PROCEDURE — 99152 MOD SED SAME PHYS/QHP 5/>YRS: CPT | Performed by: ANESTHESIOLOGY

## 2023-12-11 PROCEDURE — 64634 DESTROY C/TH FACET JNT ADDL: CPT | Mod: 50,,, | Performed by: ANESTHESIOLOGY

## 2023-12-11 PROCEDURE — 64634 DESTROY C/TH FACET JNT ADDL: CPT | Mod: 50 | Performed by: ANESTHESIOLOGY

## 2023-12-11 PROCEDURE — 64634 PR DESTROY C/TH FACET JNT ADDL: ICD-10-PCS | Mod: 50,,, | Performed by: ANESTHESIOLOGY

## 2023-12-11 PROCEDURE — 64633 DESTROY CERV/THOR FACET JNT: CPT | Mod: 50 | Performed by: ANESTHESIOLOGY

## 2023-12-11 PROCEDURE — 64633 PR DESTROY CERV/THOR FACET JNT: ICD-10-PCS | Mod: 50,,, | Performed by: ANESTHESIOLOGY

## 2023-12-11 RX ORDER — TRAMADOL HYDROCHLORIDE 50 MG/1
50 TABLET ORAL EVERY 8 HOURS PRN
Qty: 21 TABLET | Refills: 0 | Status: SHIPPED | OUTPATIENT
Start: 2023-12-11 | End: 2024-01-02 | Stop reason: SDUPTHER

## 2023-12-11 RX ORDER — LIDOCAINE HYDROCHLORIDE 10 MG/ML
INJECTION, SOLUTION EPIDURAL; INFILTRATION; INTRACAUDAL; PERINEURAL
Status: DISCONTINUED | OUTPATIENT
Start: 2023-12-11 | End: 2023-12-11 | Stop reason: HOSPADM

## 2023-12-11 RX ORDER — MIDAZOLAM HYDROCHLORIDE 1 MG/ML
INJECTION, SOLUTION INTRAMUSCULAR; INTRAVENOUS
Status: DISCONTINUED | OUTPATIENT
Start: 2023-12-11 | End: 2023-12-11 | Stop reason: HOSPADM

## 2023-12-11 RX ORDER — FENTANYL CITRATE 50 UG/ML
INJECTION, SOLUTION INTRAMUSCULAR; INTRAVENOUS
Status: DISCONTINUED | OUTPATIENT
Start: 2023-12-11 | End: 2023-12-11 | Stop reason: HOSPADM

## 2023-12-11 RX ORDER — DEXAMETHASONE SODIUM PHOSPHATE 10 MG/ML
INJECTION INTRAMUSCULAR; INTRAVENOUS
Status: DISCONTINUED | OUTPATIENT
Start: 2023-12-11 | End: 2023-12-11 | Stop reason: HOSPADM

## 2023-12-11 RX ORDER — BUPIVACAINE HYDROCHLORIDE 2.5 MG/ML
INJECTION, SOLUTION EPIDURAL; INFILTRATION; INTRACAUDAL
Status: DISCONTINUED | OUTPATIENT
Start: 2023-12-11 | End: 2023-12-11 | Stop reason: HOSPADM

## 2023-12-11 NOTE — H&P
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    Migraines      Past Surgical History:   Procedure Laterality Date    ACHILLES TENDON SURGERY      AXILLARY NODE DISSECTION Right 04/25/2022    Procedure: LYMPHADENECTOMY, AXILLARY;  Surgeon: Micheal Bartholomew MD;  Location: [facility];  Service: General;  Laterality: Right;  Right axillary node dissection    BILATERAL MASTECTOMY Bilateral 04/25/2022    Procedure: MASTECTOMY, BILATERAL;  Surgeon: Micheal Bartholomew MD;  Location: [facility] OR;  Service: General;  Laterality: Bilateral;    BREAST REVISION SURGERY Bilateral 9/23/2022    Procedure: BREAST RECONSTRUCTION SECOND STAGE, FAT GRAFTING BILATERAL BREAST;  Surgeon: Esther Carpio MD;  Location: [facility];  Service: Plastics;  Laterality: Bilateral;    COLONOSCOPY N/A 03/28/2022    Procedure: COLONOSCOPY;  Surgeon: Winnie Polanco MD;  Location: [facility] ENDO;  Service: Endoscopy;  Laterality: N/A;    DEBRIDEMENT, BREAST Right 05/30/2022    Procedure: DEBRIDEMENT, BREAST;  Surgeon: Esther Carpio MD;  Location: [facility] OR;  Service: General;  Laterality: Right;  FLAP WASHOUT AND CLOSURE    EXTERNAL EAR SURGERY      cosmetic    INJECTION OF ANESTHETIC AGENT AROUND MEDIAL BRANCH NERVES INNERVATING CERVICAL FACET JOINT Bilateral 10/16/2023    Procedure: Bilateral C2/C3 and C3/C4 MBB with RN IV sedation;  Surgeon: Raul Coe MD;  Location: [facility] PAIN MGT;  Service: Pain Management;  Laterality: Bilateral;    INJECTION OF ANESTHETIC AGENT AROUND MEDIAL BRANCH NERVES INNERVATING CERVICAL FACET JOINT Bilateral 10/30/2023    Procedure: Bilateral C2/C3 and C3/C4 MBB with RN IV sedation;  Surgeon: Raul Coe MD;  Location: [facility] PAIN [facility]GT;  Service: Pain Management;  Laterality:  Bilateral;    INSERTION OF BREAST TISSUE EXPANDER Bilateral 04/25/2022    Procedure: INSERTION, TISSUE EXPANDER, BREAST;  Surgeon: Micheal Bartholomew MD;  Location: [facility] OR;  Service: General;  Laterality: Bilateral;  Procedure performed by KRAIG Carpio    REPLACEMENT OF IMPLANT OF BREAST Bilateral 9/23/2022    Procedure: TISSUE EXCHANGE TO IMPLANT, CAPSULORRHAPY, CAPSALOTOMY;  Surgeon: Esther Carpio MD;  Location: [facility];  Service: Plastics;  Laterality: Bilateral;    sciatic nerve exploration      SENTINEL LYMPH NODE BIOPSY Right 04/25/2022    Procedure: BIOPSY, LYMPH NODE, SENTINEL;  Surgeon: Micheal Bartholomew MD;  Location: [facility] OR;  Service: General;  Laterality: Right;    XI ROBOTIC HYSTERECTOMY, WITH SALPINGO-OOPHORECTOMY Bilateral 07/01/2022    Procedure: XI ROBOTIC HYSTERECTOMY,WITH SALPINGO-OOPHORECTOMY;  Surgeon: CORDELL Bowen MD;  Location: [facility] OR;  Service: OB/GYN;  Laterality: Bilateral;     Review of patient's allergies indicates:  No Known Allergies     No current facility-administered medications on file prior to encounter.     Current Outpatient Medications on File Prior to Encounter   Medication Sig Dispense Refill    calcium-vitamin D3 (OS-JAMEL 500 + D3) 500 mg-5 mcg (200 unit) per tablet Take 1 tablet by mouth 2 (two) times daily with meals.      folic acid (FOLVITE) 400 MCG tablet Take 400 mcg by mouth once daily.      tiZANidine (ZANAFLEX) 4 MG tablet Take 1.5 tablets (6 mg total) by mouth nightly as needed (muscles spasms). 45 tablet 1    ascorbic acid, vitamin C, (VITAMIN C) 250 MG tablet Take 250 mg by mouth once daily.      b complex vitamins capsule Take 1 capsule by mouth once daily.      cycloSPORINE (RESTASIS) 0.05 % ophthalmic emulsion Place 1 drop into both eyes 2 (two) times daily. 60 each 12    latanoprost 0.005 % ophthalmic solution Place 1 drop into both eyes every evening. 2.5 mL 12    magnesium 200 mg Tab Take 500 mg by mouth as needed.          PMHx, PSHx, Allergies,  "Medications reviewed in epic    ROS negative except pain complaints in HPI    OBJECTIVE:    BP (!) 123/59 (BP Location: Right arm, Patient Position: Sitting)   Pulse 63   Temp 97.7 °F (36.5 °C) (Temporal)   Resp 16   Ht 4' 11" (1.499 m)   Wt 43.5 kg (95 lb 12.6 oz)   LMP 06/20/2022   SpO2 98%   Breastfeeding No   BMI 19.35 kg/m²     PHYSICAL EXAMINATION:    GENERAL: Well appearing, in no acute distress, alert and oriented x3.  PSYCH:  Mood and affect appropriate.  SKIN: Skin color, texture, turgor normal, no rashes or lesions which will impact the procedure.  CV: RRR with palpation of the radial artery.  PULM: No evidence of respiratory difficulty, symmetric chest rise. Clear to auscultation.  NEURO: Cranial nerves grossly intact.    Plan:    Proceed with procedure as planned Procedure(s) (LRB):  Bilateral C2/C3 and C3/C4 RFA with RN IV sedation (Bilateral)    Raul Coe MD  12/11/2023            "

## 2023-12-11 NOTE — OP NOTE
Cervical Medial nerve branch block radiofrequency ablation Under Fluoroscopy  Bilateral    C2/C3 and C3/C4    INFORMED CONSENT: The procedure, risks, benefits and options were discussed with patient. There are no contraindications to the procedure. The patient expressed understanding and agreed to proceed. The personnel performing the procedure was discussed.    Date of procedure 12/11/2023    Time-out taken to identify patient and procedure side prior to starting the procedure.                     PROCEDURE: Bilateral radiofrequency ablation of the the medial branch nerves at the   transverse process of  C2, C3, and C4    Pre Procedure diagnosis:    Cervical spondylosis [M47.812]  1. Cervical spondylosis        Post-Procedure diagnosis:   same        PHYSICIAN: Raul Coe MD    ASSISTANTS: None     LOCAL ANESTHETIC USED: Lidocaine 1% 1mL / site     ESTIMATED BLOOD LOSS: None.     COMPLICATIONS: None.     Sedation: Conscious sedation provided by M.D    SEDATION MEDICATIONS: local/IV sedation: Versed 3 mg and fentanyl 125 mcg IV.  Conscious sedation ordered by MD.  Patient reevaluated and sedation administered by MD and monitored by RN.  Total sedation time was less than 25 min.    Total sedation time was >20 min but <30min      TECHNIQUE: Laying in a prone position, the patient was prepped and draped in the usual sterile fashion using ChloraPrep and fenestrated drape. The level was determined under fluoroscopic guidance. Local anesthetic was given by going down to the hub of the 27-gauge 1.25in needle and raising a wheel. A 18-gauge 10mm curved active tip needle was introduced to the anatomic local of the medial branch at each of the stated above levels using fluoroscopy. Then sensory and motor testing was performed to confirm that the needle tips were in the correct location. Then after negative aspiration, 1 mL of Lidocaine PF 1% was injected into each level. This was followed by thermal lesioning at 80  degrees celsius for 90 seconds. After lesioning was complete, 0.5ml of bupivacaine PF 25% and 3mg of decadron PF was injected at each level. The patient tolerated the procedure well.    The patient tolerated the procedure well. Did not have any procedure related motor deficit at the conclusion of the procedure    The patient was monitored for approximately 30 minutes after the procedure.  Patient was given post procedure and discharge instructions to follow at home.  The patient was discharged in a stable condition

## 2023-12-11 NOTE — DISCHARGE INSTRUCTIONS

## 2023-12-11 NOTE — DISCHARGE SUMMARY
Discharge Note  Short Stay      SUMMARY     Admit Date: 12/11/2023    Attending Physician: Raul Coe MD        Discharge Physician: Raul Coe MD        Discharge Date: 12/11/2023 11:46 AM    Procedure(s) (LRB):  Bilateral C2/C3 and C3/C4 RFA with RN IV sedation (Bilateral)    Final Diagnosis: Cervical spondylosis [M47.812]    Disposition: Home or self care    Patient Instructions:   Current Discharge Medication List        CONTINUE these medications which have NOT CHANGED    Details   calcium-vitamin D3 (OS-JAMEL 500 + D3) 500 mg-5 mcg (200 unit) per tablet Take 1 tablet by mouth 2 (two) times daily with meals.      carBAMazepine (TEGRETOL) 200 mg tablet Take 1 tablet (200 mg total) by mouth 2 (two) times a day.  Qty: 180 tablet, Refills: 3    Comments: Start 1/2 tab QHS for 1 week then 1 tab QHS for 1 week then 1 tab BID.  Associated Diagnoses: Trigeminal autonomic cephalgias; Trigeminal neuralgia of left side of face      folic acid (FOLVITE) 400 MCG tablet Take 400 mcg by mouth once daily.      rosuvastatin (CRESTOR) 20 MG tablet Take 1 tablet (20 mg total) by mouth once daily.  Qty: 30 tablet, Refills: 1      tiZANidine (ZANAFLEX) 4 MG tablet Take 1.5 tablets (6 mg total) by mouth nightly as needed (muscles spasms).  Qty: 45 tablet, Refills: 1    Associated Diagnoses: Cervical myofascial pain syndrome      ascorbic acid, vitamin C, (VITAMIN C) 250 MG tablet Take 250 mg by mouth once daily.      b complex vitamins capsule Take 1 capsule by mouth once daily.      cycloSPORINE (RESTASIS) 0.05 % ophthalmic emulsion Place 1 drop into both eyes 2 (two) times daily.  Qty: 60 each, Refills: 12    Associated Diagnoses: Keratitis sicca, bilateral      galcanezumab-gnlm (EMGALITY PEN) 120 mg/mL PnIj Inject 120 mg into the skin every 28 days.  Qty: 1 each, Refills: 11    Associated Diagnoses: Intractable migraine with aura without status migrainosus; Atypical facial pain; Trigeminal autonomic cephalgias       latanoprost 0.005 % ophthalmic solution Place 1 drop into both eyes every evening.  Qty: 2.5 mL, Refills: 12    Associated Diagnoses: Ocular hypertension, bilateral      magnesium 200 mg Tab Take 500 mg by mouth as needed.      meloxicam (MOBIC) 15 MG tablet Take 1 tablet (15 mg total) by mouth daily as needed for Pain.  Qty: 30 tablet, Refills: 1    Associated Diagnoses: Cervical spondylosis; DDD (degenerative disc disease), cervical; Chronic migraine without aura without status migrainosus, not intractable; Dislocation of temporomandibular joint, subsequent encounter; Myofascial pain                 Discharge Diagnosis: Cervical spondylosis [M47.812]  Condition on Discharge: Stable with no complications to procedure   Diet on Discharge: Same as before.  Activity: as per instruction sheet.  Discharge to: Home with a responsible adult.  Follow up: 2-4 weeks       Please call the office at [phone] if you experience any weakness or loss of sensation, fever > 101.5, pain uncontrolled with oral medications, persistent nausea/vomiting/or diarrhea, redness or drainage from the incisions, or any other worrisome concerns. If physician on call was not reached or could not communicate with our office for any reason please go to the nearest emergency department       Statement Selected

## 2023-12-29 ENCOUNTER — PATIENT MESSAGE (OUTPATIENT)
Dept: INTERNAL MEDICINE | Facility: CLINIC | Age: 38
End: 2023-12-29
Payer: OTHER GOVERNMENT

## 2024-01-02 ENCOUNTER — PATIENT MESSAGE (OUTPATIENT)
Dept: PAIN MEDICINE | Facility: CLINIC | Age: 39
End: 2024-01-02
Payer: OTHER GOVERNMENT

## 2024-01-02 DIAGNOSIS — M47.812 CERVICAL SPONDYLOSIS: ICD-10-CM

## 2024-01-02 RX ORDER — TRAMADOL HYDROCHLORIDE 50 MG/1
50 TABLET ORAL EVERY 8 HOURS PRN
Qty: 21 TABLET | Refills: 0 | Status: SHIPPED | OUTPATIENT
Start: 2024-01-02

## 2024-01-02 NOTE — TELEPHONE ENCOUNTER
Good Morning/Afternoon,     Pt is requesting for a refill of: Tramadol 50 mg  Last filed: 12/11/23  Last encounter: 9/7/23  Up coming apt:   Pharmacy: [facility]  Is this something you can do?      Cedric Jj  Medical Assistant

## 2024-01-20 DIAGNOSIS — M79.18 CERVICAL MYOFASCIAL PAIN SYNDROME: ICD-10-CM

## 2024-01-22 RX ORDER — TIZANIDINE 4 MG/1
TABLET ORAL
Qty: 45 TABLET | Refills: 1 | Status: SHIPPED | OUTPATIENT
Start: 2024-01-22 | End: 2024-04-25

## 2024-03-08 DIAGNOSIS — M47.812 CERVICAL SPONDYLOSIS: ICD-10-CM

## 2024-03-08 DIAGNOSIS — M50.30 DDD (DEGENERATIVE DISC DISEASE), CERVICAL: ICD-10-CM

## 2024-03-08 DIAGNOSIS — M79.18 MYOFASCIAL PAIN: ICD-10-CM

## 2024-03-08 DIAGNOSIS — S03.00XD DISLOCATION OF TEMPOROMANDIBULAR JOINT, SUBSEQUENT ENCOUNTER: ICD-10-CM

## 2024-03-08 DIAGNOSIS — G43.709 CHRONIC MIGRAINE WITHOUT AURA WITHOUT STATUS MIGRAINOSUS, NOT INTRACTABLE: ICD-10-CM

## 2024-03-08 RX ORDER — MELOXICAM 15 MG/1
15 TABLET ORAL DAILY PRN
Qty: 30 TABLET | Refills: 1 | Status: SHIPPED | OUTPATIENT
Start: 2024-03-08 | End: 2024-06-02

## 2024-03-08 NOTE — TELEPHONE ENCOUNTER
Good Morning/Afternoon,     Pt is requesting for a refill of: Meloxicam 15 mg  Last filed:11/20/23  Last encounter: 9/7/23  Up coming apt: N/A - Called pt to schedule appointment  Pharmacy: [facility]  Is this something you can do?      Cedric Jj  Medical Assistant

## 2024-03-18 ENCOUNTER — PROCEDURE VISIT (OUTPATIENT)
Dept: NEUROLOGY | Facility: CLINIC | Age: 39
End: 2024-03-18
Payer: OTHER GOVERNMENT

## 2024-03-18 ENCOUNTER — PATIENT MESSAGE (OUTPATIENT)
Dept: NEUROLOGY | Facility: CLINIC | Age: 39
End: 2024-03-18

## 2024-03-18 VITALS
DIASTOLIC BLOOD PRESSURE: 78 MMHG | SYSTOLIC BLOOD PRESSURE: 122 MMHG | HEART RATE: 69 BPM | WEIGHT: 102.75 LBS | BODY MASS INDEX: 20.75 KG/M2

## 2024-03-18 DIAGNOSIS — G43.709 CHRONIC MIGRAINE WITHOUT AURA WITHOUT STATUS MIGRAINOSUS, NOT INTRACTABLE: ICD-10-CM

## 2024-03-18 DIAGNOSIS — G43.109 MIGRAINE WITH AURA AND WITHOUT STATUS MIGRAINOSUS, NOT INTRACTABLE: Primary | ICD-10-CM

## 2024-03-18 PROCEDURE — 99999PBSHW PR PBB SHADOW TECHNICAL ONLY FILED TO HB: Mod: JZ,PBBFAC,,

## 2024-03-18 PROCEDURE — 64615 CHEMODENERV MUSC MIGRAINE: CPT | Mod: S$PBB,,, | Performed by: PSYCHIATRY & NEUROLOGY

## 2024-03-18 PROCEDURE — 64615 CHEMODENERV MUSC MIGRAINE: CPT | Mod: PBBFAC | Performed by: PSYCHIATRY & NEUROLOGY

## 2024-03-18 RX ORDER — RIMEGEPANT SULFATE 75 MG/75MG
75 TABLET, ORALLY DISINTEGRATING ORAL ONCE AS NEEDED
Qty: 8 TABLET | Refills: 3 | Status: SHIPPED | OUTPATIENT
Start: 2024-03-18 | End: 2024-03-18

## 2024-03-18 RX ADMIN — ONABOTULINUMTOXINA 200 UNITS: 100 INJECTION, POWDER, LYOPHILIZED, FOR SOLUTION INTRADERMAL; INTRAMUSCULAR at 09:03

## 2024-04-25 DIAGNOSIS — M79.18 CERVICAL MYOFASCIAL PAIN SYNDROME: ICD-10-CM

## 2024-04-25 RX ORDER — TIZANIDINE 4 MG/1
TABLET ORAL
Qty: 45 TABLET | Refills: 2 | Status: SHIPPED | OUTPATIENT
Start: 2024-04-25 | End: 2024-06-19

## 2024-06-01 DIAGNOSIS — M47.812 CERVICAL SPONDYLOSIS: ICD-10-CM

## 2024-06-01 DIAGNOSIS — S03.00XD DISLOCATION OF TEMPOROMANDIBULAR JOINT, SUBSEQUENT ENCOUNTER: ICD-10-CM

## 2024-06-01 DIAGNOSIS — G43.709 CHRONIC MIGRAINE WITHOUT AURA WITHOUT STATUS MIGRAINOSUS, NOT INTRACTABLE: ICD-10-CM

## 2024-06-01 DIAGNOSIS — M79.18 MYOFASCIAL PAIN: ICD-10-CM

## 2024-06-01 DIAGNOSIS — M50.30 DDD (DEGENERATIVE DISC DISEASE), CERVICAL: ICD-10-CM

## 2024-06-02 RX ORDER — MELOXICAM 15 MG/1
TABLET ORAL
Qty: 30 TABLET | Refills: 1 | Status: SHIPPED | OUTPATIENT
Start: 2024-06-02

## 2024-06-19 ENCOUNTER — PROCEDURE VISIT (OUTPATIENT)
Dept: NEUROLOGY | Facility: CLINIC | Age: 39
End: 2024-06-19
Payer: OTHER GOVERNMENT

## 2024-06-19 VITALS
DIASTOLIC BLOOD PRESSURE: 98 MMHG | OXYGEN SATURATION: 99 % | RESPIRATION RATE: 16 BRPM | HEIGHT: 59 IN | WEIGHT: 110.25 LBS | SYSTOLIC BLOOD PRESSURE: 151 MMHG | BODY MASS INDEX: 22.23 KG/M2 | HEART RATE: 87 BPM

## 2024-06-19 DIAGNOSIS — G43.119 INTRACTABLE MIGRAINE WITH AURA WITHOUT STATUS MIGRAINOSUS: Primary | ICD-10-CM

## 2024-06-19 PROCEDURE — 64615 CHEMODENERV MUSC MIGRAINE: CPT | Mod: S$PBB,,, | Performed by: PSYCHIATRY & NEUROLOGY

## 2024-06-19 PROCEDURE — 64615 CHEMODENERV MUSC MIGRAINE: CPT | Mod: PBBFAC | Performed by: PSYCHIATRY & NEUROLOGY

## 2024-06-19 PROCEDURE — 99999PBSHW PR PBB SHADOW TECHNICAL ONLY FILED TO HB: Mod: JZ,PBBFAC,,

## 2024-06-19 RX ORDER — PREGABALIN 50 MG/1
50 CAPSULE ORAL 2 TIMES DAILY
Qty: 180 CAPSULE | Refills: 3 | Status: SHIPPED | OUTPATIENT
Start: 2024-06-19 | End: 2024-12-18

## 2024-06-19 RX ORDER — BACLOFEN 10 MG/1
10 TABLET ORAL 2 TIMES DAILY
Qty: 180 TABLET | Refills: 3 | Status: SHIPPED | OUTPATIENT
Start: 2024-06-19 | End: 2025-06-19

## 2024-06-19 RX ADMIN — ONABOTULINUMTOXINA 200 UNITS: 100 INJECTION, POWDER, LYOPHILIZED, FOR SOLUTION INTRADERMAL; INTRAMUSCULAR at 09:06

## 2024-08-19 ENCOUNTER — OFFICE VISIT (OUTPATIENT)
Dept: INTERNAL MEDICINE | Facility: CLINIC | Age: 39
End: 2024-08-19
Payer: OTHER GOVERNMENT

## 2024-08-19 VITALS
HEART RATE: 51 BPM | OXYGEN SATURATION: 99 % | BODY MASS INDEX: 22.26 KG/M2 | DIASTOLIC BLOOD PRESSURE: 82 MMHG | TEMPERATURE: 97 F | SYSTOLIC BLOOD PRESSURE: 120 MMHG | WEIGHT: 110.44 LBS | HEIGHT: 59 IN

## 2024-08-19 DIAGNOSIS — E78.5 HYPERLIPIDEMIA, UNSPECIFIED HYPERLIPIDEMIA TYPE: ICD-10-CM

## 2024-08-19 DIAGNOSIS — R07.9 CHEST PAIN, UNSPECIFIED TYPE: Primary | ICD-10-CM

## 2024-08-19 PROCEDURE — 99999 PR PBB SHADOW E&M-EST. PATIENT-LVL IV: CPT | Mod: PBBFAC,,, | Performed by: NURSE PRACTITIONER

## 2024-08-19 PROCEDURE — 99214 OFFICE O/P EST MOD 30 MIN: CPT | Mod: S$PBB,,, | Performed by: NURSE PRACTITIONER

## 2024-08-19 PROCEDURE — 93005 ELECTROCARDIOGRAM TRACING: CPT | Mod: PBBFAC,PO | Performed by: INTERNAL MEDICINE

## 2024-08-19 PROCEDURE — 93010 ELECTROCARDIOGRAM REPORT: CPT | Mod: S$PBB,,, | Performed by: INTERNAL MEDICINE

## 2024-08-19 PROCEDURE — 99214 OFFICE O/P EST MOD 30 MIN: CPT | Mod: PBBFAC,PO | Performed by: NURSE PRACTITIONER

## 2024-08-19 RX ORDER — FAMOTIDINE 40 MG/1
40 TABLET, FILM COATED ORAL DAILY
Qty: 30 TABLET | Refills: 0 | Status: SHIPPED | OUTPATIENT
Start: 2024-08-19 | End: 2025-08-19

## 2024-08-19 RX ORDER — ROSUVASTATIN CALCIUM 20 MG/1
20 TABLET, COATED ORAL DAILY
Qty: 90 TABLET | Refills: 1 | Status: SHIPPED | OUTPATIENT
Start: 2024-08-19

## 2024-08-19 NOTE — PROGRESS NOTES
"Subjective:       Patient ID: Gina Garcia is a 38 y.o. female.    Chief Complaint: Chest Pain (Started sharp stabbing pains 1 year /Sternum )    Pt presents to clinic today for chest pain  New to me, previous pt of Dr. Barraza   Started about 1+ year ago ago  Describes it as a sharp shooting pain that comes and goes  Staying still makes it feel better  Moving makes it worse  Occurs all times of the day  Unable to reproduce the pain  Denies reflux type symptoms  Not worse with laying down  Does have hx of hyperlipidemia- off statins for the past few months due to provider leaving  Denies crushing chest pain, numbness to left arm  Does have lots of stress within her family- parents,  and personal lam with cancer          /82   Pulse (!) 51   Temp 97.4 °F (36.3 °C) (Tympanic)   Ht 4' 11" (1.499 m)   Wt 50.1 kg (110 lb 7.2 oz)   LMP 06/20/2022   SpO2 99%   BMI 22.31 kg/m²     Review of Systems   Constitutional:  Negative for activity change, appetite change, chills, diaphoresis, fatigue, fever and unexpected weight change.   HENT: Negative.     Respiratory:  Positive for chest tightness. Negative for cough and shortness of breath.    Cardiovascular:  Positive for chest pain. Negative for palpitations and leg swelling.   Gastrointestinal: Negative.    Genitourinary: Negative.    Musculoskeletal: Negative.    Skin:  Negative for color change, pallor, rash and wound.   Allergic/Immunologic: Negative for immunocompromised state.   Neurological: Negative.  Negative for dizziness and facial asymmetry.   Hematological:  Negative for adenopathy. Does not bruise/bleed easily.   Psychiatric/Behavioral:  Negative for agitation, behavioral problems and confusion.        Objective:      Physical Exam  Vitals reviewed.   Constitutional:       General: She is not in acute distress.     Appearance: Normal appearance. She is normal weight.   HENT:      Head: Normocephalic.      Right Ear: Tympanic membrane " normal.      Left Ear: Tympanic membrane normal.      Nose: Nose normal.   Eyes:      Conjunctiva/sclera: Conjunctivae normal.      Pupils: Pupils are equal, round, and reactive to light.   Cardiovascular:      Rate and Rhythm: Normal rate.      Pulses: Normal pulses.   Pulmonary:      Effort: Pulmonary effort is normal.   Abdominal:      General: Abdomen is flat.      Palpations: Abdomen is soft.   Musculoskeletal:         General: Normal range of motion.      Cervical back: Normal range of motion.   Skin:     General: Skin is warm.      Capillary Refill: Capillary refill takes less than 2 seconds.   Neurological:      Mental Status: She is alert and oriented to person, place, and time. Mental status is at baseline.   Psychiatric:         Mood and Affect: Mood normal.         Assessment:       1. Chest pain, unspecified type    2. Hyperlipidemia, unspecified hyperlipidemia type    3. BMI 22.0-22.9, adult        Plan:       Gina was seen today for chest pain.    Diagnoses and all orders for this visit:    Chest pain, unspecified type  -     IN OFFICE EKG 12-LEAD (to Muse)  -     famotidine (PEPCID) 40 MG tablet; Take 1 tablet (40 mg total) by mouth once daily.    Hyperlipidemia, unspecified hyperlipidemia type  -     rosuvastatin (CRESTOR) 20 MG tablet; Take 1 tablet (20 mg total) by mouth once daily.    BMI 22.0-22.9, adult      EKG normal in office  Will treat for GERD  Red flags of chest pain discussed with pt and pt's  and both verbalized understanding  If no improvement, will refer to cardio for workup given her history of hyperlipidemia, family hx  Follow up in 3 months for annual and PRN

## 2024-08-20 LAB
OHS QRS DURATION: 76 MS
OHS QTC CALCULATION: 417 MS

## 2024-08-27 ENCOUNTER — OFFICE VISIT (OUTPATIENT)
Dept: CARDIOLOGY | Facility: CLINIC | Age: 39
End: 2024-08-27
Payer: OTHER GOVERNMENT

## 2024-08-27 VITALS
OXYGEN SATURATION: 99 % | HEART RATE: 62 BPM | DIASTOLIC BLOOD PRESSURE: 82 MMHG | WEIGHT: 102.31 LBS | BODY MASS INDEX: 20.66 KG/M2 | SYSTOLIC BLOOD PRESSURE: 118 MMHG

## 2024-08-27 DIAGNOSIS — E78.00 HYPERCHOLESTEROLEMIA: ICD-10-CM

## 2024-08-27 DIAGNOSIS — R07.9 CHEST PAIN, UNSPECIFIED TYPE: Primary | ICD-10-CM

## 2024-08-27 DIAGNOSIS — R94.31 EKG ABNORMALITIES: ICD-10-CM

## 2024-08-27 PROCEDURE — 99999 PR PBB SHADOW E&M-EST. PATIENT-LVL IV: CPT | Mod: PBBFAC,,, | Performed by: INTERNAL MEDICINE

## 2024-08-27 PROCEDURE — 99244 OFF/OP CNSLTJ NEW/EST MOD 40: CPT | Mod: S$PBB,,, | Performed by: INTERNAL MEDICINE

## 2024-08-27 PROCEDURE — 99214 OFFICE O/P EST MOD 30 MIN: CPT | Mod: PBBFAC | Performed by: INTERNAL MEDICINE

## 2024-08-27 NOTE — PROGRESS NOTES
Subjective:   Patient ID:  Gina Garcia is a 39 y.o. female who presents for evaluation of Chest Pain      38 yo female, referred for chest pain   PMH HLD h/o R beast Ca s/p double mastectomy in 2022, no chemo and XRT. No smoking.   C/o chest pain for yrs sharp pain worse now, more frequent, triggered by emotional stress  Biking now.  No f/h premature CAd  EKG reviewed by myself today reveals NSR nonspecific STT change, LA dilation            No results found for this or any previous visit.     No results found for this or any previous visit.       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    Migraines        Past Surgical History:   Procedure Laterality Date    ACHILLES TENDON SURGERY      AXILLARY NODE DISSECTION Right 04/25/2022    Procedure: LYMPHADENECTOMY, AXILLARY;  Surgeon: Micheal Bartholomew MD;  Location: [facility];  Service: General;  Laterality: Right;  Right axillary node dissection    BILATERAL MASTECTOMY Bilateral 04/25/2022    Procedure: MASTECTOMY, BILATERAL;  Surgeon: Micheal Bartholomew MD;  Location: [facility] OR;  Service: General;  Laterality: Bilateral;    BREAST REVISION SURGERY Bilateral 09/23/2022    Procedure: BREAST RECONSTRUCTION SECOND STAGE, FAT GRAFTING BILATERAL BREAST;  Surgeon: Esther Carpio MD;  Location: [facility] OR;  Service: Plastics;  Laterality: Bilateral;    COLONOSCOPY N/A 03/28/2022    Procedure: COLONOSCOPY;  Surgeon: Winnie Polanco MD;  Location: [facility] ENDO;  Service: Endoscopy;  Laterality: N/A;    DEBRIDEMENT, BREAST Right 05/30/2022    Procedure: DEBRIDEMENT, BREAST;  Surgeon: Esther Carpio MD;  Location: [facility] OR;  Service: General;  Laterality: Right;  FLAP WASHOUT AND CLOSURE    EXTERNAL EAR SURGERY      cosmetic    HYSTERECTOMY      INJECTION OF ANESTHETIC AGENT AROUND MEDIAL BRANCH NERVES INNERVATING CERVICAL FACET JOINT Bilateral 10/16/2023    Procedure: Bilateral C2/C3 and C3/C4 MBB with RN IV sedation;   Surgeon: Raul Coe MD;  Location: [facility] PAIN MGT;  Service: Pain Management;  Laterality: Bilateral;    INJECTION OF ANESTHETIC AGENT AROUND MEDIAL BRANCH NERVES INNERVATING CERVICAL FACET JOINT Bilateral 10/30/2023    Procedure: Bilateral C2/C3 and C3/C4 MBB with RN IV sedation;  Surgeon: Raul Coe MD;  Location: [facility] PAIN [facility]GT;  Service: Pain Management;  Laterality: Bilateral;    INSERTION OF BREAST TISSUE EXPANDER Bilateral 04/25/2022    Procedure: INSERTION, TISSUE EXPANDER, BREAST;  Surgeon: Micheal Bartholomew MD;  Location: [facility] OR;  Service: General;  Laterality: Bilateral;  Procedure performed by KRAIG Carpio    RADIOFREQUENCY THERMOCOAGULATION Bilateral 12/11/2023    Procedure: Bilateral C2/C3 and C3/C4 RFA with RN IV sedation;  Surgeon: Raul Coe MD;  Location: [facility] PAIN [facility]GT;  Service: Pain Management;  Laterality: Bilateral;    REPLACEMENT OF IMPLANT OF BREAST Bilateral 09/23/2022    Procedure: TISSUE EXCHANGE TO IMPLANT, CAPSULORRHAPY, CAPSALOTOMY;  Surgeon: Esther Carpio MD;  Location: [facility] OR;  Service: Plastics;  Laterality: Bilateral;    sciatic nerve exploration      SENTINEL LYMPH NODE BIOPSY Right 04/25/2022    Procedure: BIOPSY, LYMPH NODE, SENTINEL;  Surgeon: Micheal Bartholomew MD;  Location: [facility];  Service: General;  Laterality: Right;    XI ROBOTIC HYSTERECTOMY, WITH SALPINGO-OOPHORECTOMY Bilateral 07/01/2022    Procedure: XI ROBOTIC HYSTERECTOMY,WITH SALPINGO-OOPHORECTOMY;  Surgeon: CORDELL Bowen MD;  Location: [facility] OR;  Service: OB/GYN;  Laterality: Bilateral;       Social History     Tobacco Use    Smoking status: Never     Passive exposure: Never    Smokeless tobacco: Never   Substance Use Topics    Alcohol use: No    Drug use: No       Family History   Problem Relation Name Age of Onset    Breast cancer Mother Trina 45        L lumptectomy, chemo; R (dx 69) plans to have BL mastectomy    Skin cancer Mother Trina     Cancer Mother Trina         Breast cancer     Prostate cancer Father Barry 68        radiation complete    Asthma Sister Nena     Lung cancer Maternal Grandmother Vladimir         non-smoker    Prostate cancer Maternal Grandfather Jayant         metastasis to brain    Cancer Maternal Grandfather Jayant         Prostate cancer    Breast cancer Paternal Grandmother Sherri     Cancer Paternal Grandmother Sherri         Breast cancer    Prostate cancer Paternal Grandfather Barry Sr.     Cancer Paternal Grandfather Barry Sr.         Prostate cancer    ADD / ADHD Son Checo     Autism Son Quentin     Diabetes Paternal Uncle Hans     Asthma Sister .     Colon cancer Neg Hx          colon cancer    Ovarian cancer Neg Hx      Thrombophilia Neg Hx         Review of Systems   Constitutional: Negative for decreased appetite, diaphoresis, fever, malaise/fatigue and night sweats.   HENT:  Negative for nosebleeds.    Eyes:  Negative for blurred vision and double vision.   Cardiovascular:  Positive for chest pain. Negative for claudication, dyspnea on exertion, irregular heartbeat, leg swelling, near-syncope, orthopnea, palpitations, paroxysmal nocturnal dyspnea and syncope.   Respiratory:  Negative for cough, shortness of breath, sleep disturbances due to breathing, snoring, sputum production and wheezing.    Endocrine: Negative for cold intolerance and polyuria.   Hematologic/Lymphatic: Does not bruise/bleed easily.   Skin:  Negative for rash.   Musculoskeletal:  Negative for back pain, falls, joint pain, joint swelling and neck pain.   Gastrointestinal:  Negative for abdominal pain, heartburn, nausea and vomiting.   Genitourinary:  Negative for dysuria, frequency and hematuria.   Neurological:  Negative for difficulty with concentration, dizziness, focal weakness, headaches, light-headedness, numbness, seizures and weakness.   Psychiatric/Behavioral:  Negative for depression, memory loss and substance abuse. The patient does not have insomnia.    Allergic/Immunologic:  Negative for HIV exposure and hives.       Objective:   Physical Exam  HENT:      Head: Normocephalic.   Eyes:      Pupils: Pupils are equal, round, and reactive to light.   Neck:      Thyroid: No thyromegaly.      Vascular: Normal carotid pulses. No carotid bruit or JVD.   Cardiovascular:      Rate and Rhythm: Normal rate and regular rhythm. No extrasystoles are present.     Chest Wall: PMI is not displaced.      Pulses: Normal pulses.      Heart sounds: Normal heart sounds. No murmur heard.     No gallop. No S3 sounds.   Pulmonary:      Effort: No respiratory distress.      Breath sounds: Normal breath sounds. No stridor.   Abdominal:      General: Bowel sounds are normal.      Palpations: Abdomen is soft.      Tenderness: There is no abdominal tenderness. There is no rebound.   Musculoskeletal:         General: Normal range of motion.   Skin:     Findings: No rash.   Neurological:      Mental Status: She is alert and oriented to person, place, and time.   Psychiatric:         Behavior: Behavior normal.         Lab Results   Component Value Date    CHOL 175 09/08/2023    CHOL 162 08/12/2022    CHOL 281 (H) 03/16/2022     Lab Results   Component Value Date    HDL 72 09/08/2023    HDL 67 08/12/2022    HDL 53 03/16/2022     Lab Results   Component Value Date    LDLCALC 90.2 09/08/2023    LDLCALC 81.4 08/12/2022    LDLCALC 208.8 (H) 03/16/2022     Lab Results   Component Value Date    TRIG 64 09/08/2023    TRIG 68 08/12/2022    TRIG 96 03/16/2022     Lab Results   Component Value Date    CHOLHDL 41.1 09/08/2023    CHOLHDL 41.4 08/12/2022    CHOLHDL 18.9 (L) 03/16/2022       Chemistry        Component Value Date/Time     09/08/2023 0850    K 4.3 09/08/2023 0850     09/08/2023 0850    CO2 23 09/08/2023 0850    BUN 19 09/08/2023 0850    CREATININE 0.7 09/08/2023 0850    GLU 75 09/08/2023 0850        Component Value Date/Time    CALCIUM 10.1 09/08/2023 0850    ALKPHOS 83 09/08/2023 0850    AST 30 09/08/2023  "0850    ALT 30 09/08/2023 0850    BILITOT 0.6 09/08/2023 0850    ESTGFRAFRICA >60.0 06/29/2022 1423    EGFRNONAA >60.0 06/29/2022 1423          Lab Results   Component Value Date    HGBA1C 4.9 09/08/2023     Lab Results   Component Value Date    TSH 0.704 09/08/2023     No results found for: "INR", "PROTIME"  Lab Results   Component Value Date    WBC 7.30 09/08/2023    HGB 14.4 09/08/2023    HCT 43.2 09/08/2023    MCV 98 09/08/2023     09/08/2023     BNP  @LABRCNTIP(BNP,BNPTRIAGEBLO)@  CrCl cannot be calculated (Patient's most recent lab result is older than the maximum 7 days allowed.).  No results found in the last 24 hours.  No results found in the last 24 hours.  No results found in the last 24 hours.    Assessment:      1. Chest pain, unspecified type    2. Hypercholesterolemia    3. EKG abnormalities        Plan:   Chest pain, unspecified type  -     Ambulatory referral/consult to Cardiology  -     Echo; Future  -     Exercise Stress - EKG; Future    Hypercholesterolemia    EKG abnormalities  -     Echo; Future  -     Exercise Stress - EKG; Future            Echo and ETT  Phone review    Continue statin      "

## 2024-08-29 ENCOUNTER — PATIENT MESSAGE (OUTPATIENT)
Dept: NEUROLOGY | Facility: CLINIC | Age: 39
End: 2024-08-29
Payer: OTHER GOVERNMENT

## 2024-08-29 RX ORDER — PREDNISONE 50 MG/1
50 TABLET ORAL DAILY
Qty: 5 TABLET | Refills: 0 | Status: SHIPPED | OUTPATIENT
Start: 2024-08-29

## 2024-08-29 RX ORDER — HYDROXYZINE HYDROCHLORIDE 50 MG/1
50 TABLET, FILM COATED ORAL NIGHTLY
Qty: 5 TABLET | Refills: 0 | Status: SHIPPED | OUTPATIENT
Start: 2024-08-29

## 2024-09-05 ENCOUNTER — HOSPITAL ENCOUNTER (OUTPATIENT)
Dept: CARDIOLOGY | Facility: HOSPITAL | Age: 39
Discharge: HOME | End: 2024-09-05
Attending: INTERNAL MEDICINE
Payer: OTHER GOVERNMENT

## 2024-09-05 VITALS
BODY MASS INDEX: 20.56 KG/M2 | HEIGHT: 59 IN | DIASTOLIC BLOOD PRESSURE: 82 MMHG | SYSTOLIC BLOOD PRESSURE: 118 MMHG | WEIGHT: 102 LBS

## 2024-09-05 DIAGNOSIS — R94.31 EKG ABNORMALITIES: ICD-10-CM

## 2024-09-05 DIAGNOSIS — R07.9 CHEST PAIN, UNSPECIFIED TYPE: ICD-10-CM

## 2024-09-05 LAB
AORTIC ROOT ANNULUS: 2.17 CM
ASCENDING AORTA: 2.26 CM
AV INDEX (PROSTH): 0.93
AV MEAN GRADIENT: 4 MMHG
AV PEAK GRADIENT: 8 MMHG
AV VALVE AREA BY VELOCITY RATIO: 2.66 CM²
AV VALVE AREA: 2.68 CM²
AV VELOCITY RATIO: 0.93
BSA FOR ECHO PROCEDURE: 1.39 M2
CV ECHO LV RWT: 0.48 CM
CV STRESS BASE HR: 75 BPM
DIASTOLIC BLOOD PRESSURE: 97 MMHG
DOP CALC AO PEAK VEL: 1.38 M/S
DOP CALC AO VTI: 29.2 CM
DOP CALC LVOT AREA: 2.9 CM2
DOP CALC LVOT DIAMETER: 1.91 CM
DOP CALC LVOT PEAK VEL: 1.28 M/S
DOP CALC LVOT STROKE VOLUME: 78.18 CM3
DOP CALC RVOT PEAK VEL: 0.61 M/S
DOP CALC RVOT VTI: 12.5 CM
DOP CALCLVOT PEAK VEL VTI: 27.3 CM
E WAVE DECELERATION TIME: 183.84 MSEC
E/A RATIO: 1.41
E/E' RATIO: 7.17 M/S
ECHO LV POSTERIOR WALL: 0.9 CM (ref 0.6–1.1)
FRACTIONAL SHORTENING: 31 % (ref 28–44)
INTERVENTRICULAR SEPTUM: 0.78 CM (ref 0.6–1.1)
IVC DIAMETER: 1.5 CM
IVRT: 77.07 MSEC
LA MAJOR: 4.06 CM
LA MINOR: 4.18 CM
LA WIDTH: 3.2 CM
LEFT ATRIUM AREA SYSTOLIC (APICAL 2 CHAMBER): 12.54 CM2
LEFT ATRIUM AREA SYSTOLIC (APICAL 4 CHAMBER): 12.64 CM2
LEFT ATRIUM SIZE: 2.69 CM
LEFT ATRIUM VOLUME INDEX MOD: 22 ML/M2
LEFT ATRIUM VOLUME INDEX: 21.7 ML/M2
LEFT ATRIUM VOLUME MOD: 30.57 CM3
LEFT ATRIUM VOLUME: 30.14 CM3
LEFT INTERNAL DIMENSION IN SYSTOLE: 2.59 CM (ref 2.1–4)
LEFT VENTRICLE DIASTOLIC VOLUME INDEX: 42.92 ML/M2
LEFT VENTRICLE DIASTOLIC VOLUME: 59.66 ML
LEFT VENTRICLE END SYSTOLIC VOLUME APICAL 2 CHAMBER: 31.16 ML
LEFT VENTRICLE END SYSTOLIC VOLUME APICAL 4 CHAMBER: 29.6 ML
LEFT VENTRICLE MASS INDEX: 64 G/M2
LEFT VENTRICLE SYSTOLIC VOLUME INDEX: 17.5 ML/M2
LEFT VENTRICLE SYSTOLIC VOLUME: 24.36 ML
LEFT VENTRICULAR INTERNAL DIMENSION IN DIASTOLE: 3.74 CM (ref 3.5–6)
LEFT VENTRICULAR MASS: 89.55 G
LV LATERAL E/E' RATIO: 6.14 M/S
LV SEPTAL E/E' RATIO: 8.6 M/S
LVED V (TEICH): 59.66 ML
LVES V (TEICH): 24.36 ML
LVOT MG: 3.63 MMHG
LVOT MV: 0.89 CM/S
MV PEAK A VEL: 0.61 M/S
MV PEAK E VEL: 0.86 M/S
MV STENOSIS PRESSURE HALF TIME: 53.31 MS
MV VALVE AREA P 1/2 METHOD: 4.13 CM2
OHS CV CPX 85 PERCENT MAX PREDICTED HEART RATE MALE: 154
OHS CV CPX ESTIMATED METS: 10
OHS CV CPX MAX PREDICTED HEART RATE: 181
OHS CV CPX PATIENT IS FEMALE: 1
OHS CV CPX PATIENT IS MALE: 0
OHS CV CPX PEAK DIASTOLIC BLOOD PRESSURE: 92 MMHG
OHS CV CPX PEAK HEAR RATE: 155 BPM
OHS CV CPX PEAK RATE PRESSURE PRODUCT: NORMAL
OHS CV CPX PEAK SYSTOLIC BLOOD PRESSURE: 185 MMHG
OHS CV CPX PERCENT MAX PREDICTED HEART RATE ACHIEVED: 90
OHS CV CPX RATE PRESSURE PRODUCT PRESENTING: NORMAL
OHS CV RV/LV RATIO: 0.55 CM
PISA TR MAX VEL: 1.76 M/S
PULM VEIN S/D RATIO: 1.24
PV MEAN GRADIENT: 1 MMHG
PV PEAK D VEL: 0.41 M/S
PV PEAK GRADIENT: 3 MMHG
PV PEAK S VEL: 0.51 M/S
PV PEAK VELOCITY: 0.9 M/S
RA MAJOR: 3.35 CM
RA PRESSURE ESTIMATED: 3 MMHG
RA WIDTH: 2.72 CM
RIGHT VENTRICULAR END-DIASTOLIC DIMENSION: 2.04 CM
RV TB RVSP: 5 MMHG
SINUS: 2.57 CM
STJ: 2.54 CM
STRESS ECHO POST EXERCISE DUR MIN: 9 MINUTES
STRESS ECHO POST EXERCISE DUR SEC: 0 SECONDS
STRESS ST DEPRESSION: 0.5 MM
SYSTOLIC BLOOD PRESSURE: 148 MMHG
TDI LATERAL: 0.14 M/S
TDI SEPTAL: 0.1 M/S
TDI: 0.12 M/S
TR MAX PG: 12 MMHG
TRICUSPID ANNULAR PLANE SYSTOLIC EXCURSION: 1.97 CM
TV REST PULMONARY ARTERY PRESSURE: 15 MMHG
Z-SCORE OF LEFT VENTRICULAR DIMENSION IN END DIASTOLE: -1.48
Z-SCORE OF LEFT VENTRICULAR DIMENSION IN END SYSTOLE: -0.31

## 2024-09-05 PROCEDURE — 93306 TTE W/DOPPLER COMPLETE: CPT | Mod: 26,,, | Performed by: INTERNAL MEDICINE

## 2024-09-05 PROCEDURE — 93016 CV STRESS TEST SUPVJ ONLY: CPT | Mod: ,,, | Performed by: INTERNAL MEDICINE

## 2024-09-05 PROCEDURE — 93306 TTE W/DOPPLER COMPLETE: CPT

## 2024-09-05 PROCEDURE — 93018 CV STRESS TEST I&R ONLY: CPT | Mod: ,,, | Performed by: INTERNAL MEDICINE

## 2024-09-05 PROCEDURE — 93017 CV STRESS TEST TRACING ONLY: CPT

## 2024-09-06 ENCOUNTER — TELEPHONE (OUTPATIENT)
Dept: CARDIOLOGY | Facility: CLINIC | Age: 39
End: 2024-09-06
Payer: OTHER GOVERNMENT

## 2024-09-06 NOTE — TELEPHONE ENCOUNTER
Attempted to contact patient to discuss results;patient didn't answer LVM      ----- Message from Nick Colbert MD sent at 9/6/2024  4:18 PM CDT -----  The Echo showed normal function and mild valvular leaking ( in normal range).  Treadmill stress EKG normal. No ischemia

## 2024-09-23 ENCOUNTER — PROCEDURE VISIT (OUTPATIENT)
Dept: NEUROLOGY | Facility: CLINIC | Age: 39
End: 2024-09-23
Payer: OTHER GOVERNMENT

## 2024-09-23 VITALS
SYSTOLIC BLOOD PRESSURE: 127 MMHG | WEIGHT: 107.56 LBS | HEART RATE: 66 BPM | DIASTOLIC BLOOD PRESSURE: 81 MMHG | BODY MASS INDEX: 21.68 KG/M2 | HEIGHT: 59 IN

## 2024-09-23 DIAGNOSIS — G44.099 TRIGEMINAL AUTONOMIC CEPHALGIAS: ICD-10-CM

## 2024-09-23 DIAGNOSIS — G43.119 INTRACTABLE MIGRAINE WITH AURA WITHOUT STATUS MIGRAINOSUS: Primary | ICD-10-CM

## 2024-09-23 PROCEDURE — 64615 CHEMODENERV MUSC MIGRAINE: CPT | Mod: PBBFAC | Performed by: PSYCHIATRY & NEUROLOGY

## 2024-09-23 PROCEDURE — 99999PBSHW PR PBB SHADOW TECHNICAL ONLY FILED TO HB: Mod: JZ,PBBFAC,,

## 2024-09-23 PROCEDURE — 64615 CHEMODENERV MUSC MIGRAINE: CPT | Mod: S$PBB,,, | Performed by: PSYCHIATRY & NEUROLOGY

## 2024-09-23 RX ADMIN — ONABOTULINUMTOXINA 200 UNITS: 100 INJECTION, POWDER, LYOPHILIZED, FOR SOLUTION INTRADERMAL; INTRAMUSCULAR at 09:09

## 2024-12-17 ENCOUNTER — TELEPHONE (OUTPATIENT)
Dept: NEUROLOGY | Facility: CLINIC | Age: 39
End: 2024-12-17
Payer: OTHER GOVERNMENT

## 2024-12-31 ENCOUNTER — OFFICE VISIT (OUTPATIENT)
Dept: NEUROLOGY | Facility: CLINIC | Age: 39
End: 2024-12-31
Payer: OTHER GOVERNMENT

## 2024-12-31 ENCOUNTER — LAB VISIT (OUTPATIENT)
Dept: LAB | Facility: HOSPITAL | Age: 39
End: 2024-12-31
Attending: PSYCHIATRY & NEUROLOGY
Payer: OTHER GOVERNMENT

## 2024-12-31 ENCOUNTER — TELEPHONE (OUTPATIENT)
Dept: NEUROLOGY | Facility: CLINIC | Age: 39
End: 2024-12-31

## 2024-12-31 ENCOUNTER — PROCEDURE VISIT (OUTPATIENT)
Dept: NEUROLOGY | Facility: CLINIC | Age: 39
End: 2024-12-31
Payer: OTHER GOVERNMENT

## 2024-12-31 VITALS
HEIGHT: 59 IN | HEART RATE: 81 BPM | BODY MASS INDEX: 20.89 KG/M2 | WEIGHT: 103.63 LBS | SYSTOLIC BLOOD PRESSURE: 124 MMHG | DIASTOLIC BLOOD PRESSURE: 84 MMHG

## 2024-12-31 DIAGNOSIS — E78.00 HYPERCHOLESTEROLEMIA: ICD-10-CM

## 2024-12-31 DIAGNOSIS — G43.119 INTRACTABLE MIGRAINE WITH AURA WITHOUT STATUS MIGRAINOSUS: Primary | ICD-10-CM

## 2024-12-31 DIAGNOSIS — G50.0 TRIGEMINAL NEURALGIA OF LEFT SIDE OF FACE: ICD-10-CM

## 2024-12-31 DIAGNOSIS — D05.11 DUCTAL CARCINOMA IN SITU (DCIS) OF RIGHT BREAST: ICD-10-CM

## 2024-12-31 DIAGNOSIS — M26.69 OTHER SPECIFIED DISORDERS OF TEMPOROMANDIBULAR JOINT: ICD-10-CM

## 2024-12-31 DIAGNOSIS — F41.9 ANXIETY: ICD-10-CM

## 2024-12-31 DIAGNOSIS — M54.81 CERVICO-OCCIPITAL NEURALGIA: ICD-10-CM

## 2024-12-31 DIAGNOSIS — G44.011 INTRACTABLE EPISODIC CLUSTER HEADACHE: ICD-10-CM

## 2024-12-31 DIAGNOSIS — Z80.51 FAMILY HISTORY OF CANCER OF THE KIDNEY: ICD-10-CM

## 2024-12-31 DIAGNOSIS — R29.90 MULTIPLE NEUROLOGICAL SYMPTOMS: ICD-10-CM

## 2024-12-31 DIAGNOSIS — F51.01 PRIMARY INSOMNIA: ICD-10-CM

## 2024-12-31 DIAGNOSIS — G43.119 INTRACTABLE MIGRAINE WITH AURA WITHOUT STATUS MIGRAINOSUS: ICD-10-CM

## 2024-12-31 DIAGNOSIS — R29.90 MULTIPLE NEUROLOGICAL SYMPTOMS: Primary | ICD-10-CM

## 2024-12-31 DIAGNOSIS — G44.099 TRIGEMINAL AUTONOMIC CEPHALGIAS: ICD-10-CM

## 2024-12-31 DIAGNOSIS — M21.371 RIGHT FOOT DROP: ICD-10-CM

## 2024-12-31 DIAGNOSIS — G50.1 ATYPICAL FACIAL PAIN: ICD-10-CM

## 2024-12-31 DIAGNOSIS — Z15.89 BIALLELIC MUTATION OF CHEK2 GENE WITHOUT DIAGNOSED MALIGNANCY: ICD-10-CM

## 2024-12-31 DIAGNOSIS — Z15.09 BIALLELIC MUTATION OF CHEK2 GENE WITHOUT DIAGNOSED MALIGNANCY: ICD-10-CM

## 2024-12-31 DIAGNOSIS — Z15.01 BIALLELIC MUTATION OF CHEK2 GENE WITHOUT DIAGNOSED MALIGNANCY: ICD-10-CM

## 2024-12-31 DIAGNOSIS — M26.623 BILATERAL TEMPOROMANDIBULAR JOINT PAIN: ICD-10-CM

## 2024-12-31 DIAGNOSIS — G57.01 NEUROPATHY OF RIGHT SCIATIC NERVE: ICD-10-CM

## 2024-12-31 PROCEDURE — 36415 COLL VENOUS BLD VENIPUNCTURE: CPT | Performed by: PSYCHIATRY & NEUROLOGY

## 2024-12-31 PROCEDURE — 99215 OFFICE O/P EST HI 40 MIN: CPT | Mod: 25,S$PBB,, | Performed by: PSYCHIATRY & NEUROLOGY

## 2024-12-31 PROCEDURE — 99999PBSHW PR PBB SHADOW TECHNICAL ONLY FILED TO HB: Mod: JZ,PBBFAC,,

## 2024-12-31 PROCEDURE — 64615 CHEMODENERV MUSC MIGRAINE: CPT | Mod: S$PBB,,, | Performed by: PSYCHIATRY & NEUROLOGY

## 2024-12-31 PROCEDURE — 86041 ACETYLCHOLN RCPTR BNDNG ANTB: CPT | Performed by: PSYCHIATRY & NEUROLOGY

## 2024-12-31 PROCEDURE — 64615 CHEMODENERV MUSC MIGRAINE: CPT | Mod: PBBFAC | Performed by: PSYCHIATRY & NEUROLOGY

## 2024-12-31 RX ORDER — GALCANEZUMAB 100 MG/ML
300 INJECTION, SOLUTION SUBCUTANEOUS
Qty: 3 ML | Refills: 11 | Status: SHIPPED | OUTPATIENT
Start: 2024-12-31

## 2024-12-31 RX ORDER — CARBAMAZEPINE 200 MG/1
200 TABLET ORAL 2 TIMES DAILY
Qty: 180 TABLET | Refills: 3 | Status: SHIPPED | OUTPATIENT
Start: 2024-12-31

## 2024-12-31 RX ORDER — VERAPAMIL HYDROCHLORIDE 40 MG/1
40 TABLET ORAL 2 TIMES DAILY
Qty: 180 TABLET | Refills: 3 | Status: SHIPPED | OUTPATIENT
Start: 2024-12-31 | End: 2025-12-31

## 2024-12-31 RX ADMIN — ONABOTULINUMTOXINA 200 UNITS: 100 INJECTION, POWDER, LYOPHILIZED, FOR SOLUTION INTRADERMAL; INTRAMUSCULAR at 08:12

## 2024-12-31 NOTE — TELEPHONE ENCOUNTER
----- Message from Cecelia sent at 12/31/2024 11:08 AM CST -----  Contact: [phone]  [facility] pharmacy is calling she states the prescription was written for 300 but it only comes in a 120 dose for Emgality

## 2024-12-31 NOTE — PROGRESS NOTES
Subjective:       Patient ID: Gina Garcia is a 39 y.o. female.    Chief Complaint: No chief complaint on file.          HPI        BACKGROUND HISTORY       The patient presented on 11- for evaluation of numerous chronic neurological problems.    The patient underwent RT sciatic nerve exploration when she was 12-13 years-old for flail foot. The surgeon told her and her family he saw a discoloration with no evidence of neuroma. The patient underwent Achilles release which did not help. The patient has been experiencing escalating pain in the RT sciatic nerve distribution.Failed TPM, GBP, PGB, Amitriptyline and Effexor. Ordered NCS/EMG RUE, tried CBZ titration to 200 mg BID and changed Tizanidine to Baclofen 10 mg PO BID titration.     When she was 15 she started experiencing common migraine headaches without aura with chronic neck pain (RT side and Occipital, moderate-severe, throbbing, associated with nausea, vomiting, light, noise and smell sensitivity). Failed OTC medications, Rx NSAIDs, Triptans. Failed TPM, GBP, PGB, Amitriptyline and Effexor. Botox has been helpful.  Zanaflex (Tizanidine) 4 mg QHS does help with the neck spasm and some aspects of the headache. Ordered Brain MRI WWO, tried Nurtec 75 mg ODT PRN.,continued Botox 200 Units Q 3 months and added Emgality 120 mg SQ monthly.     The patient also reports longstanding and severe B/L TMJDS with pain, clicking and stiffness. Ordered TMJ MRI, tried Botox TMJ injections and Baclofen 10 mg PO BID to replace Tizanidine.    Since 2020 the patient has been experiencing LT side intense facial pain affecting the face, eye and nostril. The pain is very intense and feels like electric shocks. Failed TPM, GBP, PGB, Amitriptyline and Effexor. Tried CBZ titration to 200 mg BID and changed Tizanidine to Baclofen 10 mg PO BID titration.       INTERVAL HISTORY         On 11- NCS/EMG BLE RT Sciatic Mononeuropathy (Tibial>Peroneal).  mg BID and  Baclofen 10 mg PO BID have helped.     On 11- Brain MRI WWO NL.Nurtec 75 mg ODT PRN. Botox 200 Units Q 3 months and Emgality 120 mg SQ monthly have helped but still has severe pain.     On 11- TMJ MRI showed evidence of RT TMJD. Botox TMJ injections and Baclofen 10 mg PO BID have helped.     CBZ titration 200 mg BID and Baclofen 10 mg PO BID have helped but still gets significant breakthrough pain.       Review of Systems   Constitutional:  Negative for appetite change and fatigue.   HENT:  Negative for hearing loss and tinnitus.    Eyes:  Negative for photophobia and visual disturbance.   Respiratory:  Negative for apnea and shortness of breath.    Cardiovascular:  Negative for chest pain and palpitations.   Gastrointestinal:  Negative for nausea and vomiting.   Endocrine: Negative for cold intolerance and heat intolerance.   Genitourinary:  Negative for difficulty urinating and urgency.   Musculoskeletal:  Positive for gait problem and neck pain. Negative for arthralgias, back pain, joint swelling, myalgias and neck stiffness.   Skin:  Negative for color change and rash.   Allergic/Immunologic: Negative for environmental allergies and immunocompromised state.   Neurological:  Positive for weakness, numbness and headaches. Negative for dizziness, tremors, seizures, syncope, facial asymmetry, speech difficulty and light-headedness.   Hematological:  Negative for adenopathy. Does not bruise/bleed easily.   Psychiatric/Behavioral:  Positive for dysphoric mood and sleep disturbance. Negative for agitation, behavioral problems, confusion, decreased concentration, hallucinations, self-injury and suicidal ideas. The patient is nervous/anxious. The patient is not hyperactive.                Current Outpatient Medications:     ascorbic acid, vitamin C, (VITAMIN C) 250 MG tablet, Take 250 mg by mouth once daily., Disp: , Rfl:     b complex vitamins capsule, Take 1 capsule by mouth once daily., Disp: , Rfl:      baclofen (LIORESAL) 10 MG tablet, Take 1 tablet (10 mg total) by mouth 2 (two) times daily. (Patient taking differently: Take 10 mg by mouth once daily.), Disp: 180 tablet, Rfl: 3    calcium-vitamin D3 (OS-JAMEL 500 + D3) 500 mg-5 mcg (200 unit) per tablet, Take 1 tablet by mouth 2 (two) times daily with meals., Disp: , Rfl:     carBAMazepine (TEGRETOL) 200 mg tablet, Take 1 tablet (200 mg total) by mouth 2 (two) times a day., Disp: 180 tablet, Rfl: 3    cycloSPORINE (RESTASIS) 0.05 % ophthalmic emulsion, Place 1 drop into both eyes 2 (two) times daily., Disp: 60 each, Rfl: 12    famotidine (PEPCID) 40 MG tablet, Take 1 tablet (40 mg total) by mouth once daily., Disp: 30 tablet, Rfl: 0    folic acid (FOLVITE) 400 MCG tablet, Take 400 mcg by mouth once daily., Disp: , Rfl:     galcanezumab-gnlm 300 mg/3 mL (100 mg/mL x 3) Syrg, Inject 3 mLs (300 mg total) into the skin every 28 days., Disp: 3 mL, Rfl: 11    hydrOXYzine (ATARAX) 50 MG tablet, Take 1 tablet (50 mg total) by mouth every evening., Disp: 5 tablet, Rfl: 0    latanoprost 0.005 % ophthalmic solution, Place 1 drop into both eyes every evening., Disp: 2.5 mL, Rfl: 12    magnesium 200 mg Tab, Take 500 mg by mouth as needed., Disp: , Rfl:     meloxicam (MOBIC) 15 MG tablet, (FOR PAIN /INFLAMATION) TAKE 1 TABLET BY MOUTH ONCE A DAY AS NEEDED for pain, Disp: 30 tablet, Rfl: 1    predniSONE (DELTASONE) 50 MG Tab, Take 1 tablet (50 mg total) by mouth once daily., Disp: 5 tablet, Rfl: 0    rosuvastatin (CRESTOR) 20 MG tablet, Take 1 tablet (20 mg total) by mouth once daily., Disp: 90 tablet, Rfl: 1    Current Facility-Administered Medications:     onabotulinumtoxina injection 200 Units, 200 Units, Intramuscular, 1 time in Clinic/HOD, Raul Coe MD    onabotulinumtoxina injection 200 Units, 200 Units, Intramuscular, Q90 Days, Olivia Randall MD, 200 Units at 12/31/24 0815    Past Medical History:   Diagnosis Date    Anxiety     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    Migraines        Past Surgical History:   Procedure Laterality Date    ACHILLES TENDON SURGERY      AXILLARY NODE DISSECTION Right 04/25/2022    Procedure: LYMPHADENECTOMY, AXILLARY;  Surgeon: Micheal Bartholomew MD;  Location: [facility] OR;  Service: General;  Laterality: Right;  Right axillary node dissection    BILATERAL MASTECTOMY Bilateral 04/25/2022    Procedure: MASTECTOMY, BILATERAL;  Surgeon: Micheal Bartholomew MD;  Location: [facility] OR;  Service: General;  Laterality: Bilateral;    BREAST REVISION SURGERY Bilateral 09/23/2022    Procedure: BREAST RECONSTRUCTION SECOND STAGE, FAT GRAFTING BILATERAL BREAST;  Surgeon: Esther Carpio MD;  Location: [facility] OR;  Service: Plastics;  Laterality: Bilateral;    COLONOSCOPY N/A 03/28/2022    Procedure: COLONOSCOPY;  Surgeon: Winnie Polanco MD;  Location: [facility] ENDO;  Service: Endoscopy;  Laterality: N/A;    DEBRIDEMENT, BREAST Right 05/30/2022    Procedure: DEBRIDEMENT, BREAST;  Surgeon: Etsher Carpio MD;  Location: [facility];  Service: General;  Laterality: Right;  FLAP WASHOUT AND CLOSURE    EXTERNAL EAR SURGERY      cosmetic    HYSTERECTOMY      INJECTION OF ANESTHETIC AGENT AROUND MEDIAL BRANCH NERVES INNERVATING CERVICAL FACET JOINT Bilateral 10/16/2023    Procedure: Bilateral C2/C3 and C3/C4 MBB with RN IV sedation;  Surgeon: Raul Coe MD;  Location: [facility] PAIN MGT;  Service: Pain Management;  Laterality: Bilateral;    INJECTION OF ANESTHETIC AGENT AROUND MEDIAL BRANCH NERVES INNERVATING CERVICAL FACET JOINT Bilateral 10/30/2023    Procedure: Bilateral C2/C3 and C3/C4 MBB with RN IV sedation;  Surgeon: Raul Coe MD;  Location: [facility] PAIN MGT;  Service: Pain Management;  Laterality: Bilateral;    INSERTION OF BREAST TISSUE EXPANDER Bilateral 04/25/2022    Procedure: INSERTION, TISSUE EXPANDER, BREAST;  Surgeon: Micheal Bartholomew MD;  Location: [facility] OR;  Service: General;  Laterality: Bilateral;  Procedure performed by KRAIG Carpio     RADIOFREQUENCY THERMOCOAGULATION Bilateral 12/11/2023    Procedure: Bilateral C2/C3 and C3/C4 RFA with RN IV sedation;  Surgeon: Raul Coe MD;  [facility];  Service: Pain Management;  Laterality: Bilateral;    REPLACEMENT OF IMPLANT OF BREAST Bilateral 09/23/2022    Procedure: TISSUE EXCHANGE TO IMPLANT, CAPSULORRHAPY, CAPSALOTOMY;  Surgeon: Esther Carpio MD;  Location: [facility] OR;  Service: Plastics;  Laterality: Bilateral;    sciatic nerve exploration      SENTINEL LYMPH NODE BIOPSY Right 04/25/2022    Procedure: BIOPSY, LYMPH NODE, SENTINEL;  Surgeon: Micheal Bartholomew MD;  Location: [facility] OR;  Service: General;  Laterality: Right;    XI ROBOTIC HYSTERECTOMY, WITH SALPINGO-OOPHORECTOMY Bilateral 07/01/2022    Procedure: XI ROBOTIC HYSTERECTOMY,WITH SALPINGO-OOPHORECTOMY;  Surgeon: CORDELL Bowen MD;  Location: [facility] OR;  Service: OB/GYN;  Laterality: Bilateral;       Social History     Socioeconomic History    Marital status:     Number of children: 2   Occupational History    Occupation: Stay at home mom   Tobacco Use    Smoking status: Never     Passive exposure: Never    Smokeless tobacco: Never   Substance and Sexual Activity    Alcohol use: No    Drug use: No    Sexual activity: Yes     Partners: Male     Birth control/protection: None     Social Drivers of Health     Financial Resource Strain: Patient Declined (8/18/2024)    Overall Financial Resource Strain (CARDIA)     Difficulty of Paying Living Expenses: Patient declined   Food Insecurity: Patient Declined (8/18/2024)    Hunger Vital Sign     Worried About Running Out of Food in the Last Year: Patient declined     Ran Out of Food in the Last Year: Patient declined   Physical Activity: Unknown (8/18/2024)    Exercise Vital Sign     Days of Exercise per Week: Patient declined   Stress: Patient Declined (8/18/2024)    [employer] [place] [employer] - Occupational Stress Questionnaire     Feeling of Stress :  Patient declined   Housing Stability: Unknown (8/18/2024)    Housing Stability Vital Sign     Unable to Pay for Housing in the Last Year: Patient declined             Past/Current Medical/Surgical History, Past/Current Social History, Past/Current Family History and Past/Current Medications were reviewed in detail.        Objective:           VITAL SIGNS WERE REVIEWED      GENERAL APPEARANCE:     The patient looks uncomfortable.    BMI 20.93    No signs of respiratory distress.    Normal breathing pattern.    No dysmorphic features    Normal eye contact.       GENERAL MEDICAL EXAM:    HEENT:  Head is atraumatic normocephalic.     FUNDOSCOPIC (OPHTHALMOSCOPIC) EXAMINATION showed no disc edema (papilledema).      NECK: No JVD. No visible lesions or goiters.     CHEST-CARDIOPULMONARY: No cyanosis. No tachypnea. Normal respiratory effort.    EEHAGGB-JPLGHIPEQKKOPBGG-BZHICGJJHF: No jaundice. No stomas or lesions. No visible hernias. No catheters.     SKIN, HAIR, NAILS: No pathognomonic skin rash.No neurofibromatosis. No visible lesions.No stigmata of autoimmune disease. No clubbing.    LIMBS: No varicose veins. No visible swelling.    MUSCULOSKELETAL: No visible deformities.No visible lesions.             Neurologic Exam     Mental Status   Oriented to person, place, and time.   Follows 3 step commands.   Attention: normal. Concentration: normal.   Speech: speech is normal   Level of consciousness: alert  Able to name object. Able to repeat. Normal comprehension.     Cranial Nerves   Cranial nerves II through XII intact.     CN II   Visual fields full to confrontation.   Visual acuity: normal  Right visual field deficit: none  Left visual field deficit: none     CN III, IV, VI   Pupils are equal, round, and reactive to light.  Extraocular motions are normal.   Right pupil: Size: 2 mm. Shape: regular. Reactivity: brisk. Consensual response: intact. Accommodation: intact.   Left pupil: Size: 2 mm. Shape: regular.  Reactivity: brisk. Consensual response: intact. Accommodation: intact.   CN III: no CN III palsy  CN VI: no CN VI palsy  Nystagmus: none   Diplopia: none  Ophthalmoparesis: none  Upgaze: normal  Downgaze: normal  Conjugate gaze: present  Vestibulo-ocular reflex: present    CN V   Facial sensation intact.   Right facial sensation deficit: none  Left facial sensation deficit: none  Jaw jerk: normal    CN VII   Facial expression full, symmetric.   Right facial weakness: none  Left facial weakness: none    CN VIII   CN VIII normal.   Hearing: intact    CN IX, X   CN IX normal.   CN X normal.   Palate: symmetric    CN XI   CN XI normal.   Right sternocleidomastoid strength: normal  Left sternocleidomastoid strength: normal  Right trapezius strength: normal  Left trapezius strength: normal    CN XII   CN XII normal.   Tongue: not atrophic  Fasciculations: absent  Tongue deviation: none    Motor Exam   Muscle bulk: normal  Overall muscle tone: normal  Right arm tone: normal  Left arm tone: normal  Right arm pronator drift: absent  Left arm pronator drift: absent  Right leg tone: normal  Left leg tone: normal    Strength   Right neck flexion: 5/5  Left neck flexion: 5/5  Right neck extension: 5/5  Left neck extension: 5/5  Right deltoid: 5/5  Left deltoid: 5/5  Right biceps: 5/5  Left biceps: 5/5  Right triceps: 5/5  Left triceps: 5/5  Right wrist flexion: 5/5  Left wrist flexion: 5/5  Right wrist extension: 5/5  Left wrist extension: 5/5  Right interossei: 5/5  Left interossei: 5/5  Right iliopsoas: 5/5  Left iliopsoas: 5/5  Right quadriceps: 5/5  Left quadriceps: 5/5  Right hamstrin/5  Left hamstrin/5  Right glutei: 5/5  Left glutei: 5/5  Right anterior tibial: 4/5  Left anterior tibial: 5/5  Right posterior tibial: 4/5  Left posterior tibial: 5/5  Right peroneal: 4/5  Left peroneal: 5/5  Right gastroc: 4/5  Left gastroc: 5/5    Sensory Exam   Right arm light touch: normal  Left arm light touch: normal  Right leg  light touch: decreased from knee  Left leg light touch: normal  Vibration normal.   Right arm vibration: normal  Left arm vibration: normal  Right leg vibration: normal  Left leg vibration: normal  Proprioception normal.   Right arm proprioception: normal  Left arm proprioception: normal  Right leg proprioception: normal  Left leg proprioception: normal  Right arm pinprick: normal  Left arm pinprick: normal  Right leg pinprick: decreased from knee  Left leg pinprick: normal  Sensory deficit distribution on right: sciatic  Graphesthesia: normal  Stereognosis: normal    Gait, Coordination, and Reflexes     Gait  Gait: (RT Flail Foot)    Coordination   Romberg: negative  Finger to nose coordination: normal  Heel to shin coordination: normal    Tremor   Resting tremor: absent  Intention tremor: absent  Action tremor: absent    Reflexes   Right brachioradialis: 2+  Left brachioradialis: 2+  Right biceps: 2+  Left biceps: 2+  Right triceps: 2+  Left triceps: 2+  Right patellar: 2+  Left patellar: 2+  Right achilles: 1+  Left achilles: 2+  Right plantar: normal  Left plantar: normal  Right Gilbert: absent  Left Gilbert: absent  Right ankle clonus: absent  Left ankle clonus: absent  Right pendular knee jerk: absent  Left pendular knee jerk: absent      Lab Results   Component Value Date    WBC 7.30 09/08/2023    HGB 14.4 09/08/2023    HCT 43.2 09/08/2023    MCV 98 09/08/2023     09/08/2023       Sodium   Date Value Ref Range Status   09/08/2023 143 136 - 145 mmol/L Final     Potassium   Date Value Ref Range Status   09/08/2023 4.3 3.5 - 5.1 mmol/L Final     Chloride   Date Value Ref Range Status   09/08/2023 107 95 - 110 mmol/L Final     CO2   Date Value Ref Range Status   09/08/2023 23 23 - 29 mmol/L Final     Glucose   Date Value Ref Range Status   09/08/2023 75 70 - 110 mg/dL Final     BUN   Date Value Ref Range Status   09/08/2023 19 6 - 20 mg/dL Final     Creatinine   Date Value Ref Range Status   09/08/2023 0.7  0.5 - 1.4 mg/dL Final     Calcium   Date Value Ref Range Status   09/08/2023 10.1 8.7 - 10.5 mg/dL Final     Total Protein   Date Value Ref Range Status   09/08/2023 7.6 6.0 - 8.4 g/dL Final     Albumin   Date Value Ref Range Status   09/08/2023 4.7 3.5 - 5.2 g/dL Final     Total Bilirubin   Date Value Ref Range Status   09/08/2023 0.6 0.1 - 1.0 mg/dL Final     Comment:     For infants and newborns, interpretation of results should be based  on gestational age, weight and in agreement with clinical  observations.    Premature Infant recommended reference ranges:  Up to 24 hours.............<8.0 mg/dL  Up to 48 hours............<12.0 mg/dL  3-5 days..................<15.0 mg/dL  6-29 days.................<15.0 mg/dL       Alkaline Phosphatase   Date Value Ref Range Status   09/08/2023 83 55 - 135 U/L Final     AST   Date Value Ref Range Status   09/08/2023 30 10 - 40 U/L Final     ALT   Date Value Ref Range Status   09/08/2023 30 10 - 44 U/L Final     Anion Gap   Date Value Ref Range Status   09/08/2023 13 8 - 16 mmol/L Final     eGFR if    Date Value Ref Range Status   06/29/2022 >60.0 >60 mL/min/1.73 m^2 Final     eGFR if non    Date Value Ref Range Status   06/29/2022 >60.0 >60 mL/min/1.73 m^2 Final     Comment:     Calculation used to obtain the estimated glomerular filtration  rate (eGFR) is the CKD-EPI equation.          Lab Results   Component Value Date    TSH 0.704 09/08/2023         LABORATORY EVALUATION      [id number]      CBC, CMP, TFT, HCV, HIV Unremarkable         RADIOLOGY EVALUATION       11-    Brain MRI WWO NL    TMJ MRI showed evidence of RT TMJD        NEUROPHYSIOLOGY EVALUATION     11-    NCS/EMG BLE RT Sciatic Mononeuropathy (Tibial>Peroneal)         PATHOLOGY EVALUATION        NEUROCOGNITIVE AND NEUROPSYCHOLOGY EVALUATION           Reviewed the neuroimaging independently       Assessment:           1. Multiple neurological symptoms    2. Anxiety     3. Hypercholesterolemia    4. Intractable migraine with aura without status migrainosus    5. Primary insomnia    6. Family history of cancer of the kidney    7. 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 Ductal carcinoma in situ (DCIS) of right breast    9. Bilateral temporomandibular joint pain    10. Atypical facial pain    11. Cervico-occipital neuralgia    12. Trigeminal autonomic cephalgias    13. Trigeminal neuralgia of left side of face    14. Other specified disorders of temporomandibular joint    15. Right foot drop    16. Neuropathy of right sciatic nerve    17. Intractable episodic cluster headache          Plan:             HISTORY OF RIGHT SCIATIC MONONEUROPATHY WITH FLAIL FOOT AND NEUROPATHIC PAIN        Continue CBZ titration to 200 mg BID.     Continue Baclofen 10 mg PO BID.    Failed TPM, GBP, PGB, Amitriptyline,  Tizanidine (Zanaflex)      NEXT OPTIONS:    Cymbalta, LTG, Baclofen.         CHRONIC MIXED HEADACHES: COMMON MIGRAINE WITHOUT AURA, CERVICOGENIC HEADACHE, CERVICO-OCCIPITAL NEURALGIA         Continue Nurtec 75 mg ODT PRN. Failed OTC medications, Rx NSAIDs, Triptans. NEXT OPTIONS: Ubrely, Reyvow    Continue Botox 200 Units Q 3 months.(Has >15 headaches a month >8 hours)    Change  Emgality to 300 mg SQ monthly.Failed TPM, GBP, PGB, Amitriptyline and Effexor.     Add Verapamil 40 mg PO BID. Monitor BP. NEXT OPTIONS: LTG, Aimovig, Ajovy.           BILATERAL TEMPOROMANDIBULAR JOINT DYSFUNCTION (TMJD)      B/L TMJ MRI.    Continue TMJ and Masseter Botox injections.     Continue Baclofen 10 mg PO BID.    Failed Tizanidine (Zanaflex)          TRIGEMINAL NEURALGIA (TN), ATYPICAL FACIAL PAIN SYNDROME            Continue CBZ titration to 200 mg BID.     Continue Baclofen 10 mg PO BID.    Failed TPM, GBP, PGB, Amitriptyline,  Tizanidine (Zanaflex)       NEXT OPTIONS:    Cymbalta, LTG, Baclofen.        MEDICAL/SURGICAL COMORBIDITIES     All relevant medical comorbidities  noted and managed by primary care physician and medical care team.          HEALTHY LIFESTYLE AND PREVENTATIVE CARE    The patient to adhere to the age-appropriate health maintenance guidelines including screening tests and vaccinations. The patient to adhere to  healthy lifestyle, optimal weight, exercise, healthy diet, good sleep hygiene and avoiding drugs including smoking, alcohol and recreational drugs.          RTC FOR BOTOX             Olivia Randall MD, FAAN    Attending Neurologist/Epileptologist         Diplomate, American Board of Psychiatry and Neurology    Diplomate, American Board of Clinical Neurophysiology     Fellow, American Academy of Neurology         I spent a total of 40 minutes on the day of the visit.  This includes face to face time and non-face to face time preparing to see the patient (eg, review of tests), obtaining and/or reviewing separately obtained history, documenting clinical information in the electronic or other health record, independently interpreting results and communicating results to the patient/family/caregiver, or care coordinator.

## 2025-01-08 ENCOUNTER — PATIENT MESSAGE (OUTPATIENT)
Dept: NEUROLOGY | Facility: CLINIC | Age: 40
End: 2025-01-08

## 2025-03-06 ENCOUNTER — PATIENT MESSAGE (OUTPATIENT)
Dept: NEUROLOGY | Facility: CLINIC | Age: 40
End: 2025-03-06

## 2025-03-06 RX ORDER — HYDROXYZINE HYDROCHLORIDE 50 MG/1
50 TABLET, FILM COATED ORAL NIGHTLY
Qty: 5 TABLET | Refills: 0 | Status: SHIPPED | OUTPATIENT
Start: 2025-03-06

## 2025-03-06 RX ORDER — PREDNISONE 50 MG/1
50 TABLET ORAL DAILY
Qty: 5 TABLET | Refills: 0 | Status: SHIPPED | OUTPATIENT
Start: 2025-03-06

## 2025-03-07 ENCOUNTER — PATIENT MESSAGE (OUTPATIENT)
Dept: NEUROLOGY | Facility: CLINIC | Age: 40
End: 2025-03-07

## 2025-03-07 DIAGNOSIS — G44.099 TRIGEMINAL AUTONOMIC CEPHALGIAS: ICD-10-CM

## 2025-03-07 DIAGNOSIS — G50.0 TRIGEMINAL NEURALGIA OF LEFT SIDE OF FACE: ICD-10-CM

## 2025-03-07 RX ORDER — CARBAMAZEPINE 200 MG/1
200 TABLET ORAL 3 TIMES DAILY
Qty: 270 TABLET | Refills: 3 | Status: SHIPPED | OUTPATIENT
Start: 2025-03-07

## 2025-04-11 ENCOUNTER — PROCEDURE VISIT (OUTPATIENT)
Dept: NEUROLOGY | Facility: CLINIC | Age: 40
End: 2025-04-11
Payer: OTHER GOVERNMENT

## 2025-04-11 VITALS
BODY MASS INDEX: 22 KG/M2 | WEIGHT: 109.13 LBS | DIASTOLIC BLOOD PRESSURE: 79 MMHG | HEIGHT: 59 IN | HEART RATE: 63 BPM | SYSTOLIC BLOOD PRESSURE: 134 MMHG | RESPIRATION RATE: 18 BRPM

## 2025-04-11 DIAGNOSIS — G43.709 CHRONIC MIGRAINE WITHOUT AURA WITHOUT STATUS MIGRAINOSUS, NOT INTRACTABLE: ICD-10-CM

## 2025-04-11 DIAGNOSIS — G43.119 INTRACTABLE MIGRAINE WITH AURA WITHOUT STATUS MIGRAINOSUS: Primary | ICD-10-CM

## 2025-04-11 RX ORDER — RIMEGEPANT SULFATE 75 MG/75MG
75 TABLET, ORALLY DISINTEGRATING ORAL ONCE AS NEEDED
Qty: 8 TABLET | Refills: 6 | Status: SHIPPED | OUTPATIENT
Start: 2025-04-11 | End: 2025-04-11

## 2025-04-11 RX ADMIN — ONABOTULINUMTOXINA 200 UNITS: 100 INJECTION, POWDER, LYOPHILIZED, FOR SOLUTION INTRADERMAL; INTRAMUSCULAR at 09:04

## 2025-04-11 NOTE — PROCEDURES
BOTOX  HAS PROVEN VERY EFFECTIVE IN SIGNIFICANTLY REDUCING THE SEVERITY AND FREQUENCY OF MIGRAINE HEADACHES                  PROCEDURE NAME:       INTRAMUSCULAR BOTOX INJECTIONS            PROCEDURE INDICATION:      INTRACTABLE (PHARMACOLOGICALLY UNRESPONSIVE/RESISTANT) MIGRAINE           PROCEDURE DESCRIPTION:     The procedure was discussed in detail with the patient and the consent form was signed. The patient verbalized understanding of the procedure .     I discussed the risks that may include serious immune reaction and distant spread that could results in loss of breathing. Other side effects may include droopy eyelids, trouble swallowing and cardiac arrhythmias. It was also stressed that it is contraindicated in pregnancy.         PROCEDURE TIME OUT PROCESS:      Time Out was called.      Two patient identifiers were used (first and last name in addition to date of birth). The patient stated the procedure being done (Botox injections) in the scalp muscles (both sides).             PROCEDURE EXECUTION:      As per the standard protocol, a total 155 units were injected in 31 sites.            RT  5 units in 1 site     LT  5 units in 1 site      Procerus 5 units in 1 site      RT Frontalis 10 units in 2 sites      LT Frontalis 10 units in 2 sites      RT Temporalis 20 units in 4 sites     LT Temporalis 20 units in 4 sites     RT Occipitalis 15 units in 3 sites     LT Occipitalis 15 units in 3 sites      RT Cervical Paraspinals 10 units in 2 sites     LT Cervical Paraspinals 10 units in 2 sites     RT Trapezius 15 units in 3 sites     LT Trapezius 15 units in 3 sites         Per the standard of care protocol we use 155 units and waste 45 units. I gave the patient the option of following the standard protocol vs.using the extra 45 units to target areas where the pain is maximum which could potentially provide extra help with headache control. I explained to the patient that this will be an  off-label use, not the standard protocol, not evidence-based and could result in more pronounced side effects. The patient verbalized full understanding of all the facts and the risks and benefits and elected to use the extra 45 units for the following sites:        Procerus 5 units in 1 site      RT Masseter 20 units in 4 sites      RT Masseter 20 units in 4 sites         PROCEDURE COURSE:        The standard protocol was followed. The procedure was executed very smoothly.            PROCEDURE COMPLICATIONS:      None. The patient tolerated the procedure very well.            PROCEDURE AFTERCARE:      I encouraged the patient to use ice if the sites of injection get tender and call me with any problems or complications.            FOLLOW UP:        RTC in 10 weeks for a new set of injections and call with any questions and/or concerns.

## 2025-05-29 DIAGNOSIS — M50.30 DDD (DEGENERATIVE DISC DISEASE), CERVICAL: ICD-10-CM

## 2025-05-29 DIAGNOSIS — M79.18 MYOFASCIAL PAIN: ICD-10-CM

## 2025-05-29 DIAGNOSIS — G43.709 CHRONIC MIGRAINE WITHOUT AURA WITHOUT STATUS MIGRAINOSUS, NOT INTRACTABLE: ICD-10-CM

## 2025-05-29 DIAGNOSIS — S03.00XD DISLOCATION OF TEMPOROMANDIBULAR JOINT, SUBSEQUENT ENCOUNTER: ICD-10-CM

## 2025-05-29 DIAGNOSIS — M47.812 CERVICAL SPONDYLOSIS: ICD-10-CM

## 2025-05-29 RX ORDER — MELOXICAM 15 MG/1
TABLET ORAL
Qty: 30 TABLET | Refills: 1 | Status: CANCELLED | OUTPATIENT
Start: 2025-05-29

## 2025-07-09 RX ORDER — BACLOFEN 10 MG/1
10 TABLET ORAL 2 TIMES DAILY
Qty: 180 TABLET | Refills: 3 | Status: SHIPPED | OUTPATIENT
Start: 2025-07-09 | End: 2026-07-09

## 2025-07-22 ENCOUNTER — TELEPHONE (OUTPATIENT)
Dept: NEUROLOGY | Facility: CLINIC | Age: 40
End: 2025-07-22
Payer: OTHER GOVERNMENT

## 2025-07-22 ENCOUNTER — PATIENT MESSAGE (OUTPATIENT)
Dept: NEUROLOGY | Facility: CLINIC | Age: 40
End: 2025-07-22
Payer: OTHER GOVERNMENT

## 2025-07-28 ENCOUNTER — TELEPHONE (OUTPATIENT)
Dept: NEUROLOGY | Facility: CLINIC | Age: 40
End: 2025-07-28
Payer: OTHER GOVERNMENT

## 2025-07-30 ENCOUNTER — PROCEDURE VISIT (OUTPATIENT)
Dept: NEUROLOGY | Facility: CLINIC | Age: 40
End: 2025-07-30
Payer: OTHER GOVERNMENT

## 2025-07-30 VITALS
HEIGHT: 59 IN | DIASTOLIC BLOOD PRESSURE: 84 MMHG | HEART RATE: 87 BPM | WEIGHT: 108 LBS | RESPIRATION RATE: 16 BRPM | BODY MASS INDEX: 21.77 KG/M2 | SYSTOLIC BLOOD PRESSURE: 129 MMHG

## 2025-07-30 DIAGNOSIS — M54.81 CERVICO-OCCIPITAL NEURALGIA: ICD-10-CM

## 2025-07-30 DIAGNOSIS — G43.709 CHRONIC MIGRAINE WITHOUT AURA WITHOUT STATUS MIGRAINOSUS, NOT INTRACTABLE: ICD-10-CM

## 2025-07-30 DIAGNOSIS — S03.00XD DISLOCATION OF TEMPOROMANDIBULAR JOINT, SUBSEQUENT ENCOUNTER: ICD-10-CM

## 2025-07-30 DIAGNOSIS — M26.623 BILATERAL TEMPOROMANDIBULAR JOINT PAIN: ICD-10-CM

## 2025-07-30 DIAGNOSIS — G43.119 INTRACTABLE MIGRAINE WITH AURA WITHOUT STATUS MIGRAINOSUS: Primary | ICD-10-CM

## 2025-07-30 DIAGNOSIS — G50.0 TRIGEMINAL NEURALGIA OF LEFT SIDE OF FACE: ICD-10-CM

## 2025-07-30 DIAGNOSIS — G44.099 TRIGEMINAL AUTONOMIC CEPHALGIAS: ICD-10-CM

## 2025-07-30 PROBLEM — S03.00XA DISLOCATION OF JAW: Status: ACTIVE | Noted: 2023-11-15

## 2025-07-30 PROBLEM — M79.18 MYOFASCIAL PAIN: Status: ACTIVE | Noted: 2025-07-30

## 2025-07-30 PROBLEM — M50.30 DDD (DEGENERATIVE DISC DISEASE), CERVICAL: Status: ACTIVE | Noted: 2025-07-30

## 2025-07-30 PROBLEM — M47.812 CERVICAL SPONDYLOSIS: Status: ACTIVE | Noted: 2025-07-30

## 2025-07-30 PROCEDURE — 64615 CHEMODENERV MUSC MIGRAINE: CPT | Mod: S$PBB,,, | Performed by: NURSE PRACTITIONER

## 2025-07-30 PROCEDURE — 64615 CHEMODENERV MUSC MIGRAINE: CPT | Mod: PBBFAC | Performed by: NURSE PRACTITIONER

## 2025-07-30 PROCEDURE — 99999PBSHW PR PBB SHADOW TECHNICAL ONLY FILED TO HB: Mod: JZ,PBBFAC,,

## 2025-07-30 RX ORDER — RIZATRIPTAN BENZOATE 10 MG/1
10 TABLET ORAL
Qty: 9 TABLET | Refills: 3 | Status: SHIPPED | OUTPATIENT
Start: 2025-07-30

## 2025-07-30 RX ORDER — MELOXICAM 15 MG/1
15 TABLET ORAL DAILY
Qty: 30 TABLET | Refills: 2 | Status: SHIPPED | OUTPATIENT
Start: 2025-07-30 | End: 2025-10-28

## 2025-07-30 RX ADMIN — ONABOTULINUMTOXINA 200 UNITS: 100 INJECTION, POWDER, LYOPHILIZED, FOR SOLUTION INTRADERMAL; INTRAMUSCULAR at 09:07

## 2025-07-30 NOTE — PROCEDURES
BOTOX  HAS PROVEN VERY EFFECTIVE IN SIGNIFICANTLY REDUCING THE SEVERITY AND FREQUENCY OF MIGRAINE HEADACHES                  PROCEDURE NAME:       INTRAMUSCULAR BOTOX INJECTIONS            PROCEDURE INDICATION:      INTRACTABLE (PHARMACOLOGICALLY UNRESPONSIVE/RESISTANT) MIGRAINE           PROCEDURE DESCRIPTION:     The procedure was discussed in detail with the patient and the consent form was signed. The patient verbalized understanding of the procedure .     I discussed the risks that may include serious immune reaction and distant spread that could results in loss of breathing. Other side effects may include droopy eyelids, trouble swallowing and cardiac arrhythmias. It was also stressed that it is contraindicated in pregnancy.         PROCEDURE TIME OUT PROCESS:      Time Out was called.      Two patient identifiers were used (first and last name in addition to date of birth). The patient stated the procedure being done (Botox injections) in the scalp muscles (both sides).             PROCEDURE EXECUTION:      As per the standard protocol, a total 155 units were injected in 31 sites.            RT  5 units in 1 site     LT  5 units in 1 site      Procerus 5 units in 1 site      RT Frontalis 10 units in 2 sites      LT Frontalis 10 units in 2 sites      RT Temporalis 20 units in 4 sites     LT Temporalis 20 units in 4 sites     RT Occipitalis 15 units in 3 sites     LT Occipitalis 15 units in 3 sites      RT Cervical Paraspinals 10 units in 2 sites     LT Cervical Paraspinals 10 units in 2 sites     RT Trapezius 15 units in 3 sites     LT Trapezius 15 units in 3 sites         Per the standard of care protocol we use 155 units and waste 45 units. I gave the patient the option of following the standard protocol vs.using the extra 45 units to target areas where the pain is maximum which could potentially provide extra help with headache control. I explained to the patient that this will be an  off-label use, not the standard protocol, not evidence-based and could result in more pronounced side effects. The patient verbalized full understanding of all the facts and the risks and benefits and elected to use the extra 45 units for the following sites:        Procerus 5 units in 1 site      RT Masseter 20 units in 4 sites      RT Masseter 20 units in 4 sites         PROCEDURE COURSE:        The standard protocol was followed. The procedure was executed very smoothly.            PROCEDURE COMPLICATIONS:      None. The patient tolerated the procedure very well.            PROCEDURE AFTERCARE:      I encouraged the patient to use ice if the sites of injection get tender and call me with any problems or complications.            FOLLOW UP:        RTC in 10 weeks for a new set of injections and call with any questions and/or concerns.        Juan Luis Guillen, MSN NP      Collaborating Provider: Olivia Randall MD, FAAN Neurologist/Epileptologist

## (undated) DEVICE — ELECTRODE REM PLYHSV RETURN 9

## (undated) DEVICE — CATH FOLEY SIL 2-WAY 12FR 5CC

## (undated) DEVICE — OBTURATOR BLADELESS 8MM XI CLR

## (undated) DEVICE — PACK BASIC SETUP SC BR

## (undated) DEVICE — SUT MONOCRYL 4-0 PS-1 UND

## (undated) DEVICE — SET CYSTO IRRIGATION UNIV SPIK

## (undated) DEVICE — SUT ETHILON 4-0 BLK MONO

## (undated) DEVICE — TOWEL OR DISP STRL BLUE 4/PK

## (undated) DEVICE — OCCLUDER COLPO-PNEUMO STERILE

## (undated) DEVICE — NDL SAFETY 25G X 1.5 ECLIPSE

## (undated) DEVICE — PAD ABD 8X10 STERILE

## (undated) DEVICE — IRRIGATOR ENDOSCOPY DISP.

## (undated) DEVICE — DRESSING GAUZE XEROFORM 5X9

## (undated) DEVICE — ADHESIVE MASTISOL VIAL 48/BX

## (undated) DEVICE — SEE MEDLINE ITEM 146292

## (undated) DEVICE — APPLIER CLIP LIAGCLIP 9.375IN

## (undated) DEVICE — SOL 9P NACL IRR PIC IL

## (undated) DEVICE — COVER OVERHEAD SURG LT BLUE

## (undated) DEVICE — SUT MONOCRYL PLUS UD 3-0 27

## (undated) DEVICE — DRAPE LAVH LAPAROSCOPY W/FLUID

## (undated) DEVICE — EVACUATOR WOUND BULB 100CC

## (undated) DEVICE — TRAY SKIN SCRUB WET PREMIUM

## (undated) DEVICE — GLOVE SURG BIOGEL LATEX SZ 7.5

## (undated) DEVICE — SOL WATER STRL IRR 1000ML

## (undated) DEVICE — DRAPE INCISE IOBAN 2 13X13IN

## (undated) DEVICE — SEE MEDLINE ITEM 157137

## (undated) DEVICE — GLOVE SURGICAL LATEX SZ 6.5

## (undated) DEVICE — SYS REVOLVE FAT PROCESSING

## (undated) DEVICE — MANIFOLD 4 PORT

## (undated) DEVICE — DRAPE CHEST FEN 15X10IN

## (undated) DEVICE — SUT VICRYL 2-0 27 CT-1

## (undated) DEVICE — SYR 50CC LL

## (undated) DEVICE — VEST SURGICAL SZ 2

## (undated) DEVICE — DRAPE STERI LONG

## (undated) DEVICE — COVER LIGHT HANDLE 80/CA

## (undated) DEVICE — DRESSING TRANS 4X4 TEGADERM

## (undated) DEVICE — SYR LUER LOCK STERILE 10ML

## (undated) DEVICE — SYR 3CC LUER LOC

## (undated) DEVICE — SET PNEUMOCLEAR HEAT HUM SE HF

## (undated) DEVICE — STOPCOCK DISCOFIX 3 WAY

## (undated) DEVICE — DRESSING TRANS 4X10 TEGADERM

## (undated) DEVICE — SOL ELECTROLUBE ANTI-STIC

## (undated) DEVICE — GAUZE SPONGE 4X4 12PLY

## (undated) DEVICE — Device

## (undated) DEVICE — STAPLER SKIN PROXIMATE WIDE

## (undated) DEVICE — SUT MONOCYRL 4-0 PS2 UND

## (undated) DEVICE — SUT MONOCRYL 4.0 PS2 CP496G

## (undated) DEVICE — ADHESIVE DERMABOND ADVANCED

## (undated) DEVICE — APPLICATOR CHLORAPREP ORN 26ML

## (undated) DEVICE — SUT Z338H PDSII 3-0

## (undated) DEVICE — KIT ANTIFOG W/SPONG & FLUID

## (undated) DEVICE — SEE MEDLINE ITEM 157117

## (undated) DEVICE — SUT 5/0 18IN PLAIN GUT D/A

## (undated) DEVICE — SCRUB 10% POVIDONE IODINE 4OZ

## (undated) DEVICE — SYR 50ML CATH TIP

## (undated) DEVICE — UNDERGLOVE BIOGEL PI SZ 6.5 LF

## (undated) DEVICE — SUT MONOCRYL 3-0 SH U/D

## (undated) DEVICE — SUT ETHILON 3-0 PSL 30 BLK

## (undated) DEVICE — SPONGE LAP 18X18 PREWASHED

## (undated) DEVICE — SUT 2/0 30IN SILK BLK BRAI

## (undated) DEVICE — COVER TIP CURVED SCISSORS XI

## (undated) DEVICE — DRESSING TRANS 2X2 TEGADERM

## (undated) DEVICE — SEE MEDLINE ITEM 157181

## (undated) DEVICE — GLOVE SURGICAL LATEX SZ 7

## (undated) DEVICE — SYR 30CC LUER LOCK

## (undated) DEVICE — EVACUATOR PENCIL SMOKE NEPTUNE

## (undated) DEVICE — TROCAR ENDOPATH XCEL 5X100MM

## (undated) DEVICE — TUBING MEDI-VAC 20FT .25IN

## (undated) DEVICE — DRESSING ANTIMICROBIAL 1 INCH

## (undated) DEVICE — SUT PDS II 3-0 PS-1 CLEAR M

## (undated) DEVICE — SEE MEDLINE ITEM 157027

## (undated) DEVICE — GAUZE SPONGE PEANUT STRL

## (undated) DEVICE — NDL PNEUMO INSUFFLATI 120MM

## (undated) DEVICE — SUPPORT ULNA NERVE PROTECTOR

## (undated) DEVICE — TAPE MEDIPORE 4IN X 2YDS

## (undated) DEVICE — NDL SPINAL 20GX3.5 HUB

## (undated) DEVICE — CLOSURE SKIN STERI STRIP 1/2X4

## (undated) DEVICE — SET SECONDARY UNIVERSAL

## (undated) DEVICE — SKIN MARKER DEVON 160

## (undated) DEVICE — SEAL UNIVERSAL 5MM-8MM XI

## (undated) DEVICE — SUT PDSII 3-0 SH 27IN CLEAR

## (undated) DEVICE — TIP RUMI BLUE DISPOSABLE 5/BX

## (undated) DEVICE — DRAPE COLUMN DAVINCI XI

## (undated) DEVICE — SOL NS 1000CC

## (undated) DEVICE — KIT PREVENA INCISION MGMT 13CM

## (undated) DEVICE — DRESSING CURITY WOUND 10X30IN

## (undated) DEVICE — SUT VICRYL 3-0 27 SH

## (undated) DEVICE — SUT VICRYL PLUS 3-0 SH 18IN

## (undated) DEVICE — DRAIN WND 15FRX3/16X4.7MM TRCR

## (undated) DEVICE — DRESSING TRANS 8X12 TEGADERM

## (undated) DEVICE — SYS CLSR DERMABOND PRINEO 22CM

## (undated) DEVICE — STOCKINETTE IMPERVIOUS MEDIUM

## (undated) DEVICE — DRAPE ARM DAVINCI XI

## (undated) DEVICE — RETRACTOR RADIALUX LIGHTED

## (undated) DEVICE — TRAY CATH FOL SIL URIMTR 16FR

## (undated) DEVICE — COVER CAMERA OPERATING ROOM

## (undated) DEVICE — HEADREST ROUND DISP FOAM 9IN

## (undated) DEVICE — SUT VICRYL PLUS 0 CT1 36IN

## (undated) DEVICE — BLADE SURG #15 CARBON STEEL

## (undated) DEVICE — SHEARS HARMONIC CRVD 9 CM

## (undated) DEVICE — CLIPPER BLADE MOD 4406 (CAREF)

## (undated) DEVICE — SYR 10CC LUER LOCK

## (undated) DEVICE — BOWL STERILE LARGE 32OZ